# Patient Record
Sex: MALE | Race: WHITE | NOT HISPANIC OR LATINO | Employment: OTHER | URBAN - NONMETROPOLITAN AREA
[De-identification: names, ages, dates, MRNs, and addresses within clinical notes are randomized per-mention and may not be internally consistent; named-entity substitution may affect disease eponyms.]

---

## 2017-01-02 ENCOUNTER — HOSPITAL ENCOUNTER (OUTPATIENT)
Dept: RADIOLOGY | Facility: OTHER | Age: 62
End: 2017-01-02

## 2017-01-02 DIAGNOSIS — M54.16 RADICULOPATHY OF LUMBAR REGION: ICD-10-CM

## 2017-01-02 DIAGNOSIS — M48.061 SPINAL STENOSIS OF LUMBAR REGION: ICD-10-CM

## 2017-01-02 DIAGNOSIS — M79.605 PAIN OF LEFT LEG: ICD-10-CM

## 2017-01-02 DIAGNOSIS — M54.50 LOW BACK PAIN: ICD-10-CM

## 2017-01-30 ENCOUNTER — AMBULATORY - GICH (OUTPATIENT)
Dept: SCHEDULING | Facility: OTHER | Age: 62
End: 2017-01-30

## 2017-02-01 ENCOUNTER — AMBULATORY - GICH (OUTPATIENT)
Dept: RADIOLOGY | Facility: OTHER | Age: 62
End: 2017-02-01

## 2017-02-01 DIAGNOSIS — M54.16 RADICULOPATHY OF LUMBAR REGION: ICD-10-CM

## 2017-02-01 DIAGNOSIS — M54.50 LOW BACK PAIN: ICD-10-CM

## 2017-02-01 DIAGNOSIS — M79.605 PAIN OF LEFT LEG: ICD-10-CM

## 2017-02-01 DIAGNOSIS — M48.061 SPINAL STENOSIS OF LUMBAR REGION: ICD-10-CM

## 2017-02-07 ENCOUNTER — AMBULATORY - GICH (OUTPATIENT)
Dept: RADIOLOGY | Facility: OTHER | Age: 62
End: 2017-02-07

## 2017-02-07 ENCOUNTER — HOSPITAL ENCOUNTER (OUTPATIENT)
Dept: RADIOLOGY | Facility: OTHER | Age: 62
End: 2017-02-07

## 2017-02-07 DIAGNOSIS — M54.50 LOW BACK PAIN: ICD-10-CM

## 2017-02-07 DIAGNOSIS — M48.061 SPINAL STENOSIS OF LUMBAR REGION: ICD-10-CM

## 2017-02-07 DIAGNOSIS — M79.605 PAIN OF LEFT LEG: ICD-10-CM

## 2017-02-07 DIAGNOSIS — M54.16 RADICULOPATHY OF LUMBAR REGION: ICD-10-CM

## 2017-02-16 ENCOUNTER — COMMUNICATION - GICH (OUTPATIENT)
Dept: FAMILY MEDICINE | Facility: OTHER | Age: 62
End: 2017-02-16

## 2017-02-20 ENCOUNTER — HISTORY (OUTPATIENT)
Dept: FAMILY MEDICINE | Facility: OTHER | Age: 62
End: 2017-02-20

## 2017-02-20 ENCOUNTER — OFFICE VISIT - GICH (OUTPATIENT)
Dept: FAMILY MEDICINE | Facility: OTHER | Age: 62
End: 2017-02-20

## 2017-02-20 DIAGNOSIS — R20.0 ANESTHESIA OF SKIN: ICD-10-CM

## 2017-02-20 DIAGNOSIS — R03.0 ELEVATED BLOOD PRESSURE READING WITHOUT DIAGNOSIS OF HYPERTENSION: ICD-10-CM

## 2017-02-20 DIAGNOSIS — M48.061 SPINAL STENOSIS OF LUMBAR REGION: ICD-10-CM
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
## 2017-02-20 DIAGNOSIS — K58.0 IRRITABLE BOWEL SYNDROME WITH DIARRHEA: ICD-10-CM

## 2017-02-20 DIAGNOSIS — M54.16 RADICULOPATHY OF LUMBAR REGION: ICD-10-CM

## 2017-02-20 DIAGNOSIS — M79.2 NEURALGIA AND NEURITIS, UNSPECIFIED (CODE): ICD-10-CM

## 2017-02-20 LAB
ABSOLUTE BASOPHILS - HISTORICAL: 0.1 THOU/CU MM
ABSOLUTE EOSINOPHILS - HISTORICAL: 0.1 THOU/CU MM
ABSOLUTE LYMPHOCYTES - HISTORICAL: 1.8 THOU/CU MM (ref 0.9–2.9)
ABSOLUTE MONOCYTES - HISTORICAL: 0.4 THOU/CU MM
ABSOLUTE NEUTROPHILS - HISTORICAL: 3.9 THOU/CU MM (ref 1.7–7)
BASOPHILS # BLD AUTO: 1.4 %
C-REACTIVE PROTEIN - HISTORICAL: <1 MG/DL
EOSINOPHIL NFR BLD AUTO: 1.7 %
ERYTHROCYTE [DISTWIDTH] IN BLOOD BY AUTOMATED COUNT: 11.6 % (ref 11.5–15.5)
ERYTHROCYTE [SEDIMENTATION RATE] IN BLOOD: 2 MM/HR
HCT VFR BLD AUTO: 44.6 % (ref 37–53)
HEMOGLOBIN: 15.4 G/DL (ref 13.5–17.5)
LYMPHOCYTES NFR BLD AUTO: 28.5 % (ref 20–44)
MCH RBC QN AUTO: 31.2 PG (ref 26–34)
MCHC RBC AUTO-ENTMCNC: 34.4 G/DL (ref 32–36)
MCV RBC AUTO: 91 FL (ref 80–100)
MONOCYTES NFR BLD AUTO: 6.9 %
NEUTROPHILS NFR BLD AUTO: 61.5 % (ref 42–72)
PLATELET # BLD AUTO: 230 THOU/CU MM (ref 140–440)
PMV BLD: 7.4 FL (ref 6.5–11)
RED BLOOD COUNT - HISTORICAL: 4.91 MIL/CU MM (ref 4.3–5.9)
WHITE BLOOD COUNT - HISTORICAL: 6.3 THOU/CU MM (ref 4.5–11)

## 2017-02-22 LAB — LYME SCREEN W/REFLEX WEST BLOT - HISTORICAL: NEGATIVE

## 2017-03-03 ENCOUNTER — AMBULATORY - GICH (OUTPATIENT)
Dept: FAMILY MEDICINE | Facility: OTHER | Age: 62
End: 2017-03-03

## 2017-03-03 ENCOUNTER — HOSPITAL ENCOUNTER (OUTPATIENT)
Dept: RADIOLOGY | Facility: OTHER | Age: 62
End: 2017-03-03
Attending: FAMILY MEDICINE

## 2017-03-03 DIAGNOSIS — R20.0 ANESTHESIA OF SKIN: ICD-10-CM

## 2017-03-03 DIAGNOSIS — M48.061 SPINAL STENOSIS OF LUMBAR REGION: ICD-10-CM

## 2017-03-03 DIAGNOSIS — M54.16 RADICULOPATHY OF LUMBAR REGION: ICD-10-CM

## 2017-04-10 ENCOUNTER — COMMUNICATION - GICH (OUTPATIENT)
Dept: FAMILY MEDICINE | Facility: OTHER | Age: 62
End: 2017-04-10

## 2017-04-10 DIAGNOSIS — M48.061 SPINAL STENOSIS OF LUMBAR REGION: ICD-10-CM

## 2017-04-10 DIAGNOSIS — M54.16 RADICULOPATHY OF LUMBAR REGION: ICD-10-CM

## 2017-04-10 DIAGNOSIS — M54.50 LOW BACK PAIN: ICD-10-CM

## 2017-06-06 ENCOUNTER — OFFICE VISIT - GICH (OUTPATIENT)
Dept: FAMILY MEDICINE | Facility: OTHER | Age: 62
End: 2017-06-06

## 2017-06-06 ENCOUNTER — HISTORY (OUTPATIENT)
Dept: FAMILY MEDICINE | Facility: OTHER | Age: 62
End: 2017-06-06

## 2017-06-06 ENCOUNTER — COMMUNICATION - GICH (OUTPATIENT)
Dept: FAMILY MEDICINE | Facility: OTHER | Age: 62
End: 2017-06-06

## 2017-06-06 DIAGNOSIS — L25.9 CONTACT DERMATITIS: ICD-10-CM

## 2017-12-28 NOTE — TELEPHONE ENCOUNTER
Patient Information     Patient Name MRN Rj Martin [id number] Male 1955      Telephone Encounter by Aspen Rodgers at 2017  9:52 AM     Author:  Aspen Rodgers Service:  (none) Author Type:  (none)     Filed:  2017  9:52 AM Encounter Date:  2017 Status:  Signed     :  Aspen Rodgers            Pt notified of appointment.    Aspen Rodgers ....................  2017   9:52 AM

## 2017-12-28 NOTE — PROGRESS NOTES
Patient Information     Patient Name MRN Rj Martin [id number] Male 1955      Progress Notes by Darryl Carlisle MD at 2017  2:30 PM     Author:  Darryl Carlisle MD Service:  (none) Author Type:  Physician     Filed:  2017  3:46 PM Encounter Date:  2017 Status:  Signed     :  Darryl Carlisle MD (Physician)            SUBJECTIVE:  61 y.o. male who presents for evaluation of a rash on his ankles. The rash is bilateral and follows exactly the pattern of a paracytic said he just wore for half a day. The socks are a pair of cotton socks that he is warned many times, but they were in a drawer and he had not worn them since last fall. He asked his wife if she had use something different to wash them but there was no change in any detergent or any other issues. The rash is been quite itchy and causes some swelling down around the ankles. It's uncomfortable to walk but not really painful. There is no rash elsewhere.    Additional Review of Systems: see HPI:      Past Medical History:     Diagnosis  Date     Carpal tunnel syndrome     Both hands      Diverticulosis of sigmoid colon 2014     Essential and other specified forms of tremor 3/2/2011     HA (headache)      Multiple lipomas 2014     Pain in joint, multiple sites      Patellar fracture 09    Sustained right superior pole patellar fracture which underwent conservative management      RENAL CALCULUS     possible       Umbilical hernia 2014     URTICARIA, CHRONIC 3/2/2011        Current Outpatient Prescriptions       Medication  Sig Dispense Refill     cholestyramine-sucrose 4 G per scoop (QUESTRAN) 4 gram powder Take 1 Packet by mouth 2 times daily. 1 Container 1     hyoscyamine (LEVSIN) 0.125 mg tablet Take 1 tablet by mouth every 4 hours if needed for Other (Specify) (Abdominal cramps). 30 tablet 5     nortriptyline (PAMELOR) 10 mg capsule Take 1-5 capsules by mouth at bedtime. Start with  "one capsule at bedtime and titrate upward as needed. 100 capsule 0     traMADol (ULTRAM) 50 mg tablet Take 1-2 tablets by mouth every 6 hours if needed for Pain (use mainly at night--may take 2 tablets at a time at night if needed). 100 tablet 3     triamcinolone (ARISTOCORT; KENALOG) 0.1 % cream Apply  topically to affected area(s) 3 times daily. 80 g 3     No current facility-administered medications for this visit.      Medications have been reviewed by me and are current to the best of my knowledge and ability.      Allergies as of 06/06/2017 - Gautam as Reviewed 06/06/2017      Allergen  Reaction Noted     Versed [midazolam] Other - Describe In Comment Field 09/08/2014        OBJECTIVE:  /78  Ht 1.791 m (5' 10.51\")  Wt 105.7 kg (233 lb)  BMI 32.95 kg/m2  EXAM:  EXAM:  General Appearance: Pleasant, alert, appropriate appearance for age. No acute distress  Foot Exam: Normal pulses. No gross deformity. Both ankles are mildly swollen. There is an erythematous rash which extends from the toes all the way up to the top of where the socks contacted the skin. There is a perfect line of demarcation where the sock and it. The rash is not raised, there are no lesions, and no pitting.        ASSESSMENT/PLAN:  Contact dermatitis. The etiology is not clear. The rash, however, follows exactly the pattern of where the socks were in contact with the skin. I recommended avoiding those socks, and treat with triamcinolone cream. He does have prednisone at home which he can take if symptoms don't improve. Follow-up as needed for this problem.  Darryl Carlisle MD ....................  6/6/2017   3:46 PM            "

## 2017-12-30 NOTE — NURSING NOTE
Patient Information     Patient Name MRN Rj Martin [id number] Male 1955      Nursing Note by Aspen Rodgers at 2017  2:30 PM     Author:  Aspen Rodgers Service:  (none) Author Type:  (none)     Filed:  2017  2:36 PM Encounter Date:  2017 Status:  Signed     :  Aspen Rodgers            Patient presents to the clinic today for a bilateral foot rash.     Aspen Rodgers ....................  2017   2:27 PM

## 2018-01-02 NOTE — PROGRESS NOTES
Patient Information     Patient Name MRN Sex Rj Tavera [id number] Male 1955      Progress Notes by Helga Huang at 2017  2:26 PM     Author:  Helga Huang Service:  (none) Author Type:  (none)     Filed:  2017  2:26 PM Date of Service:  2017  2:26 PM Status:  Signed     :  Helga Huang            Falls Risk Criteria:    Age 65 and older or under age 4        Sensory deficits    Poor vision    Use of ambulatory aides    Impaired judgment    Unable to walk independently    Meets High Risk criteria for falls:  no

## 2018-01-02 NOTE — PROGRESS NOTES
Patient Information     Patient Name MRN Rj Martin [id number] Male 1955      Progress Notes by Helga Huang at 2017  2:27 PM     Author:  Helga Huang Service:  (none) Author Type:  (none)     Filed:  2017  2:27 PM Date of Service:  2017  2:27 PM Status:  Signed     :  Helga Huang            RECOVERY TIME  Some numbness may be present 2-4 hours after the injection, which could impair your normal driving, reflexes.  You will need someone to drive you home from your exam due to the effects of certain medications.    You may experience numbness and/or relief of your pain for up to 4-6 hours after the injection.  Your usual symptoms may return the night of the procedure and may possible be more severe than usual a day or two following.  Please keep track of your pain over the next several days and report how long the relief lasts to the doctor who referred you for this procedure.    The beneficial effects of the steroids usually require 2 to 3 days to take effect, buy may take as long as 5 to 7 days.  If there is no change in the pain, then investigation can be focused on other possible sources of your pain.  In either case, the information is useful to the doctor who referred you for this procedure.    POSSIBLE SIDE EFFECTS  Facial flushing (redness), occasional low grade fevers of 99.5F or less, hiccups, insomnia, headaches, increased heart rate, abdominal cramping, and/or a bloating feeling are side effects of the steroid medications and will go away 3 to 4 days after the injection.    Diabetic Patients  The steroids you have received may significantly increase your blood sugar levels.  Monitor your blood sugar level closely (4-6 times per day) for a period of 4 days or until your blood sugar level normalizes.  If your blood sugar level elevates significantly or you experience confusion, dizziness, sweating, please notify our primary physician and make him/her aware  that you have received steroids.

## 2018-01-02 NOTE — PROGRESS NOTES
Patient Information     Patient Name MRN Sex Rj Tavera [id number] Male 1955      Progress Notes by Helga Huang at 2017  2:26 PM     Author:  Helga Huang Service:  (none) Author Type:  (none)     Filed:  2017  2:26 PM Date of Service:  2017  2:26 PM Status:  Signed     :  Helga Huang            [place] Protocol    A. Pre-procedure verification complete yes  1-relevant information / documentation available, reviewed and properly matched to the patient; 2-consent accurate and complete, 3-equipment and supplies available    B. Site marking complete Yes  Site marked if not in continuous attendance with patient    C. TIME OUT completed yes  Time Out was conducted just prior to starting procedure to verify the eight required elements: 1-patient identity, 2-consent accurate and complete, 3-position, 4-correct side/site marked (if applicable), 5-procedure, 6-relevant images / results properly labeled and displayed (if applicable), 7-antibiotics / irrigation fluids (if applicable), 8-safety precautions.

## 2018-01-03 NOTE — TELEPHONE ENCOUNTER
Patient Information     Patient Name MRN Sex jR Tavera [id number] Male 1955      Telephone Encounter by Bhavana Deluca at 2017 11:32 AM     Author:  Bhavana Deluca Service:  (none) Author Type:  (none)     Filed:  2017 11:32 AM Encounter Date:  2017 Status:  Signed     :  Bhavana Deluca            Patient notified and will come in at 145 on .  Radha Deluca LPN ...... 2017 11:32 AM

## 2018-01-03 NOTE — NURSING NOTE
Patient Information     Patient Name MRN Rj Martin [id number] Male 1955      Nursing Note by Aspen Rodgers at 2017  1:45 PM     Author:  Aspen Rodgers Service:  (none) Author Type:  (none)     Filed:  2017  2:07 PM Encounter Date:  2017 Status:  Signed     :  Aspen Rodgers            Patient presents to the clinic today for a follow up on his back.  Aspen Rodgers LPN.................. 2017 1:49 PM

## 2018-01-03 NOTE — PROGRESS NOTES
Patient Information     Patient Name MRN Rj Martin [id number] Male 1955      Progress Notes by Darryl Carlisle MD at 2017  1:45 PM     Author:  Darryl Carlisle MD Service:  (none) Author Type:  Physician     Filed:  2017  9:00 AM Encounter Date:  2017 Status:  Signed     :  Darryl Carlisle MD (Physician)            SUBJECTIVE:  61 y.o. male who presents for discussion of several issues. The main one is his back. He has had 2 injections, neither of which has given him much benefit. He has pain in the low back, in the hip area, which radiates down the leg almost all the way to the toe. The pain seems to stop in the ankle or forefoot. The pain moves about, sometimes in the groin, sometimes laterally in the thigh, and in the calf and ankle area. It is quite consistent on the left side. On occasion he gets pain on the right side but not very often. Both legs seem to be numb at times, but there is no bowel or bladder dysfunction.    His MRI done last fall did reveal multiple abnormalities including nerve root compression at L4-5 on the left and also some nerve root compression on the right. His main symptoms, however, are on the left. There is also evidence of spinal stenosis.    Patient is interested in another injection if it may be helpful. He is not anxious for any surgery at this time, however if things don't improve that may be the next option.    He also reports a tick bite that he received last summer. He states he did develop a bull's-eye and a slight rash but then symptoms went away. He's had multiple aches and pains over the years and there didn't seem to be anything new or different, until recently he developed some pain around his left eye that encircled the eye. This came on fairly suddenly around the orbit, quite severe at first, but did not affect her vision or extraocular movements. The pain then gradually went away. He does have a small skin  lesion on the eyebrow which she wonders is connected. No other neurologic symptoms other than those associated with the back.    He's also had evidence of hip arthritis, however not real severe. The left hip is affected, which was also the side affected by his back. He does not have significant pain when walking other than when the back flares up.    He has occasional episodes of diarrhea but nothing real severe. There is no new change in his health status otherwise. He uses occasional tramadol for the pain and uses over-the-counter ibuprofen. He requests a refill of tramadol.    Additional Review of Systems: see HPI:  He denies any cardiopulmonary or other associated symptoms. No significant  problems. No new skin rashes, although he has a number of lumps that have been diagnosed as fibromas, which is a familial trait.    Past Medical History      Diagnosis   Date     Carpal tunnel syndrome       Both hands      Diverticulosis of sigmoid colon  1/28/2014     Essential and other specified forms of tremor  3/2/2011     HA (headache)       Multiple lipomas  1/20/2014     Pain in joint, multiple sites       Patellar fracture  05/06/09     Sustained right superior pole patellar fracture which underwent conservative management      RENAL CALCULUS       possible       Umbilical hernia  1/20/2014     URTICARIA, CHRONIC  3/2/2011        Current Outpatient Prescriptions       Medication  Sig Dispense Refill     cholestyramine-sucrose 4 G per scoop (QUESTRAN) 4 gram powder Take 1 Packet by mouth 2 times daily. 1 Container 1     hyoscyamine (LEVSIN) 0.125 mg tablet Take 1 tablet by mouth every 4 hours if needed for Other (Specify) (Abdominal cramps). 30 tablet 5     methylPREDNISolone (MEDROL) 4 mg tablet Take 2 tablets by mouth 3 times daily with meals for 5 days. 30 tablet 0     traMADol (ULTRAM) 50 mg tablet Take 1-2 tablets by mouth every 6 hours if needed for Pain (use mainly at night--may take 2 tablets at a time at  "night if needed). 100 tablet 3     No current facility-administered medications for this visit.      Medications have been reviewed by me and are current to the best of my knowledge and ability.      Allergies as of 02/20/2017 - Gautam as Reviewed 02/20/2017      Allergen  Reaction Noted     Versed [midazolam] Other - Describe In Comment Field 09/08/2014        OBJECTIVE:  Visit Vitals       /90     Ht 1.791 m (5' 10.51\")     Wt 106.6 kg (235 lb)     BMI 33.23 kg/m2     EXAM:  EXAM:  General Appearance: Pleasant, alert, appropriate appearance for age. No acute distress, sitting comfortably, however keeps his left leg extended because of discomfort.  Examination of the eyes reveal pupils to be equal round and reactive, extraocular movements intact. No visual loss. There is no loss of eye motion, the cranial nerves all appear intact.  Musculoskeletal Exam: Low back is tender and there is tenderness over the sciatic notch. Straight leg raising on the left is equivocal. There is no pain with pressure into the hip joint and he has fairly good internal and external rotation of the left hip.  Skin: No significant skin rashes. The area in the left eyebrow appears to be early developing seborrheic keratosis.  Neurologic Exam: Intact and nonfocal  Psychiatric Exam: Alert and oriented, appropriate affect.  MRI report was reviewed along with review of the 2 previous injections. Also reviewed the consultation at the spine center and reviewed all these findings with the patient.  CBC, sedimentation rate, and C-reactive protein are all normal. Lyme screening is pending.    ASSESSMENT/PLAN:  Persistent back pain with left lumbar radiculopathy, bilateral leg numbness, and history of spinal stenosis. Previous injections have been of minimal benefit. He will be referred to Dr. Nevarez for consultation regarding another injection. He may need to go back to the spine center to consider surgery if no improvement.    History of tick " bite and symptoms consistent with orbital neuritis. Symptoms have now resolved and there were no significant findings. We will check Lyme serology.    IBS-stable.    Pre-hypertension-stable.    Plan: He will be notified of lab results when the Lyme serology returns. Consultation scheduled. We'll follow-up again pending the results of these 2 issues.  Darryl Carlisle MD ....................  2/21/2017   8:59 AM

## 2018-01-03 NOTE — PROGRESS NOTES
Patient Information     Patient Name MRN Rj Martin [id number] Male 1955      Progress Notes by Olivia Henriquez R.T. (ARRT) at 2017  8:50 AM     Author:  Olivia Henriquez R.T. (ARRT) Service:  (none) Author Type:  [employer] - Registered Radiologic Technologist     Filed:  2017  8:50 AM Date of Service:  2017  8:50 AM Status:  Signed     :  Olivia Henriquez R.T. (ARRT) ([facility] - Registered Radiologic Technologist)            RECOVERY TIME  You may experience numbness and/or relief of your pain for up to 4-6 hours after the injection.  Your usual symptoms may return the night of the procedure and may possible be more severe than usual a day or two following.  Please keep track of your pain over the next several days and report how long the relief lasts to the doctor who referred you for this procedure.    The beneficial effects of the steroids usually require 2 to 3 days to take effect, buy may take as long as 5 to 7 days.  If there is no change in the pain, then investigation can be focused on other possible sources of your pain.  In either case, the information is useful to the doctor who referred you for this procedure.    POSSIBLE SIDE EFFECTS  Facial flushing (redness), occasional low grade fevers of 99.5F or less, hiccups, insomnia, headaches, increased heart rate, abdominal cramping, and/or a bloating feeling are side effects of the steroid medications and will go away 3 to 4 days after the injection.    Diabetic Patients  The steroids you have received may significantly increase your blood sugar levels.  Monitor your blood sugar level closely (4-6 times per day) for a period of 4 days or until your blood sugar level normalizes.  If your blood sugar level elevates significantly or you experience confusion, dizziness, sweating, please notify our primary physician and make him/her aware that you have received steroids.

## 2018-01-03 NOTE — PROGRESS NOTES
Patient Information     Patient Name MRN Sex Rj Tavera [id number] Male 1955      Progress Notes by Olivia Henriquez R.T. (ARRT) at 2017  8:50 AM     Author:  Olivia Henri[facility] R.T. ([facility] Service:  (none) Author Type:  [employer] - Registered Radiologic Technologist     Filed:  2017  8:51 AM Date of Service:  2017  8:50 AM Status:  Signed     :  Olivia Henriquez R.T. (ARRT) ([facility] - Registered Radiologic Technologist)            [place] Protocol    A. Pre-procedure verification complete yes  1-relevant information / documentation available, reviewed and properly matched to the patient; 2-consent accurate and complete, 3-equipment and supplies available    B. Site marking complete Yes  Site marked if not in continuous attendance with patient    C. TIME OUT completed yes  Time Out was conducted just prior to starting procedure to verify the eight required elements: 1-patient identity, 2-consent accurate and complete, 3-position, 4-correct side/site marked (if applicable), 5-procedure, 6-relevant images / results properly labeled and displayed (if applicable), 7-antibiotics / irrigation fluids (if applicable), 8-safety precautions.

## 2018-01-03 NOTE — PROGRESS NOTES
Patient Information     Patient Name MRN Rj Martin [id number] Male 1955      Progress Notes by Helga Huang at 3/3/2017  2:54 PM     Author:  Helga Huang Service:  (none) Author Type:  (none)     Filed:  3/3/2017  2:55 PM Date of Service:  3/3/2017  2:54 PM Status:  Signed     :  Helga Huang            RECOVERY TIME  Some numbness may be present 2-4 hours after the injection, which could impair your normal driving, reflexes.  You will need someone to drive you home from your exam due to the effects of certain medications.    You may experience numbness and/or relief of your pain for up to 4-6 hours after the injection.  Your usual symptoms may return the night of the procedure and may possible be more severe than usual a day or two following.  Please keep track of your pain over the next several days and report how long the relief lasts to the doctor who referred you for this procedure.    The beneficial effects of the steroids usually require 2 to 3 days to take effect, buy may take as long as 5 to 7 days.  If there is no change in the pain, then investigation can be focused on other possible sources of your pain.  In either case, the information is useful to the doctor who referred you for this procedure.    POSSIBLE SIDE EFFECTS  Facial flushing (redness), occasional low grade fevers of 99.5F or less, hiccups, insomnia, headaches, increased heart rate, abdominal cramping, and/or a bloating feeling are side effects of the steroid medications and will go away 3 to 4 days after the injection.    Diabetic Patients  The steroids you have received may significantly increase your blood sugar levels.  Monitor your blood sugar level closely (4-6 times per day) for a period of 4 days or until your blood sugar level normalizes.  If your blood sugar level elevates significantly or you experience confusion, dizziness, sweating, please notify our primary physician and make him/her aware  that you have received steroids.

## 2018-01-03 NOTE — PROGRESS NOTES
Patient Information     Patient Name MRN Sex Rj Tavera [id number] Male 1955      Progress Notes by Helga Huang at 3/3/2017  2:54 PM     Author:  Helga Huang Service:  (none) Author Type:  (none)     Filed:  3/3/2017  2:54 PM Date of Service:  3/3/2017  2:54 PM Status:  Signed     :  Helga Huang            [place] Protocol    A. Pre-procedure verification complete yes  1-relevant information / documentation available, reviewed and properly matched to the patient; 2-consent accurate and complete, 3-equipment and supplies available    B. Site marking complete Yes  Site marked if not in continuous attendance with patient    C. TIME OUT completed yes  Time Out was conducted just prior to starting procedure to verify the eight required elements: 1-patient identity, 2-consent accurate and complete, 3-position, 4-correct side/site marked (if applicable), 5-procedure, 6-relevant images / results properly labeled and displayed (if applicable), 7-antibiotics / irrigation fluids (if applicable), 8-safety precautions.

## 2018-01-03 NOTE — TELEPHONE ENCOUNTER
Patient Information     Patient Name MRN Rj Martin [id number] Male 1955      Telephone Encounter by Darryl Carlisle MD at 2017  7:55 AM     Author:  Darryl Carlisle MD Service:  (none) Author Type:  Physician     Filed:  2017  7:57 AM Encounter Date:  2017 Status:  Signed     :  Darryl Carlisle MD (Physician)            Discussed the patient's back issues with him. He is having significant problems and has had injections which have been borderline effective. After discussion I recommended that we see him in clinic and review what has been done thus far and attempt to establish an appropriate plan.    Please schedule him into my schedule on Monday,  and one of my approval spots.  Darryl Carlisle MD ....................  2017   7:56 AM

## 2018-01-03 NOTE — PROGRESS NOTES
Patient Information     Patient Name MRN Sex Rj Tavera [id number] Male 1955      Progress Notes by Helga Huang at 3/3/2017  2:54 PM     Author:  Helga Huang Service:  (none) Author Type:  (none)     Filed:  3/3/2017  2:54 PM Date of Service:  3/3/2017  2:54 PM Status:  Signed     :  Helga Huang            Falls Risk Criteria:    Age 65 and older or under age 4        Sensory deficits    Poor vision    Use of ambulatory aides    Impaired judgment    Unable to walk independently    Meets High Risk criteria for falls:  no

## 2018-01-03 NOTE — PROGRESS NOTES
Patient Information     Patient Name MRN Sex Rj Tavera [id number] Male 1955      Progress Notes by Olivia Henriquez R.T. (ARRT) at 2017  8:51 AM     Author:  Olivia Henriquez R.T. (ARRT) Service:  (none) Author Type:  [employer] - Registered Radiologic Technologist     Filed:  2017  8:51 AM Date of Service:  2017  8:51 AM Status:  Signed     :  Olivia Henriquez R.T. (ARRT) ([facility] - Registered Radiologic Technologist)            Falls Risk Criteria:    Age 65 and older or under age 4        Sensory deficits    Poor vision    Use of ambulatory aides    Impaired judgment    Unable to walk independently    Meets High Risk criteria for falls:  no

## 2018-01-04 NOTE — TELEPHONE ENCOUNTER
Patient Information     Patient Name MRN Sex Rj Tavera [id number] Male 1955      Telephone Encounter by Darryl Carlisle MD at 2017 11:39 AM     Author:  Darryl Carlisle MD Service:  (none) Author Type:  Physician     Filed:  2017 11:40 AM Encounter Date:  4/10/2017 Status:  Signed     :  Darryl Carlisle MD (Physician)            Patient is having increasing pain with radiculopathy. Injections have not been of much benefit. He has been in contact with the spine center at [facility] about possible surgery. He is self-employed, and is quite worried about his inability to work as he has no other source of income. He was told that he would probably not be able to work for several months. He has requested a second opinion. He would like to see the orthopedic group in [place], where his mother had surgery. I advised him to get the disc of his x-rays and MRI to take with him. An appointment will be arranged.  Darryl Carlisle MD ....................  2017   11:40 AM

## 2018-01-19 ENCOUNTER — HISTORY (OUTPATIENT)
Dept: SURGERY | Facility: OTHER | Age: 63
End: 2018-01-19

## 2018-01-19 ENCOUNTER — OFFICE VISIT - GICH (OUTPATIENT)
Dept: SURGERY | Facility: OTHER | Age: 63
End: 2018-01-19

## 2018-01-19 DIAGNOSIS — K42.9 UMBILICAL HERNIA WITHOUT OBSTRUCTION OR GANGRENE: ICD-10-CM
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
## 2018-01-20 LAB — LYME SCREEN W/REFLEX WEST BLOT - HISTORICAL: NEGATIVE

## 2018-01-26 ENCOUNTER — HISTORY (OUTPATIENT)
Dept: SURGERY | Facility: OTHER | Age: 63
End: 2018-01-26

## 2018-01-26 ENCOUNTER — SURGERY (OUTPATIENT)
Dept: SURGERY | Facility: OTHER | Age: 63
End: 2018-01-26

## 2018-01-26 ENCOUNTER — HOSPITAL ENCOUNTER (OUTPATIENT)
Dept: SURGERY | Facility: OTHER | Age: 63
Discharge: HOME | End: 2018-01-26
Attending: SURGERY | Admitting: SURGERY

## 2018-01-26 DIAGNOSIS — K42.9 UMBILICAL HERNIA WITHOUT OBSTRUCTION OR GANGRENE: ICD-10-CM

## 2018-01-27 VITALS
BODY MASS INDEX: 32.62 KG/M2 | WEIGHT: 233 LBS | DIASTOLIC BLOOD PRESSURE: 78 MMHG | SYSTOLIC BLOOD PRESSURE: 130 MMHG | HEIGHT: 71 IN

## 2018-01-27 VITALS
DIASTOLIC BLOOD PRESSURE: 90 MMHG | SYSTOLIC BLOOD PRESSURE: 136 MMHG | WEIGHT: 235 LBS | HEIGHT: 71 IN | BODY MASS INDEX: 32.9 KG/M2

## 2018-02-02 ENCOUNTER — OFFICE VISIT - GICH (OUTPATIENT)
Dept: SURGERY | Facility: OTHER | Age: 63
End: 2018-02-02

## 2018-02-02 ENCOUNTER — HISTORY (OUTPATIENT)
Dept: SURGERY | Facility: OTHER | Age: 63
End: 2018-02-02

## 2018-02-02 DIAGNOSIS — K42.9 UMBILICAL HERNIA WITHOUT OBSTRUCTION OR GANGRENE: ICD-10-CM

## 2018-02-02 DIAGNOSIS — Z09 ENCOUNTER FOR FOLLOW-UP EXAMINATION AFTER COMPLETED TREATMENT FOR CONDITIONS OTHER THAN MALIGNANT NEOPLASM: ICD-10-CM

## 2018-02-09 VITALS
SYSTOLIC BLOOD PRESSURE: 148 MMHG | DIASTOLIC BLOOD PRESSURE: 86 MMHG | WEIGHT: 237 LBS | BODY MASS INDEX: 33.93 KG/M2 | HEIGHT: 70 IN | HEART RATE: 88 BPM

## 2018-02-09 VITALS
WEIGHT: 236.8 LBS | HEART RATE: 80 BPM | DIASTOLIC BLOOD PRESSURE: 92 MMHG | SYSTOLIC BLOOD PRESSURE: 150 MMHG | BODY MASS INDEX: 33.52 KG/M2

## 2018-02-10 ENCOUNTER — HOSPITAL ENCOUNTER (EMERGENCY)
Facility: OTHER | Age: 63
Discharge: HOME | End: 2018-02-10
Attending: INTERNAL MEDICINE | Admitting: INTERNAL MEDICINE
Payer: COMMERCIAL

## 2018-02-10 VITALS
HEART RATE: 88 BPM | DIASTOLIC BLOOD PRESSURE: 67 MMHG | SYSTOLIC BLOOD PRESSURE: 114 MMHG | OXYGEN SATURATION: 98 % | TEMPERATURE: 99.2 F | RESPIRATION RATE: 18 BRPM

## 2018-02-10 DIAGNOSIS — N10 ACUTE PYELONEPHRITIS: Primary | ICD-10-CM

## 2018-02-10 LAB
ALBUMIN SERPL-MCNC: 3.9 G/DL (ref 3.5–5.7)
ALBUMIN UR-MCNC: 100 MG/DL
ALP SERPL-CCNC: 46 U/L (ref 34–104)
ALT SERPL W P-5'-P-CCNC: 12 U/L (ref 7–52)
ANION GAP SERPL CALCULATED.3IONS-SCNC: 9 MMOL/L (ref 3–14)
APPEARANCE UR: CLEAR
AST SERPL W P-5'-P-CCNC: 10 U/L (ref 13–39)
BACTERIA #/AREA URNS HPF: ABNORMAL /HPF
BILIRUB SERPL-MCNC: 1.1 MG/DL (ref 0.3–1)
BILIRUB UR QL STRIP: ABNORMAL
BUN SERPL-MCNC: 15 MG/DL (ref 7–25)
CALCIUM SERPL-MCNC: 9.7 MG/DL (ref 8.6–10.3)
CHLORIDE SERPL-SCNC: 103 MMOL/L (ref 98–107)
CO2 SERPL-SCNC: 24 MMOL/L (ref 21–31)
COLOR UR AUTO: YELLOW
CREAT SERPL-MCNC: 1.07 MG/DL (ref 0.7–1.3)
DIFFERENTIAL METHOD BLD: ABNORMAL
ERYTHROCYTE [DISTWIDTH] IN BLOOD BY AUTOMATED COUNT: 12.9 % (ref 10–15)
GFR SERPL CREATININE-BSD FRML MDRD: 70 ML/MIN/1.7M2
GLUCOSE SERPL-MCNC: 167 MG/DL (ref 70–105)
GLUCOSE UR STRIP-MCNC: NEGATIVE MG/DL
HCT VFR BLD AUTO: 42.7 % (ref 40–53)
HGB BLD-MCNC: 15 G/DL (ref 13.3–17.7)
HGB UR QL STRIP: ABNORMAL
KETONES UR STRIP-MCNC: ABNORMAL MG/DL
LEUKOCYTE ESTERASE UR QL STRIP: ABNORMAL
LYMPHOCYTES # BLD AUTO: 1.2 10E9/L (ref 0.8–5.3)
LYMPHOCYTES NFR BLD AUTO: 6 %
MCH RBC QN AUTO: 31.1 PG (ref 26.5–33)
MCHC RBC AUTO-ENTMCNC: 35.1 G/DL (ref 31.5–36.5)
MCV RBC AUTO: 88 FL (ref 78–100)
MONOCYTES # BLD AUTO: 1.4 10E9/L (ref 0–1.3)
MONOCYTES NFR BLD AUTO: 7 %
NEUTROPHILS # BLD AUTO: 16.9 10E9/L (ref 1.6–8.3)
NEUTROPHILS NFR BLD AUTO: 87 %
NITRATE UR QL: POSITIVE
NON-SQ EPI CELLS #/AREA URNS LPF: ABNORMAL /LPF
PH UR STRIP: 6 PH (ref 5–7)
PLATELET # BLD AUTO: 217 10E9/L (ref 150–450)
POTASSIUM SERPL-SCNC: 3.8 MMOL/L (ref 3.5–5.1)
PROT SERPL-MCNC: 6.3 G/DL (ref 6.4–8.9)
RBC # BLD AUTO: 4.83 10E12/L (ref 4.4–5.9)
RBC #/AREA URNS AUTO: ABNORMAL /HPF
SODIUM SERPL-SCNC: 136 MMOL/L (ref 134–144)
SOURCE: ABNORMAL
SP GR UR STRIP: 1.02 (ref 1–1.03)
UROBILINOGEN UR STRIP-ACNC: 0.2 EU/DL (ref 0.2–1)
WBC # BLD AUTO: 19.5 10E9/L (ref 4–11)
WBC #/AREA URNS AUTO: >100 /HPF

## 2018-02-10 PROCEDURE — 85025 COMPLETE CBC W/AUTO DIFF WBC: CPT | Performed by: INTERNAL MEDICINE

## 2018-02-10 PROCEDURE — 80053 COMPREHEN METABOLIC PANEL: CPT | Performed by: INTERNAL MEDICINE

## 2018-02-10 PROCEDURE — 99285 EMERGENCY DEPT VISIT HI MDM: CPT | Mod: Z6 | Performed by: INTERNAL MEDICINE

## 2018-02-10 PROCEDURE — 25000132 ZZH RX MED GY IP 250 OP 250 PS 637

## 2018-02-10 PROCEDURE — 87186 SC STD MICRODIL/AGAR DIL: CPT | Performed by: INTERNAL MEDICINE

## 2018-02-10 PROCEDURE — 87086 URINE CULTURE/COLONY COUNT: CPT | Performed by: INTERNAL MEDICINE

## 2018-02-10 PROCEDURE — 96375 TX/PRO/DX INJ NEW DRUG ADDON: CPT | Performed by: INTERNAL MEDICINE

## 2018-02-10 PROCEDURE — 96365 THER/PROPH/DIAG IV INF INIT: CPT | Performed by: INTERNAL MEDICINE

## 2018-02-10 PROCEDURE — 99285 EMERGENCY DEPT VISIT HI MDM: CPT | Mod: 25 | Performed by: INTERNAL MEDICINE

## 2018-02-10 PROCEDURE — 96368 THER/DIAG CONCURRENT INF: CPT | Performed by: INTERNAL MEDICINE

## 2018-02-10 PROCEDURE — 81001 URINALYSIS AUTO W/SCOPE: CPT | Performed by: INTERNAL MEDICINE

## 2018-02-10 PROCEDURE — 87088 URINE BACTERIA CULTURE: CPT | Performed by: INTERNAL MEDICINE

## 2018-02-10 PROCEDURE — 25000132 ZZH RX MED GY IP 250 OP 250 PS 637: Performed by: INTERNAL MEDICINE

## 2018-02-10 PROCEDURE — 25000128 H RX IP 250 OP 636: Performed by: INTERNAL MEDICINE

## 2018-02-10 PROCEDURE — 25000128 H RX IP 250 OP 636

## 2018-02-10 PROCEDURE — 36415 COLL VENOUS BLD VENIPUNCTURE: CPT | Performed by: INTERNAL MEDICINE

## 2018-02-10 RX ORDER — ACETAMINOPHEN 500 MG
TABLET ORAL
Status: COMPLETED
Start: 2018-02-10 | End: 2018-02-10

## 2018-02-10 RX ORDER — CEFTRIAXONE SODIUM 1 G/50ML
INJECTION, SOLUTION INTRAVENOUS
Status: DISCONTINUED
Start: 2018-02-10 | End: 2018-02-10 | Stop reason: HOSPADM

## 2018-02-10 RX ORDER — SODIUM CHLORIDE 9 MG/ML
INJECTION, SOLUTION INTRAVENOUS
Status: DISCONTINUED
Start: 2018-02-10 | End: 2018-02-10 | Stop reason: HOSPADM

## 2018-02-10 RX ORDER — SODIUM CHLORIDE 9 MG/ML
INJECTION, SOLUTION INTRAVENOUS
Status: COMPLETED
Start: 2018-02-10 | End: 2018-02-10

## 2018-02-10 RX ORDER — MAGNESIUM HYDROXIDE/ALUMINUM HYDROXICE/SIMETHICONE 120; 1200; 1200 MG/30ML; MG/30ML; MG/30ML
30 SUSPENSION ORAL ONCE
Status: COMPLETED | OUTPATIENT
Start: 2018-02-10 | End: 2018-02-10

## 2018-02-10 RX ORDER — MAGNESIUM HYDROXIDE/ALUMINUM HYDROXICE/SIMETHICONE 120; 1200; 1200 MG/30ML; MG/30ML; MG/30ML
SUSPENSION ORAL
Status: DISCONTINUED
Start: 2018-02-10 | End: 2018-02-10 | Stop reason: HOSPADM

## 2018-02-10 RX ORDER — CEFTRIAXONE SODIUM 1 G/50ML
1 INJECTION, SOLUTION INTRAVENOUS ONCE
Status: COMPLETED | OUTPATIENT
Start: 2018-02-10 | End: 2018-02-10

## 2018-02-10 RX ORDER — KETOROLAC TROMETHAMINE 30 MG/ML
INJECTION, SOLUTION INTRAMUSCULAR; INTRAVENOUS
Status: COMPLETED
Start: 2018-02-10 | End: 2018-02-10

## 2018-02-10 RX ORDER — MAGNESIUM HYDROXIDE/ALUMINUM HYDROXICE/SIMETHICONE 120; 1200; 1200 MG/30ML; MG/30ML; MG/30ML
30 SUSPENSION ORAL ONCE
Status: DISCONTINUED | OUTPATIENT
Start: 2018-02-10 | End: 2018-02-10 | Stop reason: HOSPADM

## 2018-02-10 RX ORDER — ACETAMINOPHEN 500 MG
TABLET ORAL
Status: DISCONTINUED
Start: 2018-02-10 | End: 2018-02-10 | Stop reason: HOSPADM

## 2018-02-10 RX ORDER — KETOROLAC TROMETHAMINE 30 MG/ML
30 INJECTION, SOLUTION INTRAMUSCULAR; INTRAVENOUS ONCE
Status: COMPLETED | OUTPATIENT
Start: 2018-02-10 | End: 2018-02-10

## 2018-02-10 RX ORDER — ACETAMINOPHEN 500 MG
1000 TABLET ORAL ONCE
Status: COMPLETED | OUTPATIENT
Start: 2018-02-10 | End: 2018-02-10

## 2018-02-10 RX ADMIN — SODIUM CHLORIDE 1000 ML: 9 INJECTION, SOLUTION INTRAVENOUS at 18:50

## 2018-02-10 RX ADMIN — KETOROLAC TROMETHAMINE 30 MG: 30 INJECTION, SOLUTION INTRAMUSCULAR at 16:06

## 2018-02-10 RX ADMIN — Medication 1000 MG: at 18:48

## 2018-02-10 RX ADMIN — ACETAMINOPHEN 1000 MG: 500 TABLET, FILM COATED ORAL at 18:48

## 2018-02-10 RX ADMIN — CEFTRIAXONE SODIUM 1 G: 1 INJECTION, SOLUTION INTRAVENOUS at 15:22

## 2018-02-10 RX ADMIN — TAZOBACTAM SODIUM AND PIPERACILLIN SODIUM 3.38 G: 375; 3 INJECTION, SOLUTION INTRAVENOUS at 16:05

## 2018-02-10 RX ADMIN — Medication 1000 ML: at 18:50

## 2018-02-10 RX ADMIN — SODIUM CHLORIDE 1000 ML: 900 INJECTION, SOLUTION INTRAVENOUS at 15:21

## 2018-02-10 RX ADMIN — KETOROLAC TROMETHAMINE 30 MG: 30 INJECTION, SOLUTION INTRAMUSCULAR; INTRAVENOUS at 16:06

## 2018-02-10 RX ADMIN — ALUMINUM HYDROXIDE, MAGNESIUM HYDROXIDE, AND SIMETHICONE 30 ML: 200; 200; 20 SUSPENSION ORAL at 17:32

## 2018-02-10 ASSESSMENT — ENCOUNTER SYMPTOMS
LIGHT-HEADEDNESS: 1
BACK PAIN: 0
ARTHRALGIAS: 0
FATIGUE: 1
ADENOPATHY: 0
AGITATION: 0
FLANK PAIN: 1
HEMATURIA: 0
SHORTNESS OF BREATH: 0
ABDOMINAL PAIN: 0
FEVER: 1
ABDOMINAL DISTENTION: 0
DYSURIA: 1
CHILLS: 1
FREQUENCY: 1
COUGH: 0
CONFUSION: 1

## 2018-02-10 NOTE — ED NOTES
Patient was in the hospital recently for COPD, and now is getting more SOB and weak in the last few days.

## 2018-02-10 NOTE — ED AVS SNAPSHOT
[facility]    [address]    [place] [address]    Phone:  [phone]    Fax:  [phone]                                       Rj Juarez   MRN: [id number]    Department:  [facility]   Date of Visit:  2/10/2018           After Visit Summary Signature Page     I have received my discharge instructions, and my questions have been answered. I have discussed any challenges I see with this plan with the nurse or doctor.    ..........................................................................................................................................  Patient/Patient Representative Signature      ..........................................................................................................................................  Patient Representative Print Name and Relationship to Patient    ..................................................               ................................................  Date                                            Time    ..........................................................................................................................................  Reviewed by Signature/Title    ...................................................              ..............................................  Date                                                            Time

## 2018-02-10 NOTE — ED AVS SNAPSHOT
[facility]    [address]    [place] [address]    Phone:  [phone]    Fax:  [phone]                                       Rj Juarez   MRN: [id number]    Department:  [facility]   Date of Visit:  2/10/2018           Patient Information     Date Of Birth          1955        Your diagnoses for this visit were:     Acute pyelonephritis        You were seen by Dawood Bearden MD.      Follow-up Information     Call to follow up.    Why:  Your primary care provider as needed.  Return to emergency room if symptoms worsen.        Discharge Instructions       Take Augmentin twice daily until gone.    Push oral hydration, prevent dehydration.    Ibuprofen 200 mg tablet - take with food -- 3 or 4 times daily. Up to 3 tablets per dose - maximum 12 per day.    --- Or ---     Naproxen 220 to 440 mg tablet(s) - take 220 to 440 mg with food Twice daily as needed for pain.      ------- in addition to -------    Tylenol 500 mgtablet - 1 or 2 every 4 to 6 hours as needed for pain - maximum 8 per day.     --- Or ---     Tylenol Arthritis 650 mg tablet  -- up to every 4 times daily.    --- Maximum total Tylenol in 24 hours -- is 4,000 mg.       Discharge References/Attachments     PYELONEPHRITIS, MALE (ADULT) (ENGLISH)      Future Appointments        Provider Department Dept Phone Center    2/20/2018 9:20 AM Ly Frances MD [facility] [phone] [facility]      24 Hour Appointment Hotline       To make an appointment at any [facility], call [phone] ([phone]). If you don't have a family doctor or clinic, we will help you find one. [facility] are conveniently located to serve the needs of you and your family.             Review of your medicines      START taking        Dose / Directions Last dose taken    amoxicillin-clavulanate 875-125 MG per tablet   Commonly known as:  AUGMENTIN   Dose:  1 tablet  "  Quantity:  20 tablet        Take 1 tablet by mouth 2 times daily   Refills:  0          Our records show that you are taking the medicines listed below. If these are incorrect, please call your family doctor or clinic.        Dose / Directions Last dose taken    IBUPROFEN PO   Dose:  400 mg        Take 400 mg by mouth   Refills:  0                Prescriptions were sent or printed at these locations (1 Prescription)                   [facility]   [address] [place] [address]    Telephone:     Fax:     Hours:                  [facility] (1 of 1)         amoxicillin-clavulanate (AUGMENTIN) 875-125 MG per tablet                Procedures and tests performed during your visit     *UA reflex to Microscopic    CBC with platelets differential    Comprehensive metabolic panel    Urine Culture Aerobic Bacterial    Urine Microscopic      Orders Needing Specimen Collection     None      Pending Results     Date and Time Order Name Status Description    2/10/2018 1620 Urine Culture Aerobic Bacterial In process             Pending Culture Results     Date and Time Order Name Status Description    2/10/2018 1620 Urine Culture Aerobic Bacterial In process             Thank you for choosing [facility]       Thank you for choosing [facility] for your care. Our goal is always to provide you with excellent care. Hearing back from our patients is one way we can continue to improve our services. Please take a few minutes to complete the written survey that you may receive in the mail after you visit with us. Thank you!        [employer] Information     [employer] lets you send messages to your doctor, view your test results, renew your prescriptions, schedule appointments and more. To sign up, go to [email or web address] . Click on \"Log in\" on the left side of the screen, which will take you to the Welcome page. Then click on \"Sign up Now\" on the right side of the page.     You will be asked to " enter the access code listed below, as well as some personal information. Please follow the directions to create your username and password.     Your access code is: [email or web address]  Expires: 2018  8:06 PM     Your access code will  in 90 days. If you need help or a new code, please call your [facility] clinic or [phone].        Care EveryWhere ID     This is your Care EveryWhere ID. This could be used by other organizations to access your [facility] medical records  [id number]        Equal Access to Services     [facility]: Hadii leo Salinas, waliliya emerson, alhaji kaaljimenez drake, julisa mcmullen . So [facility] [phone].    ATENCIÓN: Si habla español, tiene a garcia disposición servicios gratuitos de asistencia lingüística. Llame al [phone].    We comply with applicable federal civil rights laws and [place] laws. We do not discriminate on the basis of race, color, national origin, age, disability, sex, sexual orientation, or gender identity.            After Visit Summary       This is your record. Keep this with you and show to your community pharmacist(s) and doctor(s) at your next visit.

## 2018-02-10 NOTE — ED NOTES
Patient had hernia surgery 2 weeks ago, and is now having fevers, pain with urination and low back pain.  Feels weak.

## 2018-02-10 NOTE — ED NOTES
Up ambulating around hallways and is feeling strong. States his back is hurting from laying in bed.

## 2018-02-11 ENCOUNTER — TELEPHONE (OUTPATIENT)
Dept: FAMILY MEDICINE | Facility: OTHER | Age: 63
End: 2018-02-11

## 2018-02-11 NOTE — TELEPHONE ENCOUNTER
Reviewed ED visit and lab work and discuss with patient.  Will see him in follow-up later this week.  FRANCISCO BARKER MD

## 2018-02-11 NOTE — ED PROVIDER NOTES
History     Chief Complaint   Patient presents with     Post-op Problem     pain with urination, fevers     The history is provided by the patient and the spouse.     Rj Juarez is a 62 year old male who presents today for evaluation of dysuria and rigors.    Reports starting around 9 AM yesterday he began having dysuria.  Gradually worsened throughout the day.  States he was initially urinating a lot today now, has not urinated much at all.  He's been having fevers and shaking chills.  Bilateral kidney pain/flank pain.  Weakness and fatigue.    Reports he did have a little bit of flank pain left-sided for about a week urination.    Had umbilical hernia surgery recently.    Problem List:    There are no active problems to display for this patient.       Past Medical History:    Past Medical History:   Diagnosis Date     Benign lipomatous neoplasm      Calculus of kidney      Carpal tunnel syndrome      Closed fracture of patella      Diverticulosis of large intestine without perforation or abscess without bleeding      Headache      Other specified forms of tremor      Other urticaria (CODE)      Pain in joint      Umbilical hernia without obstruction or gangrene      Umbilical hernia without obstruction or gangrene        Past Surgical History:    Past Surgical History:   Procedure Laterality Date     COLONOSCOPY      2009,2014,F/U 2019     ESOPHAGOSCOPY, GASTROSCOPY, DUODENOSCOPY (EGD), COMBINED      1/28/14,EGD     OTHER SURGICAL HISTORY      9/10/2014,73716.0,[place] REPAIR ING HERNIA  >5 TRS BETTINA     OTHER SURGICAL HISTORY      1/26/2018,41261.0,[place] REPAIR UMBILICAL NAZARIO  >5 TRS REDUC     RELEASE CARPAL TUNNEL      3,12/2004,Both hands     SIGMOIDOSCOPY FLEXIBLE      No Comments Provided       Family History:    Family History   Problem Relation Age of Onset     Hypertension Mother      Hypertension     DIABETES Mother      Diabetes     Other - See Comments Mother      Spinal stenosis     Other - See  Comments Father      scleroderma which was quite severe     CANCER Father      Cancer, of adenocarcinoma of the lung.  He was a nonsmoker.     DIABETES Brother      Diabetes     Other - See Comments Brother      Pancreatitis     Substance Abuse Brother      Alcohol/Drug,Alcoholic, has been through treatment a number of times.       Social History:  Marital Status:   [2]  Social History   Substance Use Topics     Smoking status: Never Smoker     Smokeless tobacco: Never Used     Alcohol use No      Comment: Alcoholic Drinks/day: not currently consuming any alcohol 2014        Medications:      IBUPROFEN PO   amoxicillin-clavulanate (AUGMENTIN) 875-125 MG per tablet         Review of Systems   Constitutional: Positive for chills, fatigue and fever.   HENT: Negative for congestion.    Respiratory: Negative for cough and shortness of breath.    Cardiovascular: Negative for chest pain.   Gastrointestinal: Negative for abdominal distention and abdominal pain.   Genitourinary: Positive for dysuria, flank pain, frequency and urgency. Negative for hematuria.   Musculoskeletal: Negative for arthralgias and back pain.   Neurological: Positive for light-headedness.   Hematological: Negative for adenopathy.   Psychiatric/Behavioral: Positive for confusion. Negative for agitation.       Physical Exam   BP: 150/79  Pulse: 102  Temp: 98.9  F (37.2  C)  Resp: 18  SpO2: 95 %  Lying Orthostatic BP: 117/69  Lying Orthostatic Pulse: 85 bpm  Sitting Orthostatic BP: 126/81  Sitting Orthostatic Pulse: 84 bpm  Standing Orthostatic BP: 138/73  Standing Orthostatic Pulse: 94 bpm      Physical Exam   Constitutional: He appears well-developed.   HENT:   Head: Normocephalic and atraumatic.   Eyes: No scleral icterus.   Cardiovascular: Regular rhythm and intact distal pulses.    Tachycardic rate.   Pulmonary/Chest: Effort normal and breath sounds normal. No respiratory distress. He has no wheezes.   Abdominal: Soft. There is  tenderness.   Healing umbilical hernia incision.   Musculoskeletal: Normal range of motion. He exhibits tenderness. He exhibits no edema.   CVA tenderness to percussion bilaterally.   Lymphadenopathy:     He has no cervical adenopathy.   Neurological: He is alert. No cranial nerve deficit.   Skin: Skin is warm and dry.   Psychiatric: He has a normal mood and affect.     Results for orders placed or performed during the hospital encounter of 02/10/18   *UA reflex to Microscopic   Result Value Ref Range    Color Urine Yellow     Appearance Urine Clear     Glucose Urine Negative NEG^Negative mg/dL    Bilirubin Urine Small (A) NEG^Negative    Ketones Urine Trace (A) NEG^Negative mg/dL    Specific Gravity Urine 1.025 1.003 - 1.035    Blood Urine Moderate (A) NEG^Negative    pH Urine 6.0 5.0 - 7.0 pH    Protein Albumin Urine 100 (A) NEG^Negative mg/dL    Urobilinogen Urine 0.2 0.2 - 1.0 EU/dL    Nitrite Urine Positive (A) NEG^Negative    Leukocyte Esterase Urine Moderate (A) NEG^Negative    Source Midstream Urine    Urine Microscopic   Result Value Ref Range    WBC Urine >100 (A) OTO2^O - 2 /HPF    RBC Urine 2-5 (A) OTO2^O - 2 /HPF    Squamous Epithelial /LPF Urine Few FEW^Few /LPF    Bacteria Urine Many (A) NEG^Negative /HPF   CBC with platelets differential   Result Value Ref Range    WBC 19.5 (H) 4.0 - 11.0 10e9/L    RBC Count 4.83 4.4 - 5.9 10e12/L    Hemoglobin 15.0 13.3 - 17.7 g/dL    Hematocrit 42.7 40.0 - 53.0 %    MCV 88 78 - 100 fl    MCH 31.1 26.5 - 33.0 pg    MCHC 35.1 31.5 - 36.5 g/dL    RDW 12.9 10.0 - 15.0 %    Platelet Count 217 150 - 450 10e9/L    Diff Method Automated Method     % Neutrophils 87.0 %    % Lymphocytes 6.0 %    % Monocytes 7.0 %    Absolute Neutrophil 16.9 (H) 1.6 - 8.3 10e9/L    Absolute Lymphocytes 1.2 0.8 - 5.3 10e9/L    Absolute Monocytes 1.4 (H) 0.0 - 1.3 10e9/L   Comprehensive metabolic panel   Result Value Ref Range    Sodium 136 134 - 144 mmol/L    Potassium 3.8 3.5 - 5.1 mmol/L     "Chloride 103 98 - 107 mmol/L    Carbon Dioxide 24 21 - 31 mmol/L    Anion Gap 9 3 - 14 mmol/L    Glucose 167 (H) 70 - 105 mg/dL    Urea Nitrogen 15 7 - 25 mg/dL    Creatinine 1.07 0.70 - 1.30 mg/dL    GFR Estimate 70 >60 mL/min/1.7m2    GFR Estimate If Black 84 >60 mL/min/1.7m2    Calcium 9.7 8.6 - 10.3 mg/dL    Bilirubin Total 1.1 (H) 0.3 - 1.0 mg/dL    Albumin 3.9 3.5 - 5.7 g/dL    Protein Total 6.3 (L) 6.4 - 8.9 g/dL    Alkaline Phosphatase 46 34 - 104 U/L    ALT 12 7 - 52 U/L    AST 10 (L) 13 - 39 U/L     Orders Placed This Encounter   Procedures     *UA reflex to Microscopic     Standing Status:   Standing     Number of Occurrences:   1     Urine Microscopic     Standing Status:   Standing     Number of Occurrences:   1     CBC with platelets differential     Last Lab Result: Hemoglobin (g/dL)       Date                     Value                 02/20/2017               15.4             ----------     Standing Status:   Standing     Number of Occurrences:   1     Comprehensive metabolic panel     Standing Status:   Standing     Number of Occurrences:   1     Urine Culture Aerobic Bacterial     For bacterial culture ONLY.  If request is for yeast or yeast surveillance, order \"Yeast Culture [id number]).     Standing Status:   Standing     Number of Occurrences:   1     Medications   alum & mag hydroxide-simethicone (MYLANTA/MAALOX) suspension 30 mL ( Oral Canceled Entry 2/10/18 1702)   alum & mag hydroxide-simethicone (MYLANTA/MAALOX) 200-200-20 MG/5ML suspension (not administered)   0.9% sodium chloride BOLUS (0 mLs Intravenous Stopped 2/10/18 1735)   cefTRIAXone in d5w (ROCEPHIN) intermittent infusion 1 g (0 g Intravenous Stopped 2/10/18 1641)   ketorolac (TORADOL) injection 30 mg (30 mg Intravenous Given 2/10/18 1606)   piperacillin-tazobactam (ZOSYN) infusion 3.375 g (0 g Intravenous Stopped 2/10/18 1644)   alum & mag hydroxide-simethicone (MYLANTA/MAALOX) suspension 30 mL (30 mLs Oral Given 2/10/18 1732) "   acetaminophen (TYLENOL) tablet 1,000 mg (0 mg Oral Hold 2/10/18 1849)   0.9% sodium chloride BOLUS (0 mLs Intravenous Stopped 2/10/18 1945)       ED Course     ED Course   Comment Time   Reports being mildly disoriented or confused.  Mildly lightheaded with ambulation.  Less dysuria noted with urination.  1 L of saline bolus completed.  Check orthostatic blood pressures. 02/10 1749   Reporting headache and chills.  Acetaminophen 1000 mg oral ordered.  Second liter of IV fluids ordered. 02/10 1841         Procedures          Patient felt improved after above treatments.  We discussed possible hospital admission versus outpatient discharge with close follow-up.    He wished to discharge home.    Critical Care time:  none   start oral Augmentin 875 mg twice daily.  Prescription dispensed from [facility].  Push oral hydration.  Advil or Tylenol as needed for pain.  Return to clinic or emergency room if symptoms worsen.            Labs Ordered and Resulted from Time of ED Arrival Up to the Time of Departure from the ED   UA MACROSCOPIC WITH REFLEX TO MICRO - Abnormal; Notable for the following:        Result Value    Bilirubin Urine Small (*)     Ketones Urine Trace (*)     Blood Urine Moderate (*)     Protein Albumin Urine 100 (*)     Nitrite Urine Positive (*)     Leukocyte Esterase Urine Moderate (*)     All other components within normal limits   URINE MICROSCOPIC - Abnormal; Notable for the following:     WBC Urine >100 (*)     RBC Urine 2-5 (*)     Bacteria Urine Many (*)     All other components within normal limits   CBC WITH PLATELETS DIFFERENTIAL - Abnormal; Notable for the following:     WBC 19.5 (*)     Absolute Neutrophil 16.9 (*)     Absolute Monocytes 1.4 (*)     All other components within normal limits   COMPREHENSIVE METABOLIC PANEL - Abnormal; Notable for the following:     Glucose 167 (*)     Bilirubin Total 1.1 (*)     Protein Total 6.3 (*)     AST 10 (*)     All other components  within normal limits   PERIPHERAL IV CATHETER   ORTHOSTATIC BLOOD PRESSURE AND PULSE   URINE CULTURE AEROBIC BACTERIAL       Assessments & Plan (with Medical Decision Making)     I have reviewed the nursing notes.    I have reviewed the findings, diagnosis, plan and need for follow up with the patient.    start oral Augmentin 875 mg twice daily.  Close outpatient follow-up recommended.  Return to ER if worsening.    New Prescriptions    AMOXICILLIN-CLAVULANATE (AUGMENTIN) 875-125 MG PER TABLET    Take 1 tablet by mouth 2 times daily       Final diagnoses:   Acute pyelonephritis       2/10/2018   [facility]     Dawood Bearden MD  02/10/18 2013       Dawood Bearden MD  02/10/18 1999

## 2018-02-13 ENCOUNTER — COMMUNICATION - GICH (OUTPATIENT)
Dept: FAMILY MEDICINE | Facility: OTHER | Age: 63
End: 2018-02-13

## 2018-02-13 ENCOUNTER — TELEPHONE (OUTPATIENT)
Dept: FAMILY MEDICINE | Facility: OTHER | Age: 63
End: 2018-02-13

## 2018-02-13 ENCOUNTER — DOCUMENTATION ONLY (OUTPATIENT)
Dept: FAMILY MEDICINE | Facility: OTHER | Age: 63
End: 2018-02-13

## 2018-02-13 DIAGNOSIS — N10 ACUTE PYELONEPHRITIS: Primary | ICD-10-CM

## 2018-02-13 PROBLEM — M54.16 LUMBAR RADICULOPATHY: Status: ACTIVE | Noted: 2017-02-20

## 2018-02-13 LAB
BACTERIA SPEC CULT: ABNORMAL
SPECIMEN SOURCE: ABNORMAL

## 2018-02-13 RX ORDER — TRIAMCINOLONE ACETONIDE 1 MG/G
1 CREAM TOPICAL 3 TIMES DAILY
COMMUNITY
Start: 2017-06-06

## 2018-02-13 RX ORDER — SULFAMETHOXAZOLE/TRIMETHOPRIM 800-160 MG
2 TABLET ORAL 2 TIMES DAILY
Qty: 40 TABLET | Refills: 0 | Status: SHIPPED | OUTPATIENT
Start: 2018-02-13 | End: 2018-03-29

## 2018-02-13 RX ORDER — CHOLESTYRAMINE 4 G/9G
1 POWDER, FOR SUSPENSION ORAL 2 TIMES DAILY
COMMUNITY
Start: 2014-07-07 | End: 2018-10-31

## 2018-02-13 RX ORDER — HYOSCYAMINE SULFATE 0.125 MG
0.12 TABLET ORAL EVERY 4 HOURS PRN
COMMUNITY
Start: 2014-02-06 | End: 2018-10-31

## 2018-02-13 RX ORDER — TRAMADOL HYDROCHLORIDE 50 MG/1
50-100 TABLET ORAL EVERY 6 HOURS PRN
COMMUNITY
Start: 2017-02-20 | End: 2018-10-31

## 2018-02-13 RX ORDER — NORTRIPTYLINE HCL 10 MG
10-50 CAPSULE ORAL AT BEDTIME
COMMUNITY
Start: 2017-04-11 | End: 2018-10-31

## 2018-02-13 NOTE — OR ANESTHESIA
Patient Information     Patient Name MRN Sex Rj Tavera [id number] Male 1955      OR Anesthesia by Amy Bosch MD at 2018 11:38 AM     Author:  Amy Bosch MD  Service:  (none) Author Type:  PHYS- Anesthesiologist     Filed:  2018 11:41 AM  Date of Service:  2018 11:38 AM Status:  Addendum     :  Amy Bosch MD (PHYS- Anesthesiologist)        Related Notes: Original Note by Amy Bosch MD (PHYS- Anesthesiologist) filed at 2018 11:38 AM                                                           ANESTHESIA ASSESSMENT    Date: 18 Time: 11:38 AM      Patient:  Rj Juarez    Procedure(s) (LRB):  Umbilical Hernia Repair w/Mesh (N/A)    Past Medical History:     Diagnosis  Date     Carpal tunnel syndrome     Both hands      Diverticulosis of sigmoid colon 2014     Essential and other specified forms of tremor 3/2/2011     HA (headache)      Multiple lipomas 2014     Pain in joint, multiple sites      Patellar fracture 09    Sustained right superior pole patellar fracture which underwent conservative management      RENAL CALCULUS     possible       Umbilical hernia 2014     Umbilical hernia without obstruction or gangrene 2018     URTICARIA, CHRONIC 3/2/2011       Past Surgical History:      Procedure  Laterality Date     CARPAL TUNNEL RELEASE Bilateral 3,2004    Both hands       COLONOSCOPY SCREENING  ,    F/U 2019       ESOPHAGOGASTRODUODENOSCOPY  14    EGD       FLEXIBLE SIGMOIDOSCOPY       [place] REPAIR ING HERNIA  >5 TRS BETTINA  9/10/2014              Family History       Problem   Relation Age of Onset     Hypertension  Mother      Diabetes  Mother      Other  Mother      Spinal stenosis       Other  Father      scleroderma which was quite severe        Cancer  Father       of adenocarcinoma of the lung.  He was a nonsmoker.       Diabetes  Brother      Other  Brother      Pancreatitis        Alcohol/Drug  Brother      Alcoholic, has been through treatment a number of times.         Patient Active Problem List     Diagnosis  Code     H/O adenomatous polyp of colon Z86.010     Prehypertension R03.0     Irritable bowel syndrome with diarrhea K58.0     Elevated random blood glucose level R73.09     Benign essential tremor G25.0     Lumbar radiculopathy M54.16     Umbilical hernia without obstruction or gangrene K42.9       Prescriptions Prior to Admission       Medication  Sig Dispense Refill     cholestyramine-sucrose 4 G per scoop (QUESTRAN) 4 gram powder Take 1 Packet by mouth 2 times daily. 1 Container 1     hyoscyamine (LEVSIN) 0.125 mg tablet Take 1 tablet by mouth every 4 hours if needed for Other (Specify) (Abdominal cramps). 30 tablet 5     nortriptyline (PAMELOR) 10 mg capsule Take 1-5 capsules by mouth at bedtime. Start with one capsule at bedtime and titrate upward as needed. 100 capsule 0     traMADol (ULTRAM) 50 mg tablet Take 1-2 tablets by mouth every 6 hours if needed for Pain (use mainly at night--may take 2 tablets at a time at night if needed). 100 tablet 3     triamcinolone (ARISTOCORT; KENALOG) 0.1 % cream Apply  topically to affected area(s) 3 times daily. 80 g 3       Allergies:  Allergies      Allergen   Reactions     Versed [Midazolam]  Other - Describe In Comment Field     Difficult to wake up        Review of Systems:  GERD: No  Chest pain: No (Incomplete RBBB. Hypertension.)  Shortness of breath: No (Slight tickle in his throat but no cough or fever.)  Recent fever: No  Poor exercise tolerance: No  Bleeding tendency: No  Pregnant: No  Anesthesia Complications: Other  (Does alright with propofol, narcotics, NSAID's & tylenol; awoke slowly last time with propofol & ketamine - when he awoke he was confused, agitated and in pain.)      History    Smoking Status      Never Smoker   Smokeless Tobacco      Never Used     Social History     Social History        Marital status:        Spouse name: N/A     Number of children:  N/A     Years of education:  N/A     Social History Main Topics         Smoking status:   Never Smoker     Smokeless tobacco:   Never Used     Alcohol use   No      Comment: not currently consuming any alcohol 9/2014      Drug use:   No     Sexual activity:   Not Asked     Other Topics  Concern     None      Social History Narrative     .  2 children.  Both children grown and living elsewhere.  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       Physical Examination:  BP (!) 186/11 (Cuff Size: Adult Regular)  Pulse 81  Temp 97  F (36.1  C)  Resp 18  SpO2 98% There is no height or weight on file to calculate BMI. There is no height or weight on file to calculate BSA.  Dental Condition: Good (Dentition intact.)     Mallampati Score (Airway): II (Full short beard.)  Cardiovascular: Abnormal  (Hypertensive today; consumed a lot of caffeine this a.m.)  Pulmonary: Normal  Other: N/A    [facility]    No results for input(s): SODIUM, POTASSIUM, CHLORIDE, EN6ESZSC, ANIONGAP, BUN, CREATININE, BUNCREARATIO, CALCIUM, GLUCOSE, GLUCOSEMETER, KETONES, MAGNESIUM, WBC, HGB, HCT, PLT, ABORH, RHTYPE, PREGURINE, BHCGQL, HCGBETAQUANT, INR in the last 72 hours.          Assessment/Plan:  ASA Class: III  Risk of dental injury discussed: Yes  NPO status confirmed: Yes  Anesthetic Plan: MAC (Proceed to GA-LMA prn.)  Risk/Benefit/Alt discussed: Yes  Questions answered: Yes  Emergency Case?: No  Labs/ECG/Radiology Reviewed?: Yes      H&P Reviewed.  Patient Examined.      Provider Electronic Signature:  LAKEISHA DUNCAN MD

## 2018-02-13 NOTE — NURSING NOTE
Patient Information     Patient Name MRN Sex Rj Tavera [id number] Male 1955      Nursing Note by Jessica Bruno at 2018 10:50 AM     Author:  Jessica Bruno Service:  (none) Author Type:  (none)     Filed:  2018 11:03 AM Encounter Date:  2018 Status:  Signed     :  Jessica Bruno            Patient is here today for a post op from a hernia repair. Patient has a follow up with Darryl Carlisle MD next and will discuss his high blood pressure.    Jessica Bruno LPN.......................... 2018  11:00 AM

## 2018-02-13 NOTE — H&P
Patient Information     Patient Name MRN Sex Rj Tavera [id number] Male 1955      H&P (View-Only) by Ly Frances MD at 2018  8:20 AM     Author:  Ly Frances MD Service:  (none) Author Type:  Physician     Filed:  2018  2:01 PM Date of Service:  2018  8:20 AM Status:  Signed     :  Ly Frances MD (Physician)            OFFICE CONSULTATION NOTE  Patient Name: Rj Juarez  Address: [address] [place] [id number]  Age:62 y.o.  Sex: male     Primary Care Physician: Darryl Carlisle MD    I was requested to see this patient in consultation by Darryl Carlisle MD for evaluation of umbilical pain/bulge. A copy of my findings and recommendations will be sent to Darryl Carlisle MD.    HPI:   The patient is 62 y.o. male with umbilical bulge and pain. The patient first noted this quite awhile ago but over the last couple of months it has been a lot more sore . The bulge has been getting bigger and more uncomfortable. No nausea or vomiting. No problems with new diarrhea or constipation. Has chronic diarrhea. No skin redness or rash at the umbilicus. No previous umbilical hernia surgery.  Has a persistant erythematous nodule on his chin from a tick bite. He had a bullseye rash after the bite.    CONSULTATION ASSESSMENT AND PLAN/RECOMMENDATIONS: I discussed with the patient the pathophysiology of umbilical hernias. I explained the risks, benefits and alternatives to repair of umbilical hernia with mesh. We specifically discussed the risks of infection, bleeding, injury to intra-abdominal organs, mesh complications and hernia recurrence. We discussed post operative limitations and expected recovery time. The patient's questions were answered and the patient wishes to proceed with repair. Informed consent paperwork was completed. This will be scheduled at a time that is convenient for the patient. The patient will call with questions or concerns prior  to the procedure.  Will check Lymes titer today.       REVIEW OF SYSTEMS  GENERAL: No fevers or chills. Denies fatigue, recent weight loss.  HEENT: No sinus drainage. No changes with vision or hearing. No difficulty swallowing.   LYMPHATICS:  No swollen nodes in axilla, neck or groin.  CARDIOVASCULAR: Denies chest pain, palpitations and dyspnea on exertion.  PULMONARY: No shortness of breath or cough. No increase in sputum production.  GI: Denies melena, bright red blood in stools. No hematemesis. No constipation or diarrhea.  : No dysuria or hematuria.  SKIN: No recent rashes or ulcers.   HEMATOLOGY:  No history of easy bruising or bleeding.  ENDOCRINE:  No history of diabetes or thyroid problems.  NEUROLOGY:  No history of seizures or headaches. No motor or sensory changes.    PAST MEDICAL HISTORY    Past Medical History:     Diagnosis  Date     Carpal tunnel syndrome     Both hands      Diverticulosis of sigmoid colon 1/28/2014     Essential and other specified forms of tremor 3/2/2011     HA (headache)      Multiple lipomas 1/20/2014     Pain in joint, multiple sites      Patellar fracture 05/06/09    Sustained right superior pole patellar fracture which underwent conservative management      RENAL CALCULUS     possible       Umbilical hernia 1/20/2014     URTICARIA, CHRONIC 3/2/2011      PAST SURGICAL HISTORY    Past Surgical History:      Procedure  Laterality Date     CARPAL TUNNEL RELEASE Bilateral 3,12/2004    Both hands       COLONOSCOPY SCREENING  2009,2014    F/U 2019       ESOPHAGOGASTRODUODENOSCOPY  1/28/14    EGD       FLEXIBLE SIGMOIDOSCOPY       [place] REPAIR ING HERNIA  >5 TRS BETTINA  9/10/2014             CURRENT MEDS    Current Outpatient Prescriptions on File Prior to Visit       Medication  Sig Dispense Refill     cholestyramine-sucrose 4 G per scoop (QUESTRAN) 4 gram powder Take 1 Packet by mouth 2 times daily. 1 Container 1     hyoscyamine (LEVSIN) 0.125 mg tablet Take 1 tablet by mouth every 4  hours if needed for Other (Specify) (Abdominal cramps). 30 tablet 5     nortriptyline (PAMELOR) 10 mg capsule Take 1-5 capsules by mouth at bedtime. Start with one capsule at bedtime and titrate upward as needed. 100 capsule 0     traMADol (ULTRAM) 50 mg tablet Take 1-2 tablets by mouth every 6 hours if needed for Pain (use mainly at night--may take 2 tablets at a time at night if needed). 100 tablet 3     triamcinolone (ARISTOCORT; KENALOG) 0.1 % cream Apply  topically to affected area(s) 3 times daily. 80 g 3     No current facility-administered medications on file prior to visit.      ALLERGIES/SENSITIVITIES  Allergies      Allergen   Reactions     Versed [Midazolam]  Other - Describe In Comment Field     Difficult to wake up      FAMILY HISTORY    Family History       Problem   Relation Age of Onset     Hypertension  Mother      Diabetes  Mother      Other  Mother      Spinal stenosis       Other  Father      scleroderma which was quite severe        Cancer  Father       of adenocarcinoma of the lung.  He was a nonsmoker.       Diabetes  Brother      Other  Brother      Pancreatitis       Alcohol/Drug  Brother      Alcoholic, has been through treatment a number of times.        SOCIAL HISTORY    Social History     Social History        Marital status:       Spouse name: N/A     Number of children:  N/A     Years of education:  N/A     Occupational History      Not on file.     Social History Main Topics         Smoking status:   Never Smoker     Smokeless tobacco:   Never Used     Alcohol use   No      Comment: not currently consuming any alcohol 2014      Drug use:   No     Sexual activity:   Not on file     Other Topics  Concern     Not on file      Social History Narrative     .  2 children.  Both children grown and living elsewhere.  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      PHYSICAL EXAM  /94 (Cuff Site: Right Arm, Position:  "Sitting, Cuff Size: Adult Large)  Pulse 88  Ht 1.79 m (5' 10.47\")  Wt 107.5 kg (237 lb)  BMI 33.55 kg/m2   Body mass index is 33.55 kg/(m^2).    GENERAL: Healthy appearing patient in no acute distress. Pleasant and cooperative with exam and interview.   HEENT: Head-normocephalic. Eyes-no scleral icterus, pupils equal, round, and reactive to light. Nose-no nasal drainage. No lesions. Mouth-oral mucosa pink and moist, no lesions.  NECK: Supple. No thyroid nodules. Trachea midline.  LYMPHATICS:  No cervical, axillary or supraclavicular adenopathy.  CV: Regular rate and rhythm, no murmurs. No peripheral edema.  LUNGS:  No respiratory distress. Clear bilaterally to auscultation.  ABDOMEN: Non distended. Bowel sounds active. Soft, non-tender, no hepatosplenomegaly. Umbilical hernia noted, moderate tenderness, reducible. No peritoneal signs.  SKIN: Pink, warm and dry. No jaundice. No rash.  NEURO:  Cranial nerves II-XII grossly intact. Alert and oriented.  PSYCH: Appropriate mood and affect.    Ly Frances MD             "

## 2018-02-13 NOTE — NURSING NOTE
Patient Information     Patient Name MRN Rj Martin [id number] Male 1955      Nursing Note by Kaci Huddleston at 2018  8:20 AM     Author:  Kaci Huddleston Service:  (none) Author Type:  (none)     Filed:  2018  8:35 AM Encounter Date:  2018 Status:  Signed     :  Kaci Huddleston            Patient presents to the clinic for a hernia consult.  Kaci Huddleston LPN....................2018 8:27 AM

## 2018-02-13 NOTE — OR POSTOP
Patient Information     Patient Name MRN Rj Martin [id number] Male 1955      OR PostOp by Oumar Burnette RN at 2018  2:42 PM     Author:  Oumar Burnette RN Service:  (none) Author Type:  NURS- Registered Nurse     Filed:  2018  2:43 PM Date of Service:  2018  2:42 PM Status:  Signed     :  Oumar Burnette RN (NURS- Registered Nurse)            Discharge Note    Data:  Rj Juarez has been discharged home at 1435 via wheelchair accompanied by Registered Nurse.      Action:  Written discharge/follow-up instructions were provided to patient. Prescriptions were filled and sent with patient.  Belongings sent with patient. Medications from home sent with patient/family: Not Applicable  Equipment none .     Response:  Patient and Spouse verbalized understanding of discharge instructions, reason for discharge, and necessary follow-up appointments.

## 2018-02-13 NOTE — H&P
Patient Information     Patient Name MRN Sex Rj Tavera [id number] Male 1955      H&P by Ly Frances MD at 2018  8:20 AM     Author:  Ly Frances MD Service:  (none) Author Type:  Physician     Filed:  2018  2:01 PM Encounter Date:  2018 Status:  Signed     :  Ly Frances MD (Physician)            OFFICE CONSULTATION NOTE  Patient Name: Rj Juarez  Address: [address] [place] [id number]  Age:62 y.o.  Sex: male     Primary Care Physician: Darryl Carlisle MD    I was requested to see this patient in consultation by Darryl Carlisle MD for evaluation of umbilical pain/bulge. A copy of my findings and recommendations will be sent to Darryl Carlisle MD.    HPI:   The patient is 62 y.o. male with umbilical bulge and pain. The patient first noted this quite awhile ago but over the last couple of months it has been a lot more sore . The bulge has been getting bigger and more uncomfortable. No nausea or vomiting. No problems with new diarrhea or constipation. Has chronic diarrhea. No skin redness or rash at the umbilicus. No previous umbilical hernia surgery.  Has a persistant erythematous nodule on his chin from a tick bite. He had a bullseye rash after the bite.    CONSULTATION ASSESSMENT AND PLAN/RECOMMENDATIONS: I discussed with the patient the pathophysiology of umbilical hernias. I explained the risks, benefits and alternatives to repair of umbilical hernia with mesh. We specifically discussed the risks of infection, bleeding, injury to intra-abdominal organs, mesh complications and hernia recurrence. We discussed post operative limitations and expected recovery time. The patient's questions were answered and the patient wishes to proceed with repair. Informed consent paperwork was completed. This will be scheduled at a time that is convenient for the patient. The patient will call with questions or concerns prior to the  procedure.  Will check Lymes titer today.       REVIEW OF SYSTEMS  GENERAL: No fevers or chills. Denies fatigue, recent weight loss.  HEENT: No sinus drainage. No changes with vision or hearing. No difficulty swallowing.   LYMPHATICS:  No swollen nodes in axilla, neck or groin.  CARDIOVASCULAR: Denies chest pain, palpitations and dyspnea on exertion.  PULMONARY: No shortness of breath or cough. No increase in sputum production.  GI: Denies melena, bright red blood in stools. No hematemesis. No constipation or diarrhea.  : No dysuria or hematuria.  SKIN: No recent rashes or ulcers.   HEMATOLOGY:  No history of easy bruising or bleeding.  ENDOCRINE:  No history of diabetes or thyroid problems.  NEUROLOGY:  No history of seizures or headaches. No motor or sensory changes.    PAST MEDICAL HISTORY    Past Medical History:     Diagnosis  Date     Carpal tunnel syndrome     Both hands      Diverticulosis of sigmoid colon 1/28/2014     Essential and other specified forms of tremor 3/2/2011     HA (headache)      Multiple lipomas 1/20/2014     Pain in joint, multiple sites      Patellar fracture 05/06/09    Sustained right superior pole patellar fracture which underwent conservative management      RENAL CALCULUS     possible       Umbilical hernia 1/20/2014     URTICARIA, CHRONIC 3/2/2011      PAST SURGICAL HISTORY    Past Surgical History:      Procedure  Laterality Date     CARPAL TUNNEL RELEASE Bilateral 3,12/2004    Both hands       COLONOSCOPY SCREENING  2009,2014    F/U 2019       ESOPHAGOGASTRODUODENOSCOPY  1/28/14    EGD       FLEXIBLE SIGMOIDOSCOPY       [place] REPAIR ING HERNIA  >5 TRS BETTINA  9/10/2014             CURRENT MEDS    Current Outpatient Prescriptions on File Prior to Visit       Medication  Sig Dispense Refill     cholestyramine-sucrose 4 G per scoop (QUESTRAN) 4 gram powder Take 1 Packet by mouth 2 times daily. 1 Container 1     hyoscyamine (LEVSIN) 0.125 mg tablet Take 1 tablet by mouth every 4 hours  if needed for Other (Specify) (Abdominal cramps). 30 tablet 5     nortriptyline (PAMELOR) 10 mg capsule Take 1-5 capsules by mouth at bedtime. Start with one capsule at bedtime and titrate upward as needed. 100 capsule 0     traMADol (ULTRAM) 50 mg tablet Take 1-2 tablets by mouth every 6 hours if needed for Pain (use mainly at night--may take 2 tablets at a time at night if needed). 100 tablet 3     triamcinolone (ARISTOCORT; KENALOG) 0.1 % cream Apply  topically to affected area(s) 3 times daily. 80 g 3     No current facility-administered medications on file prior to visit.      ALLERGIES/SENSITIVITIES  Allergies      Allergen   Reactions     Versed [Midazolam]  Other - Describe In Comment Field     Difficult to wake up      FAMILY HISTORY    Family History       Problem   Relation Age of Onset     Hypertension  Mother      Diabetes  Mother      Other  Mother      Spinal stenosis       Other  Father      scleroderma which was quite severe        Cancer  Father       of adenocarcinoma of the lung.  He was a nonsmoker.       Diabetes  Brother      Other  Brother      Pancreatitis       Alcohol/Drug  Brother      Alcoholic, has been through treatment a number of times.        SOCIAL HISTORY    Social History     Social History        Marital status:       Spouse name: N/A     Number of children:  N/A     Years of education:  N/A     Occupational History      Not on file.     Social History Main Topics         Smoking status:   Never Smoker     Smokeless tobacco:   Never Used     Alcohol use   No      Comment: not currently consuming any alcohol 2014      Drug use:   No     Sexual activity:   Not on file     Other Topics  Concern     Not on file      Social History Narrative     .  2 children.  Both children grown and living elsewhere.  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      PHYSICAL EXAM  /94 (Cuff Site: Right Arm, Position:  "Sitting, Cuff Size: Adult Large)  Pulse 88  Ht 1.79 m (5' 10.47\")  Wt 107.5 kg (237 lb)  BMI 33.55 kg/m2   Body mass index is 33.55 kg/(m^2).    GENERAL: Healthy appearing patient in no acute distress. Pleasant and cooperative with exam and interview.   HEENT: Head-normocephalic. Eyes-no scleral icterus, pupils equal, round, and reactive to light. Nose-no nasal drainage. No lesions. Mouth-oral mucosa pink and moist, no lesions.  NECK: Supple. No thyroid nodules. Trachea midline.  LYMPHATICS:  No cervical, axillary or supraclavicular adenopathy.  CV: Regular rate and rhythm, no murmurs. No peripheral edema.  LUNGS:  No respiratory distress. Clear bilaterally to auscultation.  ABDOMEN: Non distended. Bowel sounds active. Soft, non-tender, no hepatosplenomegaly. Umbilical hernia noted, moderate tenderness, reducible. No peritoneal signs.  SKIN: Pink, warm and dry. No jaundice. No rash.  NEURO:  Cranial nerves II-XII grossly intact. Alert and oriented.  PSYCH: Appropriate mood and affect.    Ly Frances MD             "

## 2018-02-13 NOTE — PROGRESS NOTES
Patient Information     Patient Name MRN Sex Rj Tavera [id number] Male 1955      Progress Notes by Ly Frances MD at 2018 10:50 AM     Author:  Ly Frances MD Service:  (none) Author Type:  Physician     Filed:  2018 11:52 AM Encounter Date:  2018 Status:  Signed     :  Ly Frances MD (Physician)            Patient presents for post surgical visit after umbilical hernia repair with mesh on . Patient has done well. No problems with incision.    /92 (Cuff Site: Right Arm, Position: Sitting, Cuff Size: Adult Large)  Pulse 80  Wt 107.4 kg (236 lb 12.8 oz)  BMI 33.52 kg/m2    General: NAD, pleasant and cooperative with exam and interview.  Abdomen: healing incision umbilicus. No sign of infection. No pain with palpation.  Psychiatry: awake, alert and oriented. Appropriate affect.    Assessment/Plan:  Discussed surgery. Patient can return to normal activities. Patient will call with questions or concerns.

## 2018-02-13 NOTE — OR ANESTHESIA
Patient Information     Patient Name MRN Rj Martin [id number] Male 1955      OR Anesthesia by Lakeisha Bosch MD at 2018  1:49 PM     Author:  Lakeisha Bosch MD Service:  (none) Author Type:  PHYS- Anesthesiologist     Filed:  2018  1:50 PM Date of Service:  2018  1:49 PM Status:  Signed     :  Lakeisha Bosch MD (PHYS- Anesthesiologist)            Please note that the pre-operative vital signs that rolled over from the flowsheet are missing a number in the diastolic blood pressure.  The correct reading is 186/111.      LAKEISHA BOSCH MD ....................  2018   1:50 PM

## 2018-02-13 NOTE — OR ANESTHESIA
Patient Information     Patient Name MRN Sex Rj Tavera [id number] Male 1955      OR Anesthesia by Amy Bosch MD at 2018  1:09 PM     Author:  Amy Bosch MD  Service:  (none) Author Type:  PHYS- Anesthesiologist     Filed:  2018  1:43 PM  Date of Service:  2018  1:09 PM Status:  Addendum     :  Amy Bosch MD (PHYS- Anesthesiologist)        Related Notes: Original Note by Amy Bosch MD (PHYS- Anesthesiologist) filed at 2018  1:42 PM            Anesthesia Post Operative Care Note    Name: Rj Juarez  MRN:   [id number]  :    1955       Procedure Done:  See Surgeon Note   Case Cancelled for Anesthetic Reason:  No     Post Op Considerations:  Other       Other Recommendations:  Significant pre-operative & post-operative hypertension was noted.  As discussed with Dr. Carlisle (primary care), he needs follow-up as an out-patient to initiate anti-hypertensive therapy.    Anesthesia Technique    Anesthetic Type:  MAC       MAC Type:  NC     Oral Trauma:  No    Intraoperative Course   Hemodynamics:  Stable      Ventilation Normal:  Yes   Abnormal Ventilation:       Treatment with MDI:  MDI Treatment Lung Sounds:  Normal      PACU Course        Nondepolarizer Used: No        Reversed: N/A    Reintubation:  No   Hemodynamics:  Stable      Hydration: Euvolemic   Temperature:  36.1 - 38.3      Mental Status:  Awake, alert, follows commands   Pain Management:  Adequate   Regional Block:  No   Anesthesia Complications:  None      Vital Signs:  Temp: 97  F (36.1  C)  Pulse: 81  BP: (!) 186/11  Resp: 18  SpO2: 98 %                       Active Lines:  Patient Lines/Drains/Airways Status    Active Line     Name: Placement date: Placement time: Site: Days:    PERIPHERAL VAD Right Forearm 20 18   1150   Forearm   less than 1                Intake & Output:       Labs:  No results for input(s): BA7ISAROMKO, CKR4OJPEWKPF,  PHARTERIAL, QIU3LKYCFFNN, Q6LACVQHLDAM in the last 24 hours.    No results for input(s): MAGNESIUM in the last 24 hours.    No results for input(s): GLUCOSEMETER in the last 720 hours.        LAKEISHA DUNCAN MD ....................  1/26/2018   1:09 PM

## 2018-02-13 NOTE — TELEPHONE ENCOUNTER
Patient was notified of his urine culture.  He has been on Augmentin, and has been improving, but the culture is Klebsiella and not sensitive to ampicillin.  I recommended switching to Bactrim DS as he is allergic to Cipro.  We will plan to follow-up again with a urinalysis, CBC, and an A1c once he has completed his antibiotic.  FRANCISCO BARKER MD

## 2018-02-13 NOTE — PROGRESS NOTES
Patient Information     Patient Name MRN Sex Rj Tavera [id number] Male 1955      Progress Notes by Ly Frances MD at 2018  8:20 AM     Author:  Ly Frances MD Service:  (none) Author Type:  Physician     Filed:  2018  2:01 PM Encounter Date:  2018 Status:  Signed     :  Ly Frances MD (Physician)            OFFICE CONSULTATION NOTE  Patient Name: Rj Juarez  Address: [address] [place] [id number]  Age:62 y.o.  Sex: male     Primary Care Physician: Darryl Carlisle MD    I was requested to see this patient in consultation by Darryl Carlisle MD for evaluation of umbilical pain/bulge. A copy of my findings and recommendations will be sent to Darryl Carlisle MD.    HPI:   The patient is 62 y.o. male with umbilical bulge and pain. The patient first noted this quite awhile ago but over the last couple of months it has been a lot more sore . The bulge has been getting bigger and more uncomfortable. No nausea or vomiting. No problems with new diarrhea or constipation. Has chronic diarrhea. No skin redness or rash at the umbilicus. No previous umbilical hernia surgery.  Has a persistant erythematous nodule on his chin from a tick bite. He had a bullseye rash after the bite.    CONSULTATION ASSESSMENT AND PLAN/RECOMMENDATIONS: I discussed with the patient the pathophysiology of umbilical hernias. I explained the risks, benefits and alternatives to repair of umbilical hernia with mesh. We specifically discussed the risks of infection, bleeding, injury to intra-abdominal organs, mesh complications and hernia recurrence. We discussed post operative limitations and expected recovery time. The patient's questions were answered and the patient wishes to proceed with repair. Informed consent paperwork was completed. This will be scheduled at a time that is convenient for the patient. The patient will call with questions or concerns prior to the  procedure.  Will check Lymes titer today.       REVIEW OF SYSTEMS  GENERAL: No fevers or chills. Denies fatigue, recent weight loss.  HEENT: No sinus drainage. No changes with vision or hearing. No difficulty swallowing.   LYMPHATICS:  No swollen nodes in axilla, neck or groin.  CARDIOVASCULAR: Denies chest pain, palpitations and dyspnea on exertion.  PULMONARY: No shortness of breath or cough. No increase in sputum production.  GI: Denies melena, bright red blood in stools. No hematemesis. No constipation or diarrhea.  : No dysuria or hematuria.  SKIN: No recent rashes or ulcers.   HEMATOLOGY:  No history of easy bruising or bleeding.  ENDOCRINE:  No history of diabetes or thyroid problems.  NEUROLOGY:  No history of seizures or headaches. No motor or sensory changes.    PAST MEDICAL HISTORY    Past Medical History:     Diagnosis  Date     Carpal tunnel syndrome     Both hands      Diverticulosis of sigmoid colon 1/28/2014     Essential and other specified forms of tremor 3/2/2011     HA (headache)      Multiple lipomas 1/20/2014     Pain in joint, multiple sites      Patellar fracture 05/06/09    Sustained right superior pole patellar fracture which underwent conservative management      RENAL CALCULUS     possible       Umbilical hernia 1/20/2014     URTICARIA, CHRONIC 3/2/2011      PAST SURGICAL HISTORY    Past Surgical History:      Procedure  Laterality Date     CARPAL TUNNEL RELEASE Bilateral 3,12/2004    Both hands       COLONOSCOPY SCREENING  2009,2014    F/U 2019       ESOPHAGOGASTRODUODENOSCOPY  1/28/14    EGD       FLEXIBLE SIGMOIDOSCOPY       AR REPAIR ING HERNIA  >5 TRS BETTINA  9/10/2014             CURRENT MEDS    Current Outpatient Prescriptions on File Prior to Visit       Medication  Sig Dispense Refill     cholestyramine-sucrose 4 G per scoop (QUESTRAN) 4 gram powder Take 1 Packet by mouth 2 times daily. 1 Container 1     hyoscyamine (LEVSIN) 0.125 mg tablet Take 1 tablet by mouth every 4 hours  if needed for Other (Specify) (Abdominal cramps). 30 tablet 5     nortriptyline (PAMELOR) 10 mg capsule Take 1-5 capsules by mouth at bedtime. Start with one capsule at bedtime and titrate upward as needed. 100 capsule 0     traMADol (ULTRAM) 50 mg tablet Take 1-2 tablets by mouth every 6 hours if needed for Pain (use mainly at night--may take 2 tablets at a time at night if needed). 100 tablet 3     triamcinolone (ARISTOCORT; KENALOG) 0.1 % cream Apply  topically to affected area(s) 3 times daily. 80 g 3     No current facility-administered medications on file prior to visit.      ALLERGIES/SENSITIVITIES  Allergies      Allergen   Reactions     Versed [Midazolam]  Other - Describe In Comment Field     Difficult to wake up      FAMILY HISTORY    Family History       Problem   Relation Age of Onset     Hypertension  Mother      Diabetes  Mother      Other  Mother      Spinal stenosis       Other  Father      scleroderma which was quite severe        Cancer  Father       of adenocarcinoma of the lung.  He was a nonsmoker.       Diabetes  Brother      Other  Brother      Pancreatitis       Alcohol/Drug  Brother      Alcoholic, has been through treatment a number of times.        SOCIAL HISTORY    Social History     Social History        Marital status:       Spouse name: N/A     Number of children:  N/A     Years of education:  N/A     Occupational History      Not on file.     Social History Main Topics         Smoking status:   Never Smoker     Smokeless tobacco:   Never Used     Alcohol use   No      Comment: not currently consuming any alcohol 2014      Drug use:   No     Sexual activity:   Not on file     Other Topics  Concern     Not on file      Social History Narrative     .  2 children.  Both children grown and living elsewhere.  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      PHYSICAL EXAM  /94 (Cuff Site: Right Arm, Position:  "Sitting, Cuff Size: Adult Large)  Pulse 88  Ht 1.79 m (5' 10.47\")  Wt 107.5 kg (237 lb)  BMI 33.55 kg/m2   Body mass index is 33.55 kg/(m^2).    GENERAL: Healthy appearing patient in no acute distress. Pleasant and cooperative with exam and interview.   HEENT: Head-normocephalic. Eyes-no scleral icterus, pupils equal, round, and reactive to light. Nose-no nasal drainage. No lesions. Mouth-oral mucosa pink and moist, no lesions.  NECK: Supple. No thyroid nodules. Trachea midline.  LYMPHATICS:  No cervical, axillary or supraclavicular adenopathy.  CV: Regular rate and rhythm, no murmurs. No peripheral edema.  LUNGS:  No respiratory distress. Clear bilaterally to auscultation.  ABDOMEN: Non distended. Bowel sounds active. Soft, non-tender, no hepatosplenomegaly. Umbilical hernia noted, moderate tenderness, reducible. No peritoneal signs.  SKIN: Pink, warm and dry. No jaundice. No rash.  NEURO:  Cranial nerves II-XII grossly intact. Alert and oriented.  PSYCH: Appropriate mood and affect.    Ly Frances MD           "

## 2018-02-13 NOTE — INTERVAL H&P NOTE
Patient Information     Patient Name MRN Rj Martin [id number] Male 1955      Interval H&P Note by Ly Frances MD at 2018 11:44 AM     Author:  Ly Frances MD Service:  (none) Author Type:  Physician     Filed:  2018 11:44 AM Date of Service:  2018 11:44 AM Status:  Signed     :  Ly Frances MD (Physician)            History and Physical Update    The history and physical has been reviewed and the patient has been examined.  There are no interim changes to the patient's history or physical condition.  I discussed umbilical hernia repair with the patient. Ly Frances MD        Source Note     Author:  Ly Frances MD Service:  (none) Author Type:  Physician    Filed:  2018  2:01 PM Date of Service:  2018  8:20 AM Status:  Signed    :  Ly Frances MD (Physician)              OFFICE CONSULTATION NOTE  Patient Name: Rj Juarez  Address: [address] [place] [id number]  Age:62 y.o.  Sex: male     Primary Care Physician: Darryl Carlisle MD    I was requested to see this patient in consultation by Darryl Carlisle MD for evaluation of umbilical pain/bulge. A copy of my findings and recommendations will be sent to Darryl Carlisle MD.    HPI:   The patient is 62 y.o. male with umbilical bulge and pain. The patient first noted this quite awhile ago but over the last couple of months it has been a lot more sore . The bulge has been getting bigger and more uncomfortable. No nausea or vomiting. No problems with new diarrhea or constipation. Has chronic diarrhea. No skin redness or rash at the umbilicus. No previous umbilical hernia surgery.  Has a persistant erythematous nodule on his chin from a tick bite. He had a bullseye rash after the bite.    CONSULTATION ASSESSMENT AND PLAN/RECOMMENDATIONS: I discussed with the patient the pathophysiology of umbilical hernias. I explained the risks, benefits and alternatives to  repair of umbilical hernia with mesh. We specifically discussed the risks of infection, bleeding, injury to intra-abdominal organs, mesh complications and hernia recurrence. We discussed post operative limitations and expected recovery time. The patient's questions were answered and the patient wishes to proceed with repair. Informed consent paperwork was completed. This will be scheduled at a time that is convenient for the patient. The patient will call with questions or concerns prior to the procedure.  Will check Lymes titer today.       REVIEW OF SYSTEMS  GENERAL: No fevers or chills. Denies fatigue, recent weight loss.  HEENT: No sinus drainage. No changes with vision or hearing. No difficulty swallowing.   LYMPHATICS:  No swollen nodes in axilla, neck or groin.  CARDIOVASCULAR: Denies chest pain, palpitations and dyspnea on exertion.  PULMONARY: No shortness of breath or cough. No increase in sputum production.  GI: Denies melena, bright red blood in stools. No hematemesis. No constipation or diarrhea.  : No dysuria or hematuria.  SKIN: No recent rashes or ulcers.   HEMATOLOGY:  No history of easy bruising or bleeding.  ENDOCRINE:  No history of diabetes or thyroid problems.  NEUROLOGY:  No history of seizures or headaches. No motor or sensory changes.    PAST MEDICAL HISTORY    Past Medical History:     Diagnosis  Date     Carpal tunnel syndrome     Both hands      Diverticulosis of sigmoid colon 1/28/2014     Essential and other specified forms of tremor 3/2/2011     HA (headache)      Multiple lipomas 1/20/2014     Pain in joint, multiple sites      Patellar fracture 05/06/09    Sustained right superior pole patellar fracture which underwent conservative management      RENAL CALCULUS     possible       Umbilical hernia 1/20/2014     URTICARIA, CHRONIC 3/2/2011      PAST SURGICAL HISTORY    Past Surgical History:      Procedure  Laterality Date     CARPAL TUNNEL RELEASE Bilateral 3,12/2004    Both hands        COLONOSCOPY SCREENING      F/U 2019       ESOPHAGOGASTRODUODENOSCOPY  14    EGD       FLEXIBLE SIGMOIDOSCOPY       ME REPAIR ING HERNIA  >5 TRS BETTINA  9/10/2014             CURRENT MEDS    Current Outpatient Prescriptions on File Prior to Visit       Medication  Sig Dispense Refill     cholestyramine-sucrose 4 G per scoop (QUESTRAN) 4 gram powder Take 1 Packet by mouth 2 times daily. 1 Container 1     hyoscyamine (LEVSIN) 0.125 mg tablet Take 1 tablet by mouth every 4 hours if needed for Other (Specify) (Abdominal cramps). 30 tablet 5     nortriptyline (PAMELOR) 10 mg capsule Take 1-5 capsules by mouth at bedtime. Start with one capsule at bedtime and titrate upward as needed. 100 capsule 0     traMADol (ULTRAM) 50 mg tablet Take 1-2 tablets by mouth every 6 hours if needed for Pain (use mainly at night--may take 2 tablets at a time at night if needed). 100 tablet 3     triamcinolone (ARISTOCORT; KENALOG) 0.1 % cream Apply  topically to affected area(s) 3 times daily. 80 g 3     No current facility-administered medications on file prior to visit.      ALLERGIES/SENSITIVITIES  Allergies      Allergen   Reactions     Versed [Midazolam]  Other - Describe In Comment Field     Difficult to wake up      FAMILY HISTORY    Family History       Problem   Relation Age of Onset     Hypertension  Mother      Diabetes  Mother      Other  Mother      Spinal stenosis       Other  Father      scleroderma which was quite severe        Cancer  Father       of adenocarcinoma of the lung.  He was a nonsmoker.       Diabetes  Brother      Other  Brother      Pancreatitis       Alcohol/Drug  Brother      Alcoholic, has been through treatment a number of times.        SOCIAL HISTORY    Social History     Social History        Marital status:       Spouse name: N/A     Number of children:  N/A     Years of education:  N/A     Occupational History      Not on file.     Social History Main Topics          "Smoking status:   Never Smoker     Smokeless tobacco:   Never Used     Alcohol use   No      Comment: not currently consuming any alcohol 9/2014      Drug use:   No     Sexual activity:   Not on file     Other Topics  Concern     Not on file      Social History Narrative     .  2 children.  Both children grown and living elsewhere.  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      PHYSICAL EXAM  /94 (Cuff Site: Right Arm, Position: Sitting, Cuff Size: Adult Large)  Pulse 88  Ht 1.79 m (5' 10.47\")  Wt 107.5 kg (237 lb)  BMI 33.55 kg/m2   Body mass index is 33.55 kg/(m^2).    GENERAL: Healthy appearing patient in no acute distress. Pleasant and cooperative with exam and interview.   HEENT: Head-normocephalic. Eyes-no scleral icterus, pupils equal, round, and reactive to light. Nose-no nasal drainage. No lesions. Mouth-oral mucosa pink and moist, no lesions.  NECK: Supple. No thyroid nodules. Trachea midline.  LYMPHATICS:  No cervical, axillary or supraclavicular adenopathy.  CV: Regular rate and rhythm, no murmurs. No peripheral edema.  LUNGS:  No respiratory distress. Clear bilaterally to auscultation.  ABDOMEN: Non distended. Bowel sounds active. Soft, non-tender, no hepatosplenomegaly. Umbilical hernia noted, moderate tenderness, reducible. No peritoneal signs.  SKIN: Pink, warm and dry. No jaundice. No rash.  NEURO:  Cranial nerves II-XII grossly intact. Alert and oriented.  PSYCH: Appropriate mood and affect.    Ly Frances MD                    "

## 2018-02-13 NOTE — PROCEDURES
Patient Information     Patient Name MRN Sex Rj Tavera [id number] Male 1955      Procedures by Ly Frances MD at 2018 12:39 PM     Author:  Ly Frances MD Service:  (none) Author Type:  Physician     Filed:  2018 12:41 PM Date of Service:  2018 12:39 PM Status:  Signed     :  Ly Frances MD (Physician)        Pre-procedure Diagnoses:    1. Umbilical hernia without obstruction or gangrene [K42.9]           Post-procedure Diagnoses:    1. Umbilical hernia without obstruction or gangrene [K42.9]           Procedures:    1. TN REPAIR UMBILICAL NAZARIO  >5 TRS REDUC [35757.0]               Preoperative Diagnosis: reducible Umbilical Hernia   Postoperative Diagnosis: reducible Umbilical Hernia   Procedure planned: Repair umbilical hernia with mesh   Procedure performed: Repair reducible umbilical hernia with mesh   Surgeon: Ly Frances MD   Assistant: Martha Aviles CST  Circulator: Kaci Ramos RN  Circulator Set Up: Eduardo Roque RN  Pre Op Nurse: Frances Rossi RN  Scrub: Celeste Smith, Surgical Technician  Anesthesia: Monitored anesthesia care local   Specimen: none  Estimated Blood Loss: minimal   INDICATIONS   Please see the consultation. The patient has been having discomfort associated with a bulge in the umbilicus. The risks, benefits and alternatives to repair of umbilical hernia with mesh were discussed with the patient. We specifically discussed the risks of infection, bleeding, injury to abdominal organs, mesh complication and hernia recurrence. The patient expressed understanding and questions were answered. Informed consent paperwork was completed.     DESCRIPTION OF PROCEDURE   The patient was brought to the operating room and placed in a supine position on the operating table. Appropriate monitors were attached.  The patient received IV antibiotics preoperatively. After general anesthesia was induced, the patient was positioned,  prepped and draped in the standard fashion. Time out was performed confirming the patient's identity and procedure to be performed.  Local anesthetic was infiltrated in the skin and subcutaneous tissue in the area of planned incision just below the umbilicus. Incision was made sharply and carried down to the subcutaneous tissue. Electrocautery was used to maintain excellent hemostasis. Dissection was carried out to the level of the fascia. The hernia sac was dissected from from the overlying skin. The hernia sac was opened and no incarcerated contents were noted. The sac was excised. Circular layered mesh was placed preperitoneally and secured with Vicryl sutures utilizing the straps. Hemostasis was excellent. Further local anesthetic was infiltrated for post operative pain control. The umbilical stump was secured to the fascia using Vicryl suture. Skin edges were approximated using Monocryl suture. Sterile dressing was applied. The patient was then awakened from anesthesia and taken to postanesthesia recovery in stable condition. All needle, sponge and instrument counts were reported as correct at the conclusion of the case. The patient tolerated the procedure with no immediately apparent complications.       Ly Frances MD     CC: Darryl Carlisle MD

## 2018-02-14 ENCOUNTER — TELEPHONE (OUTPATIENT)
Dept: FAMILY MEDICINE | Facility: OTHER | Age: 63
End: 2018-02-14

## 2018-02-14 DIAGNOSIS — R39.9 LOWER URINARY TRACT SYMPTOMS (LUTS): ICD-10-CM

## 2018-02-14 DIAGNOSIS — R73.9 HYPERGLYCEMIA: ICD-10-CM

## 2018-02-14 DIAGNOSIS — N10 ACUTE PYELONEPHRITIS: Primary | ICD-10-CM

## 2018-02-15 ENCOUNTER — OFFICE VISIT (OUTPATIENT)
Dept: UROLOGY | Facility: OTHER | Age: 63
End: 2018-02-15
Attending: UROLOGY
Payer: COMMERCIAL

## 2018-02-15 VITALS — DIASTOLIC BLOOD PRESSURE: 80 MMHG | SYSTOLIC BLOOD PRESSURE: 118 MMHG | BODY MASS INDEX: 32.99 KG/M2 | WEIGHT: 233 LBS

## 2018-02-15 DIAGNOSIS — R97.20 ELEVATED PROSTATE SPECIFIC ANTIGEN (PSA): Primary | ICD-10-CM

## 2018-02-15 DIAGNOSIS — N10 ACUTE PYELONEPHRITIS: ICD-10-CM

## 2018-02-15 DIAGNOSIS — R39.9 LOWER URINARY TRACT SYMPTOMS (LUTS): ICD-10-CM

## 2018-02-15 DIAGNOSIS — R73.9 HYPERGLYCEMIA: ICD-10-CM

## 2018-02-15 DIAGNOSIS — N39.43 POST-VOID DRIBBLING: ICD-10-CM

## 2018-02-15 DIAGNOSIS — R30.0 DYSURIA: Primary | ICD-10-CM

## 2018-02-15 LAB
ALBUMIN UR-MCNC: NEGATIVE MG/DL
ANION GAP SERPL CALCULATED.3IONS-SCNC: 9 MMOL/L (ref 3–14)
APPEARANCE UR: CLEAR
BILIRUB UR QL STRIP: NEGATIVE
BUN SERPL-MCNC: 9 MG/DL (ref 7–25)
CALCIUM SERPL-MCNC: 9.2 MG/DL (ref 8.6–10.3)
CHLORIDE SERPL-SCNC: 103 MMOL/L (ref 98–107)
CO2 SERPL-SCNC: 26 MMOL/L (ref 21–31)
COLOR UR AUTO: YELLOW
CREAT SERPL-MCNC: 1.21 MG/DL (ref 0.7–1.3)
ERYTHROCYTE [DISTWIDTH] IN BLOOD BY AUTOMATED COUNT: 12.8 % (ref 10–15)
GFR SERPL CREATININE-BSD FRML MDRD: 60 ML/MIN/1.7M2
GLUCOSE SERPL-MCNC: 172 MG/DL (ref 70–105)
GLUCOSE UR STRIP-MCNC: NEGATIVE MG/DL
HBA1C MFR BLD: 6.1 % (ref 4–6)
HCT VFR BLD AUTO: 46.1 % (ref 40–53)
HGB BLD-MCNC: 15.8 G/DL (ref 13.3–17.7)
HGB UR QL STRIP: NEGATIVE
KETONES UR STRIP-MCNC: NEGATIVE MG/DL
LEUKOCYTE ESTERASE UR QL STRIP: NEGATIVE
MCH RBC QN AUTO: 30.4 PG (ref 26.5–33)
MCHC RBC AUTO-ENTMCNC: 34.3 G/DL (ref 31.5–36.5)
MCV RBC AUTO: 89 FL (ref 78–100)
NITRATE UR QL: NEGATIVE
PH UR STRIP: 6.5 PH (ref 5–7)
PLATELET # BLD AUTO: 281 10E9/L (ref 150–450)
POTASSIUM SERPL-SCNC: 3.8 MMOL/L (ref 3.5–5.1)
RBC # BLD AUTO: 5.19 10E12/L (ref 4.4–5.9)
SODIUM SERPL-SCNC: 138 MMOL/L (ref 134–144)
SOURCE: NORMAL
SP GR UR STRIP: 1.01 (ref 1–1.03)
UROBILINOGEN UR STRIP-ACNC: 0.2 EU/DL (ref 0.2–1)
WBC # BLD AUTO: 6.9 10E9/L (ref 4–11)

## 2018-02-15 PROCEDURE — 80048 BASIC METABOLIC PNL TOTAL CA: CPT | Performed by: FAMILY MEDICINE

## 2018-02-15 PROCEDURE — 83036 HEMOGLOBIN GLYCOSYLATED A1C: CPT | Performed by: FAMILY MEDICINE

## 2018-02-15 PROCEDURE — 51798 US URINE CAPACITY MEASURE: CPT | Performed by: UROLOGY

## 2018-02-15 PROCEDURE — 36415 COLL VENOUS BLD VENIPUNCTURE: CPT | Performed by: FAMILY MEDICINE

## 2018-02-15 PROCEDURE — 99204 OFFICE O/P NEW MOD 45 MIN: CPT | Mod: 25 | Performed by: UROLOGY

## 2018-02-15 PROCEDURE — 85027 COMPLETE CBC AUTOMATED: CPT | Performed by: FAMILY MEDICINE

## 2018-02-15 PROCEDURE — 81003 URINALYSIS AUTO W/O SCOPE: CPT | Performed by: FAMILY MEDICINE

## 2018-02-15 RX ORDER — TAMSULOSIN HYDROCHLORIDE 0.4 MG/1
0.4 CAPSULE ORAL EVERY EVENING
Qty: 90 CAPSULE | Refills: 3 | Status: SHIPPED | OUTPATIENT
Start: 2018-02-15 | End: 2018-10-31

## 2018-02-15 RX ORDER — TAMSULOSIN HYDROCHLORIDE 0.4 MG/1
0.4 CAPSULE ORAL EVERY EVENING
Qty: 90 CAPSULE | Refills: 3 | Status: SHIPPED | OUTPATIENT
Start: 2018-02-15 | End: 2018-02-15

## 2018-02-15 ASSESSMENT — PAIN SCALES - GENERAL: PAINLEVEL: MILD PAIN (2)

## 2018-02-15 NOTE — PROGRESS NOTES
I was asked to see this patient by Dr Carlisle and provide my opinion about the following:  Incontinence    Type of Visit  Consult    Chief Complaint  Incontinence    HPI  Mr. Juarez is a 62 year old male with history of recent episode of pyelonephritis who presents with multiple urinary complaints.  He was seen in the ED and treated with antibiotics 5 days ago.  His symptoms are improving significantly since starting antibiotics.  He is on Bactrim now.    He has no history of prostate surgery.  He complains of small volume leakage, not associated with urge.  His dysuria has improved.  He denies hematuria.  He is not on Flomax.    His father had a bad experience with Flomax in the past.      Past Medical History  He  has a past medical history of Benign lipomatous neoplasm; Calculus of kidney; Carpal tunnel syndrome; Closed fracture of patella; Diverticulosis of large intestine without perforation or abscess without bleeding; Headache; Other specified forms of tremor; Other urticaria (CODE); Pain in joint; Umbilical hernia without obstruction or gangrene; and Umbilical hernia without obstruction or gangrene.  Patient Active Problem List   Diagnosis     Benign essential tremor     Elevated random blood glucose level     H/O adenomatous polyp of colon     Irritable bowel syndrome with diarrhea     Lumbar radiculopathy     Prehypertension       Past Surgical History  He  has a past surgical history that includes Colonoscopy; Release carpal tunnel; Esophagoscopy, gastroscopy, duodenoscopy (EGD), combined; Sigmoidoscopy flexible; other surgical history; and other surgical history.    Medications  He has a current medication list which includes the following prescription(s): cholestyramine, hyoscyamine, nortriptyline, tramadol, triamcinolone, sulfamethoxazole-trimethoprim, ibuprofen, and amoxicillin-clavulanate.    Allergies  Allergies   Allergen Reactions     Ciprofloxacin Unknown     Midazolam      Other  reaction(s): Other - Describe In Comment Field  Difficult to wake up       Social History  He  reports that he has never smoked. He has never used smokeless tobacco. He reports that he does not drink alcohol.  No drug abuse.    Family History  Family History   Problem Relation Age of Onset     Hypertension Mother      Hypertension     DIABETES Mother      Diabetes     Other - See Comments Mother      Spinal stenosis     Other - See Comments Father      scleroderma which was quite severe     CANCER Father      Cancer, of adenocarcinoma of the lung.  He was a nonsmoker.     DIABETES Brother      Diabetes     Other - See Comments Brother      Pancreatitis     Substance Abuse Brother      Alcohol/Drug,Alcoholic, has been through treatment a number of times.       Review of Systems  I personally reviewed the ROS with the patient.    Nursing Notes:   Vani Erwin LPN  2/15/2018  9:43 AM  Signed  Here for urinary complaints.  Post-Void Residual  A post-void residual was measured by ultrasonic bladder scanner.  110 mL  Review of Systems:    Weight loss:    No     Recent fever/chills:  Yes   Night sweats:   Yes  Current skin rash:  Yes   Recent hair loss:  No  Heat intolerance:  No   Cold intolerance:  No  Chest pain:   No   Palpitations:   No  Shortness of breath:  Yes   Wheezing:   No  Constipation:    No   Diarrhea:   Yes   Nausea:   No   Vomiting:   No   Kidney/side pain:  Yes   Back pain:   Yes  Frequent headaches:  Yes   Dizziness:     Yes  Leg swelling:   No   Calf pain:    No    Parents, brothers or sisters with history of kidney cancer:   No  Parents, brothers or sisters with history of bladder cancer: No  Father or brother with history of prostate cancer:  No  Vani Erwin LPN on 2/15/2018 at 9:37 AM      Physical Exam  Vitals:    02/15/18 0938   BP: 118/80   BP Location: Left arm   Patient Position: Sitting   Cuff Size: Adult Large   Weight: 105.7 kg (233 lb)     Constitutional: No acute  distress.  Alert and cooperative   Head: NCAT  Eyes: Conjunctivae normal  Cardiovascular: Regular rate.  Pulmonary/Chest: Respirations are even and non-labored bilaterally, no audible wheezing  Abdominal: Soft. No distension, tenderness, masses or guarding.   Neurological: A + O x 3.  Cranial Nerves II-XII grossly intact.  Extremities: FREEMAN x 4, Warm. No clubbing.  No cyanosis.    Skin: Pink, warm and dry.  No visible rashes noted.  Psychiatric:  Normal mood and affect  Back:  No left CVA tenderness.  No right CVA tenderness.  Genitourinary:  Nonpalpable bladder    Labs  Results for orders placed or performed during the hospital encounter of 02/10/18   *UA reflex to Microscopic   Result Value Ref Range    Color Urine Yellow     Appearance Urine Clear     Glucose Urine Negative NEG^Negative mg/dL    Bilirubin Urine Small (A) NEG^Negative    Ketones Urine Trace (A) NEG^Negative mg/dL    Specific Gravity Urine 1.025 1.003 - 1.035    Blood Urine Moderate (A) NEG^Negative    pH Urine 6.0 5.0 - 7.0 pH    Protein Albumin Urine 100 (A) NEG^Negative mg/dL    Urobilinogen Urine 0.2 0.2 - 1.0 EU/dL    Nitrite Urine Positive (A) NEG^Negative    Leukocyte Esterase Urine Moderate (A) NEG^Negative    Source Midstream Urine    Urine Microscopic   Result Value Ref Range    WBC Urine >100 (A) OTO2^O - 2 /HPF    RBC Urine 2-5 (A) OTO2^O - 2 /HPF    Squamous Epithelial /LPF Urine Few FEW^Few /LPF    Bacteria Urine Many (A) NEG^Negative /HPF   CBC with platelets differential   Result Value Ref Range    WBC 19.5 (H) 4.0 - 11.0 10e9/L    RBC Count 4.83 4.4 - 5.9 10e12/L    Hemoglobin 15.0 13.3 - 17.7 g/dL    Hematocrit 42.7 40.0 - 53.0 %    MCV 88 78 - 100 fl    MCH 31.1 26.5 - 33.0 pg    MCHC 35.1 31.5 - 36.5 g/dL    RDW 12.9 10.0 - 15.0 %    Platelet Count 217 150 - 450 10e9/L    Diff Method Automated Method     % Neutrophils 87.0 %    % Lymphocytes 6.0 %    % Monocytes 7.0 %    Absolute Neutrophil 16.9 (H) 1.6 - 8.3 10e9/L    Absolute  Lymphocytes 1.2 0.8 - 5.3 10e9/L    Absolute Monocytes 1.4 (H) 0.0 - 1.3 10e9/L   Comprehensive metabolic panel   Result Value Ref Range    Sodium 136 134 - 144 mmol/L    Potassium 3.8 3.5 - 5.1 mmol/L    Chloride 103 98 - 107 mmol/L    Carbon Dioxide 24 21 - 31 mmol/L    Anion Gap 9 3 - 14 mmol/L    Glucose 167 (H) 70 - 105 mg/dL    Urea Nitrogen 15 7 - 25 mg/dL    Creatinine 1.07 0.70 - 1.30 mg/dL    GFR Estimate 70 >60 mL/min/1.7m2    GFR Estimate If Black 84 >60 mL/min/1.7m2    Calcium 9.7 8.6 - 10.3 mg/dL    Bilirubin Total 1.1 (H) 0.3 - 1.0 mg/dL    Albumin 3.9 3.5 - 5.7 g/dL    Protein Total 6.3 (L) 6.4 - 8.9 g/dL    Alkaline Phosphatase 46 34 - 104 U/L    ALT 12 7 - 52 U/L    AST 10 (L) 13 - 39 U/L   Urine Culture Aerobic Bacterial   Result Value Ref Range    Specimen Description Midstream Urine     Culture Micro >100,000 colonies/mL  Klebsiella pneumoniae   (A)        Susceptibility    Klebsiella pneumoniae - TRACIE     AMPICILLIN >16 Resistant ug/mL     CEFTAZIDIME <=1 Sensitive ug/mL     CEFTRIAXONE <=8 Sensitive ug/mL     CEFUROXIME 8 Sensitive ug/mL     CIPROFLOXACIN <=1 Sensitive ug/mL     GENTAMICIN <=4 Sensitive ug/mL     LEVOFLOXACIN <=2 Sensitive ug/mL     NITROFURANTOIN 64 Intermediate ug/mL     TETRACYCLINE <=4 Sensitive ug/mL     Trimethoprim/Sulfa <=2/38 Sensitive ug/mL     CEFOTAXIME <=2 Sensitive ug/mL     Piperacillin/Tazo <=16 Sensitive ug/mL     CEFEPIME <=8 Sensitive ug/mL     IMIPENEM <=1 Sensitive ug/mL     AZTREONAM <=8 Sensitive ug/mL    Klebsiella pneumoniae - TRACIE     CEFTAZIDIME  Sensitive ug/mL     NITROFURANTOIN  Intermediate ug/mL     Post-Void Residual  A post-void residual was measured by ultrasonic bladder scanner.  110 mL today  65 mL (previously recorded)    Assessment  Mr. Juarez is a 62 year old male who presents with recent episode of pyelonephritis and post void dribbling.  We discussed side effects of Flomax including, but not limited to, retrograde ejaculation,  congestion and lightheadedness.    Plan  Continue Bactrim  Start Flomax 0.4mg every evening  Follow up in 6 weeks with UA and PVR

## 2018-02-15 NOTE — MR AVS SNAPSHOT
"              After Visit Summary   2/15/2018    Rj Juarez    MRN: [id number]           Patient Information     Date Of Birth          1955        Visit Information        Provider Department      2/15/2018 9:30 AM Nestor Toledo MD [facility]        Today's Diagnoses     Dysuria    -  1    Post-void dribbling           Follow-ups after your visit        Your next 10 appointments already scheduled     Feb 20, 2018  9:20 AM CST   Return Visit with Ly Frances MD   [facility] ([facility])    [address]  [place] [address]   [phone]            Mar 29, 2018  9:00 AM CDT   Return Visit with Nestor Toledo MD   [facility]    [address]  [place] [address]   [phone]              Who to contact     If you have questions or need follow up information about today's clinic visit or your schedule please contact [facility] directly at [phone].  Normal or non-critical lab and imaging results will be communicated to you by [employer], letter or phone within 4 business days after the clinic has received the results. If you do not hear from us within 7 days, please contact the clinic through [email or web address] or phone. If you have a critical or abnormal lab result, we will notify you by phone as soon as possible.  Submit refill requests through [employer]ai or call your pharmacy and they will forward the refill request to us. Please allow 3 business days for your refill to be completed.          Additional Information About Your Visit        Guomai Information     Guomai lets you send messages to your doctor, view your test results, renew your prescriptions, schedule appointments and more. To sign up, go to [email or web address] . Click on \"Log in\" on the left side of the screen, which will take you to the Welcome page. Then click on \"Sign " "up Now\" on the right side of the page.     You will be asked to enter the access code listed below, as well as some personal information. Please follow the directions to create your username and password.     Your access code is: [email or web address]  Expires: 2018  8:06 PM     Your access code will  in 90 days. If you need help or a new code, please call your [facility] clinic or [phone].        Care EveryWhere ID     This is your Care EveryWhere ID. This could be used by other organizations to access your [facility] medical records  [id number]        Your Vitals Were     BMI (Body Mass Index)                   32.99 kg/m2            Blood Pressure from Last 3 Encounters:   02/15/18 118/80   02/10/18 114/67   18 (!) 150/92    Weight from Last 3 Encounters:   02/15/18 105.7 kg (233 lb)   18 107.4 kg (236 lb 12.8 oz)   18 107.5 kg (237 lb)              We Performed the Following     Bladder scan          Today's Medication Changes          These changes are accurate as of 2/15/18  1:46 PM.  If you have any questions, ask your nurse or doctor.               Start taking these medicines.        Dose/Directions    tamsulosin 0.4 MG capsule   Commonly known as:  FLOMAX   Used for:  Post-void dribbling   Started by:  Nestor Toledo MD        Dose:  0.4 mg   Take 1 capsule (0.4 mg) by mouth every evening   Quantity:  90 capsule   Refills:  3            Where to get your medicines      These medications were sent to [facility], - [place], [place] - [address]  [address], [place] [address]     Phone:  [phone]     tamsulosin 0.4 MG capsule                Primary Care Provider Office Phone # Fax #    Darryl Carlisle [phone]       [address] [facility] [address]        Equal Access to Services     WILLIAM ALDANA AH: Hadii leo sutherland Somarianne, waaxda luqadaha, qaybta julisa ritter " mathieu pimentelaalobo ah. So [facility] [phone].    ATENCIÓN: Si anderson marie, tiene a garcia disposición servicios gratuitos de asistencia lingüística. Samantha al [phone].    We comply with applicable federal civil rights laws and [place] laws. We do not discriminate on the basis of race, color, national origin, age, disability, sex, sexual orientation, or gender identity.            Thank you!     Thank you for choosing [facility]  for your care. Our goal is always to provide you with excellent care. Hearing back from our patients is one way we can continue to improve our services. Please take a few minutes to complete the written survey that you may receive in the mail after your visit with us. Thank you!             Your Updated Medication List - Protect others around you: Learn how to safely use, store and throw away your medicines at www.disposemymeds.org.          This list is accurate as of 2/15/18  1:46 PM.  Always use your most recent med list.                   Brand Name Dispense Instructions for use Diagnosis    amoxicillin-clavulanate 875-125 MG per tablet    AUGMENTIN    20 tablet    Take 1 tablet by mouth 2 times daily    Acute pyelonephritis       cholestyramine 4 GM/DOSE powder    QUESTRAN     Take 1 packet by mouth 2 times daily        hyoscyamine 0.125 MG tablet    ANASPAZ/LEVSIN     Take 0.125 mg by mouth every 4 hours as needed        IBUPROFEN PO      Take 400 mg by mouth    Acute pyelonephritis       nortriptyline 10 MG capsule    PAMELOR     Take 10-50 mg by mouth At Bedtime Start with one capsule at bedtime and titrate upward as needed.        sulfamethoxazole-trimethoprim 800-160 MG per tablet    BACTRIM DS/SEPTRA DS    40 tablet    Take 2 tablets by mouth 2 times daily    Acute pyelonephritis       tamsulosin 0.4 MG capsule    FLOMAX    90 capsule    Take 1 capsule (0.4 mg) by mouth every evening    Post-void dribbling       traMADol 50 MG tablet    ULTRAM     Take   mg by mouth every 6 hours as needed Use mainly at night- may take 2 tablets at a time if needed.        triamcinolone 0.1 % cream    KENALOG     Apply 1 Film topically 3 times daily

## 2018-02-19 ENCOUNTER — HEALTH MAINTENANCE LETTER (OUTPATIENT)
Age: 63
End: 2018-02-19

## 2018-02-19 ENCOUNTER — DOCUMENTATION ONLY (OUTPATIENT)
Dept: FAMILY MEDICINE | Facility: OTHER | Age: 63
End: 2018-02-19

## 2018-03-29 ENCOUNTER — HOSPITAL ENCOUNTER (OUTPATIENT)
Dept: CT IMAGING | Facility: OTHER | Age: 63
Discharge: HOME | End: 2018-03-29
Attending: UROLOGY | Admitting: UROLOGY
Payer: COMMERCIAL

## 2018-03-29 ENCOUNTER — OFFICE VISIT (OUTPATIENT)
Dept: UROLOGY | Facility: OTHER | Age: 63
End: 2018-03-29
Attending: UROLOGY
Payer: COMMERCIAL

## 2018-03-29 VITALS
WEIGHT: 231 LBS | SYSTOLIC BLOOD PRESSURE: 130 MMHG | RESPIRATION RATE: 16 BRPM | DIASTOLIC BLOOD PRESSURE: 70 MMHG | BODY MASS INDEX: 32.7 KG/M2

## 2018-03-29 DIAGNOSIS — R10.9 LEFT FLANK PAIN: ICD-10-CM

## 2018-03-29 DIAGNOSIS — Z87.442 HISTORY OF RENAL CALCULI: ICD-10-CM

## 2018-03-29 DIAGNOSIS — R30.0 DYSURIA: Primary | ICD-10-CM

## 2018-03-29 LAB
ALBUMIN UR-MCNC: NEGATIVE MG/DL
APPEARANCE UR: CLEAR
BACTERIA #/AREA URNS HPF: ABNORMAL /HPF
BILIRUB UR QL STRIP: NEGATIVE
COLOR UR AUTO: YELLOW
GLUCOSE UR STRIP-MCNC: 100 MG/DL
HGB UR QL STRIP: NEGATIVE
KETONES UR STRIP-MCNC: NEGATIVE MG/DL
LEUKOCYTE ESTERASE UR QL STRIP: NEGATIVE
NITRATE UR QL: NEGATIVE
PH UR STRIP: 6 PH (ref 5–7)
RBC #/AREA URNS AUTO: ABNORMAL /HPF
RBC CASTS #/AREA URNS LPF: ABNORMAL /LPF
SOURCE: ABNORMAL
SP GR UR STRIP: 1.02 (ref 1–1.03)
UROBILINOGEN UR STRIP-ACNC: 0.2 EU/DL (ref 0.2–1)
WBC #/AREA URNS AUTO: ABNORMAL /HPF
WBC CASTS #/AREA URNS LPF: ABNORMAL /LPF

## 2018-03-29 PROCEDURE — 74176 CT ABD & PELVIS W/O CONTRAST: CPT

## 2018-03-29 PROCEDURE — 51798 US URINE CAPACITY MEASURE: CPT | Performed by: UROLOGY

## 2018-03-29 PROCEDURE — 99214 OFFICE O/P EST MOD 30 MIN: CPT | Mod: 25 | Performed by: UROLOGY

## 2018-03-29 PROCEDURE — 81001 URINALYSIS AUTO W/SCOPE: CPT | Performed by: UROLOGY

## 2018-03-29 ASSESSMENT — PAIN SCALES - GENERAL: PAINLEVEL: NO PAIN (1)

## 2018-03-29 NOTE — Clinical Note
He seems to be doing quite a bit better.  He was concerned about left flank pain and has a history of kidney stones.  His CT stone study, urinalysis and PVR were all completely normal and revealed no stones so I do not have a cause for his discomfort.

## 2018-03-29 NOTE — NURSING NOTE
Here for 6 week follow up on U/A and PVR.  Review of Systems:    Weight loss:    No     Recent fever/chills:  No   Night sweats:   No  Current skin rash:  No   Recent hair loss:  No  Heat intolerance:  No   Cold intolerance:  No  Chest pain:   No   Palpitations:   No  Shortness of breath:  No   Wheezing:   No  Constipation:    No   Diarrhea:   Yes   Nausea:   No   Vomiting:   No   Kidney/side pain:  Yes   Back pain:   Yes  Frequent headaches:  No   Dizziness:     No  Leg swelling:   No   Calf pain:    Yes    Post-Void Residual  A post-void residual was measured by ultrasonic bladder scanner.  34 mL  Vani Erwin LPN on 3/29/2018 at 9:19 AM

## 2018-03-29 NOTE — PROGRESS NOTES
Type of Visit  EST    Chief Complaint  Incontinence  Left flank pain    HPI  Mr. Juarez is a 62 year old male with history of previous episode of pyelonephritis who follows up with urinary complaints and left flank pain.  The left flank pain has been intermittent and over the course of a few weeks.  He does have a history of kidney stones remotely.  He denies gross hematuria, fevers and dysuria.  His urinary symptoms in general have overall improved.  He underwent urinalysis, PVR and CT stone study today.    He completed the course of Bactrim for pyelonephritis.  He has no history of prostate surgery.  His dysuria has improved.  He denies hematuria.      Family History  Family History   Problem Relation Age of Onset     Hypertension Mother      Hypertension     DIABETES Mother      Diabetes     Other - See Comments Mother      Spinal stenosis     Other - See Comments Father      scleroderma which was quite severe     CANCER Father      Cancer, of adenocarcinoma of the lung.  He was a nonsmoker.     DIABETES Brother      Diabetes     Other - See Comments Brother      Pancreatitis     Substance Abuse Brother      Alcohol/Drug,Alcoholic, has been through treatment a number of times.       Review of Systems  I personally reviewed the ROS with the patient.    Nursing Notes:   Vani Erwin LPN  3/29/2018  9:25 AM  Signed  Here for 6 week follow up on U/A and PVR.  Review of Systems:    Weight loss:    No     Recent fever/chills:  No   Night sweats:   No  Current skin rash:  No   Recent hair loss:  No  Heat intolerance:  No   Cold intolerance:  No  Chest pain:   No   Palpitations:   No  Shortness of breath:  No   Wheezing:   No  Constipation:    No   Diarrhea:   Yes   Nausea:   No   Vomiting:   No   Kidney/side pain:  Yes   Back pain:   Yes  Frequent headaches:  No   Dizziness:     No  Leg swelling:   No   Calf pain:    Yes    Post-Void Residual  A post-void residual was measured by ultrasonic bladder  scanner.  34 mL  Vani Erwin LPN on 3/29/2018 at 9:19 AM      Physical Exam  Vitals:    03/29/18 0919   BP: 130/70   BP Location: Left arm   Patient Position: Sitting   Cuff Size: Adult Large   Resp: 16   Weight: 104.8 kg (231 lb)     Constitutional: No acute distress.  Alert and cooperative   Head: NCAT  Eyes: Conjunctivae normal  Cardiovascular: Regular rate.  Pulmonary/Chest: Respirations are even and non-labored bilaterally, no audible wheezing  Abdominal: Soft. No distension, tenderness, masses or guarding.   Neurological: A + O x 3.  Cranial Nerves II-XII grossly intact.  Extremities: FREEMAN x 4, Warm. No clubbing.  No cyanosis.    Skin: Pink, warm and dry.  No visible rashes noted.  Psychiatric:  Normal mood and affect  Back:  No left CVA tenderness.  No right CVA tenderness.  Genitourinary:  Nonpalpable bladder    Labs  Results for orders placed or performed in visit on 02/15/18   **CBC with platelets FUTURE anytime   Result Value Ref Range    WBC 6.9 4.0 - 11.0 10e9/L    RBC Count 5.19 4.4 - 5.9 10e12/L    Hemoglobin 15.8 13.3 - 17.7 g/dL    Hematocrit 46.1 40.0 - 53.0 %    MCV 89 78 - 100 fl    MCH 30.4 26.5 - 33.0 pg    MCHC 34.3 31.5 - 36.5 g/dL    RDW 12.8 10.0 - 15.0 %    Platelet Count 281 150 - 450 10e9/L   **Basic metabolic panel FUTURE anytime   Result Value Ref Range    Sodium 138 134 - 144 mmol/L    Potassium 3.8 3.5 - 5.1 mmol/L    Chloride 103 98 - 107 mmol/L    Carbon Dioxide 26 21 - 31 mmol/L    Anion Gap 9 3 - 14 mmol/L    Glucose 172 (H) 70 - 105 mg/dL    Urea Nitrogen 9 7 - 25 mg/dL    Creatinine 1.21 0.70 - 1.30 mg/dL    GFR Estimate 60 (L) >60 mL/min/1.7m2    GFR Estimate If Black 73 >60 mL/min/1.7m2    Calcium 9.2 8.6 - 10.3 mg/dL   **UA reflex to Microscopic FUTURE anytime   Result Value Ref Range    Color Urine Yellow     Appearance Urine Clear     Glucose Urine Negative NEG^Negative mg/dL    Bilirubin Urine Negative NEG^Negative    Ketones Urine Negative NEG^Negative mg/dL     Specific Gravity Urine 1.010 1.003 - 1.035    Blood Urine Negative NEG^Negative    pH Urine 6.5 5.0 - 7.0 pH    Protein Albumin Urine Negative NEG^Negative mg/dL    Urobilinogen Urine 0.2 0.2 - 1.0 EU/dL    Nitrite Urine Negative NEG^Negative    Leukocyte Esterase Urine Negative NEG^Negative    Source Midstream Urine    **A1C FUTURE anytime   Result Value Ref Range    Hemoglobin A1C 6.1 (H) 4.0 - 6.0 %     Imaging  CT Stone  3/29/2018  Impression: No evidence of abdominal wall hernia at this time.  No renal or ureteral calculi are present. There is no hydronephrosis.    Post-Void Residual  A post-void residual was measured by ultrasonic bladder scanner.  34 mL today  110 mL (previously recorded)  65 mL (previously recorded)    Assessment  Mr. Juarez is a 62 year old male who follows up with prior episode of pyelonephritis, post void dribbling and unexplained left flank pain.  He does have a history of kidney stones.  Urinalysis and PVR were normal.  Imaging today reveals no kidney stones leading to left flank pain     Plan  Continue Flomax 0.4mg every evening  Follow up in 1 year or as needed

## 2018-03-29 NOTE — MR AVS SNAPSHOT
"              After Visit Summary   3/29/2018    Rj Juarez    MRN: [id number]           Patient Information     Date Of Birth          1955        Visit Information        Provider Department      3/29/2018 9:00 AM Nestor Toledo MD [facility]        Today's Diagnoses     Dysuria    -  1    History of renal calculi        Left flank pain           Follow-ups after your visit        Future tests that were ordered for you today     Open Future Orders        Priority Expected Expires Ordered    CT Abdomen Pelvis w/o Contrast Routine  3/29/2019 3/29/2018            Who to contact     If you have questions or need follow up information about today's clinic visit or your schedule please contact [facility] AND [facility] directly at [phone].  Normal or non-critical lab and imaging results will be communicated to you by [employer], letter or phone within 4 business days after the clinic has received the results. If you do not hear from us within 7 days, please contact the clinic through [employer] or phone. If you have a critical or abnormal lab result, we will notify you by phone as soon as possible.  Submit refill requests through [employer] or call your pharmacy and they will forward the refill request to us. Please allow 3 business days for your refill to be completed.          Additional Information About Your Visit        [employer] Information     [employer] lets you send messages to your doctor, view your test results, renew your prescriptions, schedule appointments and more. To sign up, go to [email or web address] . Click on \"Log in\" on the left side of the screen, which will take you to the Welcome page. Then click on \"Sign up Now\" on the right side of the page.     You will be asked to enter the access code listed below, as well as some personal information. Please follow the directions to create your username and password.     Your access code is: [email or web address]  Expires: " 2018  9:06 PM     Your access code will  in 90 days. If you need help or a new code, please call your [place] clinic or [phone].        Care EveryWhere ID     This is your Care EveryWhere ID. This could be used by other organizations to access your [place] medical records  [id number]        Your Vitals Were     Respirations BMI (Body Mass Index)                16 32.7 kg/m2           Blood Pressure from Last 3 Encounters:   18 130/70   02/15/18 118/80   02/10/18 114/67    Weight from Last 3 Encounters:   18 104.8 kg (231 lb)   02/15/18 105.7 kg (233 lb)   18 107.4 kg (236 lb 12.8 oz)              We Performed the Following     POST-VOID RESIDUAL BLADDER SCAN     UA reflex to Microscopic     Urine Microscopic          Today's Medication Changes          These changes are accurate as of 3/29/18 12:37 PM.  If you have any questions, ask your nurse or doctor.               Stop taking these medicines if you haven't already. Please contact your care team if you have questions.     sulfamethoxazole-trimethoprim 800-160 MG per tablet   Commonly known as:  BACTRIM DS/SEPTRA DS   Stopped by:  Nestor Toledo MD                    Primary Care Provider Office Phone # Fax #    Darryl L MD [place] [phone] [address] [facility] [address]        Equal Access to Services     [facility] AH: Hadii leo tejeda hadasho Somarianne, waaxda luqadaha, qaybta kaalmada noelle, julias mcmullen . So [facility] [phone].    ATENCIÓN: Si habla español, tiene a garcia disposición servicios gratuitos de asistencia lingüística. Llame al [phone].    We comply with applicable federal civil rights laws and [place] laws. We do not discriminate on the basis of race, color, national origin, age, disability, sex, sexual orientation, or gender identity.            Thank you!     Thank you for choosing [facility] AND [facility]  for your care. Our  goal is always to provide you with excellent care. Hearing back from our patients is one way we can continue to improve our services. Please take a few minutes to complete the written survey that you may receive in the mail after your visit with us. Thank you!             Your Updated Medication List - Protect others around you: Learn how to safely use, store and throw away your medicines at www.disposemymeds.org.          This list is accurate as of 3/29/18 12:37 PM.  Always use your most recent med list.                   Brand Name Dispense Instructions for use Diagnosis    amoxicillin-clavulanate 875-125 MG per tablet    AUGMENTIN    20 tablet    Take 1 tablet by mouth 2 times daily    Acute pyelonephritis       cholestyramine 4 GM/DOSE powder    QUESTRAN     Take 1 packet by mouth 2 times daily        hyoscyamine 0.125 MG tablet    ANASPAZ/LEVSIN     Take 0.125 mg by mouth every 4 hours as needed        IBUPROFEN PO      Take 400 mg by mouth    Acute pyelonephritis       nortriptyline 10 MG capsule    PAMELOR     Take 10-50 mg by mouth At Bedtime Start with one capsule at bedtime and titrate upward as needed.        tamsulosin 0.4 MG capsule    FLOMAX    90 capsule    Take 1 capsule (0.4 mg) by mouth every evening    Post-void dribbling       traMADol 50 MG tablet    ULTRAM     Take  mg by mouth every 6 hours as needed Use mainly at night- may take 2 tablets at a time if needed.        triamcinolone 0.1 % cream    KENALOG     Apply 1 Film topically 3 times daily

## 2018-08-01 ENCOUNTER — TELEPHONE (OUTPATIENT)
Dept: FAMILY MEDICINE | Facility: OTHER | Age: 63
End: 2018-08-01

## 2018-08-01 DIAGNOSIS — N30.00 ACUTE CYSTITIS WITHOUT HEMATURIA: Primary | ICD-10-CM

## 2018-08-01 RX ORDER — SULFAMETHOXAZOLE/TRIMETHOPRIM 800-160 MG
1 TABLET ORAL 2 TIMES DAILY
Qty: 20 TABLET | Refills: 0 | Status: SHIPPED | OUTPATIENT
Start: 2018-08-01 | End: 2018-10-31

## 2018-08-01 NOTE — TELEPHONE ENCOUNTER
Patient called stating that he had urinary tract symptoms over the weekend when he was working in [place].  He had dysuria and frequency and some mild flank pain.  He did have Bactrim DS which she took for 4 or 5 days.  Symptoms have now resolved, but he still feeling a bit weak and diaphoretic.  I recommended that he stay on the Bactrim for another 10 days, and be seen within the next week or 2 for reevaluation.    FRANCISCO BARKER MD on 8/1/2018 at 10:24 AM

## 2018-10-31 ENCOUNTER — OFFICE VISIT (OUTPATIENT)
Dept: FAMILY MEDICINE | Facility: OTHER | Age: 63
End: 2018-10-31
Attending: FAMILY MEDICINE
Payer: COMMERCIAL

## 2018-10-31 VITALS
RESPIRATION RATE: 18 BRPM | WEIGHT: 228.6 LBS | DIASTOLIC BLOOD PRESSURE: 86 MMHG | HEART RATE: 74 BPM | TEMPERATURE: 96.7 F | BODY MASS INDEX: 32.36 KG/M2 | SYSTOLIC BLOOD PRESSURE: 132 MMHG

## 2018-10-31 DIAGNOSIS — R39.14 BENIGN PROSTATIC HYPERPLASIA WITH INCOMPLETE BLADDER EMPTYING: ICD-10-CM

## 2018-10-31 DIAGNOSIS — S61.412A LACERATION OF LEFT HAND WITHOUT FOREIGN BODY, INITIAL ENCOUNTER: ICD-10-CM

## 2018-10-31 DIAGNOSIS — Z23 NEED FOR PROPHYLACTIC VACCINATION AND INOCULATION AGAINST INFLUENZA: ICD-10-CM

## 2018-10-31 DIAGNOSIS — M54.16 LUMBAR RADICULOPATHY: ICD-10-CM

## 2018-10-31 DIAGNOSIS — I10 ESSENTIAL HYPERTENSION: Primary | ICD-10-CM

## 2018-10-31 DIAGNOSIS — T14.8XXA SUPERFICIAL FOREIGN BODY (SLIVER): ICD-10-CM

## 2018-10-31 DIAGNOSIS — R73.9 ELEVATED RANDOM BLOOD GLUCOSE LEVEL: ICD-10-CM

## 2018-10-31 DIAGNOSIS — N40.1 BENIGN PROSTATIC HYPERPLASIA WITH INCOMPLETE BLADDER EMPTYING: ICD-10-CM

## 2018-10-31 DIAGNOSIS — Z23 NEED FOR DIPHTHERIA-TETANUS-PERTUSSIS (TDAP) VACCINE: ICD-10-CM

## 2018-10-31 DIAGNOSIS — N39.43 POST-VOID DRIBBLING: ICD-10-CM

## 2018-10-31 LAB
ALBUMIN SERPL-MCNC: 4.4 G/DL (ref 3.5–5.7)
ALBUMIN UR-MCNC: NEGATIVE MG/DL
ALP SERPL-CCNC: 54 U/L (ref 34–104)
ALT SERPL W P-5'-P-CCNC: 13 U/L (ref 7–52)
ANION GAP SERPL CALCULATED.3IONS-SCNC: 2 MMOL/L (ref 3–14)
APPEARANCE UR: CLEAR
AST SERPL W P-5'-P-CCNC: 10 U/L (ref 13–39)
BASOPHILS # BLD AUTO: 0.1 10E9/L (ref 0–0.2)
BASOPHILS NFR BLD AUTO: 1 %
BILIRUB SERPL-MCNC: 0.8 MG/DL (ref 0.3–1)
BILIRUB UR QL STRIP: NEGATIVE
BUN SERPL-MCNC: 18 MG/DL (ref 7–25)
CALCIUM SERPL-MCNC: 10 MG/DL (ref 8.6–10.3)
CHLORIDE SERPL-SCNC: 103 MMOL/L (ref 98–107)
CHOLEST SERPL-MCNC: 172 MG/DL
CO2 SERPL-SCNC: 32 MMOL/L (ref 21–31)
COLOR UR AUTO: YELLOW
CREAT SERPL-MCNC: 0.95 MG/DL (ref 0.7–1.3)
DIFFERENTIAL METHOD BLD: NORMAL
EOSINOPHIL # BLD AUTO: 0.1 10E9/L (ref 0–0.7)
EOSINOPHIL NFR BLD AUTO: 1.5 %
ERYTHROCYTE [DISTWIDTH] IN BLOOD BY AUTOMATED COUNT: 12.9 % (ref 10–15)
GFR SERPL CREATININE-BSD FRML MDRD: 80 ML/MIN/1.7M2
GLUCOSE SERPL-MCNC: 112 MG/DL (ref 70–105)
GLUCOSE UR STRIP-MCNC: NEGATIVE MG/DL
HBA1C MFR BLD: 5.8 % (ref 4–6)
HCT VFR BLD AUTO: 47.4 % (ref 40–53)
HDLC SERPL-MCNC: 42 MG/DL (ref 23–92)
HGB BLD-MCNC: 15.7 G/DL (ref 13.3–17.7)
HGB UR QL STRIP: NEGATIVE
IMM GRANULOCYTES # BLD: 0 10E9/L (ref 0–0.4)
IMM GRANULOCYTES NFR BLD: 0.3 %
KETONES UR STRIP-MCNC: NEGATIVE MG/DL
LDLC SERPL CALC-MCNC: 112 MG/DL
LEUKOCYTE ESTERASE UR QL STRIP: NEGATIVE
LYMPHOCYTES # BLD AUTO: 1.7 10E9/L (ref 0.8–5.3)
LYMPHOCYTES NFR BLD AUTO: 29 %
MCH RBC QN AUTO: 30.3 PG (ref 26.5–33)
MCHC RBC AUTO-ENTMCNC: 33.1 G/DL (ref 31.5–36.5)
MCV RBC AUTO: 92 FL (ref 78–100)
MONOCYTES # BLD AUTO: 0.7 10E9/L (ref 0–1.3)
MONOCYTES NFR BLD AUTO: 11.1 %
NEUTROPHILS # BLD AUTO: 3.4 10E9/L (ref 1.6–8.3)
NEUTROPHILS NFR BLD AUTO: 57.1 %
NITRATE UR QL: NEGATIVE
NONHDLC SERPL-MCNC: 130 MG/DL
PH UR STRIP: 5.5 PH (ref 5–9)
PLATELET # BLD AUTO: 245 10E9/L (ref 150–450)
POTASSIUM SERPL-SCNC: 4.5 MMOL/L (ref 3.5–5.1)
PROT SERPL-MCNC: 6.7 G/DL (ref 6.4–8.9)
PSA SERPL-MCNC: 3.91 NG/ML
RBC # BLD AUTO: 5.18 10E12/L (ref 4.4–5.9)
SODIUM SERPL-SCNC: 137 MMOL/L (ref 134–144)
SOURCE: NORMAL
SP GR UR STRIP: 1.02 (ref 1–1.03)
TRIGL SERPL-MCNC: 91 MG/DL
UROBILINOGEN UR STRIP-ACNC: 0.2 EU/DL (ref 0.2–1)
WBC # BLD AUTO: 5.9 10E9/L (ref 4–11)

## 2018-10-31 PROCEDURE — 99214 OFFICE O/P EST MOD 30 MIN: CPT | Performed by: FAMILY MEDICINE

## 2018-10-31 PROCEDURE — 90686 IIV4 VACC NO PRSV 0.5 ML IM: CPT | Performed by: FAMILY MEDICINE

## 2018-10-31 PROCEDURE — 84153 ASSAY OF PSA TOTAL: CPT | Performed by: FAMILY MEDICINE

## 2018-10-31 PROCEDURE — 80061 LIPID PANEL: CPT | Performed by: FAMILY MEDICINE

## 2018-10-31 PROCEDURE — 80053 COMPREHEN METABOLIC PANEL: CPT | Performed by: FAMILY MEDICINE

## 2018-10-31 PROCEDURE — 83036 HEMOGLOBIN GLYCOSYLATED A1C: CPT | Performed by: FAMILY MEDICINE

## 2018-10-31 PROCEDURE — G0463 HOSPITAL OUTPT CLINIC VISIT: HCPCS

## 2018-10-31 PROCEDURE — 85025 COMPLETE CBC W/AUTO DIFF WBC: CPT | Performed by: FAMILY MEDICINE

## 2018-10-31 PROCEDURE — 81003 URINALYSIS AUTO W/O SCOPE: CPT | Performed by: FAMILY MEDICINE

## 2018-10-31 PROCEDURE — G0008 ADMIN INFLUENZA VIRUS VAC: HCPCS

## 2018-10-31 PROCEDURE — 90471 IMMUNIZATION ADMIN: CPT | Performed by: FAMILY MEDICINE

## 2018-10-31 PROCEDURE — 90472 IMMUNIZATION ADMIN EACH ADD: CPT

## 2018-10-31 PROCEDURE — 36415 COLL VENOUS BLD VENIPUNCTURE: CPT | Performed by: FAMILY MEDICINE

## 2018-10-31 PROCEDURE — 90715 TDAP VACCINE 7 YRS/> IM: CPT | Performed by: FAMILY MEDICINE

## 2018-10-31 PROCEDURE — G0463 HOSPITAL OUTPT CLINIC VISIT: HCPCS | Mod: 25

## 2018-10-31 RX ORDER — HYDROCHLOROTHIAZIDE 25 MG/1
25 TABLET ORAL DAILY
Qty: 90 TABLET | Refills: 3 | Status: SHIPPED | OUTPATIENT
Start: 2018-10-31 | End: 2019-08-22

## 2018-10-31 RX ORDER — TRAMADOL HYDROCHLORIDE 50 MG/1
50-100 TABLET ORAL EVERY 6 HOURS PRN
Qty: 100 TABLET | Refills: 5 | Status: SHIPPED | OUTPATIENT
Start: 2018-10-31 | End: 2019-11-08

## 2018-10-31 RX ORDER — TAMSULOSIN HYDROCHLORIDE 0.4 MG/1
0.4 CAPSULE ORAL EVERY EVENING
Qty: 90 CAPSULE | Refills: 3 | Status: SHIPPED | OUTPATIENT
Start: 2018-10-31 | End: 2019-08-22

## 2018-10-31 ASSESSMENT — PAIN SCALES - GENERAL: PAINLEVEL: SEVERE PAIN (7)

## 2018-10-31 ASSESSMENT — PATIENT HEALTH QUESTIONNAIRE - PHQ9: SUM OF ALL RESPONSES TO PHQ QUESTIONS 1-9: 0

## 2018-10-31 NOTE — LETTER
November 1, 2018      Rj CRENSHAW Larry  [address]  [facility] [address]        Dear Hector,    Your lab work is enclosed.  You will note that your A1c is normal and your glucose was 112 on your profile, which is excellent.  The blood profile is essentially normal.  The minimally high carbon dioxide and low anion gap is of no significance at all.  The same is true of the low AST.    Your complete blood count, cholesterol profile, and urinalysis are all normal.  You will note that your PSA is slightly elevated at 3.9.    The PSA elevation is just minimal.  There are a couple of options regarding this finding.  It is most likely related just to an enlarged prostate.  One option is to just observe and repeat the PSA again in about 4 months to see if it rises significantly.  The other option would be to see Dr. Toledo again, for consultation and a possible prostate biopsy.    We can discuss this further at your convenience.    Resulted Orders   Prostate Specific Antigen GH   Result Value Ref Range    Prostate Specific Antigen 3.909 (H) <3.100 ng/mL   Hemoglobin A1c   Result Value Ref Range    Hemoglobin A1C 5.8 4.0 - 6.0 %   Comprehensive metabolic panel   Result Value Ref Range    Sodium 137 134 - 144 mmol/L    Potassium 4.5 3.5 - 5.1 mmol/L    Chloride 103 98 - 107 mmol/L    Carbon Dioxide 32 (H) 21 - 31 mmol/L    Anion Gap 2 (L) 3 - 14 mmol/L    Glucose 112 (H) 70 - 105 mg/dL    Urea Nitrogen 18 7 - 25 mg/dL    Creatinine 0.95 0.70 - 1.30 mg/dL    GFR Estimate 80 >60 mL/min/1.7m2    GFR Estimate If Black >90 >60 mL/min/1.7m2    Calcium 10.0 8.6 - 10.3 mg/dL    Bilirubin Total 0.8 0.3 - 1.0 mg/dL    Albumin 4.4 3.5 - 5.7 g/dL    Protein Total 6.7 6.4 - 8.9 g/dL    Alkaline Phosphatase 54 34 - 104 U/L    ALT 13 7 - 52 U/L    AST 10 (L) 13 - 39 U/L   CBC with platelets differential   Result Value Ref Range    WBC 5.9 4.0 - 11.0 10e9/L    RBC Count 5.18 4.4 - 5.9 10e12/L    Hemoglobin 15.7 13.3 - 17.7 g/dL     Hematocrit 47.4 40.0 - 53.0 %    MCV 92 78 - 100 fl    MCH 30.3 26.5 - 33.0 pg    MCHC 33.1 31.5 - 36.5 g/dL    RDW 12.9 10.0 - 15.0 %    Platelet Count 245 150 - 450 10e9/L    Diff Method Automated Method     % Neutrophils 57.1 %    % Lymphocytes 29.0 %    % Monocytes 11.1 %    % Eosinophils 1.5 %    % Basophils 1.0 %    % Immature Granulocytes 0.3 %    Absolute Neutrophil 3.4 1.6 - 8.3 10e9/L    Absolute Lymphocytes 1.7 0.8 - 5.3 10e9/L    Absolute Monocytes 0.7 0.0 - 1.3 10e9/L    Absolute Eosinophils 0.1 0.0 - 0.7 10e9/L    Absolute Basophils 0.1 0.0 - 0.2 10e9/L    Abs Immature Granulocytes 0.0 0 - 0.4 10e9/L   Lipid Profile   Result Value Ref Range    Cholesterol 172 <200 mg/dL    Triglycerides 91 <150 mg/dL    HDL Cholesterol 42 23 - 92 mg/dL    LDL Cholesterol Calculated 112 (H) <100 mg/dL      Comment:      Above desirable:  100-129 mg/dl  Borderline High:  130-159 mg/dL  High:             160-189 mg/dL  Very high:       >189 mg/dl      Non HDL Cholesterol 130 (H) <130 mg/dL      Comment:      Above Desirable:  130-159 mg/dl  Borderline high:  160-189 mg/dl  High:             190-219 mg/dl  Very high:       >219 mg/dl     *UA reflex to Microscopic   Result Value Ref Range    Color Urine Yellow     Appearance Urine Clear     Glucose Urine Negative NEG^Negative mg/dL    Bilirubin Urine Negative NEG^Negative    Ketones Urine Negative NEG^Negative mg/dL    Specific Gravity Urine 1.025 1.000 - 1.030    Blood Urine Negative NEG^Negative    pH Urine 5.5 5.0 - 9.0 pH    Protein Albumin Urine Negative NEG^Negative mg/dL    Urobilinogen Urine 0.2 0.2 - 1.0 EU/dL    Nitrite Urine Negative NEG^Negative    Leukocyte Esterase Urine Negative NEG^Negative    Source Midstream Urine        If you have any questions or concerns, please call the clinic at the number listed above.       Sincerely,        FRANCISCO BARKER MD

## 2018-10-31 NOTE — PROGRESS NOTES

## 2018-10-31 NOTE — MR AVS SNAPSHOT
After Visit Summary   10/31/2018    Rj Juarez    MRN: [id number]           Patient Information     Date Of Birth          1955        Visit Information        Provider Department      10/31/2018 11:15 AM Darryl Carlisle MD [facility]        Today's Diagnoses     Essential hypertension    -  1    Need for prophylactic vaccination and inoculation against influenza        Elevated random blood glucose level        Laceration of left hand without foreign body, initial encounter        Need for diphtheria-tetanus-pertussis (Tdap) vaccine        Post-void dribbling        Lumbar radiculopathy        Benign prostatic hyperplasia with incomplete bladder emptying        Superficial foreign body (sliver)           Follow-ups after your visit        Who to contact     If you have questions or need follow up information about today's clinic visit or your schedule please contact [facility] AND [facility] directly at [phone].  Normal or non-critical lab and imaging results will be communicated to you by MyChart, letter or phone within 4 business days after the clinic has received the results. If you do not hear from us within 7 days, please contact the clinic through MyChart or phone. If you have a critical or abnormal lab result, we will notify you by phone as soon as possible.  Submit refill requests through [employer] or call your pharmacy and they will forward the refill request to us. Please allow 3 business days for your refill to be completed.          Additional Information About Your Visit        Care EveryWhere ID     This is your Care EveryWhere ID. This could be used by other organizations to access your [place] medical records  [id number]        Your Vitals Were     Pulse Temperature Respirations BMI (Body Mass Index)          74 96.7  F (35.9  C) (Tympanic) 18 32.36 kg/m2         Blood Pressure from Last 3 Encounters:   10/31/18 132/86   03/29/18  130/70   02/15/18 118/80    Weight from Last 3 Encounters:   10/31/18 228 lb 9.6 oz (103.7 kg)   03/29/18 231 lb (104.8 kg)   02/15/18 233 lb (105.7 kg)              We Performed the Following     *UA reflex to Microscopic     CBC with platelets differential     Comprehensive metabolic panel     HC FLU VAC PRESRV FREE QUAD SPLIT VIR 3+YRS IM     Hemoglobin A1c     Lipid Profile     Prostate Specific Antigen GH     TDAP VACCINE (BOOSTRIX)     Vaccine Administration, Initial [19763]          Today's Medication Changes          These changes are accurate as of 10/31/18  1:10 PM.  If you have any questions, ask your nurse or doctor.               Start taking these medicines.        Dose/Directions    hydrochlorothiazide 25 MG tablet   Commonly known as:  HYDRODIURIL   Used for:  Essential hypertension   Started by:  Darryl Carlisle MD        Dose:  25 mg   Take 1 tablet (25 mg) by mouth daily   Quantity:  90 tablet   Refills:  3         These medicines have changed or have updated prescriptions.        Dose/Directions    traMADol 50 MG tablet   Commonly known as:  ULTRAM   This may have changed:  reasons to take this   Used for:  Lumbar radiculopathy   Changed by:  Darryl Carlisle MD        Dose:   mg   Take 1-2 tablets ( mg) by mouth every 6 hours as needed for moderate pain Use mainly at night- may take 2 tablets at a time if needed.   Quantity:  100 tablet   Refills:  5            Where to get your medicines      These medications were sent to [facility], - [place], [place] - [address]  [address], [place] [address]     Phone:  [phone]     hydrochlorothiazide 25 MG tablet    tamsulosin 0.4 MG capsule         Some of these will need a paper prescription and others can be bought over the counter.  Ask your nurse if you have questions.     Bring a paper prescription for each of these medications     traMADol 50 MG tablet                Information about OPIOIDS     PRESCRIPTION OPIOIDS: WHAT YOU NEED TO KNOW   We gave you an opioid (narcotic) pain medicine. It is important to manage your pain, but opioids are not always the best choice. You should first try all the other options your care team gave you. Take this medicine for as short a time (and as few doses) as possible.    Some activities can increase your pain, such as bandage changes or therapy sessions. It may help to take your pain medicine 30 to 60 minutes before these activities. Reduce your stress by getting enough sleep, working on hobbies you enjoy and practicing relaxation or meditation. Talk to your care team about ways to manage your pain beyond prescription opioids.    These medicines have risks:    DO NOT drive when on new or higher doses of pain medicine. These medicines can affect your alertness and reaction times, and you could be arrested for driving under the influence (DUI). If you need to use opioids long-term, talk to your care team about driving.    DO NOT operate heavy machinery    DO NOT do any other dangerous activities while taking these medicines.    DO NOT drink any alcohol while taking these medicines.     If the opioid prescribed includes acetaminophen, DO NOT take with any other medicines that contain acetaminophen. Read all labels carefully. Look for the word  acetaminophen  or  Tylenol.  Ask your pharmacist if you have questions or are unsure.    You can get addicted to pain medicines, especially if you have a history of addiction (chemical, alcohol or substance dependence). Talk to your care team about ways to reduce this risk.    All opioids tend to cause constipation. Drink plenty of water and eat foods that have a lot of fiber, such as fruits, vegetables, prune juice, apple juice and high-fiber cereal. Take a laxative (Miralax, milk of magnesia, Colace, Senna) if you don t move your bowels at least every other day. Other side effects include upset  stomach, sleepiness, dizziness, throwing up, tolerance (needing more of the medicine to have the same effect), physical dependence and slowed breathing.    Store your pills in a secure place, locked if possible. We will not replace any lost or stolen medicine. If you don t finish your medicine, please throw away (dispose) as directed by your pharmacist. The [place] Pollution Control [employer] has more information about safe disposal: [email or web address]://[email or web address].[email or web address]/living-[email or web address]/managing-unwanted-medications         Primary Care Provider Office Phone # Fax #    Darryl Carlisle -[phone] [address] [facility] [address]        Equal Access to Services     WILLIAM ALDANA : Gretchen Salinas, waliliya emerson, alhaji drake, julisa marroquin. So [facility] [phone].    ATENCIÓN: Si habla español, tiene a garcia disposición servicios gratuitos de asistencia lingüística. Llame al [phone].    We comply with applicable federal civil rights laws and [place] laws. We do not discriminate on the basis of race, color, national origin, age, disability, sex, sexual orientation, or gender identity.            Thank you!     Thank you for choosing [facility]  for your care. Our goal is always to provide you with excellent care. Hearing back from our patients is one way we can continue to improve our services. Please take a few minutes to complete the written survey that you may receive in the mail after your visit with us. Thank you!             Your Updated Medication List - Protect others around you: Learn how to safely use, store and throw away your medicines at www.disposemymeds.org.          This list is accurate as of 10/31/18  1:10 PM.  Always use your most recent med list.                   Brand Name Dispense Instructions for use Diagnosis    hydrochlorothiazide 25 MG tablet    HYDRODIURIL    90 tablet    Take 1 tablet  (25 mg) by mouth daily    Essential hypertension       IBUPROFEN PO      Take 400 mg by mouth    Acute pyelonephritis       tamsulosin 0.4 MG capsule    FLOMAX    90 capsule    Take 1 capsule (0.4 mg) by mouth every evening    Post-void dribbling       traMADol 50 MG tablet    ULTRAM    100 tablet    Take 1-2 tablets ( mg) by mouth every 6 hours as needed for moderate pain Use mainly at night- may take 2 tablets at a time if needed.    Lumbar radiculopathy       triamcinolone 0.1 % cream    KENALOG     Apply 1 Film topically 3 times daily

## 2018-10-31 NOTE — PROGRESS NOTES
SUBJECTIVE:  62 year old male who presents for evaluation of a number of problems.  He is due for lab work, in terms of follow-up of his prostate symptoms and blood sugar.  He checks his blood pressure at home and generally gets in the 150-160 range.  We checked his pressure here several times with his machine compared to ours and generally came up with about 140 over the mid 90s.  His machine correlated fairly well with ours.    He has had no cardiopulmonary symptoms.  His main problem is persistent back pain for which she takes occasional tramadol, and also urinary frequency and dribbling.  He has seen Dr. Toledo in the past.  He has not tried Flomax but is ready to try it now.    He has had 2 issues regarding lacerations on his liver.  He got a sliver in the left hand at the base of the index finger.  He did know it was present until he developed a small abscess and then he was able to pull out the sliver himself, about 1/4 inch piece of wood.  This area has started to heal up nicely.  He has a small laceration in the same area that is healing nicely.  His last tetanus shot was almost 7 years ago.    His main concern is the blood sugar, the prostate issues in the blood pressure.  He has been running borderline on blood pressure for quite some time.  He has had no neurologic symptoms.  His gastrointestinal symptoms are fairly well controlled and he is due for colonoscopy next year.    Additional Review of Systems: See HPI: No new symptoms otherwise    Past Medical History:   Diagnosis Date     Benign lipomatous neoplasm     1/20/2014     Calculus of kidney     possible     Carpal tunnel syndrome     Both hands     Closed fracture of patella     05/06/09,Sustained right superior pole patellar fracture which underwent conservative management     Diverticulosis of large intestine without perforation or abscess without bleeding     1/28/2014     Headache     No Comments Provided     Other specified forms of tremor      3/2/2011     Other urticaria (CODE)     3/2/2011     Pain in joint     No Comments Provided     Umbilical hernia without obstruction or gangrene     1/20/2014     Umbilical hernia without obstruction or gangrene     1/26/2018        Current Outpatient Prescriptions   Medication Sig Dispense Refill     hydrochlorothiazide (HYDRODIURIL) 25 MG tablet Take 1 tablet (25 mg) by mouth daily 90 tablet 3     IBUPROFEN PO Take 400 mg by mouth       tamsulosin (FLOMAX) 0.4 MG capsule Take 1 capsule (0.4 mg) by mouth every evening 90 capsule 3     traMADol (ULTRAM) 50 MG tablet Take 1-2 tablets ( mg) by mouth every 6 hours as needed for moderate pain Use mainly at night- may take 2 tablets at a time if needed. 100 tablet 5     triamcinolone (KENALOG) 0.1 % cream Apply 1 Film topically 3 times daily         Allergies as of 10/31/2018 - Gautam as Reviewed 10/31/2018   Allergen Reaction Noted     Ciprofloxacin Unknown 02/10/2018     Midazolam  09/08/2014        OBJECTIVE:  /86 (BP Location: Right arm, Patient Position: Sitting, Cuff Size: Adult Large)  Pulse 74  Temp 96.7  F (35.9  C) (Tympanic)  Resp 18  Wt 228 lb 9.6 oz (103.7 kg)  BMI 32.36 kg/m2  EXAM: {EXAM -   General: He is a pleasant cooperative 62-year-old man, answers questions appropriately and is in no acute distress  HEENT/neck: ENT is unremarkable.  Neck is supple with good carotid pulses  Chest/cardiac: Lungs are clear.  Cardiac exam is normal.  Blood pressure was checked a number of times both in the left arm and right arm, and ran roughly 144/90.  His machine was fairly close in this regard.  Abdomen/: Soft and nontender with no organomegaly or masses.  Skin: Multiple skin lesions which are typical hemangiomas and seborrheic keratoses.  He also has subcutaneous nodules which she has had for many years which appear unchanged.  Extremities: There is a healing laceration which appears to be well approximated, 1 cm, and left index finger and just  above that is the area where he pulled out the sliver.  This is healing as well.  Neuro/psych: Neuro is intact and nonfocal    Labs/imaging: Labs are pending    ASSESSMENT/PLAN:  Healing laceration and sliver-recommended updating tetanus today which was done.    Preventive healthcare-flu vaccine recommended and given.  Colonoscopy is due next year.    History of elevated blood sugar-A1c pending    Lumbar radiculopathy-currently stable-refilled tramadol    History of lower urinary tract symptoms with incomplete bladder emptying and postvoid dribbling.  He is going to restart Flomax.  Check labs today and he will be notified of the results.    Hypertension-remains borderline.  We had a long discussion regarding pros and cons of treatment and elected to start hydrochlorothiazide 25 mg daily.  We will plan to follow-up blood pressure again in 2 months and he will continue checking at home with a goal to get down close to 120/80.    He will be notified of lab results and we will follow-up again in 2 months or pending the results.  FRANCISCO BARKER MD on 10/31/2018 at 1:09 PM

## 2018-10-31 NOTE — NURSING NOTE
"Previous A1C is at goal of <8  Lab Results   Component Value Date    A1C 6.1 02/15/2018     Urine microalbumin:creatine:  Foot exam 6/6/17  Eye exam May 2018    Tobacco User no  Patient is not on a daily aspirin  Patient is not on a Statin.  Blood pressure today of:     BP Readings from Last 1 Encounters:   03/29/18 130/70      is at the goal of <139/89 for diabetics.    Andrea Gillis LPN on 10/31/2018 at 11:27 AM    Chief Complaint   Patient presents with     Hypertension     Blood pressure check     Diabetes     Diabetic Check     Imm/Inj     Flu Shot       Initial /86 (BP Location: Right arm, Patient Position: Sitting, Cuff Size: Adult Large)  Pulse 74  Temp 96.7  F (35.9  C) (Tympanic)  Resp 18  Wt 228 lb 9.6 oz (103.7 kg)  BMI 32.36 kg/m2 Estimated body mass index is 32.36 kg/(m^2) as calculated from the following:    Height as of 1/19/18: 5' 10.47\" (1.79 m).    Weight as of this encounter: 228 lb 9.6 oz (103.7 kg).  Medication Reconciliation: complete    Andrea Gillis LPN    "

## 2018-11-29 ENCOUNTER — OFFICE VISIT (OUTPATIENT)
Dept: UROLOGY | Facility: OTHER | Age: 63
End: 2018-11-29
Attending: UROLOGY
Payer: COMMERCIAL

## 2018-11-29 VITALS
BODY MASS INDEX: 32.73 KG/M2 | RESPIRATION RATE: 16 BRPM | HEART RATE: 80 BPM | SYSTOLIC BLOOD PRESSURE: 138 MMHG | WEIGHT: 233.8 LBS | DIASTOLIC BLOOD PRESSURE: 82 MMHG | HEIGHT: 71 IN

## 2018-11-29 DIAGNOSIS — R97.20 ELEVATED PROSTATE SPECIFIC ANTIGEN (PSA): Primary | ICD-10-CM

## 2018-11-29 PROCEDURE — 99213 OFFICE O/P EST LOW 20 MIN: CPT | Performed by: UROLOGY

## 2018-11-29 PROCEDURE — G0463 HOSPITAL OUTPT CLINIC VISIT: HCPCS | Performed by: UROLOGY

## 2018-11-29 ASSESSMENT — PAIN SCALES - GENERAL: PAINLEVEL: SEVERE PAIN (7)

## 2018-11-29 NOTE — NURSING NOTE
Review of Systems:    Weight loss:    No     Recent fever/chills:  Yes   Night sweats:   Yes  Current skin rash:  No   Recent hair loss:  No  Heat intolerance:  No   Cold intolerance:  No  Chest pain:   No   Palpitations:   No  Shortness of breath:  No   Wheezing:   No  Constipation:    No   Diarrhea:   Yes   Nausea:   Yes   Vomiting:   No   Kidney/side pain:  Yes   Back pain:   Yes  Frequent headaches:  No   Dizziness:     Yes  Leg swelling:   No   Calf pain:    Yes

## 2018-11-29 NOTE — MR AVS SNAPSHOT
After Visit Summary   11/29/2018    Rj Juarez    MRN: [id number]           Patient Information     Date Of Birth          1955        Visit Information        Provider Department      11/29/2018 8:15 AM Nestor Toledo MD [facility]        Today's Diagnoses     Elevated prostate specific antigen (PSA)    -  1       Follow-ups after your visit        Your next 10 appointments already scheduled     Mar 01, 2019  8:00 AM CST   LAB with GH LAB   [facility] ([facility])    [address] [facility] [id number] [phone]           Please do not eat 10-12 hours before your appointment if you are coming in fasting for labs on lipids, cholesterol, or glucose (sugar). This does not apply to pregnant women. Water, hot tea and black coffee (with nothing added) are okay. Do not drink other fluids, diet soda or chew gum.            Mar 01, 2019 10:15 AM CST   Return Visit with Nestor Toledo MD   [facility] ([facility])    [address]  [place] [id number] [phone]              Who to contact     If you have questions or need follow up information about today's clinic visit or your schedule please contact [facility] directly at [phone].  Normal or non-critical lab and imaging results will be communicated to you by MyChart, letter or phone within 4 business days after the clinic has received the results. If you do not hear from us within 7 days, please contact the clinic through MyChart or phone. If you have a critical or abnormal lab result, we will notify you by phone as soon as possible.  Submit refill requests through [employer] or call your pharmacy and they will forward the refill request to us. Please allow 3 business days for your refill to be completed.          Additional Information About Your Visit        Care EveryWhere ID   "   This is your Care EveryWhere ID. This could be used by other organizations to access your [place] medical records  [id number]        Your Vitals Were     Pulse Respirations Height BMI (Body Mass Index)          80 16 1.791 m (5' 10.5\") 33.07 kg/m2         Blood Pressure from Last 3 Encounters:   11/29/18 138/82   10/31/18 132/86   03/29/18 130/70    Weight from Last 3 Encounters:   11/29/18 106.1 kg (233 lb 12.8 oz)   10/31/18 103.7 kg (228 lb 9.6 oz)   03/29/18 104.8 kg (231 lb)              Today, you had the following     No orders found for display       Primary Care Provider Office Phone # Fax #    Darryl Carlisle [phone] [address] [facility] [address]        Equal Access to Services     [facility]: Hadii aad ku hadasho Somarianne, waaxda luqadaha, qaybta kaalmada adeegyada, julisa benson hayspike mcmullen . So [facility] [phone].    ATENCIÓN: Si habla español, tiene a garcia disposición servicios gratuitos de asistencia lingüística. Samantha al [phone].    We comply with applicable federal civil rights laws and [place] laws. We do not discriminate on the basis of race, color, national origin, age, disability, sex, sexual orientation, or gender identity.            Thank you!     Thank you for choosing [facility] AND [facility]  for your care. Our goal is always to provide you with excellent care. Hearing back from our patients is one way we can continue to improve our services. Please take a few minutes to complete the written survey that you may receive in the mail after your visit with us. Thank you!             Your Updated Medication List - Protect others around you: Learn how to safely use, store and throw away your medicines at www.disposemymeds.org.          This list is accurate as of 11/29/18 10:28 AM.  Always use your most recent med list.                   Brand Name Dispense Instructions for use Diagnosis    hydrochlorothiazide 25 " MG tablet    HYDRODIURIL    90 tablet    Take 1 tablet (25 mg) by mouth daily    Essential hypertension       IBUPROFEN PO      Take 400 mg by mouth    Acute pyelonephritis       tamsulosin 0.4 MG capsule    FLOMAX    90 capsule    Take 1 capsule (0.4 mg) by mouth every evening    Post-void dribbling       traMADol 50 MG tablet    ULTRAM    100 tablet    Take 1-2 tablets ( mg) by mouth every 6 hours as needed for moderate pain Use mainly at night- may take 2 tablets at a time if needed.    Lumbar radiculopathy       triamcinolone 0.1 % external cream    KENALOG     Apply 1 Film topically 3 times daily

## 2018-11-29 NOTE — PROGRESS NOTES
Type of Visit  EST    Chief Complaint  Elevated PSA    HPI  Mr. Juarez is a 62 year old male with history of previous episode of pyelonephritis who follows up with urinary complaints and left flank pain.  No family history of prostate cancer.  He has never undergone prostate biopsy in the past.  He recently underwent a PSA.  He denies dysuria or urinary urgency at the time of the lab collection.  He did develop an episode of acute pyelonephritis in the early spring earlier this year.  This was treated and his symptoms have returned to baseline.  He does have obstructive urinary symptoms including frequency and urgency.  He was previously on Flomax but he discontinued as he prefers to not be on medication.      Family History  Family History   Problem Relation Age of Onset     Hypertension Mother      Hypertension     Diabetes Mother      Diabetes     Other - See Comments Mother      Spinal stenosis     Other - See Comments Father      scleroderma which was quite severe     Cancer Father      Cancer, of adenocarcinoma of the lung.  He was a nonsmoker.     Diabetes Brother      Diabetes     Other - See Comments Brother      Pancreatitis     Substance Abuse Brother      Alcohol/Drug,Alcoholic, has been through treatment a number of times.       Review of Systems  I personally reviewed the ROS with the patient.    Nursing Notes:   Prema Aviles RN  2018  8:25 AM  Signed  Review of Systems:    Weight loss:    No     Recent fever/chills:  Yes   Night sweats:   Yes  Current skin rash:  No   Recent hair loss:  No  Heat intolerance:  No   Cold intolerance:  No  Chest pain:   No   Palpitations:   No  Shortness of breath:  No   Wheezing:   No  Constipation:    No   Diarrhea:   Yes   Nausea:   Yes   Vomiting:   No   Kidney/side pain:  Yes   Back pain:   Yes  Frequent headaches:  No   Dizziness:     Yes  Leg swelling:   No   Calf pain:    Yes      Physical Exam  Vitals:    18 0820   BP: 138/82   BP  "Location: Left arm   Patient Position: Sitting   Cuff Size: Adult Large   Pulse: 80   Resp: 16   Weight: 106.1 kg (233 lb 12.8 oz)   Height: 1.791 m (5' 10.5\")     Constitutional: No acute distress.  Alert and cooperative   Head: NCAT  Eyes: Conjunctivae normal  Cardiovascular: Regular rate.  Pulmonary/Chest: Respirations are even and non-labored bilaterally, no audible wheezing  Abdominal: Soft. No distension, tenderness, masses or guarding.   Neurological: A + O x 3.  Cranial Nerves II-XII grossly intact.  Extremities: FREEMAN x 4, Warm. No clubbing.  No cyanosis.    Skin: Pink, warm and dry.  No visible rashes noted.  Psychiatric:  Normal mood and affect  Back:  No left CVA tenderness.  No right CVA tenderness.  Genitourinary:  Nonpalpable bladder    Labs  Results for orders placed or performed in visit on 10/31/18   Prostate Specific Antigen GH   Result Value Ref Range    Prostate Specific Antigen 3.909 (H) <3.100 ng/mL   Hemoglobin A1c   Result Value Ref Range    Hemoglobin A1C 5.8 4.0 - 6.0 %   Comprehensive metabolic panel   Result Value Ref Range    Sodium 137 134 - 144 mmol/L    Potassium 4.5 3.5 - 5.1 mmol/L    Chloride 103 98 - 107 mmol/L    Carbon Dioxide 32 (H) 21 - 31 mmol/L    Anion Gap 2 (L) 3 - 14 mmol/L    Glucose 112 (H) 70 - 105 mg/dL    Urea Nitrogen 18 7 - 25 mg/dL    Creatinine 0.95 0.70 - 1.30 mg/dL    GFR Estimate 80 >60 mL/min/1.7m2    GFR Estimate If Black >90 >60 mL/min/1.7m2    Calcium 10.0 8.6 - 10.3 mg/dL    Bilirubin Total 0.8 0.3 - 1.0 mg/dL    Albumin 4.4 3.5 - 5.7 g/dL    Protein Total 6.7 6.4 - 8.9 g/dL    Alkaline Phosphatase 54 34 - 104 U/L    ALT 13 7 - 52 U/L    AST 10 (L) 13 - 39 U/L   CBC with platelets differential   Result Value Ref Range    WBC 5.9 4.0 - 11.0 10e9/L    RBC Count 5.18 4.4 - 5.9 10e12/L    Hemoglobin 15.7 13.3 - 17.7 g/dL    Hematocrit 47.4 40.0 - 53.0 %    MCV 92 78 - 100 fl    MCH 30.3 26.5 - 33.0 pg    MCHC 33.1 31.5 - 36.5 g/dL    RDW 12.9 10.0 - 15.0 %    " Platelet Count 245 150 - 450 10e9/L    Diff Method Automated Method     % Neutrophils 57.1 %    % Lymphocytes 29.0 %    % Monocytes 11.1 %    % Eosinophils 1.5 %    % Basophils 1.0 %    % Immature Granulocytes 0.3 %    Absolute Neutrophil 3.4 1.6 - 8.3 10e9/L    Absolute Lymphocytes 1.7 0.8 - 5.3 10e9/L    Absolute Monocytes 0.7 0.0 - 1.3 10e9/L    Absolute Eosinophils 0.1 0.0 - 0.7 10e9/L    Absolute Basophils 0.1 0.0 - 0.2 10e9/L    Abs Immature Granulocytes 0.0 0 - 0.4 10e9/L   Lipid Profile   Result Value Ref Range    Cholesterol 172 <200 mg/dL    Triglycerides 91 <150 mg/dL    HDL Cholesterol 42 23 - 92 mg/dL    LDL Cholesterol Calculated 112 (H) <100 mg/dL    Non HDL Cholesterol 130 (H) <130 mg/dL   *UA reflex to Microscopic   Result Value Ref Range    Color Urine Yellow     Appearance Urine Clear     Glucose Urine Negative NEG^Negative mg/dL    Bilirubin Urine Negative NEG^Negative    Ketones Urine Negative NEG^Negative mg/dL    Specific Gravity Urine 1.025 1.000 - 1.030    Blood Urine Negative NEG^Negative    pH Urine 5.5 5.0 - 9.0 pH    Protein Albumin Urine Negative NEG^Negative mg/dL    Urobilinogen Urine 0.2 0.2 - 1.0 EU/dL    Nitrite Urine Negative NEG^Negative    Leukocyte Esterase Urine Negative NEG^Negative    Source Midstream Urine      Imaging  CT Stone  3/29/2018  Impression: No evidence of abdominal wall hernia at this time.  No renal or ureteral calculi are present. There is no hydronephrosis.    Post-Void Residual  A post-void residual was measured by ultrasonic bladder scanner.  34 mL (previously recorded)  110 mL (previously recorded)  65 mL (previously recorded)    Assessment  Mr. Juarez is a 62 year old male who follows up with mildly elevated PSA.  We discussed various options including prostate biopsy, repeat PSA, [facility] urine testing, prostate MRI.  We discussed the risks and benefits of each option.    Plan  Follow up PSA in 3 months

## 2018-12-27 ENCOUNTER — OFFICE VISIT (OUTPATIENT)
Dept: FAMILY MEDICINE | Facility: OTHER | Age: 63
End: 2018-12-27
Attending: NURSE PRACTITIONER
Payer: MEDICARE

## 2018-12-27 ENCOUNTER — TELEPHONE (OUTPATIENT)
Dept: FAMILY MEDICINE | Facility: OTHER | Age: 63
End: 2018-12-27

## 2018-12-27 VITALS
DIASTOLIC BLOOD PRESSURE: 82 MMHG | SYSTOLIC BLOOD PRESSURE: 160 MMHG | HEIGHT: 71 IN | TEMPERATURE: 95.1 F | OXYGEN SATURATION: 97 % | WEIGHT: 237 LBS | BODY MASS INDEX: 33.18 KG/M2 | HEART RATE: 90 BPM

## 2018-12-27 DIAGNOSIS — Z01.89 PATIENT REQUEST FOR DIAGNOSTIC TESTING: ICD-10-CM

## 2018-12-27 DIAGNOSIS — J02.9 VIRAL PHARYNGITIS: Primary | ICD-10-CM

## 2018-12-27 DIAGNOSIS — J02.9 SORE THROAT: ICD-10-CM

## 2018-12-27 LAB
DEPRECATED S PYO AG THROAT QL EIA: NORMAL
SPECIMEN SOURCE: NORMAL

## 2018-12-27 PROCEDURE — 99213 OFFICE O/P EST LOW 20 MIN: CPT | Performed by: NURSE PRACTITIONER

## 2018-12-27 PROCEDURE — 87880 STREP A ASSAY W/OPTIC: CPT | Performed by: NURSE PRACTITIONER

## 2018-12-27 PROCEDURE — 25000125 ZZHC RX 250: Performed by: NURSE PRACTITIONER

## 2018-12-27 PROCEDURE — G0463 HOSPITAL OUTPT CLINIC VISIT: HCPCS

## 2018-12-27 RX ORDER — DEXAMETHASONE SODIUM PHOSPHATE 4 MG/ML
10 VIAL (ML) INJECTION ONCE
Status: COMPLETED | OUTPATIENT
Start: 2018-12-27 | End: 2018-12-27

## 2018-12-27 RX ADMIN — DEXAMETHASONE SODIUM PHOSPHATE 10 MG: 4 INJECTION, SOLUTION INTRA-ARTICULAR; INTRALESIONAL; INTRAMUSCULAR; INTRAVENOUS; SOFT TISSUE at 13:42

## 2018-12-27 ASSESSMENT — MIFFLIN-ST. JEOR: SCORE: 1885.02

## 2018-12-27 ASSESSMENT — PAIN SCALES - GENERAL: PAINLEVEL: MILD PAIN (3)

## 2018-12-27 NOTE — NURSING NOTE
Chief Complaint   Patient presents with     Throat Problem       Medication Reconciliation: complete   Sore Throat  Onset:  3 days  Fever:  no  Exposure:  no  Pain scale:  3  Headache:  yes  Rash:  no  Carmela Velasquez LPN .............12/27/2018  12:45 PM

## 2018-12-27 NOTE — PATIENT INSTRUCTIONS
Negative rapid strep test    Decadron given in clinic for pain and swelling.  NO Ibuprofen for 24 hours.      Symptoms likely due to virus. No antibiotic is needed at this time.       Most coughs are caused by a viral infection.   Usually coughs can last 2 to 3 weeks.     Most sore throats are caused by viruses and are part of a cold. About 10% of sore throats are caused by strep bacteria.    Encouraged fluids and rest.    May use symptomatic care with tylenol or ibuprofen.     Using a humidifier works well to break up the congestion.     Alternate warm and cool liquids    Oatmeal or honey coats the throat and some patients find it soothes the pain.     Salt water gargles as needed     Return to clinic with change/worsening of symptoms or concerns.

## 2018-12-27 NOTE — PROGRESS NOTES
HPI:    Rj Juarez is a 62 year old male  who presents to clinic today for strep test.    Sore throat for the past 3 days.  States severe throat pain with difficulty swallowing.   Requesting strep testing.  Right ear ache/pain with swallowing.  Right side of neck with painful lymph nodes.  Denies any facial numbness, tingling or weakness.  No fevers.  Some sweats.  No runny/stuffy nose.  No cough.  No chest congestion.  No shortness of breath.  Mild eye irritation/scratchy feeling.  Wears contact lenses.  Dull headaches.  Appetite - normal/feels hungry but eating less due to pain.  Energy decreased, low.    Taking Ibuprofen 800 mg every 6 hours otherwise pain becomes unbearable.  States he takes Ibuprofen on a regular basis for chronic back pain.        Past Medical History:   Diagnosis Date     Benign lipomatous neoplasm     1/20/2014     Calculus of kidney     possible     Carpal tunnel syndrome     Both hands     Closed fracture of patella     05/06/09,Sustained right superior pole patellar fracture which underwent conservative management     Diverticulosis of large intestine without perforation or abscess without bleeding     1/28/2014     Headache     No Comments Provided     Other specified forms of tremor     3/2/2011     Other urticaria (CODE)     3/2/2011     Pain in joint     No Comments Provided     Umbilical hernia without obstruction or gangrene     1/20/2014     Umbilical hernia without obstruction or gangrene     1/26/2018     Past Surgical History:   Procedure Laterality Date     COLONOSCOPY      2009,2014,F/U 2019     ESOPHAGOSCOPY, GASTROSCOPY, DUODENOSCOPY (EGD), COMBINED      1/28/14,EGD     OTHER SURGICAL HISTORY      9/10/2014,71569.0,[place] REPAIR ING HERNIA  >5 TRS BETTINA     OTHER SURGICAL HISTORY      1/26/2018,84951.0,[place] REPAIR UMBILICAL NAZARIO  >5 TRS REDUC     RELEASE CARPAL TUNNEL      3,12/2004,Both hands     SIGMOIDOSCOPY FLEXIBLE      No Comments Provided     Social History  "    Tobacco Use     Smoking status: Never Smoker     Smokeless tobacco: Never Used   Substance Use Topics     Alcohol use: No     Comment: Alcoholic Drinks/day: not currently consuming any alcohol 9/2014     Current Outpatient Medications   Medication Sig Dispense Refill     IBUPROFEN PO Take 400 mg by mouth       hydrochlorothiazide (HYDRODIURIL) 25 MG tablet Take 1 tablet (25 mg) by mouth daily (Patient not taking: Reported on 12/27/2018) 90 tablet 3     tamsulosin (FLOMAX) 0.4 MG capsule Take 1 capsule (0.4 mg) by mouth every evening (Patient not taking: Reported on 12/27/2018) 90 capsule 3     traMADol (ULTRAM) 50 MG tablet Take 1-2 tablets ( mg) by mouth every 6 hours as needed for moderate pain Use mainly at night- may take 2 tablets at a time if needed. 100 tablet 5     triamcinolone (KENALOG) 0.1 % cream Apply 1 Film topically 3 times daily       Allergies   Allergen Reactions     Ciprofloxacin Unknown     Midazolam      Other reaction(s): Other - Describe In Comment Field  Difficult to wake up         Past medical history, past surgical history, current medications and allergies reviewed and accurate to the best of my knowledge.        ROS:  Refer to HPI    /82 (BP Location: Left arm, Patient Position: Sitting, Cuff Size: Adult Large)   Pulse 90   Temp 95.1  F (35.1  C) (Tympanic)   Ht 1.8 m (5' 10.87\")   Wt 107.5 kg (237 lb)   SpO2 97%   BMI 33.18 kg/m      EXAM:  General Appearance: Well appearing adult male, non toxic appearance, appropriate appearance for age. No acute distress  Head: normocephalic, atraumatic  Ears: Left TM grey, translucent with bony landmarks appreciated, no erythema, no effusion, no bulging, no purulence.  Right TM grey, translucent with bony landmarks appreciated, no erythema, no effusion, no bulging, no purulence.  Left auditory canal clear.  Right auditory canal clear.  Normal external ears, non tender.  Eyes: conjunctivae normal without erythema or irritation, " no drainage or crusting, no eyelid swelling, pupils equal   Orophayrnx: moist mucous membranes, posterior pharynx with erythema, tonsils without hypertrophy/ minimally visible tonsils, mild erythema, no exudates or petechiae, uvula midline, no trismus, no post nasal drip seen, voice clear, grimacing with swallowing.    Neck: minimal bilateral tonsillar lymph nodes, right side with tenderness to palpation.  Respiratory: normal chest wall and respirations.  Normal effort.  Clear to auscultation bilaterally, no wheezing, crackles or rhonchi.  No increased work of breathing.  No cough appreciated, oxygen saturation 97%  Cardiac: RRR with no murmurs  Musculoskeletal:  Normal gait.  Equal movement of bilateral upper extremities.  Equal movement of bilateral lower extremities.    Psychological: normal affect, alert and pleasant      Labs:  Results for orders placed or performed in visit on 12/27/18   Rapid strep screen   Result Value Ref Range    Specimen Description Throat     Rapid Strep A Screen       Negative presumptive for Group A Beta Streptococcus           ASSESSMENT/PLAN:  1. Sore throat  2. Patient request for diagnostic testing    - Rapid strep screen    3. Viral pharyngitis    Negative rapid strep test.  Likely viral illness.  No clinical indications for antibiotics at this time.    - dexamethasone (DECADRON) oral solution (inj used orally) 10 mg; Take 2.5 mLs (10 mg) by mouth once    Discussed no Ibuprofen for the next 24 hours due to taking Decadron.  May use OTC Tylenol (max 3000 mg) per 24 hours PRN.    Encouraged fluids  Symptomatic treatment - salt water gargles, honey, elevation, humidifier, tea, lozenges, etc     Discussed warning signs/symptoms indicative of need to f/u    Follow up if symptoms persist or worsen or concerns

## 2018-12-28 NOTE — TELEPHONE ENCOUNTER
I was able to reach the patient last night.  He was seen in rapid clinic, and feeling better.  FRANCISCO BARKER MD on 12/28/2018 at 11:02 AM

## 2019-01-08 ENCOUNTER — TELEPHONE (OUTPATIENT)
Dept: SURGERY | Facility: OTHER | Age: 64
End: 2019-01-08

## 2019-01-08 DIAGNOSIS — Z12.11 SPECIAL SCREENING FOR MALIGNANT NEOPLASMS, COLON: Primary | ICD-10-CM

## 2019-01-08 RX ORDER — BISACODYL 5 MG/1
TABLET, DELAYED RELEASE ORAL
Qty: 2 TABLET | Refills: 0 | Status: SHIPPED | OUTPATIENT
Start: 2019-01-08 | End: 2019-03-12

## 2019-01-08 RX ORDER — POLYETHYLENE GLYCOL 3350, SODIUM CHLORIDE, SODIUM BICARBONATE, POTASSIUM CHLORIDE 420; 11.2; 5.72; 1.48 G/4L; G/4L; G/4L; G/4L
4000 POWDER, FOR SOLUTION ORAL ONCE
Qty: 4000 ML | Refills: 0 | Status: SHIPPED | OUTPATIENT
Start: 2019-01-08 | End: 2019-02-25

## 2019-01-08 NOTE — TELEPHONE ENCOUNTER
Screening Questions for the Scheduling of Screening Colonoscopies   (If Colonoscopy is diagnostic, Provider should review the chart before scheduling.)  Are you younger than 50 or older than 80?  NO   Do you take aspirin or fish oil?   NO  (if yes, tell patient to stop 1 week prior to Colonoscopy)  Do you take warfarin (Coumadin), clopidogrel (Plavix), apixaban (Eliquis), dabigatram (Pradaxa), rivaroxaban (Xarelto) or any blood thinner? NO  Do you use oxygen at home?  NO   Do you have kidney disease? NO   Are you on dialysis? NO   Have you had a stroke or heart attack in the last year? NO   Have you had a stent in your heart or any blood vessel in the last year? NO   Have you had a transplant of any organ? NO  Have you had a colonoscopy or upper endoscopy (EGD) before? YES          When?  2014 -[facility]   Date of scheduled Colonoscopy. 03/01/2019  Provider AMIE Trujillo  [facility]

## 2019-01-22 DIAGNOSIS — R97.20 ELEVATED PROSTATE SPECIFIC ANTIGEN (PSA): Primary | ICD-10-CM

## 2019-01-31 ENCOUNTER — TRANSFERRED RECORDS (OUTPATIENT)
Dept: HEALTH INFORMATION MANAGEMENT | Facility: OTHER | Age: 64
End: 2019-01-31

## 2019-02-25 ENCOUNTER — HOSPITAL ENCOUNTER (OUTPATIENT)
Dept: GENERAL RADIOLOGY | Facility: OTHER | Age: 64
Discharge: HOME | End: 2019-02-25
Attending: INTERNAL MEDICINE | Admitting: INTERNAL MEDICINE
Payer: MEDICARE

## 2019-02-25 ENCOUNTER — OFFICE VISIT (OUTPATIENT)
Dept: INTERNAL MEDICINE | Facility: OTHER | Age: 64
End: 2019-02-25
Attending: INTERNAL MEDICINE
Payer: MEDICARE

## 2019-02-25 VITALS
WEIGHT: 235 LBS | RESPIRATION RATE: 18 BRPM | HEIGHT: 71 IN | HEART RATE: 80 BPM | SYSTOLIC BLOOD PRESSURE: 148 MMHG | TEMPERATURE: 96.1 F | DIASTOLIC BLOOD PRESSURE: 90 MMHG | BODY MASS INDEX: 32.9 KG/M2

## 2019-02-25 DIAGNOSIS — M25.562 ACUTE PAIN OF LEFT KNEE: Primary | ICD-10-CM

## 2019-02-25 DIAGNOSIS — M25.562 ACUTE PAIN OF LEFT KNEE: ICD-10-CM

## 2019-02-25 DIAGNOSIS — M25.562 PAIN IN LATERAL PORTION OF LEFT KNEE: ICD-10-CM

## 2019-02-25 PROCEDURE — G0463 HOSPITAL OUTPT CLINIC VISIT: HCPCS | Mod: 25

## 2019-02-25 PROCEDURE — G0463 HOSPITAL OUTPT CLINIC VISIT: HCPCS

## 2019-02-25 PROCEDURE — 73562 X-RAY EXAM OF KNEE 3: CPT | Mod: LT

## 2019-02-25 PROCEDURE — 99213 OFFICE O/P EST LOW 20 MIN: CPT | Performed by: INTERNAL MEDICINE

## 2019-02-25 ASSESSMENT — PATIENT HEALTH QUESTIONNAIRE - PHQ9
5. POOR APPETITE OR OVEREATING: NOT AT ALL
SUM OF ALL RESPONSES TO PHQ QUESTIONS 1-9: 0

## 2019-02-25 ASSESSMENT — ANXIETY QUESTIONNAIRES
1. FEELING NERVOUS, ANXIOUS, OR ON EDGE: NOT AT ALL
GAD7 TOTAL SCORE: 0
7. FEELING AFRAID AS IF SOMETHING AWFUL MIGHT HAPPEN: NOT AT ALL
5. BEING SO RESTLESS THAT IT IS HARD TO SIT STILL: NOT AT ALL
3. WORRYING TOO MUCH ABOUT DIFFERENT THINGS: NOT AT ALL
2. NOT BEING ABLE TO STOP OR CONTROL WORRYING: NOT AT ALL
IF YOU CHECKED OFF ANY PROBLEMS ON THIS QUESTIONNAIRE, HOW DIFFICULT HAVE THESE PROBLEMS MADE IT FOR YOU TO DO YOUR WORK, TAKE CARE OF THINGS AT HOME, OR GET ALONG WITH OTHER PEOPLE: NOT DIFFICULT AT ALL
6. BECOMING EASILY ANNOYED OR IRRITABLE: NOT AT ALL

## 2019-02-25 ASSESSMENT — ENCOUNTER SYMPTOMS
CHEST TIGHTNESS: 0
BACK PAIN: 0
ADENOPATHY: 0
WOUND: 0
ABDOMINAL PAIN: 0
CONFUSION: 0
FEVER: 0
SHORTNESS OF BREATH: 0
CHILLS: 0
BRUISES/BLEEDS EASILY: 0
HEMATURIA: 0

## 2019-02-25 ASSESSMENT — PAIN SCALES - GENERAL: PAINLEVEL: MODERATE PAIN (4)

## 2019-02-25 ASSESSMENT — MIFFLIN-ST. JEOR: SCORE: 1869.11

## 2019-02-25 NOTE — PATIENT INSTRUCTIONS
Ice 20 minutes on, 20 minutes off -- as needed.    Rest as needed.   Elevate injured extremity to help with swelling if needed.    Ibuprofen 200 mg tablet - take with food -- 3 or 4 times daily. Up to 4tablets per dose - maximum 16 per day.  Or   Naproxen 500 mg tablet - take 1 tablet with food twice daily as needed for pain.     Caution with NSAIDS (above) due to risk for increased blood pressure, stomachpain/nausea/ulcers and kidney damage; use minimal amount necessary    -- in addition to --     Tylenol 500 mg tablet - 1 or 2 every 4 to 6 hours as needed for pain - maximum 8 per day.      Use knee brace as needed.     Call if needed -- can order MRI And send Orthopedic referral - [facility] - they will call with date/time of appointment.      Return as needed for follow-up with Dr. Bearden.    Clinic : [phone]  Appointment line: [phone]

## 2019-02-25 NOTE — PROGRESS NOTES
"Nursing Notes:   Christianne Starr LPN  2/25/2019  9:56 AM  Addendum  Patient presents to the clinic for left knee pain with decrease in range of motion since last night when patient fell on the ice.  Patient currently ambulating with crutches and is able to bear weight.    Chief Complaint   Patient presents with     Musculoskeletal Problem       Initial /90 (BP Location: Right arm, Patient Position: Sitting, Cuff Size: Adult Regular)   Pulse 80   Temp 96.1  F (35.6  C) (Tympanic)   Resp 18   Ht 1.797 m (5' 10.75\")   Wt 106.6 kg (235 lb)   BMI 33.01 kg/m    Estimated body mass index is 33.01 kg/m  as calculated from the following:    Height as of this encounter: 1.797 m (5' 10.75\").    Weight as of this encounter: 106.6 kg (235 lb).  Medication Reconciliation: complete    Christianne Starr LPN    Nursing note reviewed with patient.  Accuracy and completeness verified.   Mr. Juarez is a 63 year old male who:  Patient presents with:  Musculoskeletal Problem      ICD-10-CM    1. Acute pain of left knee M25.562 XR Knee Left 3 Views     order for DME   2. Pain in lateral portion of left knee M25.562 order for DME     HPI  Patient presents with his wife for evaluation of left knee pain.  States about 7 PM last night [place]  States that his left knee buckled and he went down.  Has pain localized over the outside, lateral joint line of the left knee.  Did not really hit his knee on the ice at all.    States that with his straight leg, standing is fine but if he bends his knee and bears weight or twists or rotates he has pain laterally.  Has not tried a knee brace.  Use some ibuprofen.  Did find some help.  Pain does seem to radiate slightly down the lateral outside of the leg from the knee and down.  Mild swelling reported.    He has a knee immobilizer for his right knee which she had years ago after he blew out his knee.    He is worried about " possible fracture versus other issue and would like knee x-ray today.    X-ray came back negative per my interpretation.  Small joint effusion.  No fractures.  Advised conservative measures.  Knee brace, ibuprofen Tylenol ice elevation.  Activity as tolerated.  Proceed with MRI in the next couple of weeks if symptoms do not improve.  Consider orthopedic referral/consultation.    Functional Capacity: normally > 4 METS.   Review of Systems   Constitutional: Negative for chills and fever.   HENT: Negative for congestion.    Eyes: Negative for visual disturbance.   Respiratory: Negative for chest tightness and shortness of breath.    Cardiovascular: Negative for chest pain.   Gastrointestinal: Negative for abdominal pain.   Genitourinary: Negative for hematuria.   Musculoskeletal: Positive for gait problem (+ left knee pain, laterally - knee buckled last night). Negative for back pain.   Skin: Negative for rash and wound.   Neurological: Negative for syncope.   Hematological: Negative for adenopathy. Does not bruise/bleed easily.   Psychiatric/Behavioral: Negative for confusion.      SHAKEEL:   SHAKEEL-7 SCORE 2/25/2019   Total Score 0     PHQ9:  PHQ-9 SCORE 4/25/2016 10/31/2018 2/25/2019   PHQ-9 Total Score 20 0 0       I have personally reviewed the past medical history, past surgical history, medications, allergies, family and social history as listed below.     Allergies   Allergen Reactions     Ciprofloxacin Other (See Comments)     Tendon issue , and IBS     Midazolam      Other reaction(s): Other - Describe In Comment Field  Difficult to wake up       Current Outpatient Medications   Medication Sig Dispense Refill     order for DME Equipment being ordered: Short hinged runners knee brace 1 each 0     bisacodyl (DULCOLAX) 5 MG EC tablet Take as directed by colonoscopy prep. 2 tablet 0     hydrochlorothiazide (HYDRODIURIL) 25 MG tablet Take 1 tablet (25 mg) by mouth daily (Patient not taking: Reported on 12/27/2018) 90  tablet 3     IBUPROFEN PO Take 400 mg by mouth       tamsulosin (FLOMAX) 0.4 MG capsule Take 1 capsule (0.4 mg) by mouth every evening (Patient not taking: Reported on 12/27/2018) 90 capsule 3     traMADol (ULTRAM) 50 MG tablet Take 1-2 tablets ( mg) by mouth every 6 hours as needed for moderate pain Use mainly at night- may take 2 tablets at a time if needed. 100 tablet 5     triamcinolone (KENALOG) 0.1 % cream Apply 1 Film topically 3 times daily          Patient Active Problem List    Diagnosis Date Noted     Elevated prostate specific antigen (PSA) 11/29/2018     Priority: Medium     Lumbar radiculopathy 02/20/2017     Priority: Medium     Benign essential tremor 03/18/2016     Priority: Medium     Elevated random blood glucose level 03/18/2016     Priority: Medium     Irritable bowel syndrome with diarrhea 03/18/2016     Priority: Medium     H/O adenomatous polyp of colon 12/17/2009     Priority: Medium     Past Medical History:   Diagnosis Date     Benign lipomatous neoplasm     1/20/2014     Calculus of kidney     possible     Carpal tunnel syndrome     Both hands     Closed fracture of patella     05/06/09,Sustained right superior pole patellar fracture which underwent conservative management     Diverticulosis of large intestine without perforation or abscess without bleeding     1/28/2014     Headache     No Comments Provided     Other specified forms of tremor     3/2/2011     Other urticaria (CODE)     3/2/2011     Pain in joint     No Comments Provided     Umbilical hernia without obstruction or gangrene     1/20/2014     Umbilical hernia without obstruction or gangrene     1/26/2018     Past Surgical History:   Procedure Laterality Date     COLONOSCOPY  01/28/2014 2009,2014,F/U 2019     ESOPHAGOSCOPY, GASTROSCOPY, DUODENOSCOPY (EGD), COMBINED      1/28/14,EGD     OTHER SURGICAL HISTORY      9/10/2014,11772.0,[place] REPAIR ING HERNIA  >5 TRS BETTINA     OTHER SURGICAL HISTORY       2018,41292.0,[place] REPAIR UMBILICAL NAZARIO  >5 TRS REDUC     RELEASE CARPAL TUNNEL      3,2004,Both hands     SIGMOIDOSCOPY FLEXIBLE      No Comments Provided     Social History     Socioeconomic History     Marital status:      Spouse name: None     Number of children: None     Years of education: None     Highest education level: None   Occupational History     None   Social Needs     Financial resource strain: None     Food insecurity:     Worry: None     Inability: None     Transportation needs:     Medical: None     Non-medical: None   Tobacco Use     Smoking status: Never Smoker     Smokeless tobacco: Never Used   Substance and Sexual Activity     Alcohol use: No     Drug use: No     Sexual activity: No   Lifestyle     Physical activity:     Days per week: None     Minutes per session: None     Stress: None   Relationships     Social connections:     Talks on phone: None     Gets together: None     Attends Sabianist service: None     Active member of club or organization: None     Attends meetings of clubs or organizations: None     Relationship status: None     Intimate partner violence:     Fear of current or ex partner: None     Emotionally abused: None     Physically abused: None     Forced sexual activity: None   Other Topics Concern     Parent/sibling w/ CABG, MI or angioplasty before 65F 55M? Not Asked   Social History Narrative    .  2 children.  Both children grown and living elsewhere.  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     Family History   Problem Relation Age of Onset     Hypertension Mother         Hypertension     Diabetes Mother         Diabetes     Other - See Comments Mother         Spinal stenosis     Other - See Comments Father         scleroderma which was quite severe     Cancer Father         Cancer, of adenocarcinoma of the lung.  He was a nonsmoker.     Diabetes Brother         Diabetes     Other - See  "Comments Brother         Pancreatitis     Substance Abuse Brother         Alcohol/Drug,Alcoholic, has been through treatment a number of times.       EXAM:   Vitals:    02/25/19 0952   BP: 148/90   BP Location: Right arm   Patient Position: Sitting   Cuff Size: Adult Regular   Pulse: 80   Resp: 18   Temp: 96.1  F (35.6  C)   TempSrc: Tympanic   Weight: 106.6 kg (235 lb)   Height: 1.797 m (5' 10.75\")       Current Pain Score: Moderate Pain (4)     BP Readings from Last 3 Encounters:   02/25/19 148/90   12/27/18 160/82   11/29/18 138/82      Wt Readings from Last 3 Encounters:   02/25/19 106.6 kg (235 lb)   12/27/18 107.5 kg (237 lb)   11/29/18 106.1 kg (233 lb 12.8 oz)      Estimated body mass index is 33.01 kg/m  as calculated from the following:    Height as of this encounter: 1.797 m (5' 10.75\").    Weight as of this encounter: 106.6 kg (235 lb).     Physical Exam   Constitutional: He appears well-developed and well-nourished. No distress.   HENT:   Head: Normocephalic and atraumatic.   Eyes: Conjunctivae are normal. No scleral icterus.   Neck: Neck supple.   Cardiovascular: Normal rate and regular rhythm.   Pulmonary/Chest: Effort normal.   Abdominal: Soft. There is no tenderness.   Musculoskeletal: He exhibits tenderness. He exhibits no deformity.   + lateral left knee tenderness to palpation laterally. Mild effusion.    Lymphadenopathy:     He has no cervical adenopathy.   Neurological: He is alert.   Skin: Skin is warm and dry. No rash noted. He is not diaphoretic.   Psychiatric: He has a normal mood and affect.      Procedures   INVESTIGATIONS:  Results for orders placed or performed during the hospital encounter of 02/25/19   XR Knee Left 3 Views    Narrative    PROCEDURE:  XR KNEE LT 3 VW    HISTORY: Acute pain of left knee    COMPARISON:  None.    TECHNIQUE:  3 views of the left knee were obtained.    FINDINGS:  No fracture or dislocation is identified. There are mild  tricompartmental degenerative " changes. There is a small joint  effusion.        Impression    IMPRESSION: No acute fracture.  Mild degenerative disease.    RITCHIE CARMEN MD       ASSESSMENT AND PLAN:  Problem List Items Addressed This Visit     None      Visit Diagnoses     Acute pain of left knee    -  Primary    Relevant Medications    order for DME    Other Relevant Orders    XR Knee Left 3 Views (Completed)    Pain in lateral portion of left knee        Relevant Medications    order for DME        reviewed diet, exercise and weight control, recommended sodium restriction  -- Expected clinical course discussed    -- Medications and their side effects discussed    The 10-year ASCVD risk score (Mitch CASSIE Jr., et al., 2013) is: 28.7%    Values used to calculate the score:      Age: 63 years      Sex: Male      Is Non- : No      Diabetic: Yes      Tobacco smoker: No      Systolic Blood Pressure: 148 mmHg      Is BP treated: No      HDL Cholesterol: 42 mg/dL      Total Cholesterol: 172 mg/dL    Patient Instructions   Ice 20 minutes on, 20 minutes off -- as needed.    Rest as needed.   Elevate injured extremity to help with swelling if needed.    Ibuprofen 200 mg tablet - take with food -- 3 or 4 times daily. Up to 4tablets per dose - maximum 16 per day.  Or   Naproxen 500 mg tablet - take 1 tablet with food twice daily as needed for pain.     Caution with NSAIDS (above) due to risk for increased blood pressure, stomachpain/nausea/ulcers and kidney damage; use minimal amount necessary    -- in addition to --     Tylenol 500 mg tablet - 1 or 2 every 4 to 6 hours as needed for pain - maximum 8 per day.      Use knee brace as needed.     Call if needed -- can order MRI And send Orthopedic referral - [facility] - they will call with date/time of appointment.      Return as needed for follow-up with Dr. Bearden.    Clinic : [phone]  Appointment line:  [phone]      Dawood Bearden MD  Internal Medicine  [facility]     Portions of this note were dictated using speech recognition software. The note has been proofread but errors in the text may have been overlooked. Please contact me if there are any concerns regarding the accuracy of the dictation.

## 2019-02-25 NOTE — NURSING NOTE
"Patient presents to the clinic for left knee pain with decrease in range of motion since last night when patient fell on the ice.  Patient currently ambulating with crutches and is able to bear weight.    Chief Complaint   Patient presents with     Musculoskeletal Problem       Initial /90 (BP Location: Right arm, Patient Position: Sitting, Cuff Size: Adult Regular)   Pulse 80   Temp 96.1  F (35.6  C) (Tympanic)   Resp 18   Ht 1.797 m (5' 10.75\")   Wt 106.6 kg (235 lb)   BMI 33.01 kg/m   Estimated body mass index is 33.01 kg/m  as calculated from the following:    Height as of this encounter: 1.797 m (5' 10.75\").    Weight as of this encounter: 106.6 kg (235 lb).  Medication Reconciliation: complete    Christianne Starr LPN    "

## 2019-02-26 ASSESSMENT — ANXIETY QUESTIONNAIRES: GAD7 TOTAL SCORE: 0

## 2019-02-28 ENCOUNTER — ANESTHESIA EVENT (OUTPATIENT)
Dept: SURGERY | Facility: OTHER | Age: 64
End: 2019-02-28
Payer: MEDICARE

## 2019-03-01 ENCOUNTER — ANESTHESIA (OUTPATIENT)
Dept: SURGERY | Facility: OTHER | Age: 64
End: 2019-03-01
Payer: MEDICARE

## 2019-03-01 ENCOUNTER — HOSPITAL ENCOUNTER (OUTPATIENT)
Facility: OTHER | Age: 64
Discharge: HOME | End: 2019-03-01
Attending: SURGERY | Admitting: SURGERY
Payer: MEDICARE

## 2019-03-01 VITALS
BODY MASS INDEX: 31.64 KG/M2 | RESPIRATION RATE: 16 BRPM | SYSTOLIC BLOOD PRESSURE: 138 MMHG | TEMPERATURE: 95.7 F | WEIGHT: 225.25 LBS | OXYGEN SATURATION: 94 % | DIASTOLIC BLOOD PRESSURE: 94 MMHG | HEART RATE: 61 BPM

## 2019-03-01 DIAGNOSIS — K57.30 DIVERTICULOSIS OF COLON WITHOUT DIVERTICULITIS: ICD-10-CM

## 2019-03-01 DIAGNOSIS — K63.5 POLYP OF SIGMOID COLON, UNSPECIFIED TYPE: ICD-10-CM

## 2019-03-01 DIAGNOSIS — K63.5 POLYP OF ASCENDING COLON, UNSPECIFIED TYPE: Primary | ICD-10-CM

## 2019-03-01 PROCEDURE — 25800030 ZZH RX IP 258 OP 636: Performed by: SURGERY

## 2019-03-01 PROCEDURE — 45384 COLONOSCOPY W/LESION REMOVAL: CPT | Performed by: NURSE ANESTHETIST, CERTIFIED REGISTERED

## 2019-03-01 PROCEDURE — 25000125 ZZHC RX 250: Performed by: NURSE ANESTHETIST, CERTIFIED REGISTERED

## 2019-03-01 PROCEDURE — 88305 TISSUE EXAM BY PATHOLOGIST: CPT

## 2019-03-01 PROCEDURE — 25000128 H RX IP 250 OP 636: Performed by: NURSE ANESTHETIST, CERTIFIED REGISTERED

## 2019-03-01 PROCEDURE — 45384 COLONOSCOPY W/LESION REMOVAL: CPT | Mod: PT | Performed by: SURGERY

## 2019-03-01 PROCEDURE — 45384 COLONOSCOPY W/LESION REMOVAL: CPT | Performed by: SURGERY

## 2019-03-01 PROCEDURE — 25000125 ZZHC RX 250: Performed by: SURGERY

## 2019-03-01 PROCEDURE — 40000010 ZZH STATISTIC ANES STAT CODE-CRNA PER MINUTE: Performed by: SURGERY

## 2019-03-01 RX ORDER — PROPOFOL 10 MG/ML
INJECTION, EMULSION INTRAVENOUS PRN
Status: DISCONTINUED | OUTPATIENT
Start: 2019-03-01 | End: 2019-03-01

## 2019-03-01 RX ORDER — ONDANSETRON 4 MG/1
4 TABLET, ORALLY DISINTEGRATING ORAL EVERY 6 HOURS PRN
Status: DISCONTINUED | OUTPATIENT
Start: 2019-03-01 | End: 2019-03-01 | Stop reason: HOSPADM

## 2019-03-01 RX ORDER — ONDANSETRON 2 MG/ML
4 INJECTION INTRAMUSCULAR; INTRAVENOUS EVERY 6 HOURS PRN
Status: DISCONTINUED | OUTPATIENT
Start: 2019-03-01 | End: 2019-03-01 | Stop reason: HOSPADM

## 2019-03-01 RX ORDER — PROPOFOL 10 MG/ML
INJECTION, EMULSION INTRAVENOUS CONTINUOUS PRN
Status: DISCONTINUED | OUTPATIENT
Start: 2019-03-01 | End: 2019-03-01

## 2019-03-01 RX ORDER — NALOXONE HYDROCHLORIDE 0.4 MG/ML
.1-.4 INJECTION, SOLUTION INTRAMUSCULAR; INTRAVENOUS; SUBCUTANEOUS
Status: DISCONTINUED | OUTPATIENT
Start: 2019-03-01 | End: 2019-03-01 | Stop reason: HOSPADM

## 2019-03-01 RX ORDER — FLUMAZENIL 0.1 MG/ML
0.2 INJECTION, SOLUTION INTRAVENOUS
Status: DISCONTINUED | OUTPATIENT
Start: 2019-03-01 | End: 2019-03-01 | Stop reason: HOSPADM

## 2019-03-01 RX ORDER — SODIUM CHLORIDE, SODIUM LACTATE, POTASSIUM CHLORIDE, CALCIUM CHLORIDE 600; 310; 30; 20 MG/100ML; MG/100ML; MG/100ML; MG/100ML
INJECTION, SOLUTION INTRAVENOUS CONTINUOUS
Status: DISCONTINUED | OUTPATIENT
Start: 2019-03-01 | End: 2019-03-01 | Stop reason: HOSPADM

## 2019-03-01 RX ORDER — LIDOCAINE 40 MG/G
CREAM TOPICAL
Status: DISCONTINUED | OUTPATIENT
Start: 2019-03-01 | End: 2019-03-01 | Stop reason: HOSPADM

## 2019-03-01 RX ORDER — LIDOCAINE HYDROCHLORIDE 20 MG/ML
INJECTION, SOLUTION INFILTRATION; PERINEURAL PRN
Status: DISCONTINUED | OUTPATIENT
Start: 2019-03-01 | End: 2019-03-01

## 2019-03-01 RX ORDER — ONDANSETRON 2 MG/ML
4 INJECTION INTRAMUSCULAR; INTRAVENOUS
Status: DISCONTINUED | OUTPATIENT
Start: 2019-03-01 | End: 2019-03-01 | Stop reason: HOSPADM

## 2019-03-01 RX ADMIN — PROPOFOL 135 MCG/KG/MIN: 10 INJECTION, EMULSION INTRAVENOUS at 08:12

## 2019-03-01 RX ADMIN — PROPOFOL 70 MG: 10 INJECTION, EMULSION INTRAVENOUS at 08:12

## 2019-03-01 RX ADMIN — SODIUM CHLORIDE, SODIUM LACTATE, POTASSIUM CHLORIDE, AND CALCIUM CHLORIDE: 600; 310; 30; 20 INJECTION, SOLUTION INTRAVENOUS at 07:40

## 2019-03-01 RX ADMIN — LIDOCAINE HYDROCHLORIDE 80 MG: 20 INJECTION, SOLUTION INFILTRATION; PERINEURAL at 08:12

## 2019-03-01 RX ADMIN — LIDOCAINE HYDROCHLORIDE 0.1 ML: 10 INJECTION, SOLUTION EPIDURAL; INFILTRATION; INTRACAUDAL; PERINEURAL at 07:40

## 2019-03-01 NOTE — DISCHARGE INSTRUCTIONS
Procedure you had done: colonoscopy with removal of polyps  Your health care provider is:  Doug Porras  Your surgeon is Dr. Ly Frances.   Please call your health care provider or surgeon at [phone] if:    - you feel you are getting worse or having an increase in problems    - fever greater than 101 degrees  - increasing shortness of breath or chest pain  - any signs of infection (increasing redness, swelling, tenderness, warmth, change in appearance, or  increased drainage)  - blood in your urine or stool  - coughing or vomiting blood  - nausea (upset stomach) and vomiting and/or diarrhea that will not stop  - severe pain that is not relieved by medicine, rest or ice  You have had medications for sedation. Please be aware that this can cause drowsiness and impaired judgment for up to 24 hours after your procedure. Do not drive, operate power tools or drink alcohol for 24 hours.  If samples were taken-you will get a phone call and a letter with your results in the next 7-10 days. If you don't get results, please call and let us know!     [facility]  Adult Discharge Orders & Instructions    ________________________________________________________________          For 12 hours after surgery  1. Get plenty of rest.  A responsible adult must stay with you for at least 24 hours after you leave the hospital.   2. You may feel lightheaded.  IF so, sit for a few minutes before standing.  Have someone help you get up.   3. You may have a slight fever. Call the doctor if your fever is over 101 F (38.3 C) (taken under the tongue) or lasts longer than 24 hours.  4. You may have a dry mouth, a sore throat, muscle aches or trouble sleeping.  These should go away after 24 hours.  5. Do not make important or legal decisions.      To contact a doctor, call   [phone]_______________________

## 2019-03-01 NOTE — H&P
PRE-PROCEDURE NOTE    CHIEF COMPLAINT / REASON FORPROCEDURE:  Need for screening colonoscopy.    PERTINENT HISTORY   Patient with no complaints. Previous colonoscopy 2014-polyps. No diarrhea, constipation, abdominal pain or rectal bleeding. No family history of colon polyps or colon cancer.  Past Medical History:   Diagnosis Date     Benign lipomatous neoplasm     1/20/2014     Calculus of kidney     possible     Carpal tunnel syndrome     Both hands     Closed fracture of patella     05/06/09,Sustained right superior pole patellar fracture which underwent conservative management     Diverticulosis of large intestine without perforation or abscess without bleeding     1/28/2014     Headache     No Comments Provided     Other specified forms of tremor     3/2/2011     Other urticaria (CODE)     3/2/2011     Pain in joint     No Comments Provided     Umbilical hernia without obstruction or gangrene     1/20/2014     Umbilical hernia without obstruction or gangrene     1/26/2018     Past Surgical History:   Procedure Laterality Date     COLONOSCOPY  01/28/2014 2009,2014,F/U 2019     ESOPHAGOSCOPY, GASTROSCOPY, DUODENOSCOPY (EGD), COMBINED      1/28/14,EGD     OTHER SURGICAL HISTORY      9/10/2014,85746.0,[place] REPAIR ING HERNIA  >5 TRS BETTINA     OTHER SURGICAL HISTORY      1/26/2018,48737.0,[place] REPAIR UMBILICAL NAZARIO  >5 TRS REDUC     RELEASE CARPAL TUNNEL      3,12/2004,Both hands     SIGMOIDOSCOPY FLEXIBLE      No Comments Provided     Other:  None  Bleeding tendencies:  No    Relevant Family History:  none    Relevant Social History:  none    A relevant review of systems was performed and was Negative.    ALLERGIES/SENSITIVITIES:   Allergies   Allergen Reactions     Ciprofloxacin Other (See Comments)     Tendon issue , and IBS     Midazolam      Other reaction(s): Other - Describe In Comment Field  Difficult to wake up        CURRENTMEDICATIONS:      No current facility-administered medications on file prior to  encounter.   Current Outpatient Medications on File Prior to Encounter:  bisacodyl (DULCOLAX) 5 MG EC tablet Take as directed by colonoscopy prep.   hydrochlorothiazide (HYDRODIURIL) 25 MG tablet Take 1 tablet (25 mg) by mouth daily (Patient not taking: Reported on 12/27/2018)   IBUPROFEN PO Take 400 mg by mouth   tamsulosin (FLOMAX) 0.4 MG capsule Take 1 capsule (0.4 mg) by mouth every evening (Patient not taking: Reported on 12/27/2018)   traMADol (ULTRAM) 50 MG tablet Take 1-2 tablets ( mg) by mouth every 6 hours as needed for moderate pain Use mainly at night- may take 2 tablets at a time if needed.   triamcinolone (KENALOG) 0.1 % cream Apply 1 Film topically 3 times daily     No current facility-administered medications for this encounter.        PRE-SEDATION ASSESSMENT:    BP (!) 187/118 (Cuff Size: Adult Regular)   Temp 97.9  F (36.6  C) (Tympanic)   Resp 18   Wt 102.2 kg (225 lb 4 oz)   SpO2 95%   BMI 31.64 kg/m    Lung Exam:  Normal  Heart Exam:  Normal    Comment(s):      IMPRESSION:  Need for screening colonoscopy.    PLAN:  I discussed screening colonoscopy with the patient. Anesthesia coverage requested due to FARNAZ.

## 2019-03-01 NOTE — ANESTHESIA PREPROCEDURE EVALUATION
Anesthesia Pre-Procedure Evaluation    Patient: Rj Juarez   MRN: [id number] : 1955          Preoperative Diagnosis: history of polyps    Procedure(s):  COLONOSCOPY    Past Medical History:   Diagnosis Date     Benign lipomatous neoplasm     2014     Calculus of kidney     possible     Carpal tunnel syndrome     Both hands     Closed fracture of patella     09,Sustained right superior pole patellar fracture which underwent conservative management     Diverticulosis of large intestine without perforation or abscess without bleeding     2014     Headache     No Comments Provided     Other specified forms of tremor     3/2/2011     Other urticaria (CODE)     3/2/2011     Pain in joint     No Comments Provided     Umbilical hernia without obstruction or gangrene     2014     Umbilical hernia without obstruction or gangrene     2018     Past Surgical History:   Procedure Laterality Date     COLONOSCOPY  2014,,F/U      ESOPHAGOSCOPY, GASTROSCOPY, DUODENOSCOPY (EGD), COMBINED      14,EGD     OTHER SURGICAL HISTORY      9/10/2014,67842.0,[place] REPAIR ING HERNIA  >5 TRS BETTINA     OTHER SURGICAL HISTORY      2018,17318.0,[place] REPAIR UMBILICAL NAZARIO  >5 TRS REDUC     RELEASE CARPAL TUNNEL      3,2004,Both hands     SIGMOIDOSCOPY FLEXIBLE      No Comments Provided       Anesthesia Evaluation     . Pt has had prior anesthetic. Type: MAC and General    No history of anesthetic complications          ROS/MED HX    ENT/Pulmonary:  - neg pulmonary ROS   (+)sleep apnea, doesn't use CPAP , . .    Neurologic:  - neg neurologic ROS     Cardiovascular:  - neg cardiovascular ROS       METS/Exercise Tolerance:  >4 METS   Hematologic:  - neg hematologic  ROS       Musculoskeletal:  - neg musculoskeletal ROS       GI/Hepatic: Comment: GERD symptoms once every couple weeks.    (+) GERD Symptomatic,       Renal/Genitourinary:  - ROS Renal section negative       Endo:  -  "neg endo ROS       Psychiatric:  - neg psychiatric ROS       Infectious Disease:  - neg infectious disease ROS       Malignancy:      - no malignancy   Other:    - neg other ROS                      Physical Exam  Normal systems: cardiovascular, pulmonary and dental    Airway   Mallampati: I  TM distance: >3 FB  Neck ROM: full    Dental     Cardiovascular   Rhythm and rate: regular and normal      Pulmonary    breath sounds clear to auscultation            Lab Results   Component Value Date    WBC 5.9 10/31/2018    HGB 15.7 10/31/2018    HCT 47.4 10/31/2018     10/31/2018    SED 2 02/20/2017     10/31/2018    POTASSIUM 4.5 10/31/2018    CHLORIDE 103 10/31/2018    CO2 32 (H) 10/31/2018    BUN 18 10/31/2018    CR 0.95 10/31/2018     (H) 10/31/2018    COMFORT 10.0 10/31/2018    ALBUMIN 4.4 10/31/2018    PROTTOTAL 6.7 10/31/2018    ALT 13 10/31/2018    AST 10 (L) 10/31/2018    ALKPHOS 54 10/31/2018    BILITOTAL 0.8 10/31/2018       Preop Vitals  BP Readings from Last 3 Encounters:   03/01/19 (!) 187/118   02/25/19 148/90   12/27/18 160/82    Pulse Readings from Last 3 Encounters:   02/25/19 80   12/27/18 90   11/29/18 80      Resp Readings from Last 3 Encounters:   03/01/19 18   02/25/19 18   11/29/18 16    SpO2 Readings from Last 3 Encounters:   03/01/19 95%   12/27/18 97%   02/10/18 98%      Temp Readings from Last 1 Encounters:   03/01/19 97.9  F (36.6  C) (Tympanic)    Ht Readings from Last 1 Encounters:   02/25/19 1.797 m (5' 10.75\")      Wt Readings from Last 1 Encounters:   03/01/19 102.2 kg (225 lb 4 oz)    Estimated body mass index is 31.64 kg/m  as calculated from the following:    Height as of 2/25/19: 1.797 m (5' 10.75\").    Weight as of this encounter: 102.2 kg (225 lb 4 oz).       Anesthesia Plan      History & Physical Review      ASA Status:  2 .    NPO Status:  > 6 hours    Plan for MAC   PONV prophylaxis:  Ondansetron (or other 5HT-3)       Postoperative Care      Consents  Anesthetic " plan, risks, benefits and alternatives discussed with:  Patient..                 TAE CADE CRNA

## 2019-03-01 NOTE — OP NOTE
PROCEDURE NOTE    SURGEON: Ly Cox MD.    PRE-OP DIAGNOSIS:  Screening Colonoscopy, history of colon polyps      POST-OP DIAGNOSIS: colon polyps, diverticula     Location: Right colon Size: 0.4 cm  Removed:  Y       Sigmoid   0.2    Y  PROCEDURE:  Colonoscopy with polypectomies hot forceps    ESTIMATEDBLOOD LOSS: none    COMPLICATIONS:  None    SPECIMEN:  Right and sigmoid colon polyps    ANESTHESIA:  See anesthesia note, anesthesia requested due to: FARNAZ    INDICATION FOR THE PROCEDURE: The patient is a 63 year old male. The patient has no complaints. I explained to the patient the risks, benefits and alternatives to screening colonoscopy for evaluating the colon for colon polyps and colon cancer. We specifically discussed the risks of bleeding, infection, perforation, potential inability to reach the cecum and the risks of sedation. The patient's questions were answered and the patient wished to proceed. Informed consent paperwork was completed.    PROCEDURE: The patient was taken to the endoscopy suite. Appropriate monitors were attached. The patient was placed in the left lateral decubitus position.Timeout was performed confirming the patient's identity and procedure to be performed. After appropriate sedation was confirmed, digital rectal exam was performed. There was normal tone and no gross abnormality was noted other than a large prostate. The lubricated colonoscope was introduced into the anus the colon was insufflated with air. The prep quality was adequate. Under direct visualization the scope was advanced to the cecum. The ileocecal valve was intubated and the terminal ileum inspected. No gross abnormality was noted. The scope was withdrawn back into the cecum. The mucosa of colon was inspected while withdrawing the scope. A flat polyp was noted in the right colon and removed with hot forceps. A tiny flat polyp was noted in the sigmoid colon and removed with hot forceps. Diverticula were noted in  the sigmoid colon. The scope was retroflexed in the rectum and the anorectal junction was inspected. No abnormalities were noted. The scope was returned to a neutral position and the colon was decompressed. The scope was removed. The patient tolerated the procedure with no immediately apparent complication. The patient was taken to recovery in stable condition.  FOLLOW UP:  RECOMMEND high fiber diet, follow up: will call with pathology results.

## 2019-03-01 NOTE — ANESTHESIA CARE TRANSFER NOTE
Patient: Rj Juarez    Procedure(s):  COMBINED COLONOSCOPY, REMOVE TUMOR/POLYP/LESION BY FULGURATION/HOT BIOPSY    Diagnosis: history of polyps  Diagnosis Additional Information: No value filed.    Anesthesia Type:   MAC     Note:  Airway :Room Air  Patient transferred to:Phase II  Handoff Report: Identifed the Patient, Identified the Reponsible Provider, Reviewed the pertinent medical history, Discussed the surgical course, Reviewed Intra-OP anesthesia mangement and issues during anesthesia, Set expectations for post-procedure period and Allowed opportunity for questions and acknowledgement of understanding      Vitals: (Last set prior to Anesthesia Care Transfer)    CRNA VITALS  3/1/2019 0811 - 3/1/2019 0843      3/1/2019             Resp Rate (set):  10                Electronically Signed By: TAE CADE CRNA  March 1, 2019  8:43 AM

## 2019-03-01 NOTE — ANESTHESIA POSTPROCEDURE EVALUATION
Patient: Rj Juarez    Procedure(s):  COMBINED COLONOSCOPY, REMOVE TUMOR/POLYP/LESION BY FULGURATION/HOT BIOPSY    Diagnosis:history of polyps  Diagnosis Additional Information: No value filed.    Anesthesia Type:  MAC    Note:  Anesthesia Post Evaluation    Patient location during evaluation: Phase 2  Patient participation: Able to fully participate in evaluation  Level of consciousness: awake and alert  Pain management: adequate  Airway patency: patent  Cardiovascular status: acceptable  Respiratory status: acceptable  Hydration status: acceptable  PONV: none     Anesthetic complications: None          Last vitals:  Vitals:    03/01/19 0845 03/01/19 0900 03/01/19 0915   BP: 130/85 (!) 133/99 (!) 137/96   Pulse: 74 65 61   Resp: 16 16    Temp:      SpO2: 91% 96% 94%         Electronically Signed By: TAE CADE CRNA  March 1, 2019  9:28 AM

## 2019-03-04 ENCOUNTER — OFFICE VISIT (OUTPATIENT)
Dept: UROLOGY | Facility: OTHER | Age: 64
End: 2019-03-04
Attending: UROLOGY
Payer: MEDICARE

## 2019-03-04 VITALS
WEIGHT: 236.2 LBS | SYSTOLIC BLOOD PRESSURE: 142 MMHG | HEART RATE: 68 BPM | BODY MASS INDEX: 33.18 KG/M2 | RESPIRATION RATE: 16 BRPM | DIASTOLIC BLOOD PRESSURE: 90 MMHG

## 2019-03-04 DIAGNOSIS — R97.20 ELEVATED PROSTATE SPECIFIC ANTIGEN (PSA): ICD-10-CM

## 2019-03-04 DIAGNOSIS — R97.20 ELEVATED PSA: Primary | ICD-10-CM

## 2019-03-04 LAB — PSA SERPL-MCNC: 2.92 NG/ML

## 2019-03-04 PROCEDURE — 36415 COLL VENOUS BLD VENIPUNCTURE: CPT | Performed by: UROLOGY

## 2019-03-04 PROCEDURE — 99213 OFFICE O/P EST LOW 20 MIN: CPT | Performed by: UROLOGY

## 2019-03-04 PROCEDURE — 84153 ASSAY OF PSA TOTAL: CPT | Performed by: UROLOGY

## 2019-03-04 PROCEDURE — G0463 HOSPITAL OUTPT CLINIC VISIT: HCPCS

## 2019-03-04 ASSESSMENT — PAIN SCALES - GENERAL: PAINLEVEL: MILD PAIN (3)

## 2019-03-04 NOTE — PROGRESS NOTES
Type of Visit  EST    Chief Complaint  Elevated PSA    HPI  Mr. Juarez is a 63 year old male with slightly elevated PSA who follows up with repeat PSA.  The patient did develop pyelonephritis about 1 year ago which has resolved.  He denies a family history of prostate cancer and has never undergone a biopsy.  His value was slightly elevated at the last visit 3 months ago so we elected to proceed with a serial PSA 3 months later.  He reports no changes with his health history in the meantime.    He has multiple mildly bothersome urinary symptoms such as urgency, urge incontinence and occasional stress incontinence.  He has been prescribed Flomax in the past but is not interested in taking the medication.  He denies dysuria and hematuria today.      Family History  Family History   Problem Relation Age of Onset     Hypertension Mother         Hypertension     Diabetes Mother         Diabetes     Other - See Comments Mother         Spinal stenosis     Other - See Comments Father         scleroderma which was quite severe     Cancer Father         Cancer, of adenocarcinoma of the lung.  He was a nonsmoker.     Diabetes Brother         Diabetes     Other - See Comments Brother         Pancreatitis     Substance Abuse Brother         Alcohol/Drug,Alcoholic, has been through treatment a number of times.       Review of Systems  I personally reviewed the ROS with the patient.    Nursing Notes:   Mariola Guillaume LPN  3/4/2019 11:40 AM  Signed  Pt presents to clinic for follow up on elevated PSA    Review of Systems:    Weight loss:    No     Recent fever/chills:  No   Night sweats:   Yes  Current skin rash:  No   Recent hair loss:  No  Heat intolerance:  No   Cold intolerance:  No  Chest pain:   No   Palpitations:   No  Shortness of breath:  No   Wheezing:   No  Constipation:    No   Diarrhea:   No   Nausea:   No   Vomiting:   No   Kidney/side pain:  Yes   Back pain:   Yes  Frequent headaches:  No   Dizziness:      No  Leg swelling:   No   Calf pain:    No          Physical Exam  Vitals:    03/04/19 1135   BP: 142/90   BP Location: Right arm   Patient Position: Sitting   Cuff Size: Adult Regular   Pulse: 68   Resp: 16   Weight: 107.1 kg (236 lb 3.2 oz)     Constitutional: No acute distress.  Alert and cooperative   Head: NCAT  Eyes: Conjunctivae normal  Cardiovascular: Regular rate.  Pulmonary/Chest: Respirations are even and non-labored bilaterally, no audible wheezing  Abdominal: Soft. No distension, tenderness, masses or guarding.   Neurological: A + O x 3.  Cranial Nerves II-XII grossly intact.  Extremities: FREEMAN x 4, Warm. No clubbing.  No cyanosis.    Skin: Pink, warm and dry.  No visible rashes noted.  Psychiatric:  Normal mood and affect  Back:  No left CVA tenderness.  No right CVA tenderness.  Genitourinary:  Nonpalpable bladder    Labs  Results for JEFE JACOB (MRN [id number]) as of 3/4/2019 11:36   10/31/2018 12:23 3/4/2019 09:37   Prostate Specific Antigen 3.909 (H) 2.918     Imaging  CT Stone  3/29/2018  Impression: No evidence of abdominal wall hernia at this time.  No renal or ureteral calculi are present. There is no hydronephrosis.    Post-Void Residual  A post-void residual was measured by ultrasonic bladder scanner.  34 mL (previously recorded)  110 mL (previously recorded)  65 mL (previously recorded)    Assessment  Mr. Jacob is a 63 year old male who follows up with mildly elevated PSA which has normalized.    Plan  Follow up PSA in 2 years

## 2019-03-07 ENCOUNTER — TELEPHONE (OUTPATIENT)
Dept: SURGERY | Facility: OTHER | Age: 64
End: 2019-03-07

## 2019-03-08 NOTE — TELEPHONE ENCOUNTER
"Patient notified of pathology results.    See \"result note\"    Kaci Huddleston LPN  3/8/2019  8:10 AM    "

## 2019-03-12 ENCOUNTER — OFFICE VISIT (OUTPATIENT)
Dept: SURGERY | Facility: OTHER | Age: 64
End: 2019-03-12
Attending: SURGERY
Payer: MEDICARE

## 2019-03-12 VITALS
BODY MASS INDEX: 32.9 KG/M2 | WEIGHT: 235 LBS | HEART RATE: 80 BPM | RESPIRATION RATE: 16 BRPM | HEIGHT: 71 IN | TEMPERATURE: 97.2 F | SYSTOLIC BLOOD PRESSURE: 138 MMHG | DIASTOLIC BLOOD PRESSURE: 86 MMHG

## 2019-03-12 DIAGNOSIS — L57.0 ACTINIC KERATOSIS OF LEFT TEMPLE: Primary | ICD-10-CM

## 2019-03-12 PROCEDURE — 17110 DESTRUCTION B9 LES UP TO 14: CPT | Performed by: SURGERY

## 2019-03-12 PROCEDURE — 17000 DESTRUCT PREMALG LESION: CPT | Performed by: SURGERY

## 2019-03-12 PROCEDURE — G0463 HOSPITAL OUTPT CLINIC VISIT: HCPCS

## 2019-03-12 PROCEDURE — 99213 OFFICE O/P EST LOW 20 MIN: CPT | Mod: 25 | Performed by: SURGERY

## 2019-03-12 ASSESSMENT — MIFFLIN-ST. JEOR: SCORE: 1869.11

## 2019-03-12 ASSESSMENT — PAIN SCALES - GENERAL: PAINLEVEL: MODERATE PAIN (4)

## 2019-03-12 NOTE — NURSING NOTE
"Chief Complaint   Patient presents with     RECHECK     left eye lesion       Initial /86 (BP Location: Left arm, Patient Position: Sitting, Cuff Size: Adult Large)   Pulse 80   Temp 97.2  F (36.2  C) (Tympanic)   Resp 16   Ht 1.797 m (5' 10.75\")   Wt 106.6 kg (235 lb)   BMI 33.01 kg/m   Estimated body mass index is 33.01 kg/m  as calculated from the following:    Height as of this encounter: 1.797 m (5' 10.75\").    Weight as of this encounter: 106.6 kg (235 lb).  Medication Reconciliation: complete    Christianne Nesbitt LPN    "

## 2019-03-12 NOTE — PROGRESS NOTES
Primary Care Physician: Doug Porras MD      HPI:   Patient is here for follow up. The patient is 63 year old male with previous shave biopsy on face and left chest wall-done by dermatology in [place] January 31.  The facial lesion was an actinic keratosis, the chest wall lesion was a hemangioma. The facial shave site has healed and is itchy and light pink. No ulceration or scaling. The chest wall shave site never really healed up. It is itching and bleeding at times. He had a rash from the bandaid immediately after the procedure. He has been using some Vitamin E but no antibiotic ointment or cream.  He had a colonoscopy done 3/4/19. Had a sessile serrated adenoma. Since then he has had some loose stools. No blood, no pain.     ASSESSMENT AND PLAN/RECOMMENDATIONS:   Actinic keratosis-face-monitor for recurrence-follow up in 3 months.   Chest wall lesion-  We discussed cryo therapy of the lesion. We specifically discussed the risks of infection, discoloration and the possible need for further treatments. The patient expressed understanding and the patient wishes to proceed.    Procedure:  The area of the skin lesion on the left chest wall was treated with liquid nitrogen for 2 freeze thaw cycles. The patient tolerated the procedure with no immediately apparent complications. We revieweddischarge instructions. The patient will call for any concerns. The patient will follow up in 3 months for a recheck of the area. The patient denies questions at this time.  Diarrhea-trial of probiotic, follow up colonoscopy in 1 year due to serrated adenoma. Option for prep would be Moviprep as he had a hard time tolerating the Nulytely.  Patient will call with concerns.    Past Medical History:   Diagnosis Date     Benign lipomatous neoplasm     1/20/2014     Calculus of kidney     possible     Carpal tunnel syndrome     Both hands     Closed fracture of patella     05/06/09,Sustained right superior pole patellar fracture  which underwent conservative management     Diverticulosis of large intestine without perforation or abscess without bleeding     2014     Headache     No Comments Provided     Other specified forms of tremor     3/2/2011     Other urticaria (CODE)     3/2/2011     Pain in joint     No Comments Provided     Umbilical hernia without obstruction or gangrene     2014     Umbilical hernia without obstruction or gangrene     2018      Past Surgical History:   Procedure Laterality Date     COLONOSCOPY  2014,,F/U      COLONOSCOPY  2019    Serrated adenoma, follow up 1 year     ESOPHAGOSCOPY, GASTROSCOPY, DUODENOSCOPY (EGD), COMBINED      14,EGD     OTHER SURGICAL HISTORY      9/10/2014,77918.0,MA REPAIR ING HERNIA  >5 TRS BETTINA     OTHER SURGICAL HISTORY      2018,62123.0,MA REPAIR UMBILICAL NAZARIO  >5 TRS REDUC     RELEASE CARPAL TUNNEL      3,2004,Both hands     SIGMOIDOSCOPY FLEXIBLE      No Comments Provided     Family History   Problem Relation Age of Onset     Hypertension Mother         Hypertension     Diabetes Mother         Diabetes     Other - See Comments Mother         Spinal stenosis     Other - See Comments Father         scleroderma which was quite severe     Cancer Father         Cancer, of adenocarcinoma of the lung.  He was a nonsmoker.     Diabetes Brother         Diabetes     Other - See Comments Brother         Pancreatitis     Substance Abuse Brother         Alcohol/Drug,Alcoholic, has been through treatment a number of times.     Social History     Socioeconomic History     Marital status:      Spouse name: None     Number of children: None     Years of education: None     Highest education level: None   Occupational History     None   Social Needs     Financial resource strain: None     Food insecurity:     Worry: None     Inability: None     Transportation needs:     Medical: None     Non-medical: None   Tobacco Use     Smoking  status: Never Smoker     Smokeless tobacco: Never Used   Substance and Sexual Activity     Alcohol use: No     Drug use: No     Sexual activity: No   Lifestyle     Physical activity:     Days per week: None     Minutes per session: None     Stress: None   Relationships     Social connections:     Talks on phone: None     Gets together: None     Attends Christian service: None     Active member of club or organization: None     Attends meetings of clubs or organizations: None     Relationship status: None     Intimate partner violence:     Fear of current or ex partner: None     Emotionally abused: None     Physically abused: None     Forced sexual activity: None   Other Topics Concern     Parent/sibling w/ CABG, MI or angioplasty before 65F 55M? Not Asked   Social History Narrative    .  2 children.  Both children grown and living elsewhere.  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     Current Outpatient Medications   Medication     hydrochlorothiazide (HYDRODIURIL) 25 MG tablet     IBUPROFEN PO     tamsulosin (FLOMAX) 0.4 MG capsule     traMADol (ULTRAM) 50 MG tablet     triamcinolone (KENALOG) 0.1 % cream     No current facility-administered medications for this visit.      Allergies   Allergen Reactions     Ciprofloxacin Other (See Comments)     Tendon issue , and IBS     Midazolam      Other reaction(s): Other - Describe In Comment Field  Difficult to wake up     REVIEW OF SYSTEMS  GENERAL: No fevers or chills. Denies fatigue, recent weight loss.  HEENT: No sinus drainage. No changes with vision or hearing. No difficulty swallowing.   LYMPHATICS:  No swollen nodes in axilla, neck or groin.  CARDIOVASCULAR: Denies chest pain, palpitations and dyspnea on exertion.  PULMONARY: No shortness of breath or cough. No increase in sputum production.  GI: Denies melena, bright red blood in stools. No hematemesis. A bit off since prep for colonoscopy.  : No  "dysuria or hematuria.  SKIN: No recent rashes.   HEMATOLOGY:  No history of easy bruising or bleeding.    PHYSICAL EXAM  Vitals: Pulse 80   Temp 97.2  F (36.2  C) (Tympanic)   Resp 16   Ht 1.797 m (5' 10.75\")   Wt 106.6 kg (235 lb)   BMI 33.01 kg/m    GENERAL: Healthy appearing patient in no acute distress. Pleasant and cooperative with exam and interview.   HEENT:Head-normocephalic. Eyes-no scleral icterus, pupils equal, round, and reactive to light. Nose-no nasal drainage. No lesions. Mouth-oral mucosa pink and moist, no lesions.  SKIN: Pink, warm and dry. No jaundice. No rash. Healing shave biopsy site above left eyebrow. Hyperpigmentation but no ulceration or scaling. Left chest wall with scaly area and mild erythema consistent with chronic irritation after biopsy of hemangioma.   NEURO:  Alert and oriented.  PSYCH: Appropriate mood and affect.    IMAGING/LAB  I personally reviewed patient's pathology report from skin lesion excision 1/31/19. Reviewed photos from colonoscopy-serrated adenoma right colon.    "

## 2019-03-12 NOTE — PATIENT INSTRUCTIONS
See you in 3 months for a skin check-call if you are worried before then about any scaling or ulceration. Call if the spot on your chest wall doesn't heal up ok.   1 year for the colonoscopy. Can try Moviprep for prep if you want.   Florajen 3 as a probiotic for a couple of weeks.

## 2019-04-09 ENCOUNTER — HOSPITAL ENCOUNTER (OUTPATIENT)
Dept: GENERAL RADIOLOGY | Facility: OTHER | Age: 64
Discharge: HOME | End: 2019-04-09
Attending: INTERNAL MEDICINE | Admitting: INTERNAL MEDICINE
Payer: MEDICARE

## 2019-04-09 ENCOUNTER — HOSPITAL ENCOUNTER (OUTPATIENT)
Dept: ULTRASOUND IMAGING | Facility: OTHER | Age: 64
End: 2019-04-09
Attending: INTERNAL MEDICINE
Payer: MEDICARE

## 2019-04-09 ENCOUNTER — OFFICE VISIT (OUTPATIENT)
Dept: INTERNAL MEDICINE | Facility: OTHER | Age: 64
End: 2019-04-09
Attending: INTERNAL MEDICINE
Payer: MEDICARE

## 2019-04-09 VITALS
BODY MASS INDEX: 32.8 KG/M2 | HEART RATE: 64 BPM | DIASTOLIC BLOOD PRESSURE: 100 MMHG | RESPIRATION RATE: 16 BRPM | WEIGHT: 233.5 LBS | SYSTOLIC BLOOD PRESSURE: 160 MMHG | TEMPERATURE: 96.8 F

## 2019-04-09 DIAGNOSIS — M25.461 EFFUSION OF RIGHT KNEE: ICD-10-CM

## 2019-04-09 DIAGNOSIS — M17.11 PRIMARY OSTEOARTHRITIS OF RIGHT KNEE: ICD-10-CM

## 2019-04-09 DIAGNOSIS — M25.561 POSTERIOR KNEE PAIN, RIGHT: ICD-10-CM

## 2019-04-09 DIAGNOSIS — M25.561 POSTERIOR KNEE PAIN, RIGHT: Primary | ICD-10-CM

## 2019-04-09 PROCEDURE — 99214 OFFICE O/P EST MOD 30 MIN: CPT | Performed by: INTERNAL MEDICINE

## 2019-04-09 PROCEDURE — G0463 HOSPITAL OUTPT CLINIC VISIT: HCPCS | Mod: 25

## 2019-04-09 PROCEDURE — 73560 X-RAY EXAM OF KNEE 1 OR 2: CPT | Mod: LT

## 2019-04-09 PROCEDURE — G0463 HOSPITAL OUTPT CLINIC VISIT: HCPCS

## 2019-04-09 PROCEDURE — 76882 US LMTD JT/FCL EVL NVASC XTR: CPT | Mod: RT

## 2019-04-09 ASSESSMENT — ANXIETY QUESTIONNAIRES
6. BECOMING EASILY ANNOYED OR IRRITABLE: NOT AT ALL
7. FEELING AFRAID AS IF SOMETHING AWFUL MIGHT HAPPEN: NOT AT ALL
5. BEING SO RESTLESS THAT IT IS HARD TO SIT STILL: NOT AT ALL
GAD7 TOTAL SCORE: 0
1. FEELING NERVOUS, ANXIOUS, OR ON EDGE: NOT AT ALL
3. WORRYING TOO MUCH ABOUT DIFFERENT THINGS: NOT AT ALL
IF YOU CHECKED OFF ANY PROBLEMS ON THIS QUESTIONNAIRE, HOW DIFFICULT HAVE THESE PROBLEMS MADE IT FOR YOU TO DO YOUR WORK, TAKE CARE OF THINGS AT HOME, OR GET ALONG WITH OTHER PEOPLE: NOT DIFFICULT AT ALL
2. NOT BEING ABLE TO STOP OR CONTROL WORRYING: NOT AT ALL

## 2019-04-09 ASSESSMENT — ENCOUNTER SYMPTOMS
FEVER: 0
ABDOMINAL PAIN: 0
CHILLS: 0
CHEST TIGHTNESS: 0
ARTHRALGIAS: 1
CONFUSION: 0
HEMATURIA: 0
SHORTNESS OF BREATH: 0
BRUISES/BLEEDS EASILY: 0
ADENOPATHY: 0
WOUND: 0
BACK PAIN: 0

## 2019-04-09 ASSESSMENT — PATIENT HEALTH QUESTIONNAIRE - PHQ9
5. POOR APPETITE OR OVEREATING: NOT AT ALL
SUM OF ALL RESPONSES TO PHQ QUESTIONS 1-9: 0

## 2019-04-09 ASSESSMENT — PAIN SCALES - GENERAL: PAINLEVEL: MILD PAIN (3)

## 2019-04-09 NOTE — NURSING NOTE
"Patient presents to the clinic for right knee pain this past week, patient denies trauma at this time.    Chief Complaint   Patient presents with     Musculoskeletal Problem       Initial BP (!) 160/100 (BP Location: Right arm, Patient Position: Sitting, Cuff Size: Adult Regular)   Pulse 64   Temp 96.8  F (36  C) (Tympanic)   Resp 16   Wt 105.9 kg (233 lb 8 oz)   BMI 32.80 kg/m   Estimated body mass index is 32.8 kg/m  as calculated from the following:    Height as of 3/12/19: 1.797 m (5' 10.75\").    Weight as of this encounter: 105.9 kg (233 lb 8 oz).  Medication Reconciliation: complete    Christianne Starr LPN          "

## 2019-04-09 NOTE — PATIENT INSTRUCTIONS
Suspect baker cyst of right knee. See print-outs.     Ultrasound ordered  - they will call with date/time of appointment.      Xray of right knee today.     Knee steroid injection ordered  - they will call with date/time of appointment.      Return as needed for follow-up with Dr. Bearden.    Clinic : [phone]  Appointment line: [phone]

## 2019-04-09 NOTE — PROGRESS NOTES
"Nursing Notes:   Christianne Starr LPN  4/9/2019  3:08 PM  Addendum  Patient presents to the clinic for right knee pain this past week, patient denies trauma at this time.    Chief Complaint   Patient presents with     Musculoskeletal Problem       Initial BP (!) 160/100 (BP Location: Right arm, Patient Position: Sitting, Cuff Size: Adult Regular)   Pulse 64   Temp 96.8  F (36  C) (Tympanic)   Resp 16   Wt 105.9 kg (233 lb 8 oz)   BMI 32.80 kg/m    Estimated body mass index is 32.8 kg/m  as calculated from the following:    Height as of 3/12/19: 1.797 m (5' 10.75\").    Weight as of this encounter: 105.9 kg (233 lb 8 oz).  Medication Reconciliation: complete    Christianne Starr LPN          Nursing note reviewed with patient.  Accuracy and completeness verified.   Mr. Juarez is a 63 year old male who:  Patient presents with:  Musculoskeletal Problem      ICD-10-CM    1. Posterior knee pain, right M25.561 US Extremity Non Vascular Right     XR Joint Injection Major Right     XR Knee Standing 2v  Bilateral & 2v Right   2. Effusion of right knee M25.461 XR Joint Injection Major Right     XR Knee Standing 2v  Bilateral & 2v Right   3. Primary osteoarthritis of right knee  M17.11 XR Joint Injection Major Right     XR Knee Standing 2v  Bilateral & 2v Right     HPI  Patient presents for evaluation of posterior right knee pain.  Started about 2 weeks ago - pain in his right knee, posteriorly, laterally started hurting.  States that is been gradually worsening.  If he sits too long with his knee bent when he gets up his knee is quite stiff and painful.  Reports that he has actually had to use his left foot when driving in the car to push on the brake, as well as needing to try not bending his knee for too long because his knee will hurt and he gets stiffness when trying to walk.  If he is able to get up and walk around for a while, the stiffness will improve.    Reports that he has a knee brace at home that he tried " using but that did not seem to help at all.  Has used some ibuprofen.  This only provided minimal relief.    History of left knee partially torn meniscus in the past.    He has mild effusion of both knees.    Does not recall any outright injuries.    Based on examination, we discussed the probability that this is a Baker's cyst.  Treatment options reviewed and discussed.  Home treatments printed and reviewed.  He would like ultrasound to verify Baker's cyst.  X-rays to evaluate for extent of arthritis.  Knee steroid injection ordered.    In the meantime, ice packs, Tylenol, ibuprofen as needed.    Patient did have distant history of right superior pole patellar fracture in the past that healed without surgery, back in 2009.    Primary care provider is Dr. Porras.  He uses tramadol intermittently for pain.    Functional Capacity: normally > 4 METS.   Review of Systems   Constitutional: Negative for chills and fever.   HENT: Negative for congestion.    Eyes: Negative for visual disturbance.   Respiratory: Negative for chest tightness and shortness of breath.    Cardiovascular: Negative for chest pain.   Gastrointestinal: Negative for abdominal pain.   Genitourinary: Negative for hematuria.   Musculoskeletal: Positive for arthralgias (+ posterior right knee pain. mild chronic left knee pain) and gait problem (+ left knee pain, laterally - knee is better, was giving out at times. ). Negative for back pain.   Skin: Negative for rash and wound.   Neurological: Negative for syncope.   Hematological: Negative for adenopathy. Does not bruise/bleed easily.   Psychiatric/Behavioral: Negative for confusion.        SHAKEEL:   SHAKEEL-7 SCORE 2/25/2019 4/9/2019   Total Score 0 0     PHQ9:  PHQ-9 SCORE 10/31/2018 2/25/2019 4/9/2019   PHQ-9 Total Score 0 0 0       I have personally reviewed the past medical history, past surgical history, medications, allergies, family and social history as listed below.     Allergies   Allergen Reactions      Ciprofloxacin Other (See Comments)     Tendon issue , and IBS     Midazolam      Other reaction(s): Other - Describe In Comment Field  Difficult to wake up       Current Outpatient Medications   Medication Sig Dispense Refill     hydrochlorothiazide (HYDRODIURIL) 25 MG tablet Take 1 tablet (25 mg) by mouth daily 90 tablet 3     IBUPROFEN PO Take 400 mg by mouth       tamsulosin (FLOMAX) 0.4 MG capsule Take 1 capsule (0.4 mg) by mouth every evening 90 capsule 3     traMADol (ULTRAM) 50 MG tablet Take 1-2 tablets ( mg) by mouth every 6 hours as needed for moderate pain Use mainly at night- may take 2 tablets at a time if needed. 100 tablet 5     triamcinolone (KENALOG) 0.1 % cream Apply 1 Film topically 3 times daily          Patient Active Problem List    Diagnosis Date Noted     Primary osteoarthritis of right knee  04/09/2019     Priority: Medium     Effusion of right knee 04/09/2019     Priority: Medium     Posterior knee pain, right 04/09/2019     Priority: Medium     Elevated prostate specific antigen (PSA) 11/29/2018     Priority: Medium     Lumbar radiculopathy 02/20/2017     Priority: Medium     Benign essential tremor 03/18/2016     Priority: Medium     Elevated random blood glucose level 03/18/2016     Priority: Medium     Irritable bowel syndrome with diarrhea 03/18/2016     Priority: Medium     H/O adenomatous polyp of colon 12/17/2009     Priority: Medium     Past Medical History:   Diagnosis Date     Benign lipomatous neoplasm     1/20/2014     Calculus of kidney     possible     Carpal tunnel syndrome     Both hands     Closed fracture of patella     05/06/09,Sustained right superior pole patellar fracture which underwent conservative management     Diverticulosis of large intestine without perforation or abscess without bleeding     1/28/2014     Headache     No Comments Provided     Other specified forms of tremor     3/2/2011     Other urticaria (CODE)     3/2/2011     Pain in joint     No  Comments Provided     Umbilical hernia without obstruction or gangrene     1/20/2014     Umbilical hernia without obstruction or gangrene     1/26/2018     Past Surgical History:   Procedure Laterality Date     COLONOSCOPY  01/28/2014 2009,2014,F/U 2019     COLONOSCOPY  03/04/2019    Serrated adenoma, follow up 1 year     ESOPHAGOSCOPY, GASTROSCOPY, DUODENOSCOPY (EGD), COMBINED      1/28/14,EGD     OTHER SURGICAL HISTORY      9/10/2014,32035.0,MS REPAIR ING HERNIA  >5 TRS BETTINA     OTHER SURGICAL HISTORY      1/26/2018,68668.0,MS REPAIR UMBILICAL NAZARIO  >5 TRS REDUC     RELEASE CARPAL TUNNEL      3,12/2004,Both hands     SIGMOIDOSCOPY FLEXIBLE      No Comments Provided     Social History     Socioeconomic History     Marital status:      Spouse name: None     Number of children: None     Years of education: None     Highest education level: None   Occupational History     None   Social Needs     Financial resource strain: None     Food insecurity:     Worry: None     Inability: None     Transportation needs:     Medical: None     Non-medical: None   Tobacco Use     Smoking status: Never Smoker     Smokeless tobacco: Never Used   Substance and Sexual Activity     Alcohol use: No     Drug use: No     Sexual activity: Never   Lifestyle     Physical activity:     Days per week: None     Minutes per session: None     Stress: None   Relationships     Social connections:     Talks on phone: None     Gets together: None     Attends Hindu service: None     Active member of club or organization: None     Attends meetings of clubs or organizations: None     Relationship status: None     Intimate partner violence:     Fear of current or ex partner: None     Emotionally abused: None     Physically abused: None     Forced sexual activity: None   Other Topics Concern     Parent/sibling w/ CABG, MI or angioplasty before 65F 55M? Not Asked   Social History Narrative    .  2 children.  Both children grown and  "living elsewhere.  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     Family History   Problem Relation Age of Onset     Hypertension Mother         Hypertension     Diabetes Mother         Diabetes     Other - See Comments Mother         Spinal stenosis     Other - See Comments Father         scleroderma which was quite severe     Cancer Father         Cancer, of adenocarcinoma of the lung.  He was a nonsmoker.     Diabetes Brother         Diabetes     Other - See Comments Brother         Pancreatitis     Substance Abuse Brother         Alcohol/Drug,Alcoholic, has been through treatment a number of times.       EXAM:   Vitals:    19 1505   BP: (!) 160/100   BP Location: Right arm   Patient Position: Sitting   Cuff Size: Adult Regular   Pulse: 64   Resp: 16   Temp: 96.8  F (36  C)   TempSrc: Tympanic   Weight: 105.9 kg (233 lb 8 oz)       Current Pain Score: Mild Pain (3)     BP Readings from Last 3 Encounters:   19 (!) 160/100   19 138/86   19 142/90      Wt Readings from Last 3 Encounters:   19 105.9 kg (233 lb 8 oz)   19 106.6 kg (235 lb)   19 107.1 kg (236 lb 3.2 oz)      Estimated body mass index is 32.8 kg/m  as calculated from the following:    Height as of 3/12/19: 1.797 m (5' 10.75\").    Weight as of this encounter: 105.9 kg (233 lb 8 oz).     Physical Exam   Constitutional: He appears well-developed and well-nourished. No distress.   HENT:   Head: Normocephalic and atraumatic.   Eyes: Conjunctivae are normal. No scleral icterus.   Neck: Neck supple.   Cardiovascular: Normal rate and regular rhythm.   Pulmonary/Chest: Effort normal.   Musculoskeletal: He exhibits tenderness (tender lump behind right knee, laterally). He exhibits no deformity.   + mild left knee effusion, moderate right knee effusion   Lymphadenopathy:     He has no cervical adenopathy.   Neurological: He is alert.   Skin: Skin is warm and dry. No " rash noted. He is not diaphoretic.   Psychiatric: He has a normal mood and affect.        Procedures   INVESTIGATIONS:  Results for orders placed or performed in visit on 03/04/19   Prostate Specific Antigen GH   Result Value Ref Range    Prostate Specific Antigen 2.918 <3.100 ng/mL     4/9/2019 - US EXTREMITY NON VASCULAR RIGHT     HISTORY: ? baker cyst behind right knee; Posterior knee pain, right .     COMPARISON: Radiographs earlier today.     TECHNIQUE: Ultrasound of the right knee.     FINDINGS:     A small Baker cyst is suggested in the popliteal fossa, measuring up  to 2.5 cm. An ossification along the lateral right knee appeared  symmetric on the prior radiographs, potentially reflecting a prior  lateral collateral ligament injury. Consider MR for further  assessment.     JAQUI HERNANDEZ MD    4/9/2019 - XR KNEE STANDING 2 V  BILATERAL AND 2 V RIGHT     HISTORY: Posterior knee pain, right; Effusion of right knee; Primary  osteoarthritis of right knee .     TECHNIQUE: 4 views of the knees.     COMPARISON: 2/25/2019 left knee.     FINDINGS:     No acute fracture or dislocation is identified. There is some  prepatellar soft tissue swelling. No definite suprapatellar effusion  is seen.       JAQUI HERNANDEZ MD    ASSESSMENT AND PLAN:  Problem List Items Addressed This Visit        Nervous and Auditory    Posterior knee pain, right - Primary    Relevant Orders    US Extremity Non Vascular Right (Completed)    XR Joint Injection Major Right    XR Knee Standing 2v  Bilateral & 2v Right (Completed)       Musculoskeletal and Integumentary    Primary osteoarthritis of right knee     Relevant Orders    XR Joint Injection Major Right    XR Knee Standing 2v  Bilateral & 2v Right (Completed)    Effusion of right knee    Relevant Orders    XR Joint Injection Major Right    XR Knee Standing 2v  Bilateral & 2v Right (Completed)        recommended sodium restriction  -- Expected clinical course discussed    --  Medications and their side effects discussed    The 10-year ASCVD risk score (Rivieramike MATTHEWS Jr., et al., 2013) is: 32.1%    Values used to calculate the score:      Age: 63 years      Sex: Male      Is Non- : No      Diabetic: Yes      Tobacco smoker: No      Systolic Blood Pressure: 160 mmHg      Is BP treated: No      HDL Cholesterol: 42 mg/dL      Total Cholesterol: 172 mg/dL    Patient Instructions   Suspect baker cyst of right knee. See print-outs.     Ultrasound ordered  - they will call with date/time of appointment.      Xray of right knee today.     Knee steroid injection ordered  - they will call with date/time of appointment.      Return as needed for follow-up with Dr. Bearden.    Clinic : [phone]  Appointment line: [phone]      Dawood Bearden MD  Internal Medicine  [facility]     Portions of this note were dictated using speech recognition software. The note has been proofread but errors in the text may have been overlooked. Please contact me if there are any concerns regarding the accuracy of the dictation.

## 2019-04-10 ENCOUNTER — HOSPITAL ENCOUNTER (OUTPATIENT)
Dept: GENERAL RADIOLOGY | Facility: OTHER | Age: 64
Discharge: HOME | End: 2019-04-10
Attending: INTERNAL MEDICINE | Admitting: INTERNAL MEDICINE
Payer: MEDICARE

## 2019-04-10 DIAGNOSIS — M25.561 POSTERIOR KNEE PAIN, RIGHT: ICD-10-CM

## 2019-04-10 DIAGNOSIS — M17.11 PRIMARY OSTEOARTHRITIS OF RIGHT KNEE: ICD-10-CM

## 2019-04-10 DIAGNOSIS — M25.461 EFFUSION OF RIGHT KNEE: ICD-10-CM

## 2019-04-10 PROCEDURE — 25000128 H RX IP 250 OP 636: Performed by: RADIOLOGY

## 2019-04-10 PROCEDURE — 25000125 ZZHC RX 250: Performed by: RADIOLOGY

## 2019-04-10 PROCEDURE — 20610 DRAIN/INJ JOINT/BURSA W/O US: CPT | Mod: RT

## 2019-04-10 PROCEDURE — 25500064 ZZH RX 255 OP 636: Performed by: RADIOLOGY

## 2019-04-10 RX ORDER — BUPIVACAINE HYDROCHLORIDE 5 MG/ML
3 INJECTION, SOLUTION PERINEURAL ONCE
Status: COMPLETED | OUTPATIENT
Start: 2019-04-10 | End: 2019-04-10

## 2019-04-10 RX ORDER — TRIAMCINOLONE ACETONIDE 40 MG/ML
40 INJECTION, SUSPENSION INTRA-ARTICULAR; INTRAMUSCULAR ONCE
Status: COMPLETED | OUTPATIENT
Start: 2019-04-10 | End: 2019-04-10

## 2019-04-10 RX ADMIN — Medication 2 ML: at 17:02

## 2019-04-10 RX ADMIN — BUPIVACAINE HYDROCHLORIDE 15 MG: 5 INJECTION, SOLUTION PERINEURAL at 17:01

## 2019-04-10 RX ADMIN — LIDOCAINE HYDROCHLORIDE 2 ML: 10 INJECTION, SOLUTION INFILTRATION; PERINEURAL at 17:02

## 2019-04-10 RX ADMIN — TRIAMCINOLONE ACETONIDE 40 MG: 40 INJECTION, SUSPENSION INTRA-ARTICULAR; INTRAMUSCULAR at 17:02

## 2019-04-10 ASSESSMENT — ANXIETY QUESTIONNAIRES: GAD7 TOTAL SCORE: 0

## 2019-06-11 ENCOUNTER — HOSPITAL ENCOUNTER (OUTPATIENT)
Dept: GENERAL RADIOLOGY | Facility: OTHER | Age: 64
Discharge: HOME | End: 2019-06-11
Attending: INTERNAL MEDICINE | Admitting: INTERNAL MEDICINE
Payer: MEDICARE

## 2019-06-11 DIAGNOSIS — M25.461 EFFUSION OF RIGHT KNEE: ICD-10-CM

## 2019-06-11 DIAGNOSIS — M17.11 PRIMARY OSTEOARTHRITIS OF RIGHT KNEE: ICD-10-CM

## 2019-06-11 DIAGNOSIS — M25.561 POSTERIOR KNEE PAIN, RIGHT: ICD-10-CM

## 2019-06-11 PROCEDURE — 25000125 ZZHC RX 250: Performed by: RADIOLOGY

## 2019-06-11 PROCEDURE — 25000128 H RX IP 250 OP 636: Performed by: RADIOLOGY

## 2019-06-11 PROCEDURE — 20610 DRAIN/INJ JOINT/BURSA W/O US: CPT | Mod: RT

## 2019-06-11 PROCEDURE — 25500064 ZZH RX 255 OP 636: Performed by: RADIOLOGY

## 2019-06-11 RX ORDER — TRIAMCINOLONE ACETONIDE 40 MG/ML
40 INJECTION, SUSPENSION INTRA-ARTICULAR; INTRAMUSCULAR ONCE
Status: COMPLETED | OUTPATIENT
Start: 2019-06-11 | End: 2019-06-11

## 2019-06-11 RX ORDER — LIDOCAINE HYDROCHLORIDE 10 MG/ML
2 INJECTION, SOLUTION INFILTRATION; PERINEURAL ONCE
Status: COMPLETED | OUTPATIENT
Start: 2019-06-11 | End: 2019-06-11

## 2019-06-11 RX ORDER — BUPIVACAINE HYDROCHLORIDE 5 MG/ML
2 INJECTION, SOLUTION EPIDURAL; INTRACAUDAL ONCE
Status: COMPLETED | OUTPATIENT
Start: 2019-06-11 | End: 2019-06-11

## 2019-06-11 RX ADMIN — IOHEXOL 2 ML: 240 INJECTION, SOLUTION INTRATHECAL; INTRAVASCULAR; INTRAVENOUS; ORAL at 11:07

## 2019-06-11 RX ADMIN — BUPIVACAINE HYDROCHLORIDE 3 ML: 5 INJECTION, SOLUTION EPIDURAL; INTRACAUDAL; PERINEURAL at 11:07

## 2019-06-11 RX ADMIN — LIDOCAINE HYDROCHLORIDE 2 ML: 10 INJECTION, SOLUTION INFILTRATION; PERINEURAL at 11:07

## 2019-06-11 RX ADMIN — TRIAMCINOLONE ACETONIDE 40 MG: 40 INJECTION, SUSPENSION INTRA-ARTICULAR; INTRAMUSCULAR at 11:07

## 2019-08-05 ENCOUNTER — TELEPHONE (OUTPATIENT)
Dept: INTERNAL MEDICINE | Facility: OTHER | Age: 64
End: 2019-08-05

## 2019-08-05 DIAGNOSIS — M25.461 EFFUSION OF RIGHT KNEE: Primary | ICD-10-CM

## 2019-08-06 ENCOUNTER — TELEPHONE (OUTPATIENT)
Dept: PEDIATRICS | Facility: OTHER | Age: 64
End: 2019-08-06

## 2019-08-06 DIAGNOSIS — G89.29 CHRONIC PAIN OF RIGHT KNEE: Primary | ICD-10-CM

## 2019-08-06 DIAGNOSIS — M25.561 CHRONIC PAIN OF RIGHT KNEE: Primary | ICD-10-CM

## 2019-08-06 NOTE — TELEPHONE ENCOUNTER
Hector Baez wanted me to ask you to order an MRI for him. He will see Dr Ochoa 9/24/19 @ [facility] or can go to [facility] for a sooner appointment ,but they require an MRI first. Thank you.

## 2019-08-06 NOTE — TELEPHONE ENCOUNTER
Patient notified that MRI has been ordered and they will give him a call.  Pilar Tapia LPN  8/6/2019  2:06 PM

## 2019-08-07 ENCOUNTER — HOSPITAL ENCOUNTER (OUTPATIENT)
Dept: MRI IMAGING | Facility: OTHER | Age: 64
Discharge: HOME | End: 2019-08-07
Attending: INTERNAL MEDICINE | Admitting: INTERNAL MEDICINE
Payer: MEDICARE

## 2019-08-07 DIAGNOSIS — G89.29 CHRONIC PAIN OF RIGHT KNEE: ICD-10-CM

## 2019-08-07 DIAGNOSIS — M25.561 CHRONIC PAIN OF RIGHT KNEE: ICD-10-CM

## 2019-08-07 PROCEDURE — 73721 MRI JNT OF LWR EXTRE W/O DYE: CPT | Mod: RT

## 2019-08-22 ENCOUNTER — OFFICE VISIT (OUTPATIENT)
Dept: PEDIATRICS | Facility: OTHER | Age: 64
End: 2019-08-22
Attending: INTERNAL MEDICINE
Payer: MEDICARE

## 2019-08-22 VITALS
DIASTOLIC BLOOD PRESSURE: 84 MMHG | HEART RATE: 80 BPM | RESPIRATION RATE: 16 BRPM | TEMPERATURE: 97.7 F | SYSTOLIC BLOOD PRESSURE: 138 MMHG | BODY MASS INDEX: 31.74 KG/M2 | WEIGHT: 226 LBS

## 2019-08-22 DIAGNOSIS — Z11.59 NEED FOR HEPATITIS C SCREENING TEST: ICD-10-CM

## 2019-08-22 DIAGNOSIS — E78.2 MIXED HYPERLIPIDEMIA: ICD-10-CM

## 2019-08-22 DIAGNOSIS — Z79.1 NSAID LONG-TERM USE: ICD-10-CM

## 2019-08-22 DIAGNOSIS — G47.33 OSA ON CPAP: ICD-10-CM

## 2019-08-22 DIAGNOSIS — R73.03 PRE-DIABETES: ICD-10-CM

## 2019-08-22 DIAGNOSIS — Z76.89 ENCOUNTER TO ESTABLISH CARE: Primary | ICD-10-CM

## 2019-08-22 DIAGNOSIS — E66.01 MORBID OBESITY DUE TO EXCESS CALORIES (H): ICD-10-CM

## 2019-08-22 DIAGNOSIS — M54.16 LUMBAR RADICULOPATHY: Chronic | ICD-10-CM

## 2019-08-22 DIAGNOSIS — I10 ESSENTIAL HYPERTENSION: ICD-10-CM

## 2019-08-22 DIAGNOSIS — K58.0 IRRITABLE BOWEL SYNDROME WITH DIARRHEA: ICD-10-CM

## 2019-08-22 LAB
ANION GAP SERPL CALCULATED.3IONS-SCNC: 4 MMOL/L (ref 3–14)
BUN SERPL-MCNC: 16 MG/DL (ref 7–25)
CALCIUM SERPL-MCNC: 9.2 MG/DL (ref 8.6–10.3)
CHLORIDE SERPL-SCNC: 104 MMOL/L (ref 98–107)
CHOLEST SERPL-MCNC: 167 MG/DL
CO2 SERPL-SCNC: 28 MMOL/L (ref 21–31)
CREAT SERPL-MCNC: 0.9 MG/DL (ref 0.7–1.3)
GFR SERPL CREATININE-BSD FRML MDRD: 85 ML/MIN/{1.73_M2}
GLUCOSE SERPL-MCNC: 134 MG/DL (ref 70–105)
HBA1C MFR BLD: 6.4 % (ref 4–6)
HDLC SERPL-MCNC: 38 MG/DL (ref 23–92)
LDLC SERPL CALC-MCNC: 114 MG/DL
NONHDLC SERPL-MCNC: 129 MG/DL
POTASSIUM SERPL-SCNC: 4.1 MMOL/L (ref 3.5–5.1)
SODIUM SERPL-SCNC: 136 MMOL/L (ref 134–144)
TRIGL SERPL-MCNC: 76 MG/DL

## 2019-08-22 PROCEDURE — 90732 PPSV23 VACC 2 YRS+ SUBQ/IM: CPT

## 2019-08-22 PROCEDURE — 80048 BASIC METABOLIC PNL TOTAL CA: CPT | Mod: ZL | Performed by: INTERNAL MEDICINE

## 2019-08-22 PROCEDURE — 83036 HEMOGLOBIN GLYCOSYLATED A1C: CPT | Mod: ZL | Performed by: INTERNAL MEDICINE

## 2019-08-22 PROCEDURE — G0463 HOSPITAL OUTPT CLINIC VISIT: HCPCS | Mod: 25

## 2019-08-22 PROCEDURE — 36415 COLL VENOUS BLD VENIPUNCTURE: CPT | Mod: ZL | Performed by: INTERNAL MEDICINE

## 2019-08-22 PROCEDURE — G0472 HEP C SCREEN HIGH RISK/OTHER: HCPCS | Performed by: INTERNAL MEDICINE

## 2019-08-22 PROCEDURE — G0009 ADMIN PNEUMOCOCCAL VACCINE: HCPCS

## 2019-08-22 PROCEDURE — 80061 LIPID PANEL: CPT | Mod: ZL | Performed by: INTERNAL MEDICINE

## 2019-08-22 PROCEDURE — 86803 HEPATITIS C AB TEST: CPT | Mod: ZL | Performed by: INTERNAL MEDICINE

## 2019-08-22 PROCEDURE — 99214 OFFICE O/P EST MOD 30 MIN: CPT | Performed by: INTERNAL MEDICINE

## 2019-08-22 RX ORDER — HYDROCHLOROTHIAZIDE 25 MG/1
TABLET ORAL
Qty: 90 TABLET | Refills: 3 | Status: SHIPPED | OUTPATIENT
Start: 2019-08-22 | End: 2019-11-07

## 2019-08-22 ASSESSMENT — PAIN SCALES - GENERAL: PAINLEVEL: MILD PAIN (3)

## 2019-08-22 NOTE — LETTER
August 22, 2019      Rj Juarez  [address] [address]        Dear ,     We are writing to inform you of your test results.    A1c is 0.1 under the cut-off for diabetes.  Work on lifestyle changes and [facility] next time!    Signed, Doug Porras MD  Internal Medicine & Pediatrics     -- Pre-diabetes:    fasting glucose: 100 - 125    hemoglobin A1c: 5.7 - 6.4   -- Diabetes:    fasting glucose greater than 125    random glucose greater than 200    hemoglobin A1c: > or = 6.5          Resulted Orders   Hemoglobin A1c   Result Value Ref Range    Hemoglobin A1C 6.4 (H) 4.0 - 6.0 %   Lipid Profile   Result Value Ref Range    Cholesterol 167 <200 mg/dL    Triglycerides 76 <150 mg/dL    HDL Cholesterol 38 23 - 92 mg/dL    LDL Cholesterol Calculated 114 (H) <100 mg/dL      Comment:      Above desirable:  100-129 mg/dl  Borderline High:  130-159 mg/dL  High:             160-189 mg/dL  Very high:       >189 mg/dl      Non HDL Cholesterol 129 <130 mg/dL   Basic metabolic panel   Result Value Ref Range    Sodium 136 134 - 144 mmol/L    Potassium 4.1 3.5 - 5.1 mmol/L    Chloride 104 98 - 107 mmol/L    Carbon Dioxide 28 21 - 31 mmol/L    Anion Gap 4 3 - 14 mmol/L    Glucose 134 (H) 70 - 105 mg/dL    Urea Nitrogen 16 7 - 25 mg/dL    Creatinine 0.90 0.70 - 1.30 mg/dL    GFR Estimate 85 >60 mL/min/[1.73_m2]    GFR Estimate If Black >90 >60 mL/min/[1.73_m2]    Calcium 9.2 8.6 - 10.3 mg/dL       If you have any questions or concerns, please call the clinic at the number listed above.       Sincerely,        Doug Porras MD

## 2019-08-22 NOTE — PROGRESS NOTES
"Subjective  Rj Juarez is a 63 year old male who presents for establish primary care.  Previous physician was Dr. Carlisle.  Recently he had an encounter with his ASV and cracked some ribs.  He is also been having some peeling skin on his hands.  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  He has chronic back pain but is been trying to hold off on surgery.  He said some problems with meniscal tears in the knees and sees orthopedics.  He is been taking tramadol.  He has a history of obstructive sleep apnea but did not tolerate CPAP in the past.  He like to try it again.  Home blood pressures 125-130/85.  He has chronic IBS with diarrhea and is tried several different things and nothing is really seem to help.  He wants to know if he really needs to take a blood pressure medication.  He was taking hydrochlorothiazide.  He is worried this may cause excessive bleeding.  No chest pains with exertion.    Problem List/PMH: reviewed in EMR, and made relevant updates today.  Medications: reviewed in EMR, and made relevant updates today.  Allergies: reviewed in EMR, and made relevant updates today.    Social Hx:  Social History     Tobacco Use     Smoking status: Never Smoker     Smokeless tobacco: Never Used   Substance Use Topics     Alcohol use: No     Drug use: No     Social History     Social History Narrative    .      2 children.  Both children grown and living elsewhere.      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     I reviewed social history and made relevant updates today.    Family Hx:   Family History   Problem Relation Age of Onset     Hypertension Mother         Hypertension     Diabetes Mother         Diabetes     Other - See Comments Mother         Spinal stenosis     Other - See Comments Father         scleroderma which was quite severe     Cancer Father         Cancer, of adenocarcinoma of the lung.  He was a " nonsmoker.     Diabetes Brother         Diabetes     Other - See Comments Brother         Pancreatitis     Substance Abuse Brother         Alcohol/Drug,Alcoholic, has been through treatment a number of times.       Objective  Vitals: reviewed in EMR.  /84 (BP Location: Right arm, Patient Position: Sitting, Cuff Size: Adult Large)   Pulse 80   Temp 97.7  F (36.5  C) (Tympanic)   Resp 16   Wt 102.5 kg (226 lb)   BMI 31.74 kg/m      Gen: Pleasant male, NAD.  HEENT: MMM, no OP erythema.   Neck: Supple, no JVD, no bruits.  CV: RRR no m/r/g.   Pulm: CTAB no w/r/r  Neuro: Grossly intact  Msk: No lower extremity edema.  Skin: No concerning lesions.  Psychiatric: Normal affect and insight. Does not appear anxious or depressed.    Labs:  Lab Results   Component Value Date    WBC 5.9 10/31/2018    HGB 15.7 10/31/2018    HCT 47.4 10/31/2018     10/31/2018    CHOL 167 08/22/2019    TRIG 76 08/22/2019    HDL 38 08/22/2019    ALT 13 10/31/2018    AST 10 (L) 10/31/2018     08/22/2019    BUN 16 08/22/2019    CO2 28 08/22/2019         Assessment    ICD-10-CM    1. Encounter to establish care Z76.89    2. FARNAZ on CPAP G47.33 Sleep DME    Z99.89    3. Irritable bowel syndrome with diarrhea K58.0 NUTRITION REFERRAL   4. Lumbar radiculopathy M54.16    5. Essential hypertension I10 hydrochlorothiazide (HYDRODIURIL) 25 MG tablet     Basic metabolic panel     Basic metabolic panel     Basic metabolic panel   6. Morbid obesity due to excess calories (H) E66.01    7. Pre-diabetes R73.03 Hemoglobin A1c     Hemoglobin A1c   8. NSAID long-term use Z79.1    9. Mixed hyperlipidemia E78.2 Lipid Profile     Lipid Profile   10. Need for hepatitis C screening test Z11.59 Hepatitis C antibody     Hepatitis C antibody     Orders Placed This Encounter   Procedures     Sleep DME     Pneumococcal vaccine 23 valent PPSV23  (Pneumovax) [22819]     Basic metabolic panel     Lipid Profile     Hemoglobin A1c     Hepatitis C antibody      Basic metabolic panel     NUTRITION REFERRAL       Plan   -- Expected clinical course discussed   -- Medications and their side effects discussed  Patient Instructions    -- Dietician consult for IBS   -- Restart CPAP for sleep apnea     -- Restart hydrochlorothiazide, 1/2 pill for a week then 1 pill   -- Lab only in 2 week   -- Nurse only for BP recheck in 2 weeks   -- Consider starting a statin, would recommend atorvastatin 10 mg daily     -- Pneumonia 23 today   -- Shingles vaccine at pharmacy or nurse-only appointment      The 10-year ASCVD risk score (Mitch MATTHEWS Jr., et al., 2013) is: 16.8%    Values used to calculate the score:      Age: 63 years      Sex: Male      Is Non- : No      Diabetic: No      Tobacco smoker: No      Systolic Blood Pressure: 152 mmHg      Is BP treated: No      HDL Cholesterol: 42 mg/dL      Total Cholesterol: 172 mg/dL    Your BMI is Body mass index is 31.74 kg/m .  (BMI ranges: Normal 18.5 - 25, Overweight 25 - 30, Obesity greater than 30,     Morbid Obesity greater than 40 or greater than 35 with associated conditions.)    Facts about losing weight:   -- Overweight and Obesity increase your risk for developing diabetes, high blood pressure and stroke, and shorten your life.   -- 90% of weight loss comes from dietary changes, only 10% from exercise    What should I do?   -- Work on 5-10% weight loss   -- Focus on a few healthy dietary changes   -- Eat more fresh fruits and vegetables, and fewer carbohydrates   -- Cut out all calorie-containing beverages (milk, juice, alcohol, etc)   -- Exercise every day   -- Weigh yourself once a week   -- Consider the DASH Diet (http://http://bit.ly/DASHDiet - redirects to the [facility])   -- Consider Weight Watchers (http://www.weightwatchers.com)   -- Consider My Fitness Pal (iOS, Android, http://[email or web address] [employer] [email or web address])        Find Local Classes   * Preventing falls   * Preventing and managing diabetes   * Managing chronic conditions  and pain    http://[email or web address]  [phone]    Examples of classes:   -- Diabetes prevention program (Pre-Diabetes or at risk for diabetes)   -- Living well with Diabetes   -- Living well with Chronic Conditions   -- Living well with Chronic Pain   -- Stay Active and Independent for Life (SAIL)   -- Maikel Ji Néstor: Moving for better balance        Return in about 1 year (around 8/22/2020) for medication management.    Signed, Doug Porras MD  Internal Medicine & Pediatrics

## 2019-08-22 NOTE — NURSING NOTE
Patient presents today to establish care.  No LMP for male patient.  Medication Reconciliation: complete     Jenna Douglas LPN  8/22/2019 8:16 AM    Provider aware of elevated blood pressure.  Jenna Douglas LPN, LPN 8/22/2019 8:25 AM

## 2019-08-22 NOTE — LETTER
August 26, 2019      Rj Juarez  [address] [address]        Dear ,    We are writing to inform you of your test results.    Hepatitis C test is negative.    Signed, Doug Porras MD  Internal Medicine & Pediatrics      Resulted Orders   Hepatitis C antibody   Result Value Ref Range    Hepatitis C Antibody Nonreactive NR^Nonreactive      Comment:      Assay performance characteristics have not been established for newborns,   infants, and children     Hemoglobin A1c   Result Value Ref Range    Hemoglobin A1C 6.4 (H) 4.0 - 6.0 %   Lipid Profile   Result Value Ref Range    Cholesterol 167 <200 mg/dL    Triglycerides 76 <150 mg/dL    HDL Cholesterol 38 23 - 92 mg/dL    LDL Cholesterol Calculated 114 (H) <100 mg/dL      Comment:      Above desirable:  100-129 mg/dl  Borderline High:  130-159 mg/dL  High:             160-189 mg/dL  Very high:       >189 mg/dl      Non HDL Cholesterol 129 <130 mg/dL   Basic metabolic panel   Result Value Ref Range    Sodium 136 134 - 144 mmol/L    Potassium 4.1 3.5 - 5.1 mmol/L    Chloride 104 98 - 107 mmol/L    Carbon Dioxide 28 21 - 31 mmol/L    Anion Gap 4 3 - 14 mmol/L    Glucose 134 (H) 70 - 105 mg/dL    Urea Nitrogen 16 7 - 25 mg/dL    Creatinine 0.90 0.70 - 1.30 mg/dL    GFR Estimate 85 >60 mL/min/[1.73_m2]    GFR Estimate If Black >90 >60 mL/min/[1.73_m2]    Calcium 9.2 8.6 - 10.3 mg/dL       If you have any questions or concerns, please call the clinic at the number listed above.       Sincerely,        Doug Porras MD

## 2019-08-22 NOTE — PATIENT INSTRUCTIONS
-- Dietician consult for IBS   -- Restart CPAP for sleep apnea     -- Restart hydrochlorothiazide, 1/2 pill for a week then 1 pill   -- Lab only in 2 week   -- Nurse only for BP recheck in 2 weeks   -- Consider starting a statin, would recommend atorvastatin 10 mg daily     -- Pneumonia 23 today   -- Shingles vaccine at pharmacy or nurse-only appointment      The 10-year ASCVD risk score (Mitch MATTHEWS JrOlive, et al., 2013) is: 16.8%    Values used to calculate the score:      Age: 63 years      Sex: Male      Is Non- : No      Diabetic: No      Tobacco smoker: No      Systolic Blood Pressure: 152 mmHg      Is BP treated: No      HDL Cholesterol: 42 mg/dL      Total Cholesterol: 172 mg/dL    Your BMI is Body mass index is 31.74 kg/m .  (BMI ranges: Normal 18.5 - 25, Overweight 25 - 30, Obesity greater than 30,     Morbid Obesity greater than 40 or greater than 35 with associated conditions.)    Facts about losing weight:   -- Overweight and Obesity increase your risk for developing diabetes, high blood pressure and stroke, and shorten your life.   -- 90% of weight loss comes from dietary changes, only 10% from exercise    What should I do?   -- Work on 5-10% weight loss   -- Focus on a few healthy dietary changes   -- Eat more fresh fruits and vegetables, and fewer carbohydrates   -- Cut out all calorie-containing beverages (milk, juice, alcohol, etc)   -- Exercise every day   -- Weigh yourself once a week   -- Consider the DASH Diet (http://http://bit.ly/DASHDiet - redirects to the NIH)   -- Consider Weight Watchers (http://www.weightwatchers.com)   -- Consider My Fitness Pal (iOS, Android, http://www.[employer].Nonpareil)        Find Local Classes   * Preventing falls   * Preventing and managing diabetes   * Managing chronic conditions and pain    http://yourjuniper.org/  [phone]    Examples of classes:   -- Diabetes prevention program (Pre-Diabetes or at risk for diabetes)   -- Living well with  Diabetes   -- Living well with Chronic Conditions   -- Living well with Chronic Pain   -- Stay Active and Independent for Life (SAIL)   -- Maikel Palm: Moving for better balance

## 2019-08-23 LAB — HCV AB SERPL QL IA: NONREACTIVE

## 2019-09-23 ENCOUNTER — OFFICE VISIT (OUTPATIENT)
Dept: FAMILY MEDICINE | Facility: OTHER | Age: 64
End: 2019-09-23
Attending: DIETITIAN, REGISTERED
Payer: MEDICARE

## 2019-09-23 VITALS — HEIGHT: 71 IN | WEIGHT: 225 LBS | BODY MASS INDEX: 31.5 KG/M2

## 2019-09-23 DIAGNOSIS — K58.0 IRRITABLE BOWEL SYNDROME WITH DIARRHEA: Chronic | ICD-10-CM

## 2019-09-23 PROCEDURE — 97802 MEDICAL NUTRITION INDIV IN: CPT | Performed by: DIETITIAN, REGISTERED

## 2019-09-23 ASSESSMENT — MIFFLIN-ST. JEOR: SCORE: 1819.78

## 2019-09-23 NOTE — PROGRESS NOTES
"[facility]  Medical Nutrition Therapy    Visit Type: Initial Assessment    Rj Juarez referred by Dr. Porras for MNT related to IBS-D, prediabetes, Obesity, HTN, GERD    Patient accompanied by his wife.  Hector reports that he is here for a variety of reasons, but would like to discuss his IBS-D as his first priority.  He also knows he is at risk for DM and notes concerns over his weight and increasing BMI.  He currently has  Cracked ribs and knee pain which cause him to be less active.     He prefers to not take Rx for his Dx at this time. He thinks he knows some foods that make his IBS worse, including most veggies and many fruits. His wife also asks about sleep and these disease states as his sleep apnea is   Untreated. (poor fitting C-Pap).     Hector is resistant to groups [facility]. He is in the pre-contemplation stage of change but is very interested in learning which foods might improve his IBS. Reports to have a high amount of stress.    Nutrition Assessment:  Anthropometrics  Height: .  179.1 cm (5' 10.5\") BMI:    31.83  Weight:  102.1 kg (225 lb) BSA:  2.25  IBW (kg):  Female: 70.2 Male: 75.2          Nutrition History   Eats all organic, produce from their garden, and wild game and fish for proteins. Rarely eats in restaurants.      B-oats or granola with full fat dairy  L-protein  D-protein veggies  Beverages: drinks 40 oz + of tea and lemonade daily.  Also drinks Gomez or other high sugar beverages all day long.    Physical Activity   is active with gardening and work. No formal exercise.      Nutrition Prescription  Energy:   1650      Protein:      80 grams      Fluid:80 oz        Fat:60 grams          Carbohydrate:   15 grams per meal         Fiber:   25 grams                              Food Record      does not record food             Nutrition Diagnosis:   IBS-D, prediabetes, HTN    Nutrition Intervention:   He elects to try low FODMAP phase 1 and get MRT food " sensitivity testing to identify foods that cause inflammation and worsening IBS-D symptoms.  He will also consider lifestyle change such as moderate physical activity and stress management.   Increase exercise to 150 minutes per week    Nutrition Goals:   (1) keep records of intake and symptoms  (2) symptom survey  (3) FODMAP diet review and start phase 1  (4) lose 7% weight in 3 months    Nutrition Follow Up / Monitorin weeks for nutrition and LEAP diet for IBS-D    Time spent: 60 minutes  Patient to follow-up with RD in 2 weeks.  Patient has RD contact information to call/email if needed.    KRISTIN K. KLINEFELTER, RD on 2019 at 3:20 PM                        ROS      Physical Exam

## 2019-09-24 ENCOUNTER — OFFICE VISIT (OUTPATIENT)
Dept: ORTHOPEDICS | Facility: OTHER | Age: 64
End: 2019-09-24
Attending: ORTHOPAEDIC SURGERY
Payer: MEDICARE

## 2019-09-24 DIAGNOSIS — Z00.00 ROUTINE GENERAL MEDICAL EXAMINATION AT A HEALTH CARE FACILITY: Primary | ICD-10-CM

## 2019-09-24 DIAGNOSIS — Z01.89 LABORATORY EXAMINATION: Primary | ICD-10-CM

## 2019-09-24 PROCEDURE — 36415 COLL VENOUS BLD VENIPUNCTURE: CPT | Mod: ZL

## 2019-09-24 PROCEDURE — G0463 HOSPITAL OUTPT CLINIC VISIT: HCPCS

## 2019-09-24 PROCEDURE — 99000 SPECIMEN HANDLING OFFICE-LAB: CPT | Performed by: FAMILY MEDICINE

## 2019-09-24 NOTE — PROGRESS NOTES
Patient presents to [facility] clinic today for lab only for LEAP testing.  [employer] Confirmation number [id number].  Micaela Thompson CMA([facility])..................9/24/2019   1:37 PM

## 2019-09-30 NOTE — PROGRESS NOTES
VITALS:   Height:   70  Weight:   220  Pulse rate:  72  Blood Pressure:  140 / 88      CHIEF COMPLAINT: Right Knee Pain     PROBLEMS:   Patient has no noted problems.    PATIENT REPORTED MEDICATIONS:  IBUPROFEN TABLET (IBUPROFEN TABS)     Medications were reviewed with patient this visit.    PATIENT REPORTED ALLERGIES:   Patient has no noted allergies.    RISK FACTORS:  Tobacco use:   never smoker  Alcohol Use:   Yes    HISTORY OF PRESENT ILLNESS:    REASON FOR EVALUATION:  Right knee pain.     HISTORY OF PRESENT ILLNESS:  Hector comes in he has had on and off knee pain here for several months.  It comes and goes here, sporadic in nature.  It is not always necessarily twisting and turning.  He has undergone a couple injections.  The injections of cortisone do seem to be helping out with regard to that.  He is here to look at his options at this time.  Did have MRI scan done, showed meniscal tearing in the medial side, radial in nature, with underlying arthrosis of a mild approaching moderate nature medial compartment, as well as patellofemoral joint.  He does feel pretty good at this time.  Does have a hunting trip coming up to [place] here in just five days.  Feels ready for that.  He is not requesting any injection, just wants to know what to do long term.  Is using higher dose antiinflammatories.  Also has a back issue that kind of supersedes and causes issues with regard to his knee superimposed on this process, as well.      PAST MEDICAL HISTORY:  The patient's health history form dated 09/24/19 was reviewed and signed.  Past medical history, medications, allergies, surgical history, social history, family history, and review of systems noted and scanned into EMR.  ALLERGIES:  No known medication allergies.      PAST MEDICAL HISTORY:    Arthritis  Skin Cancer    PAST ORTHOPEDIC SURGICAL HISTORY:    Bilateral Carpal Tunnel Release     PAST SURGICAL HISTORY:    Hernia Surgery     FAMILY HISTORY:    Father -  "Scleroderma  Mother - Diabetes   Brother - Diabetes  Sister - Breast Cancer     SOCIAL HISTORY:     Alcohol Use - Yes  Tobacco Use - Never  Secondhand Smoke Exposure - No  History of HIV - No  History of Hepatitis - No    REVIEW OF SYSTEMS:  Joint or Muscle pain: Yes  Stiffness:  Yes  Swelling:  Yes  Difficulty in walking: Yes  Weakness of muscles: Yes  Rash or Itching: Yes  Numbness/Tingling: Yes    PHYSICAL EXAMINATION:    Findings - height 5' 10\", weight 220 pounds, pulse 72, blood pressure 140/88.  The patient is awake, alert and oriented, in no acute distress.  Overall general mood, affect and appearance are normal.  Does ambulate with some gait antalgia.  On evaluation of the right knee shows tenderness across the medial joint line.  Pain with palpation there.  Meniscal palpation is mildly symptomatic.  Painful with Carlie testing.  He is stable to varus and valgus.  Range of motion 0 to about 115-120.  Kneecap tracking is acceptable.  Trace effusion is seen within the joint at this time.      X-RAY:  X-rays are reviewed, does show mild approaching moderate arthrosis.     MRI:  MRI is reviewed, as well.  Shows meniscal tearing with mild approaching moderate arthrosis.      ASSESSMENT:    IMPRESSION:  Right knee symptomatic meniscal tearing with underlying arthrosis.     PLAN:   At this time we have discussed the options.  I recommended he does consider potentially an arthroscopic intervention.  He is going to continue to see how he does.  He does not always have mechanically based pain.  If that does continue to ramp up in regard to his severity, I would advise he does consider arthroscopic partial meniscectomy.  At this point I do not feel he is ready to for joint reconstruction.  If it is simply just pain with weightbearing, we will continue to do the occasional injections space out by three months or thereabouts.      Dictated by:  Wade Ochoa MD  Copy to:  Doug Porras MD     D:  " 09/24/19  T:  09/30/19    Typed and/or reviewed and corrected by signing  below, and sent to the Physician for final review and signature.      This report was created using voice recording software and computer-generated templates. Although every effort has been made to review for and eliminate errors, some errors may still occur.         Electronically signed by Cesia King on 09/30/2019 at 8:47 AM    Electronically signed by Wade Ochoa MD on 09/30/2019 at 8:57 AM  ________________________________________________________________________

## 2019-10-09 ENCOUNTER — OFFICE VISIT (OUTPATIENT)
Dept: FAMILY MEDICINE | Facility: OTHER | Age: 64
End: 2019-10-09
Attending: DIETITIAN, REGISTERED
Payer: MEDICARE

## 2019-10-09 DIAGNOSIS — E66.01 MORBID OBESITY DUE TO EXCESS CALORIES (H): ICD-10-CM

## 2019-10-09 DIAGNOSIS — K58.0 IRRITABLE BOWEL SYNDROME WITH DIARRHEA: Chronic | ICD-10-CM

## 2019-10-09 DIAGNOSIS — R73.03 PRE-DIABETES: Chronic | ICD-10-CM

## 2019-10-09 PROCEDURE — 97803 MED NUTRITION INDIV SUBSEQ: CPT | Mod: GZ | Performed by: DIETITIAN, REGISTERED

## 2019-10-10 NOTE — PROGRESS NOTES
"[facility]  Medical Nutrition Therapy     Visit Type:follow-up, MNT        Rj Juarez referred by Dr. Porras for MNT related to IBS-D, prediabetes, Obesity, HTN, GERD     Hector is here to follow-up on his food sensitivity testing and DM prevention.  He reports that his wife is interested in his weight loss and preventing DM. He \"knows what he should do\" and is contemplating our nutrition   Plan and goals from our initial visit 3 weeks ago.      Review of MRT testing show food sensitivities to cow's milk, wheat, nightshade veggies and egg whites.         Nutrition Assessment:  Anthropometrics  Height: .  179.1 cm (5' 10.5\")      BMI:    31.83  Weight:  102.1 kg (225 lb)         BSA:  2.25  IBW (kg):  Female: 70.2 Male: 75.2                    Nutrition History   Eats all organic, produce from their garden, and wild game and fish for proteins. Rarely eats in restaurants.       B-oats or granola with full fat dairy  L-protein  D-protein veggies  Beverages: drinks 40 oz + of tea and lemonade daily.  Also drinks Gomez or other high sugar beverages all day long.  Drinks whole milk, cream, eats cheese and many dairy products. He gave them up for 2 weeks a few years ago  Without change in his symptoms.      Physical Activity   is active with gardening and work. No formal exercise.        Nutrition Prescription  Energy:   1650                 Protein:      80 grams                 Fluid:80 oz        Fat:60 grams                    Carbohydrate:   15 grams per meal                   Fiber:   25 grams           Nutrition Diagnosis:   IBS-D, prediabetes, HTN     Nutrition Intervention:  Low CHO diet (15 grams per meal) for 1800 calories.  Modify diet to remove foods found to cause worsening IBS-symptoms.  He is not sure that he can remove dairy or wheat. He acknowledges that change is a challenge. He   Will continue to consider where he can make diet change after his hunting trips.     Nutrition Goals:   " (1) keep records of intake and symptoms  (2) symptom survey  (3)weight loss 7% in 6 months     Nutrition Follow Up / Monitorin months     Time spent: 60 minutes  Patient to follow-up with RD in 2 months  Patient has RD contact information to call/email if needed.  KRISTIN K. KLINEFELTER, RD on 10/10/2019 at 5:12 PM          ROS      Physical Exam

## 2019-11-07 ENCOUNTER — HOSPITAL ENCOUNTER (EMERGENCY)
Facility: OTHER | Age: 64
Discharge: ANOTHER HOSPITAL | End: 2019-11-07
Attending: PHYSICIAN ASSISTANT | Admitting: PHYSICIAN ASSISTANT
Payer: MEDICARE

## 2019-11-07 ENCOUNTER — NURSE TRIAGE (OUTPATIENT)
Dept: PEDIATRICS | Facility: OTHER | Age: 64
End: 2019-11-07

## 2019-11-07 ENCOUNTER — TRANSFERRED RECORDS (OUTPATIENT)
Dept: HEALTH INFORMATION MANAGEMENT | Facility: OTHER | Age: 64
End: 2019-11-07

## 2019-11-07 ENCOUNTER — APPOINTMENT (OUTPATIENT)
Dept: GENERAL RADIOLOGY | Facility: OTHER | Age: 64
End: 2019-11-07
Attending: PHYSICIAN ASSISTANT
Payer: MEDICARE

## 2019-11-07 VITALS
HEIGHT: 71 IN | OXYGEN SATURATION: 94 % | SYSTOLIC BLOOD PRESSURE: 143 MMHG | HEART RATE: 73 BPM | DIASTOLIC BLOOD PRESSURE: 78 MMHG | RESPIRATION RATE: 52 BRPM | WEIGHT: 220 LBS | TEMPERATURE: 97 F | BODY MASS INDEX: 30.8 KG/M2

## 2019-11-07 DIAGNOSIS — Z91.148 NON COMPLIANCE W MEDICATION REGIMEN: ICD-10-CM

## 2019-11-07 DIAGNOSIS — I16.0 HYPERTENSIVE URGENCY: ICD-10-CM

## 2019-11-07 DIAGNOSIS — R07.9 CHEST PAIN, UNSPECIFIED TYPE: ICD-10-CM

## 2019-11-07 LAB
ALBUMIN SERPL-MCNC: 4.2 G/DL (ref 3.5–5.7)
ALP SERPL-CCNC: 72 U/L (ref 34–104)
ALT SERPL W P-5'-P-CCNC: 12 U/L (ref 7–52)
ANION GAP SERPL CALCULATED.3IONS-SCNC: 7 MMOL/L (ref 3–14)
AST SERPL W P-5'-P-CCNC: 11 U/L (ref 13–39)
BASOPHILS # BLD AUTO: 0.1 10E9/L (ref 0–0.2)
BASOPHILS NFR BLD AUTO: 1 %
BILIRUB SERPL-MCNC: 0.8 MG/DL (ref 0.3–1)
BUN SERPL-MCNC: 13 MG/DL (ref 7–25)
CALCIUM SERPL-MCNC: 9.5 MG/DL (ref 8.6–10.3)
CHLORIDE SERPL-SCNC: 102 MMOL/L (ref 98–107)
CO2 SERPL-SCNC: 27 MMOL/L (ref 21–31)
CREAT SERPL-MCNC: 0.94 MG/DL (ref 0.7–1.3)
DIFFERENTIAL METHOD BLD: NORMAL
EOSINOPHIL # BLD AUTO: 0.1 10E9/L (ref 0–0.7)
EOSINOPHIL NFR BLD AUTO: 1.5 %
ERYTHROCYTE [DISTWIDTH] IN BLOOD BY AUTOMATED COUNT: 12.7 % (ref 10–15)
GFR SERPL CREATININE-BSD FRML MDRD: 81 ML/MIN/{1.73_M2}
GLUCOSE SERPL-MCNC: 209 MG/DL (ref 70–105)
HCT VFR BLD AUTO: 46.7 % (ref 40–53)
HGB BLD-MCNC: 15.5 G/DL (ref 13.3–17.7)
IMM GRANULOCYTES # BLD: 0 10E9/L (ref 0–0.4)
IMM GRANULOCYTES NFR BLD: 0.2 %
LYMPHOCYTES # BLD AUTO: 1.4 10E9/L (ref 0.8–5.3)
LYMPHOCYTES NFR BLD AUTO: 23 %
MCH RBC QN AUTO: 30 PG (ref 26.5–33)
MCHC RBC AUTO-ENTMCNC: 33.2 G/DL (ref 31.5–36.5)
MCV RBC AUTO: 91 FL (ref 78–100)
MONOCYTES # BLD AUTO: 0.5 10E9/L (ref 0–1.3)
MONOCYTES NFR BLD AUTO: 7.9 %
NEUTROPHILS # BLD AUTO: 4 10E9/L (ref 1.6–8.3)
NEUTROPHILS NFR BLD AUTO: 66.4 %
NT-PROBNP SERPL-MCNC: 31 PG/ML (ref 0–100)
PLATELET # BLD AUTO: 255 10E9/L (ref 150–450)
POTASSIUM SERPL-SCNC: 3.9 MMOL/L (ref 3.5–5.1)
PROT SERPL-MCNC: 7.3 G/DL (ref 6.4–8.9)
RBC # BLD AUTO: 5.16 10E12/L (ref 4.4–5.9)
SODIUM SERPL-SCNC: 136 MMOL/L (ref 134–144)
TROPONIN I SERPL-MCNC: 4.8 PG/ML
WBC # BLD AUTO: 6.1 10E9/L (ref 4–11)

## 2019-11-07 PROCEDURE — 93005 ELECTROCARDIOGRAM TRACING: CPT | Performed by: PHYSICIAN ASSISTANT

## 2019-11-07 PROCEDURE — 85025 COMPLETE CBC W/AUTO DIFF WBC: CPT | Performed by: PHYSICIAN ASSISTANT

## 2019-11-07 PROCEDURE — 93010 ELECTROCARDIOGRAM REPORT: CPT | Performed by: INTERNAL MEDICINE

## 2019-11-07 PROCEDURE — 80053 COMPREHEN METABOLIC PANEL: CPT | Performed by: PHYSICIAN ASSISTANT

## 2019-11-07 PROCEDURE — 99291 CRITICAL CARE FIRST HOUR: CPT | Mod: 25 | Performed by: PHYSICIAN ASSISTANT

## 2019-11-07 PROCEDURE — 84484 ASSAY OF TROPONIN QUANT: CPT | Performed by: PHYSICIAN ASSISTANT

## 2019-11-07 PROCEDURE — 25000132 ZZH RX MED GY IP 250 OP 250 PS 637: Performed by: PHYSICIAN ASSISTANT

## 2019-11-07 PROCEDURE — 25000125 ZZHC RX 250: Performed by: PHYSICIAN ASSISTANT

## 2019-11-07 PROCEDURE — 99291 CRITICAL CARE FIRST HOUR: CPT | Mod: Z6 | Performed by: PHYSICIAN ASSISTANT

## 2019-11-07 PROCEDURE — 99285 EMERGENCY DEPT VISIT HI MDM: CPT | Mod: 25 | Performed by: PHYSICIAN ASSISTANT

## 2019-11-07 PROCEDURE — 71046 X-RAY EXAM CHEST 2 VIEWS: CPT

## 2019-11-07 PROCEDURE — 83880 ASSAY OF NATRIURETIC PEPTIDE: CPT | Performed by: PHYSICIAN ASSISTANT

## 2019-11-07 PROCEDURE — 36415 COLL VENOUS BLD VENIPUNCTURE: CPT | Performed by: PHYSICIAN ASSISTANT

## 2019-11-07 PROCEDURE — 93005 ELECTROCARDIOGRAM TRACING: CPT | Mod: 76 | Performed by: PHYSICIAN ASSISTANT

## 2019-11-07 RX ORDER — LIDOCAINE HYDROCHLORIDE 20 MG/ML
15 SOLUTION OROPHARYNGEAL ONCE
Status: COMPLETED | OUTPATIENT
Start: 2019-11-07 | End: 2019-11-07

## 2019-11-07 RX ORDER — ALUMINA, MAGNESIA, AND SIMETHICONE 2400; 2400; 240 MG/30ML; MG/30ML; MG/30ML
15 SUSPENSION ORAL ONCE
Status: COMPLETED | OUTPATIENT
Start: 2019-11-07 | End: 2019-11-07

## 2019-11-07 RX ORDER — ASPIRIN 325 MG
325 TABLET ORAL ONCE
Status: COMPLETED | OUTPATIENT
Start: 2019-11-07 | End: 2019-11-07

## 2019-11-07 RX ORDER — NITROGLYCERIN 0.4 MG/1
0.4 TABLET SUBLINGUAL EVERY 5 MIN PRN
Status: DISCONTINUED | OUTPATIENT
Start: 2019-11-07 | End: 2019-11-07 | Stop reason: HOSPADM

## 2019-11-07 RX ADMIN — ASPIRIN 325 MG ORAL TABLET 325 MG: 325 PILL ORAL at 13:13

## 2019-11-07 RX ADMIN — NITROGLYCERIN 0.4 MG: 0.4 TABLET SUBLINGUAL at 13:15

## 2019-11-07 RX ADMIN — ALUMINUM HYDROXIDE, MAGNESIUM HYDROXIDE, AND DIMETHICONE 15 ML: 400; 400; 40 SUSPENSION ORAL at 13:44

## 2019-11-07 RX ADMIN — LIDOCAINE HYDROCHLORIDE 15 ML: 20 SOLUTION ORAL; TOPICAL at 13:44

## 2019-11-07 ASSESSMENT — MIFFLIN-ST. JEOR: SCORE: 1797.1

## 2019-11-07 NOTE — TELEPHONE ENCOUNTER
states Pt calling in with c/o of chest pain.  ED was advised and Pt chose to speak with nurse.    Pt states he has been experiencing intermittent chest pain the past week or 2. Relayed that antiacids are not effective in reducing pain. 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      Currently experiencing chest pain worse on exertion.  SOB on exertion.  Describes chest pain as tightness. Pt states he also feels more fatigued with intermittent weakness.      Advised Pt to present to ED ASAP with  or dial 911 if symptoms worsen. The patient indicates understanding of these issues and agrees with the plan.    Reason for Disposition    Difficulty breathing    Protocols used: CHEST PAIN-A-[place]  Ashlyn Brady RN  ....................  11/7/2019   12:52 PM

## 2019-11-07 NOTE — ED NOTES
Pt on phone with insurance company regarding ambulance transportation coverage.    Radha Acosta RN on 11/7/2019 at 2:38 PM

## 2019-11-07 NOTE — ED PROVIDER NOTES
History     Chief Complaint   Patient presents with     Shortness of Breath     HPI  Rj Juarez is a 63 year old male who presents to the emergency department this morning for evaluation patient was noted to have stuttering chest pain for 1 to 4 weeks with exertional activity today around 11 AM he was doing some yard work pushing a wheelbarrow when he developed chest pressure substernal. When he arrives here in the emerge department he rates it 2 out of 10.  This pain is nonradiating is localized substernally he has not had any diaphoresis with it.  He says he supposed to be taking hypertensive medication but is not taking his blood pressure is elevated.  He does not have any abdominal pain diarrhea constipation no loss bowel bladder function rashes he does not have any trauma.  He says that he does have intermittent chronic pain but this pain is different than his usual pain. The patient has been trying to take antacids without relief.     Allergies:  Allergies   Allergen Reactions     Ciprofloxacin Other (See Comments)     Tendon issue , and IBS     Midazolam      Other reaction(s): Other - Describe In Comment Field  Difficult to wake up       Problem List:    Patient Active Problem List    Diagnosis Date Noted     FARNAZ on CPAP 08/22/2019     Priority: Medium     Essential hypertension 08/22/2019     Priority: Medium     Morbid obesity due to excess calories (H) 08/22/2019     Priority: Medium     Pre-diabetes 08/22/2019     Priority: Medium     NSAID long-term use 08/22/2019     Priority: Medium     Primary osteoarthritis of right knee  04/09/2019     Priority: Medium     Effusion of right knee 04/09/2019     Priority: Medium     Posterior knee pain, right 04/09/2019     Priority: Medium     Elevated prostate specific antigen (PSA) 11/29/2018     Priority: Medium     Lumbar radiculopathy 02/20/2017     Priority: Medium     Benign essential tremor 03/18/2016     Priority: Medium     Irritable bowel  syndrome with diarrhea 2016     Priority: Medium     H/O adenomatous polyp of colon 2009     Priority: Medium        Past Medical History:    Past Medical History:   Diagnosis Date     Benign lipomatous neoplasm      Calculus of kidney      Carpal tunnel syndrome      Closed fracture of patella      Diverticulosis of large intestine without perforation or abscess without bleeding      Headache      Other specified forms of tremor      Other urticaria (CODE)      Pain in joint      Umbilical hernia without obstruction or gangrene        Past Surgical History:    Past Surgical History:   Procedure Laterality Date     COLONOSCOPY  2014,,F/U      COLONOSCOPY  2019    Serrated adenoma, follow up 1 year     ESOPHAGOSCOPY, GASTROSCOPY, DUODENOSCOPY (EGD), COMBINED      14,EGD     OTHER SURGICAL HISTORY      9/10/2014,56661.0,HI REPAIR ING HERNIA  >5 TRS BETTINA     OTHER SURGICAL HISTORY      2018,71802.0,HI REPAIR UMBILICAL NAZARIO  >5 TRS REDUC     RELEASE CARPAL TUNNEL      3,2004,Both hands     SIGMOIDOSCOPY FLEXIBLE      No Comments Provided       Family History:    Family History   Problem Relation Age of Onset     Hypertension Mother         Hypertension     Diabetes Mother         Diabetes     Other - See Comments Mother         Spinal stenosis     Other - See Comments Father         scleroderma which was quite severe     Cancer Father         Cancer, of adenocarcinoma of the lung.  He was a nonsmoker.     Diabetes Brother         Diabetes     Other - See Comments Brother         Pancreatitis     Substance Abuse Brother         Alcohol/Drug,Alcoholic, has been through treatment a number of times.       Social History:  Marital Status:   [2]  Social History     Tobacco Use     Smoking status: Never Smoker     Smokeless tobacco: Never Used   Substance Use Topics     Alcohol use: No     Drug use: No        Medications:    IBUPROFEN PO  traMADol (ULTRAM) 50 MG  "tablet  triamcinolone (KENALOG) 0.1 % cream          Review of Systems    Pertinent positives and negatives are as above in the HPI. 10 point review of systems is otherwise negative.    Physical Exam   BP: (!) 189/114  Pulse: 94  Heart Rate: 92  Temp: 97  F (36.1  C)  Resp: 18  Height: 179.1 cm (5' 10.5\")  Weight: 99.8 kg (220 lb)  SpO2: 98 %      Physical Exam   Exam:  Constitutional: healthy, alert and no distress  Head: Normocephalic. No masses, lesions, tenderness or abnormalities  Neck: Neck supple. No adenopathy. Thyroid symmetric, normal size,, Carotids without bruits.  ENT: ENT exam normal, no neck nodes or sinus tenderness  Cardiovascular: negative, PMI normal. No lifts, heaves, or thrills. RRR. No murmurs, clicks gallops or rub  Respiratory: negative, Percussion normal. Good diaphragmatic excursion. Lungs clear  Gastrointestinal: Abdomen soft, non-tender. BS normal. No masses, organomegaly  : Deferred  Musculoskeletal: extremities normal- no gross deformities noted, gait normal and normal muscle tone, no tenderness along the anterior chest wall.  Skin: no suspicious lesions or rashes  Neurologic: Gait normal. Reflexes normal and symmetric. Sensation grossly WNL.  Psychiatric: mentation appears normal and affect normal/bright  Hematologic/Lymphatic/Immunologic: Normal cervical lymph nodes      ED Course        Procedures               EKG Interpretation:      Interpreted by Hernandez Serna PA-C  Time reviewed: 12:53  Symptoms at time of EKG: chest pain    Rhythm: normal sinus, age indeterminate inferior Q waves, incomplete right bundle branch block  Rate: 98  ST Segments/ T Waves: T wave changes 0.5mm III, no evidence of any other elevation noted nor reciprocal changes noted  Q Waves: III  Comparison to prior: Unchanged from 2016  Clinical Impression: Normal sinus rhythm, inferior infarct age-indeterminate Q waves in lead III, T wave changes as noted         EKG Interpretation:      Interpreted by " Hernandez Serna PA-C  Time reviewed:13:45  Symptoms at time of EKG: chest pain  Rhythm: Incomplete right bundle branch block  Rate: 71  ST Segments/ T Waves: T wave elevation in 3 improved.  Q Waves: III  Comparison to prior: Improvement of T wave elevation of 0.5 mm or less in lead III.     Clinical Impression: Normal sinus rhythm, incomplete right bundle branch block, inferior infarct age indeterminate.        Results for orders placed or performed during the hospital encounter of 11/07/19 (from the past 24 hour(s))   CBC with platelets differential   Result Value Ref Range    WBC 6.1 4.0 - 11.0 10e9/L    RBC Count 5.16 4.4 - 5.9 10e12/L    Hemoglobin 15.5 13.3 - 17.7 g/dL    Hematocrit 46.7 40.0 - 53.0 %    MCV 91 78 - 100 fl    MCH 30.0 26.5 - 33.0 pg    MCHC 33.2 31.5 - 36.5 g/dL    RDW 12.7 10.0 - 15.0 %    Platelet Count 255 150 - 450 10e9/L    Diff Method Automated Method     % Neutrophils 66.4 %    % Lymphocytes 23.0 %    % Monocytes 7.9 %    % Eosinophils 1.5 %    % Basophils 1.0 %    % Immature Granulocytes 0.2 %    Absolute Neutrophil 4.0 1.6 - 8.3 10e9/L    Absolute Lymphocytes 1.4 0.8 - 5.3 10e9/L    Absolute Monocytes 0.5 0.0 - 1.3 10e9/L    Absolute Eosinophils 0.1 0.0 - 0.7 10e9/L    Absolute Basophils 0.1 0.0 - 0.2 10e9/L    Abs Immature Granulocytes 0.0 0 - 0.4 10e9/L   Comprehensive metabolic panel   Result Value Ref Range    Sodium 136 134 - 144 mmol/L    Potassium 3.9 3.5 - 5.1 mmol/L    Chloride 102 98 - 107 mmol/L    Carbon Dioxide 27 21 - 31 mmol/L    Anion Gap 7 3 - 14 mmol/L    Glucose 209 (H) 70 - 105 mg/dL    Urea Nitrogen 13 7 - 25 mg/dL    Creatinine 0.94 0.70 - 1.30 mg/dL    GFR Estimate 81 >60 mL/min/[1.73_m2]    GFR Estimate If Black >90 >60 mL/min/[1.73_m2]    Calcium 9.5 8.6 - 10.3 mg/dL    Bilirubin Total 0.8 0.3 - 1.0 mg/dL    Albumin 4.2 3.5 - 5.7 g/dL    Protein Total 7.3 6.4 - 8.9 g/dL    Alkaline Phosphatase 72 34 - 104 U/L    ALT 12 7 - 52 U/L    AST 11 (L) 13 - 39 U/L    NT pro BNP   Result Value Ref Range    N-Terminal Pro BNP Inpatient 31 0 - 100 pg/mL   Troponin GH (now)   Result Value Ref Range    Troponin 4.8 <34.0 pg/mL   XR Chest 2 Views    Narrative    PROCEDURE:  XR CHEST 2 VW    HISTORY: pain, .    COMPARISON:  3/18/2016    FINDINGS:  The cardiomediastinal contours are normal.  The trachea is midline.  No focal consolidation, effusion or pneumothorax.    No suspicious osseous lesion or subdiaphragmatic free air.      Impression    IMPRESSION:      No acute cardiopulmonary process.      JAQUI HERNANDEZ MD       Medications   nitroGLYcerin (NITROSTAT) sublingual tablet 0.4 mg (0.4 mg Sublingual Given 11/7/19 1315)   aspirin (ASA) tablet 325 mg (325 mg Oral Given 11/7/19 1313)   alum & mag hydroxide-simethicone (MYLANTA ES/MAALOX  ES) suspension 15 mL (15 mLs Oral Given 11/7/19 1344)     And   lidocaine (XYLOCAINE) 2 % solution 15 mL (15 mLs Mouth/Throat Given 11/7/19 1344)       Assessments & Plan (with Medical Decision Making)     I have reviewed the nursing notes.    I have reviewed the findings, diagnosis, plan and need for follow up with the patient.  Differential diagnosis at this point include: acute coronary syndromes, acute aortic dissection, pulmonary embolism, pneumothorax, pneumonia, non-emergent sources of chest wall pain, pericarditis and myocarditis, as well as other etiologies.  The patient presents respond to see him for evaluation he 63 years old comes in with stuttering chest pressure intermittently for 1 to 4 weeks with exertional activity.  Patient will barrel today developed significant chest pressure when he arrived here it was 2 out of 10.  He says that it is worst in his chest and then his chronic back pain.  He did not have any radiation of symptoms he did not have any diaphoresis or other associated symptoms.  Patient did receive 4 baby aspirin, nitroglycerin, he may have had some relief in his symptoms shortly after the nitroglycerin.  He  also received a GI cocktail which seemed to to help as well.   Serial examinations of her as abdomen again he did not have any midepigastric right upper quadrant tenderness and he has not had any here in the emergency department.  Anterior chest wall discomfort is not reproducible.  I did speak with Dr. Mcnair at [facility] in [place].  Independently reviewed the radiographs EKG coordinate care for the patient reviewed patient's medical record.  The patient was informed of the above findings and the concerns of unstable angina.  Given the fact that the patient has had stuttering chest pain over the last month or so further work-up from a cardiovascular standpoint would be needed.  The patient will be transferred via [facility] to [facility].  I explained my diagnostic considerations and recommendations and the patient voiced an understanding and was in agreement with the treatment plan. All questions were answered. We discussed potential side effects of any prescribed or recommended therapies, as well as expectations for response to treatments.  Aggregate Critical Care Time is 35 minutes.  This was the time seeing the patient at the bedside while the patient was critical.  My time did not include any pertinent procedures or activities that did not contribute to the patient's care while the patient was critical.           HEART Score  Background  Calculates the overall risk of adverse event in patient's presenting with chest pain.  Based on 5 criteria (each assigned 0-2 points) including suspiciousness of history, EKG, age, risk factors and troponin.    Data  63 year old male  has Benign essential tremor; H/O adenomatous polyp of colon; Irritable bowel syndrome with diarrhea; Lumbar radiculopathy; Elevated prostate specific antigen (PSA); Primary osteoarthritis of right knee ; Effusion of right knee; Posterior knee pain, right; FARNAZ on CPAP; Essential hypertension; Morbid obesity due to excess calories (H);  Pre-diabetes; and NSAID long-term use on their problem list.   reports that he has never smoked. He has never used smokeless tobacco.  family history includes Cancer in his father; Diabetes in his brother and mother; Hypertension in his mother; Other - See Comments in his brother, father, and mother; Substance Abuse in his brother.  No results found for: TROPI  Criteria   0-2 points for each of 5 items (maximum of 10 points):  Score 2- History highly suspicious for coronary syndrome  Score 1- EKG with Non-specific repolarization disturbance  Score 2- Age 65 years or older  Score 2- Three or more risk factors for or history of atherosclerotic disease  Score 0- Within normal limits for troponin levels  Interpretation  Risk of adverse outcome  Heart Score: 7  Total Score 7-10- Adverse Outcome Risk 72.7% - Supports early aggressive management, typically including cardiac catheterization          New Prescriptions    No medications on file       Final diagnoses:   Chest pain, unspecified type   Hypertensive urgency   Non compliance w medication regimen       11/7/2019   [facility]     Hernandez Serna PA-C  11/07/19 3713

## 2019-11-08 ENCOUNTER — TRANSFERRED RECORDS (OUTPATIENT)
Dept: HEALTH INFORMATION MANAGEMENT | Facility: OTHER | Age: 64
End: 2019-11-08

## 2019-11-08 DIAGNOSIS — M54.16 LUMBAR RADICULOPATHY: ICD-10-CM

## 2019-11-08 LAB
CHOLEST SERPL-MCNC: 146 MG/DL
CREAT SERPL-MCNC: 0.79 MG/DL (ref 0.8–1.5)
EJECTION FRACTION: 60 %
GLUCOSE SERPL-MCNC: 108 MG/DL (ref 60–99)
HDLC SERPL-MCNC: 33 MG/DL
LDLC SERPL CALC-MCNC: 84 MG/DL
POTASSIUM SERPL-SCNC: 3.8 MEQ/L (ref 3.5–5.1)
TRIGL SERPL-MCNC: 146 MG/DL

## 2019-11-11 ENCOUNTER — TELEPHONE (OUTPATIENT)
Dept: PEDIATRICS | Facility: OTHER | Age: 64
End: 2019-11-11

## 2019-11-11 DIAGNOSIS — R07.9 CHEST PAIN, UNSPECIFIED TYPE: Primary | ICD-10-CM

## 2019-11-11 NOTE — TELEPHONE ENCOUNTER
-- Lexiscan cardiolyte   -- Follow-up in clinic as planned    Signed, Doug Porras MD, FAAP, FACP  Internal Medicine & Pediatrics

## 2019-11-11 NOTE — TELEPHONE ENCOUNTER
I have no discharge summary.  If they wanted stat, why didn't they order it.  Needs to be seen.    Signed, Doug Porras MD, FAAP, FACP  Internal Medicine & Pediatrics

## 2019-11-11 NOTE — TELEPHONE ENCOUNTER
Per imaging.    Patient was just discharged from [facility] and wants a stat stress test.  Patient currently scheduled with [facility] 11-19-19 but would like test this week and would also like to see [facility].

## 2019-11-11 NOTE — TELEPHONE ENCOUNTER
Spoke to pt and he was told to come here and have a stress before the end of the week.  Has Hosp F/U with Emelina PHILLIPS scheduled on the 19th.  Call placed to [facility] at [facility] to get discharge summary, labs, and imaging reports.  Informed pt that we would call him back.  Tasha Sotelo LPN ....................  11/11/2019   11:48 AM

## 2019-11-11 NOTE — TELEPHONE ENCOUNTER
Patient notified and imaging will contact pt to get his stress test scheduled.  Tasha Sotelo LPN ....................  11/11/2019   3:41 PM

## 2019-11-12 RX ORDER — TRAMADOL HYDROCHLORIDE 50 MG/1
TABLET ORAL
Qty: 100 TABLET | Refills: 5 | Status: SHIPPED | OUTPATIENT
Start: 2019-11-12 | End: 2019-11-19

## 2019-11-12 NOTE — TELEPHONE ENCOUNTER
Routing refill request to provider for review/approval because:  Drug not on the [facility] refill protocol     Tramadol last filled on 10-31-18 for #100 X 5 refills. LOV 8-22-19 to establish care. Sheyla Pace RN on 11/12/2019 at 3:34 PM

## 2019-11-14 ENCOUNTER — HOSPITAL ENCOUNTER (OUTPATIENT)
Dept: NUCLEAR MEDICINE | Facility: OTHER | Age: 64
Discharge: HOME | End: 2019-11-14
Attending: INTERNAL MEDICINE | Admitting: INTERNAL MEDICINE
Payer: MEDICARE

## 2019-11-14 ENCOUNTER — HOSPITAL ENCOUNTER (OUTPATIENT)
Dept: NUCLEAR MEDICINE | Facility: OTHER | Age: 64
End: 2019-11-14
Attending: INTERNAL MEDICINE
Payer: MEDICARE

## 2019-11-14 DIAGNOSIS — R07.9 CHEST PAIN, UNSPECIFIED TYPE: ICD-10-CM

## 2019-11-14 LAB
CV BLOOD PRESSURE: 63 %
CV STRESS MAX HR HE: 87
NUC STRESS EJECTION FRACTION: 58 %
RATE PRESSURE PRODUCT: NORMAL
STRESS ECHO BASELINE DIASTOLIC HE: 86
STRESS ECHO BASELINE HR: 80
STRESS ECHO BASELINE SYSTOLIC BP: 116
STRESS ECHO CALCULATED PERCENT HR: 56 %
STRESS ECHO LAST STRESS DIASTOLIC BP: 84
STRESS ECHO LAST STRESS SYSTOLIC BP: 128
STRESS ECHO POST ESTIMATED WORKLOAD: 1 METS
STRESS ECHO TARGET HR: 155

## 2019-11-14 PROCEDURE — 93017 CV STRESS TEST TRACING ONLY: CPT

## 2019-11-14 PROCEDURE — 34300033 ZZH RX 343: Performed by: INTERNAL MEDICINE

## 2019-11-14 PROCEDURE — A9500 TC99M SESTAMIBI: HCPCS | Performed by: INTERNAL MEDICINE

## 2019-11-14 PROCEDURE — 93018 CV STRESS TEST I&R ONLY: CPT | Performed by: INTERNAL MEDICINE

## 2019-11-14 PROCEDURE — 93016 CV STRESS TEST SUPVJ ONLY: CPT | Performed by: INTERNAL MEDICINE

## 2019-11-14 PROCEDURE — 25000128 H RX IP 250 OP 636: Performed by: INTERNAL MEDICINE

## 2019-11-14 PROCEDURE — 78452 HT MUSCLE IMAGE SPECT MULT: CPT

## 2019-11-14 RX ORDER — HYDROCHLOROTHIAZIDE 25 MG/1
TABLET ORAL
Refills: 3 | COMMUNITY
Start: 2019-08-22 | End: 2020-05-19

## 2019-11-14 RX ORDER — AMINOPHYLLINE 25 MG/ML
50 INJECTION, SOLUTION INTRAVENOUS 2 TIMES DAILY PRN
Status: DISCONTINUED | OUTPATIENT
Start: 2019-11-14 | End: 2019-11-15 | Stop reason: HOSPADM

## 2019-11-14 RX ORDER — REGADENOSON 0.08 MG/ML
0.4 INJECTION, SOLUTION INTRAVENOUS ONCE
Status: COMPLETED | OUTPATIENT
Start: 2019-11-14 | End: 2019-11-14

## 2019-11-14 RX ORDER — METOPROLOL TARTRATE 25 MG/1
TABLET, FILM COATED ORAL
Refills: 0 | COMMUNITY
Start: 2019-11-08 | End: 2019-12-03 | Stop reason: ALTCHOICE

## 2019-11-14 RX ADMIN — KIT FOR THE PREPARATION OF TECHNETIUM TC99M SESTAMIBI 8.14 MILLICURIE: 1 INJECTION, POWDER, LYOPHILIZED, FOR SOLUTION PARENTERAL at 08:20

## 2019-11-14 RX ADMIN — KIT FOR THE PREPARATION OF TECHNETIUM TC99M SESTAMIBI 31.4 MILLICURIE: 1 INJECTION, POWDER, LYOPHILIZED, FOR SOLUTION PARENTERAL at 10:20

## 2019-11-14 RX ADMIN — REGADENOSON 0.4 MG: 0.08 INJECTION, SOLUTION INTRAVENOUS at 10:17

## 2019-11-14 NOTE — PROGRESS NOTES
0805: The patient arrived for a Lexiscan Cardiolite stress test.  The procedure, risks, and benefits were discussed with the patient and spouse,and the consent was signed.  A saline lock was started,and the Cardiolite was injected by x-ray.  The patient was taken to the waiting area, to await resting images at 0845.  0950: The patient returned from x-ray and was prepped for the stress test.    arrived, and the patient was administered the Lexiscan per procedure.  The patient had a headache with the lexiscan which lessened with coffee after the test.  He was given a snack and taken to x-ray in stable condition for stress images.  The saline lock will be removed by x-ray for proper disposal.  The patient was instructed that the ordering MD will call with results in one to two days.  Please see the chart for the complete test results.

## 2019-11-19 ENCOUNTER — OFFICE VISIT (OUTPATIENT)
Dept: INTERNAL MEDICINE | Facility: OTHER | Age: 64
End: 2019-11-19
Attending: NURSE PRACTITIONER
Payer: COMMERCIAL

## 2019-11-19 VITALS
BODY MASS INDEX: 32.14 KG/M2 | DIASTOLIC BLOOD PRESSURE: 78 MMHG | RESPIRATION RATE: 16 BRPM | TEMPERATURE: 97.9 F | WEIGHT: 227.2 LBS | HEART RATE: 84 BPM | SYSTOLIC BLOOD PRESSURE: 148 MMHG

## 2019-11-19 DIAGNOSIS — L50.9 HIVES: ICD-10-CM

## 2019-11-19 DIAGNOSIS — F40.240 CLAUSTROPHOBIA: ICD-10-CM

## 2019-11-19 DIAGNOSIS — K21.00 GASTROESOPHAGEAL REFLUX DISEASE WITH ESOPHAGITIS: ICD-10-CM

## 2019-11-19 DIAGNOSIS — R07.9 EXERTIONAL CHEST PAIN: Primary | ICD-10-CM

## 2019-11-19 DIAGNOSIS — M54.16 LUMBAR RADICULOPATHY: Chronic | ICD-10-CM

## 2019-11-19 LAB
ANION GAP SERPL CALCULATED.3IONS-SCNC: 5 MMOL/L (ref 3–14)
BUN SERPL-MCNC: 16 MG/DL (ref 7–25)
CALCIUM SERPL-MCNC: 9.6 MG/DL (ref 8.6–10.3)
CHLORIDE SERPL-SCNC: 100 MMOL/L (ref 98–107)
CO2 SERPL-SCNC: 32 MMOL/L (ref 21–31)
CREAT SERPL-MCNC: 1.03 MG/DL (ref 0.7–1.3)
ERYTHROCYTE [DISTWIDTH] IN BLOOD BY AUTOMATED COUNT: 12.4 % (ref 10–15)
GFR SERPL CREATININE-BSD FRML MDRD: 72 ML/MIN/{1.73_M2}
GLUCOSE SERPL-MCNC: 157 MG/DL (ref 70–105)
HCT VFR BLD AUTO: 46.7 % (ref 40–53)
HGB BLD-MCNC: 15.4 G/DL (ref 13.3–17.7)
MCH RBC QN AUTO: 29.9 PG (ref 26.5–33)
MCHC RBC AUTO-ENTMCNC: 33 G/DL (ref 31.5–36.5)
MCV RBC AUTO: 91 FL (ref 78–100)
PLATELET # BLD AUTO: 268 10E9/L (ref 150–450)
POTASSIUM SERPL-SCNC: 4.2 MMOL/L (ref 3.5–5.1)
RBC # BLD AUTO: 5.15 10E12/L (ref 4.4–5.9)
SODIUM SERPL-SCNC: 137 MMOL/L (ref 134–144)
WBC # BLD AUTO: 6.4 10E9/L (ref 4–11)

## 2019-11-19 PROCEDURE — 99215 OFFICE O/P EST HI 40 MIN: CPT | Performed by: NURSE PRACTITIONER

## 2019-11-19 PROCEDURE — G0463 HOSPITAL OUTPT CLINIC VISIT: HCPCS

## 2019-11-19 PROCEDURE — 80048 BASIC METABOLIC PNL TOTAL CA: CPT | Mod: ZL | Performed by: NURSE PRACTITIONER

## 2019-11-19 PROCEDURE — 85027 COMPLETE CBC AUTOMATED: CPT | Mod: ZL | Performed by: NURSE PRACTITIONER

## 2019-11-19 PROCEDURE — 36415 COLL VENOUS BLD VENIPUNCTURE: CPT | Mod: ZL | Performed by: NURSE PRACTITIONER

## 2019-11-19 RX ORDER — ALPRAZOLAM 0.25 MG
TABLET ORAL
Qty: 2 TABLET | Refills: 0 | Status: SHIPPED | OUTPATIENT
Start: 2019-11-19 | End: 2019-11-25

## 2019-11-19 RX ORDER — TRAMADOL HYDROCHLORIDE 50 MG/1
TABLET ORAL
Qty: 100 TABLET | Refills: 5 | Status: SHIPPED | OUTPATIENT
Start: 2019-11-19 | End: 2019-12-03

## 2019-11-19 ASSESSMENT — ENCOUNTER SYMPTOMS
WEAKNESS: 0
DIZZINESS: 0
BACK PAIN: 1
SHORTNESS OF BREATH: 0
VOMITING: 0
ANAL BLEEDING: 0
CHEST TIGHTNESS: 0
HEARTBURN: 0
ABDOMINAL PAIN: 0
ROS SKIN COMMENTS: INTERMITTENT HIVES
HEMATOCHEZIA: 0
NAUSEA: 0
LIGHT-HEADEDNESS: 0
COUGH: 0
NERVOUS/ANXIOUS: 1

## 2019-11-19 ASSESSMENT — PAIN SCALES - GENERAL: PAINLEVEL: MILD PAIN (3)

## 2019-11-19 NOTE — PROGRESS NOTES
Subjective:  He is here today for hospital follow-up.  He was hospitalized at [facility] from November 7 through November 8 for cardiac evaluation.  He was seen in the [facility] emergency department for intermittent substernal chest pain that had been occurring for 1-4 weeks.  His initial troponin was normal.  He had an EKG which showed normal sinus rhythm at 90 bpm and age-indeterminate inferior Q waves and incomplete right bundle branch block.  There was concern for acute coronary syndrome.  He was transferred to [facility].  He was admitted under observation and placed on telemetry.  Cardiac enzymes were normal.  Chest x-ray unremarkable.  Echocardiogram showing borderline LVH with normal global systolic function and an ejection fraction of 55%.  No significant valvular disease.  Prior to admission he has stopped taking some of his blood pressure medications and he was initially seen in the emergency department his blood pressure was quite high.  While at [facility] he was restarted back on aspirin 81 mg daily, hydrochlorothiazide 25 mg daily.  Lopressor 6.25 mg twice daily was added.  He was also started on omeprazole 20 mg daily as there was concern that he had underlying GERD and concern for peptic ulcer disease related to high NSAID use.  It was recommended after hospital discharge that he had EGD for evaluation and that he follow-up with his primary physician to have a cardiac stress test ordered.  He had cardiac stress test last week after order was placed by primary physician.  He is here today to discuss results.  Laboratory studies during hospitalization were unremarkable.  A1c 6%.  Hemoglobin 14.3 g/dL.  Patient reports that he is still having exertional chest pain.  No chest pain at rest.  The epigastric burning resolved with starting the omeprazole.  He denies hematemesis, heartburn, black stools and bloody stools.  He tells me he is not taking aspirin 81 mg daily.  He is taking metoprolol and  hydrochlorothiazide.  He needs to have his potassium level rechecked.  He also needs something to help with his back pain since discontinuing NSAIDs.  He has taken tramadol in the past.  It does make him tired.  He does not want any stronger narcotic pain medications.  Also reports that he has had intermittent hives usually in the morning but usually resolve within a half an hour.  He did not have hives when he was on hypertensive drugs in the past.  He has felt anxious.      Patient Active Problem List   Diagnosis     Benign essential tremor     H/O adenomatous polyp of colon     Irritable bowel syndrome with diarrhea     Lumbar radiculopathy     Elevated prostate specific antigen (PSA)     Primary osteoarthritis of right knee      Effusion of right knee     Posterior knee pain, right     FARNAZ on CPAP     Essential hypertension     Morbid obesity due to excess calories (H)     Pre-diabetes     NSAID long-term use     Exertional chest pain     Gastroesophageal reflux disease with esophagitis     Past Medical History:   Diagnosis Date     Benign lipomatous neoplasm     1/20/2014     Calculus of kidney     possible     Carpal tunnel syndrome     Both hands     Closed fracture of patella     05/06/09,Sustained right superior pole patellar fracture which underwent conservative management     Diverticulosis of large intestine without perforation or abscess without bleeding     1/28/2014     Headache     No Comments Provided     Other specified forms of tremor     3/2/2011     Other urticaria (CODE)     3/2/2011     Pain in joint     No Comments Provided     Umbilical hernia without obstruction or gangrene     1/26/2018     Past Surgical History:   Procedure Laterality Date     COLONOSCOPY  01/28/2014 2009,2014,F/U 2019     COLONOSCOPY  03/04/2019    Serrated adenoma, follow up 1 year     ESOPHAGOSCOPY, GASTROSCOPY, DUODENOSCOPY (EGD), COMBINED      1/28/14,EGD     OTHER SURGICAL HISTORY      9/10/2014,77404.0,UT REPAIR  ING HERNIA  >5 TRS BETTINA     OTHER SURGICAL HISTORY      1/26/2018,38780.0,OH REPAIR UMBILICAL NAZARIO  >5 TRS REDUC     RELEASE CARPAL TUNNEL      3,12/2004,Both hands     SIGMOIDOSCOPY FLEXIBLE      No Comments Provided     Social History     Socioeconomic History     Marital status:      Spouse name: Not on file     Number of children: Not on file     Years of education: Not on file     Highest education level: Not on file   Occupational History     Not on file   Social Needs     Financial resource strain: Not on file     Food insecurity:     Worry: Not on file     Inability: Not on file     Transportation needs:     Medical: Not on file     Non-medical: Not on file   Tobacco Use     Smoking status: Never Smoker     Smokeless tobacco: Never Used   Substance and Sexual Activity     Alcohol use: No     Drug use: No     Sexual activity: Never   Lifestyle     Physical activity:     Days per week: Not on file     Minutes per session: Not on file     Stress: Not on file   Relationships     Social connections:     Talks on phone: Not on file     Gets together: Not on file     Attends Advent service: Not on file     Active member of club or organization: Not on file     Attends meetings of clubs or organizations: Not on file     Relationship status: Not on file     Intimate partner violence:     Fear of current or ex partner: Not on file     Emotionally abused: Not on file     Physically abused: Not on file     Forced sexual activity: Not on file   Other Topics Concern     Parent/sibling w/ CABG, MI or angioplasty before 65F 55M? Not Asked   Social History Narrative    .      2 children.  Both children grown and living elsewhere.      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     Family History   Problem Relation Age of Onset     Hypertension Mother         Hypertension     Diabetes Mother         Diabetes     Other - See Comments Mother         Spinal  stenosis     Other - See Comments Father         scleroderma which was quite severe     Cancer Father         Cancer, of adenocarcinoma of the lung.  He was a nonsmoker.     Diabetes Brother         Diabetes     Other - See Comments Brother         Pancreatitis     Substance Abuse Brother         Alcohol/Drug,Alcoholic, has been through treatment a number of times.     Current Outpatient Medications   Medication Sig Dispense Refill     ALPRAZolam (XANAX) 0.25 MG tablet 1 tablet 30-60 minutes prior to procedure 2 tablet 0     hydrochlorothiazide (HYDRODIURIL) 25 MG tablet take 12.5 mg (1/2 tablet) by mouth daily for 7 days then 25 mg (1 tablet) daily  3     metoprolol tartrate (LOPRESSOR) 25 MG tablet TAKE 1/4 TABLET (6.25 MG) BY MOUTH 2 TIMES DAILY 30 DAYS.  0     naloxone (NARCAN) 4 MG/0.1ML nasal spray Spray 1 spray (4 mg) into one nostril alternating nostrils once as needed for opioid reversal every 2-3 minutes until assistance arrives 0.2 mL 0     omeprazole (PRILOSEC) 20 MG DR capsule TAKE 1 CAPSULE (20 MG) BY MOUTH DAILY@0700 FOR 30 DAYS. 90 capsule 3     traMADol (ULTRAM) 50 MG tablet TAKE 1 TO 2 TABLETS BY MOUTH EVERY 6 HOURS AS NEEDED FOR moderate PAIN  tablet 5     triamcinolone (KENALOG) 0.1 % cream Apply 1 Film topically 3 times daily       Ciprofloxacin and Midazolam      Review of Systems:  Review of Systems   Respiratory: Negative for cough, chest tightness and shortness of breath.    Cardiovascular: Positive for chest pain.        Occasionally radiates to the left arm.   Gastrointestinal: Negative for abdominal pain, anal bleeding, heartburn, hematochezia, nausea and vomiting.   Musculoskeletal: Positive for back pain.   Skin:        Intermittent hives   Neurological: Negative for dizziness, weakness and light-headedness.   Psychiatric/Behavioral: The patient is nervous/anxious.        Objective:   BP (!) 148/78 (BP Location: Right arm, Patient Position: Sitting, Cuff Size: Adult Large)    Pulse 84   Temp 97.9  F (36.6  C) (Tympanic)   Resp 16   Wt 103.1 kg (227 lb 3.2 oz)   BMI 32.14 kg/m    Physical Exam  Pleasant gentleman accompanied by his wife who does appear anxious.  He has many questions and concerns today.  Skin color pink.  Sclera nonicteric.  Lung fields clear to auscultation.  Cardiovascular regular rate and rhythm with no murmur S3 auscultated.  Abdomen is soft and without masses, tenderness and organomegaly.  No epigastric tenderness.  Extremities without edema.  No hives present at this time.  Conclusion           The nuclear stress test is negative for inducible myocardial ischemia or infarction.     The left ventricular ejection fraction at rest is 63%.  The left ventricular ejection fraction at stress is 58%.     There is no prior study for comparison     Hospital discharge summary labs and diagnostic studies are available reviewed and discussed with patient and his wife at this visit.    Assessment:    ICD-10-CM    1. Exertional chest pain R07.9 CTA Angiogram coronary artery     CBC with platelets     Basic metabolic panel     naloxone (NARCAN) 4 MG/0.1ML nasal spray     Basic metabolic panel     CBC with platelets   2. Gastroesophageal reflux disease with esophagitis K21.0 omeprazole (PRILOSEC) 20 MG DR capsule     CBC with platelets     CBC with platelets   3. Lumbar radiculopathy M54.16 traMADol (ULTRAM) 50 MG tablet   4. Claustrophobia F40.240 ALPRAZolam (XANAX) 0.25 MG tablet   5. Hives L50.9        Plan:   Patient has a nuclear stress test that was negative for inducible myocardial ischemia or infarction however the ejection fraction at rest was 63% and reduce down to 58%.  He continues to have exertional chest pain.  With the drop in the ejection fraction and continued discomfort and high risk for coronary disease we will proceed with a CT angiogram of coronary arteries to evaluate further.  Risk and benefits of testing reviewed and discussed with patient.  I think he  should restart aspirin 81 mg and continue current blood pressure medications.  We will check a potassium level today.  He is going to keep a close watch on his blood pressure.  Significantly improved since restarting his medications.  He does not feel that he can have CT scan without something for anxiety.  He is requesting alprazolam.  May take alprazolam 0.5 mg 30-60 minutes prior to procedure.  His wife will drive him to the appointment.  He also is requesting to have medication to treat his back pain.  Tramadol is refilled.  He can follow with primary doctor to discuss further treatment plan as he was wondering if may be gabapentin would be appropriate.  If he has exertional chest pain that is not relieved with rest or more intense than usual, syncope, etc. he needs to call [phone].  Would asked that he not exert himself in the meantime.  He will have follow-up appointment 1 to 2 days after CT angiogram with primary physician to discuss results.  He was also asking to try a long-acting beta-blocker since he is having some cold intolerance symptoms however I think that he is going to do better by staying on currently prescribed medication and discuss this with his primary physician at follow-up once we have further cardiac evaluation.  He will continue on the omeprazole.  His epigastric discomfort resolved.  He stopped using NSAIDs.  He is having no symptoms to suggest GI blood loss.  He gets in his best interest at this time to be on aspirin 81 mg daily with his current cardiac symptoms.  If he notices any evidence of GI bleed or increased epigastric discomfort he will stop aspirin and be seen in the clinic.  We will follow-up on hemoglobin today.  Hemoglobin was normal while he was hospitalized.  He had no evidence of hives at this appointment.  He is quite anxious and this may be due to anxiety.  His wife agrees.  Continue to monitor.  Does not sound like the hives are related to his medication.  He has been on  these medications before in the past without difficulties and the hives are intermittent.    We will hold off on ordering EGD for evaluation at this time as we are still doing cardiac work-up.  He can discuss this further at follow-up visit with primary physician.  Side effects of all medications reviewed and discussed with patient he expresses understanding.  Medications were reconciled at this visit.    LAURENT Villalpando   11/19/2019  7:45 AM

## 2019-11-19 NOTE — NURSING NOTE
Chief Complaint   Patient presents with     Follow Up       Medication Reconciliation: complete    Barb Navarro LPN

## 2019-11-20 ENCOUNTER — TELEPHONE (OUTPATIENT)
Dept: CARDIOLOGY | Facility: OTHER | Age: 64
End: 2019-11-20

## 2019-11-20 NOTE — TELEPHONE ENCOUNTER
Patient verified .  Reminder call for CCTA test with instructions given.  Patient verbalized understanding. He will bring his Xanax to take before the CT and he will have a .

## 2019-11-21 ENCOUNTER — HOSPITAL ENCOUNTER (OUTPATIENT)
Dept: CT IMAGING | Facility: OTHER | Age: 64
Discharge: HOME | End: 2019-11-21
Attending: NURSE PRACTITIONER | Admitting: NURSE PRACTITIONER
Payer: MEDICARE

## 2019-11-21 VITALS
WEIGHT: 227 LBS | DIASTOLIC BLOOD PRESSURE: 78 MMHG | BODY MASS INDEX: 32.5 KG/M2 | HEIGHT: 70 IN | SYSTOLIC BLOOD PRESSURE: 117 MMHG | HEART RATE: 56 BPM | OXYGEN SATURATION: 95 %

## 2019-11-21 DIAGNOSIS — R07.9 EXERTIONAL CHEST PAIN: ICD-10-CM

## 2019-11-21 PROCEDURE — 25500064 ZZH RX 255 OP 636: Performed by: NURSE PRACTITIONER

## 2019-11-21 PROCEDURE — 25000132 ZZH RX MED GY IP 250 OP 250 PS 637: Performed by: RADIOLOGY

## 2019-11-21 PROCEDURE — 25000125 ZZHC RX 250: Performed by: RADIOLOGY

## 2019-11-21 PROCEDURE — 75574 CT ANGIO HRT W/3D IMAGE: CPT

## 2019-11-21 RX ORDER — NITROGLYCERIN 0.4 MG/1
0.4 TABLET SUBLINGUAL
Status: COMPLETED | OUTPATIENT
Start: 2019-11-21 | End: 2019-11-21

## 2019-11-21 RX ORDER — METOPROLOL TARTRATE 50 MG
50-100 TABLET ORAL
Status: COMPLETED | OUTPATIENT
Start: 2019-11-21 | End: 2019-11-21

## 2019-11-21 RX ORDER — METOPROLOL TARTRATE 1 MG/ML
10 INJECTION, SOLUTION INTRAVENOUS
Status: DISCONTINUED | OUTPATIENT
Start: 2019-11-21 | End: 2019-11-22 | Stop reason: HOSPADM

## 2019-11-21 RX ADMIN — IOHEXOL 109 ML: 350 INJECTION, SOLUTION INTRAVENOUS at 09:32

## 2019-11-21 RX ADMIN — METOPROLOL TARTRATE 100 MG: 100 TABLET ORAL at 08:09

## 2019-11-21 RX ADMIN — METOPROLOL TARTRATE 10 MG: 1 INJECTION, SOLUTION INTRAVENOUS at 09:25

## 2019-11-21 RX ADMIN — NITROGLYCERIN 0.4 MG: 0.4 TABLET SUBLINGUAL at 09:31

## 2019-11-21 ASSESSMENT — MIFFLIN-ST. JEOR: SCORE: 1820.92

## 2019-11-21 NOTE — PROGRESS NOTES
0800 Patient arrived for a CCTA. Accompanied with wife. They were connected to a cardiac monitor and vital signs were obtained. The patient's heart rate was 75 . Medications and allergies were reviewed. . The procedure was explained, and the patient was instructed to rest and relax, as much as possible. A saline lock was established. The patient's heart rate 58. The patient was offered the restroom, and then they walked to CT. The cardiac monitor was placed there, and the test was completed per protocol.The patient was given one bottle of water, and they were told to  Drink at least 3 other additional glasses of water today, per post contrast instructions.  The patient was instructed that the ordering MD will call with results in one to two days with test results. The patient left ambulatory in stable condition. Patient has an appointment with Dr. Porras on 11/25/2019 for review of test results

## 2019-11-23 ENCOUNTER — DOCUMENTATION ONLY (OUTPATIENT)
Dept: FAMILY MEDICINE | Facility: OTHER | Age: 64
End: 2019-11-23

## 2019-11-23 NOTE — PROGRESS NOTES
Patient gave me verbal permission to review his chart, and to give him results of his previous echocardiogram and CT angiogram, as I was his previous physician prior to senior living from the clinic, at the end of 2018, and am currently a hospitalist here at [facility].  FRANCISCO BARKER MD on 11/23/2019 at 10:48 AM

## 2019-11-25 ENCOUNTER — OFFICE VISIT (OUTPATIENT)
Dept: PEDIATRICS | Facility: OTHER | Age: 64
End: 2019-11-25
Attending: INTERNAL MEDICINE
Payer: COMMERCIAL

## 2019-11-25 VITALS
DIASTOLIC BLOOD PRESSURE: 70 MMHG | HEIGHT: 71 IN | BODY MASS INDEX: 31.5 KG/M2 | TEMPERATURE: 98.2 F | SYSTOLIC BLOOD PRESSURE: 124 MMHG | HEART RATE: 86 BPM | WEIGHT: 225 LBS | RESPIRATION RATE: 14 BRPM | OXYGEN SATURATION: 96 %

## 2019-11-25 DIAGNOSIS — I73.00 RAYNAUD'S PHENOMENON WITHOUT GANGRENE: ICD-10-CM

## 2019-11-25 DIAGNOSIS — I25.10 ASCVD (ARTERIOSCLEROTIC CARDIOVASCULAR DISEASE): ICD-10-CM

## 2019-11-25 DIAGNOSIS — I25.10 STENOSIS OF RIGHT CORONARY ARTERY: ICD-10-CM

## 2019-11-25 DIAGNOSIS — I10 ESSENTIAL HYPERTENSION: ICD-10-CM

## 2019-11-25 DIAGNOSIS — R93.1 HIGH CORONARY ARTERY CALCIUM SCORE: ICD-10-CM

## 2019-11-25 DIAGNOSIS — R07.9 CHEST PAIN, EXERTIONAL: Primary | ICD-10-CM

## 2019-11-25 DIAGNOSIS — I25.10 CAD, MULTIPLE VESSEL: ICD-10-CM

## 2019-11-25 PROBLEM — E66.01 MORBID OBESITY DUE TO EXCESS CALORIES (H): Status: RESOLVED | Noted: 2019-08-22 | Resolved: 2019-11-25

## 2019-11-25 PROCEDURE — 99214 OFFICE O/P EST MOD 30 MIN: CPT | Performed by: INTERNAL MEDICINE

## 2019-11-25 PROCEDURE — G0463 HOSPITAL OUTPT CLINIC VISIT: HCPCS

## 2019-11-25 RX ORDER — ATORVASTATIN CALCIUM 10 MG/1
10 TABLET, FILM COATED ORAL DAILY
Qty: 90 TABLET | Refills: 3 | Status: SHIPPED | OUTPATIENT
Start: 2019-11-25 | End: 2019-12-03 | Stop reason: ALTCHOICE

## 2019-11-25 ASSESSMENT — PAIN SCALES - GENERAL: PAINLEVEL: NO PAIN (1)

## 2019-11-25 ASSESSMENT — MIFFLIN-ST. JEOR: SCORE: 1819.78

## 2019-11-25 NOTE — PATIENT INSTRUCTIONS
-- Cardiology consult   -- Start atorvastatin 10 mg daily   -- Rest, don't do anything that triggers any symptoms   -- Continue aspirin   -- Continue hydrochlorothiazide and metoprolol      The 10-year ASCVD risk score (Mitch MATTHEWS Jr., et al., 2013) is: 14.5%    Values used to calculate the score:      Age: 63 years      Sex: Male      Is Non- : No      Diabetic: No      Tobacco smoker: No      Systolic Blood Pressure: 124 mmHg      Is BP treated: Yes      HDL Cholesterol: 38 mg/dL      Total Cholesterol: 167 mg/dL    [place] counselors/therapists   Telephone Hours Kids? Address   [facility]  (Many counselors) [phone] M-Th 8-5  F 8-12 Yes [address]   [email or web address]   [facility]  (Many counselors) [phone]  Yes [address]   [email or web address]   [facility]  (Many counselors) [phone]  Yes [address]  [email or web address]   [facility]  Jeff Sanchez [phone]  Yes [facility]  [address]  [email or web address]   [facility]  Hector Perdomo [phone]  Yes [address]   [address]  [email or web address]   Ban Buchanan [phone]   [address]  [email or web address]   [facility]  Matty Kamara [phone]   [address]  [email or web address]   Dania Abdi [phone]   [address]   Ly Arteaga [phone]   [address] [place] [address]   Cristine Misael [phone]  Yes [address]   Newton Sanchez [phone]   [address] [place]    Avelino Guillaume [phone]   [address] [place] [address]   Alona Schneider [phone]   [address]   [email or web address]   Jody Coyle [phone]   [address]  Gloria)  [email or web address]   [facility]  Christoph Brunson [phone]   [address] [place] [address]  Michele Rinne Cindy Thomas [phone]      [facility]  Alok Kirk CNP [phone] M-F 8-5 Yes [address] [place]  [email or web address]   [facility]: Kevin [phone]  Yes IWONA Brown  [address]  [email or web address]   [facility]: [place] [phone]  Yes IWONA Cannon  [address]  [email or web address]

## 2019-11-25 NOTE — PROGRESS NOTES
"Subjective  Rj Juarez is a 63 year old male who presents for follow-up CT angiogram.  He is been having some chest pain with exertion for quite a while.  Dr. Carlisle is a friend of his and has been encouraging him to come in to be seen.  They have been hunting together.  He recently called in on November 7, 2019 with issue of chest pain and nurse triage told him to come to the emergency department.  He was seen in the emergency department and transferred to [facility] in [place].  There he underwent a chest pain rule out with serial troponins and telemetry.  They offered stress test as an outpatient and he declined requesting to have it performed locally.  He underwent stress testing at [facility] and followed up with Celeste Frausto for his hospital discharge follow-up.  She is totally noticed that his ejection fraction had reduced on his stress test although there was no wall motion abnormal.  Based on his classic story for exertional based chest heaviness in order for CT angiogram was placed.  He also wants to know how much of a component his stress load is.  \"I do not do well sitting around.\"  He feels a lot of stress and anxiety building up with all the work orders he has pending between now and Christmas.    Problem List/PMH: reviewed in EMR, and made relevant updates today.  Medications: reviewed in EMR, and made relevant updates today.  Allergies: reviewed in EMR, and made relevant updates today.    Social Hx:  Social History     Tobacco Use     Smoking status: Never Smoker     Smokeless tobacco: Never Used   Substance Use Topics     Alcohol use: No     Drug use: No     Social History     Social History Narrative    .      2 children.  Both children grown and living elsewhere.      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     I reviewed social history and made relevant updates today.    Family Hx:   Family " "History   Problem Relation Age of Onset     Hypertension Mother         Hypertension     Diabetes Mother         Diabetes     Other - See Comments Mother         Spinal stenosis     Other - See Comments Father         scleroderma which was quite severe     Cancer Father         Cancer, of adenocarcinoma of the lung.  He was a nonsmoker.     Diabetes Brother         Diabetes     Other - See Comments Brother         Pancreatitis     Substance Abuse Brother         Alcohol/Drug,Alcoholic, has been through treatment a number of times.       Objective  Vitals: reviewed in EMR.  /70 (BP Location: Right arm, Patient Position: Sitting, Cuff Size: Adult Large)   Pulse 86   Temp 98.2  F (36.8  C) (Tympanic)   Resp 14   Ht 1.791 m (5' 10.5\")   Wt 102.1 kg (225 lb)   SpO2 96%   BMI 31.83 kg/m      Gen: Pleasant male, NAD.  HEENT: MMM, no OP erythema.   Neck: Supple, no JVD, no bruits.  CV: RRR no m/r/g.   Pulm: CTAB no w/r/r  Neuro: Grossly intact  Msk: No lower extremity edema.  Skin: No concerning lesions.  Psychiatric: Normal affect and insight. Does not appear anxious or depressed.    Recent Results (from the past 744 hour(s))   XR Chest 2 Views    Narrative    PROCEDURE:  XR CHEST 2 VW    HISTORY: pain, .    COMPARISON:  3/18/2016    FINDINGS:  The cardiomediastinal contours are normal.  The trachea is midline.  No focal consolidation, effusion or pneumothorax.    No suspicious osseous lesion or subdiaphragmatic free air.      Impression    IMPRESSION:      No acute cardiopulmonary process.      JAQUI HERNANDEZ MD   NM Lexiscan stress test   Result Value    Target     Baseline Systolic     Baseline Diastolic BP 86    Last Stress Systolic     Last Stress Diastolic BP 84    Baseline HR 80    Max HR 87    Calculated Percent HR 56    Estimated workload 1.0    Rate Pressure Product 11,136.0    Nuc Rest EF 63    Left Ventricular EF 58    Narrative       The nuclear stress test is negative " for inducible myocardial ischemia   or infarction.     The left ventricular ejection fraction at rest is 63%.  The left   ventricular ejection fraction at stress is 58%.     There is no prior study for comparison.     CTA Angiogram coronary artery    Narrative    PROCEDURE:  CTA ANGIOGRAM CORONARY ARTERY, 2019 11:20 AM    HISTORY:  exertional chest pain, abnormal Lexiscan (drop in EF with  stress); Exertional chest pain.    TECHNIQUE: A ECG synchronized prospectively triggered noncontrast CT  of the heart was performed. Subsequently, a prospective triggered  helical cardiac acquisition was obtained after the administration of  the contrast bolus for the purposes of coronary assessment. Coronary  calcification and stenosis was analyzed using Siemens ScreenburnoVia  software.     COMPARISON:  2019    FINDINGS:    Examination quality: Excellent.     CORONARY CALCIUM SCORE:    Left Main:                            0.0  Left anterior descendin.8  Left Circumflex:                  281.9  Right coronary artery:        495.4    TOTAL:                               1118.2    The estimated probability of a non-zero calcium score for a   Male of age 65 years is 77%. The observed calcium score of 1118 is at  93 percentile for subjects of the same age, gender, and race/ethnicity  who are free of clinical cardiovascular disease and treated diabetes.    Dominance of coronary artery system: Co-dominant    Coronary Vessels  Left main coronary artery: Short, patent.    Left anterior descending artery: Scattered mild narrowing. Focal  moderate narrowing in the LAD between the first and second diagonal  branches measuring 79% by area and 54% by diameter    Left circumflex artery: Multifocal mild narrowing throughout the  proximal circumflex. Patent distally.    Right coronary artery: Multifocal mild stenosis is seen throughout the  right coronary. Focal moderate to severe stenosis is associated with  bulky  atherosclerotic plaque in the proximal right coronary artery,  measuring up to 89% by area and 67% by diameter. Focal severe stenosis  without a measurable lumen is seen in the distal right coronary 10.5  cm in the origin.    Cardiac Morphology  The heart is within normal limits in volume. The ascending aorta is  ectatic at 3.7 cm.    Cardiac Function  Ejection fraction: 54%  End-diastolic volume: 175 mL  End-systolic volume: 81 mL  Regional wall motion abnormality: None.  Cardiac devices: None.    Nonvascular findings  Dependent atelectasis. No suspicious osseous lesion.      Impression    IMPRESSION:      Total calcium score of 1118. The observed calcium score of is at the  93 percentile for subjects of the same age, gender, and race/ethnicity  who are free of clinical cardiovascular disease and treated diabetes.  Please note that bulky coronary calcification somewhat limits the  accuracy of postcontrast CT assessment of the coronary arteries.    Near occlusion of the distal right coronary artery, 10.5 from the  origin. Moderate to severe proximal right coronary artery stenosis.  Moderate focal narrowing 54% by diameter in the LAD between the first  and second branch points. Multifocal mild stenoses elsewhere.    JAQUI HERNANDEZ MD         Assessment    ICD-10-CM    1. Chest pain, exertional R07.9 CARDIOLOGY EVAL ADULT REFERRAL     atorvastatin (LIPITOR) 10 MG tablet   2. High coronary artery calcium score R93.1 CARDIOLOGY EVAL ADULT REFERRAL     atorvastatin (LIPITOR) 10 MG tablet   3. Essential hypertension I10    4. Raynaud's phenomenon without gangrene I73.00    5. ASCVD (arteriosclerotic cardiovascular disease) I25.10 CARDIOLOGY EVAL ADULT REFERRAL     atorvastatin (LIPITOR) 10 MG tablet     Orders Placed This Encounter   Procedures     CARDIOLOGY EVAL ADULT REFERRAL       I agree that his history is consistent with angina and chest pains with exertion.  If he rests his symptoms resolved within 20 minutes  or so.  He does also have some anxiety which complicates his symptoms.  We reviewed his coronary CT angiogram report together today.  I believe the next step should be a conventional angiogram and local cardiology consultation is requested.  I will contact our cardiology team to see if subsequent testing could be expedited.  I recommend that he not exert himself including avoid any symptoms that would trigger his chest discomfort for the foreseeable future.    Plan   -- Expected clinical course discussed   -- Medications and their side effects discussed  Patient Instructions      -- Cardiology consult   -- Start atorvastatin 10 mg daily   -- Rest, don't do anything that triggers any symptoms   -- Continue aspirin   -- Continue hydrochlorothiazide and metoprolol      The 10-year ASCVD risk score (Mitch MATTHEWS Jr., et al., 2013) is: 14.5%    Values used to calculate the score:      Age: 63 years      Sex: Male      Is Non- : No      Diabetic: No      Tobacco smoker: No      Systolic Blood Pressure: 124 mmHg      Is BP treated: Yes      HDL Cholesterol: 38 mg/dL      Total Cholesterol: 167 mg/dL    [place] counselors/therapists   Telephone Hours Kids? Address   [facility]  (Many counselors) [phone] M-Th 8-5  F 8-12 Yes [address]   [email or web address]   [facility]  (Many counselors) [phone]  Yes [address] [facility] [address] [place]   [email or web address]   [facility]  (Many counselors) [phone]  Yes [address]  [email or web address]   [facility]  Jeff Sanchez [phone]  Yes [facility]  [address]  [email or web address]   [facility]  Hector Perdomo [phone]  Yes [address]   [address]  [email or web address]   Ban Buchanan [phone]   [address] [email or web address]   [facility]  Matty Trivedi  Kay Arevalo  Alla Kamara [phone]   [address] [facility] [address]  [email or web address]   Dania Abdi [phone]   [address]   Ly Arteaga [phone]   [address] [place] [address]   Cristine Douglas [phone]  Yes [address]   Newton Sanchez [phone]   [address] [place]    Aveilno Guillaume [phone]   [address] [facility] [address]   Alona Schneider [phone]   [address] [place] [address]   [email or web address]   Jody Coyle [phone]   [address] [place] [address] [address]  ([facility])  [email or web address]   [facility]  Christoph Brunson [phone]   [address] [facility]  Michele Rinne Cindy Thomas [phone]      [facility]  Alok Kirk, CNP [phone] M-F 8-5 Yes [address] [place], [place]  [email or web address]   [facility]: Kevni [phone]  Yes IWONA Brown  [address]  [email or web address]   [facility]: [place] [phone]  Yes IWONA Cannon  [address]  [email or web address]           Return if symptoms worsen or fail to improve.    Signed, Doug Porras MD, FAAP, FACP  Internal Medicine & Pediatrics

## 2019-11-25 NOTE — NURSING NOTE
Patient presents to clinic for F/U on angiogram.  Tasha Sotelo LPN ....................  11/25/2019   1:17 PM    No LMP for male patient.  Medication Reconciliation: complete    Tasha Sotelo LPN  11/25/2019 1:17 PM

## 2019-11-27 ENCOUNTER — TELEPHONE (OUTPATIENT)
Dept: CARDIOLOGY | Facility: OTHER | Age: 64
End: 2019-11-27

## 2019-11-27 ENCOUNTER — HOSPITAL ENCOUNTER (OUTPATIENT)
Facility: CLINIC | Age: 64
End: 2019-11-27
Attending: INTERNAL MEDICINE | Admitting: INTERNAL MEDICINE
Payer: MEDICARE

## 2019-11-27 DIAGNOSIS — I25.10 CAD, MULTIPLE VESSEL: ICD-10-CM

## 2019-11-27 DIAGNOSIS — R07.9 CHEST PAIN, EXERTIONAL: ICD-10-CM

## 2019-11-27 DIAGNOSIS — R93.1 HIGH CORONARY ARTERY CALCIUM SCORE: ICD-10-CM

## 2019-11-27 DIAGNOSIS — I25.10 STENOSIS OF RIGHT CORONARY ARTERY: ICD-10-CM

## 2019-11-27 NOTE — TELEPHONE ENCOUNTER
Chaparrita Almonte APRN CNP Green, Glenda M, RN             Can we call Antonietta to schedule coronary angiography for this patient and let patient know date and time. I placed order for his cor angio yesterday.   Thanks,   Chaparrita NAIR. TAE Almonte CNP      Called Antonietta at [facility] scheduled patient for an Angiogram this will be on 12/13/2019 at 1030.  Called patient he coming to see Dr. Mendez on 12/03/2019 will give education and informational packet that day.  Alondra Hernandez RN on 11/27/2019 at 9:20 AM

## 2019-12-02 NOTE — PROGRESS NOTES
[facility] HEART CARE   CARDIOLOGY CONSULT     Rj Juarez   1955  [id number]    Doug Porras     Chief Complaint   Patient presents with     Consult     Chest Pain, exertional; High coronary artery calcium score; ASCVD          HPI:   Mr. Juarez is a 63-year-old gentleman who is being seen by cardiology for concerns for unstable angina.  He had normal stress test on 11/14/2019.  He went on to have a CTA coronary angiogram on 11/21/2019 with concerns for blockages of the LAD and RCA.  He has concerns for CAD, hypertension, FARNAZ on CPAP, prediabetes with an A1c of 6.4% on 8/22/2019, exertional chest pain, and chronic low back pain.  He had significant questions today.    He was seen in the clinic on 8/22/2019, essentially to establish care.  He was previously seen by Dr. Carlisle.  He was noted to have rib fractures to his left side.  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  He had denied chest pain at that time.    He reports that in late September he started waking up in the night with increasing heartburn.  He has not had a history of acid reflux but does take 3000 to 4000 mg of ibuprofen on a regular basis for back pain.  He is friends of Dr. Carlisle and had been mentioning his symptoms.  Dr. Carlisle had recommended he be seen.    He had gone goose hunting on October 3, 2019.  He was able to obtain these keys and would quickly retrieve them.  In so doing, he describes tightness to his chest with shortness of breath.  At that time, his discomfort did not radiate.    He is also been cutting firewood a regular basis which is caused him to be short of breath with chest discomfort.    He went hunting for deer.  With walking through the woods, he describes a tightness to his chest with shortness of breath.  With the symptoms he started to have nausea but no vomiting, shortness of breath, diaphoresis, fatigue, and a substernal burning brought on with activity and  relieved with stress.    He was seen in the clinic again 11/19/2019 after being seen in the ER on 11/7/2019.  He has been having stuttering chest pain for 1 to 4 weeks with exertional activity at around 11 AM doing some yard work pushing a wheelbarrow.  He described a substernal chest pressure.  With that, he had been having nausea, shortness of breath, diaphoresis, and lightheadedness.  He has been increasingly fatigued.  With these activities, he would have a burning sensation in his chest relieved with rest.  His symptoms have been 2 out of 10.  He had not been taking medications for blood pressure as he had been told to do so multiple times previously.  Is been resistant to take medications.  He does not like to be on medications.  He was given 4 baby aspirin and a sublingual nitro which really improved his symptoms.  He was also given a GI cocktail which seemed to improve his symptoms.  His EKG and troponin were essentially normal.  He did/does have a component of anterior chest wall discomfort which is reproducible but not entirely reproducible.  He was decided he be transferred to [facility].    He was transferred to [facility] from 11/7/2019 to 11/8/2019.  Chest x-ray was normal. Cardiac enzymes were normal.  They were concerned that he has acid reflux.  He was told to follow-up with his PCP in 7 to 10 days and have a stress test.  He was suggested he have an EGD.  It was recommended he have an outpatient stress test at [facility] but was declined.  It was suggested he have a stress test locally in [place].  He was started on Prilosec and metoprolol 6.25 mg twice a day.  He was told to discontinue taking NSAID's.    He went on to have a stress test on 11/14/2019 which showed no reversible ischemia or infarction.  His EF went from 63% and dropped to 58%.  This is obviously concerning.    He was seen in the clinic once again on 11/19/2019 and his hospitalization was reviewed.  He was set up for a CTA of  his coronaries.    He had a CTA of his coronary on 2019.  His total calcium score was 1118.2.  He had concerns for severe disease involving the LAD and RCA.  LAD was approximately 54 percent to 79% blocked.  His RCA was 67% to 89% blocked.  Left main patent and the circumflex had mild narrowing throughout the proximal vessel.    With his ongoing symptoms and his abnormal CTA of the coronaries, he was referred to cardiology.  He was seen on 12/3/2019.  He is hoping to have the procedure done at [facility]. Has been tentatively set up for a cardiac catheterization on 19.  He has an appointment at [facility] on 2019.      IMAGING RESULTS:   CTA angiogram of coronaries on 2019:  CORONARY CALCIUM SCORE:     Left Main:                            0.0  Left anterior descendin.8  Left Circumflex:                  281.9  Right coronary artery:        495.4     TOTAL:                               1118.2     The estimated probability of a non-zero calcium score for a   Male of age 65 years is 77%. The observed calcium score of 1118 is at  93 percentile for subjects of the same age, gender, and race/ethnicity  who are free of clinical cardiovascular disease and treated diabetes.     Dominance of coronary artery system: Co-dominant     Coronary Vessels  Left main coronary artery: Short, patent.     Left anterior descending artery: Scattered mild narrowing. Focal  moderate narrowing in the LAD between the first and second diagonal  branches measuring 79% by area and 54% by diameter     Left circumflex artery: Multifocal mild narrowing throughout the  proximal circumflex. Patent distally.     Right coronary artery: Multifocal mild stenosis is seen throughout the  right coronary. Focal moderate to severe stenosis is associated with  bulky atherosclerotic plaque in the proximal right coronary artery,  measuring up to 89% by area and 67% by diameter. Focal severe stenosis  without a measurable  lumen is seen in the distal right coronary 10.5  cm in the origin.    Stress test on 11/14/2019:     The nuclear stress test is negative for inducible myocardial ischemia or infarction.     The left ventricular ejection fraction at rest is 63%.  The left ventricular ejection fraction at stress is 58%.     There is no prior study for comparison.    ALLERGIES:   Allergies   Allergen Reactions     Ciprofloxacin Other (See Comments)     Tendon issue , and IBS     Midazolam      Other reaction(s): Other - Describe In Comment Field  Difficult to wake up        PAST MEDICAL HISTORY:   Past Medical History:   Diagnosis Date     Benign lipomatous neoplasm     1/20/2014     Calculus of kidney     possible     Carpal tunnel syndrome     Both hands     Closed fracture of patella     05/06/09,Sustained right superior pole patellar fracture which underwent conservative management     Diverticulosis of large intestine without perforation or abscess without bleeding     1/28/2014     Headache     No Comments Provided     Other specified forms of tremor     3/2/2011     Other urticaria (CODE)     3/2/2011     Pain in joint     No Comments Provided     Umbilical hernia without obstruction or gangrene     1/26/2018        PAST SURGICAL HISTORY:   Past Surgical History:   Procedure Laterality Date     COLONOSCOPY  01/28/2014 2009,2014,F/U 2019     COLONOSCOPY  03/04/2019    Serrated adenoma, follow up 1 year     ESOPHAGOSCOPY, GASTROSCOPY, DUODENOSCOPY (EGD), COMBINED      1/28/14,EGD     OTHER SURGICAL HISTORY      9/10/2014,17925.0,AZ REPAIR ING HERNIA  >5 TRS BETTINA     OTHER SURGICAL HISTORY      1/26/2018,70601.0,AZ REPAIR UMBILICAL NAZARIO  >5 TRS REDUC     RELEASE CARPAL TUNNEL      3,12/2004,Both hands     SIGMOIDOSCOPY FLEXIBLE      No Comments Provided        FAMILY HISTORY:   Family History   Problem Relation Age of Onset     Hypertension Mother         Hypertension     Diabetes Mother         Diabetes     Other - See  Comments Mother         Spinal stenosis     Other - See Comments Father         scleroderma which was quite severe     Cancer Father         Cancer, of adenocarcinoma of the lung.  He was a nonsmoker.     Diabetes Brother         Diabetes     Other - See Comments Brother         Pancreatitis     Substance Abuse Brother         Alcohol/Drug,Alcoholic, has been through treatment a number of times.        SOCIAL HISTORY:   Social History     Socioeconomic History     Marital status:      Spouse name: Not on file     Number of children: Not on file     Years of education: Not on file     Highest education level: Not on file   Occupational History     Not on file   Social Needs     Financial resource strain: Not on file     Food insecurity:     Worry: Not on file     Inability: Not on file     Transportation needs:     Medical: Not on file     Non-medical: Not on file   Tobacco Use     Smoking status: Never Smoker     Smokeless tobacco: Never Used   Substance and Sexual Activity     Alcohol use: No     Drug use: No     Sexual activity: Not Currently   Lifestyle     Physical activity:     Days per week: Not on file     Minutes per session: Not on file     Stress: Not on file   Relationships     Social connections:     Talks on phone: Not on file     Gets together: Not on file     Attends [employer] service: Not on file     Active member of club or organization: Not on file     Attends meetings of clubs or organizations: Not on file     Relationship status: Not on file     Intimate partner violence:     Fear of current or ex partner: Not on file     Emotionally abused: Not on file     Physically abused: Not on file     Forced sexual activity: Not on file   Other Topics Concern     Parent/sibling w/ CABG, MI or angioplasty before 65F 55M? Not Asked   Social History Narrative    .      2 children.  Both children grown and living elsewhere.      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         CURRENT MEDICATIONS:   Prior to Admission medications    Medication Sig Start Date End Date Taking? Authorizing Provider   aspirin (ASA) 81 MG tablet Take 1 tablet (81 mg) by mouth daily 11/19/19   Emelina Frausto NP   atorvastatin (LIPITOR) 10 MG tablet Take 1 tablet (10 mg) by mouth daily 11/25/19   Doug Porras MD   hydrochlorothiazide (HYDRODIURIL) 25 MG tablet take 12.5 mg (1/2 tablet) by mouth daily for 7 days then 25 mg (1 tablet) daily 8/22/19   Reported, Patient   metoprolol tartrate (LOPRESSOR) 25 MG tablet TAKE 1/4 TABLET (6.25 MG) BY MOUTH 2 TIMES DAILY 30 DAYS. 11/8/19   Reported, Patient   naloxone (NARCAN) 4 MG/0.1ML nasal spray Spray 1 spray (4 mg) into one nostril alternating nostrils once as needed for opioid reversal every 2-3 minutes until assistance arrives 11/19/19   Emelina Frausto NP   omeprazole (PRILOSEC) 20 MG DR capsule TAKE 1 CAPSULE (20 MG) BY MOUTH DAILY@0700 FOR 30 DAYS. 11/19/19   Emelina Frausto NP   traMADol (ULTRAM) 50 MG tablet TAKE 1 TO 2 TABLETS BY MOUTH EVERY 6 HOURS AS NEEDED FOR moderate PAIN USE 11/19/19   Emelina Frausto NP   triamcinolone (KENALOG) 0.1 % cream Apply 1 Film topically 3 times daily 6/6/17   Reported, Patient          ROS:   CONSTITUTIONAL: No weight loss, fever, chills, weakness or fatigue.   HEENT: Eyes: No visual changes. Ears, Nose, Throat: No hearing loss, congestion or difficulty swallowing.   CARDIOVASCULAR: No chest pain, chest pressure or chest discomfort. No palpitations or lower extremity edema.   RESPIRATORY: No shortness of breath, dyspnea upon exertion, cough or sputum production.   GASTROINTESTINAL: No abdominal pain. No anorexia, nausea, vomiting or diarrhea.   NEUROLOGICAL: No headache, lightheadedness, dizziness, syncope, ataxia or weakness.   HEMATOLOGIC: No anemia, bleeding or bruising.   PSYCHIATRIC: No history of depression or anxiety.   ENDOCRINOLOGIC: No  reports of sweating, cold or heat intolerance. No polyuria or polydipsia.   SKIN: No abnormal rashes or itching.       PHYSICAL EXAM:   GENERAL: The patient is a well-developed, well-nourished, in no apparent distress. Alert and oriented x3.   HEENT: Head is normocephalic and atraumatic. Eyes are symmetrical with normal visual tracking.  HEART: Regular rate and rhythm, S1S2 present without murmur, rub or gallop.   LUNGS: Respirations regular and unlabored. Clear to auscultation.   GI: Abdomen is soft and non distended.   EXTREMITIES: No peripheral edema present.   MUSCULOSKELETAL: No joint swelling.   NEUROLOGIC: Alert and oriented X3.    SKIN: No jaundice. No rashes or visible skin lesions present.         LAB RESULTS:   Office Visit on 11/19/2019   Component Date Value Ref Range Status     Sodium 11/19/2019 137  134 - 144 mmol/L Final     Potassium 11/19/2019 4.2  3.5 - 5.1 mmol/L Final     Chloride 11/19/2019 100  98 - 107 mmol/L Final     Carbon Dioxide 11/19/2019 32* 21 - 31 mmol/L Final     Anion Gap 11/19/2019 5  3 - 14 mmol/L Final     Glucose 11/19/2019 157* 70 - 105 mg/dL Final     Urea Nitrogen 11/19/2019 16  7 - 25 mg/dL Final     Creatinine 11/19/2019 1.03  0.70 - 1.30 mg/dL Final     GFR Estimate 11/19/2019 72  >60 mL/min/[1.73_m2] Final     GFR Estimate If Black 11/19/2019 88  >60 mL/min/[1.73_m2] Final     Calcium 11/19/2019 9.6  8.6 - 10.3 mg/dL Final     WBC 11/19/2019 6.4  4.0 - 11.0 10e9/L Final     RBC Count 11/19/2019 5.15  4.4 - 5.9 10e12/L Final     Hemoglobin 11/19/2019 15.4  13.3 - 17.7 g/dL Final     Hematocrit 11/19/2019 46.7  40.0 - 53.0 % Final     MCV 11/19/2019 91  78 - 100 fl Final     MCH 11/19/2019 29.9  26.5 - 33.0 pg Final     MCHC 11/19/2019 33.0  31.5 - 36.5 g/dL Final     RDW 11/19/2019 12.4  10.0 - 15.0 % Final     Platelet Count 11/19/2019 268  150 - 450 10e9/L Final            ASSESSMENT:       ICD-10-CM    1. Chest pain, exertional R07.9 EKG 12-lead complete w/read -  "Clinics     nitroGLYcerin (NITROSTAT) 0.4 MG sublingual tablet     metoprolol succinate ER (TOPROL-XL) 25 MG 24 hr tablet     rosuvastatin (CRESTOR) 20 MG tablet   2. High coronary artery calcium score at 1118.2 on 11/21/2019 R93.1 nitroGLYcerin (NITROSTAT) 0.4 MG sublingual tablet     rosuvastatin (CRESTOR) 20 MG tablet   3. Stenosis of right coronary artery I25.10 nitroGLYcerin (NITROSTAT) 0.4 MG sublingual tablet     rosuvastatin (CRESTOR) 20 MG tablet   4. Stenosis of left anterior descending (LAD) artery I25.10 nitroGLYcerin (NITROSTAT) 0.4 MG sublingual tablet     rosuvastatin (CRESTOR) 20 MG tablet   5. ASCVD (arteriosclerotic cardiovascular disease) I25.10 EKG 12-lead complete w/read - Clinics     nitroGLYcerin (NITROSTAT) 0.4 MG sublingual tablet     rosuvastatin (CRESTOR) 20 MG tablet   6. CAD, multiple vessel I25.10 nitroGLYcerin (NITROSTAT) 0.4 MG sublingual tablet     rosuvastatin (CRESTOR) 20 MG tablet   7. Essential hypertension I10 metoprolol succinate ER (TOPROL-XL) 25 MG 24 hr tablet   8. FARNAZ on CPAP G47.33     Z99.89    9. Pre-diabetes R73.03    10. Exertional chest pain R07.9 metoprolol succinate ER (TOPROL-XL) 25 MG 24 hr tablet   11. Lumbar radiculopathy M54.16    12. High coronary artery calcium score R93.1 nitroGLYcerin (NITROSTAT) 0.4 MG sublingual tablet         PLAN:   1.  We discussed his symptoms, his stress test, and the abnormal CTA of the coronaries.  He has been scheduled for cardiac cath on 12/11/2019.  He also has an appointment [facility] on 12/5/2019.  He is hoping to get the angiogram sooner.  His wife describes him as being \"AAA\" in which she means type A personality x3.  His symptoms are keeping him up at night.  He is hesitant to take medications.  It was stressed needs to take aspirin 81 mg daily, cholesterol medication, and he feels he is only tolerant of metoprolol 25 mg quarter tablet secondary to cold hands.  2.  81 mg aspirin daily for life  3.  Will be given a " prescription for sublingual nitro as needed for chest pain.  4.  We will change metoprolol 6.25 mg tartrate twice a day to metoprolol 6.25 mg XL daily.  5.  We will stop Lipitor.  6.  We will start on Crestor 20 mg daily.  7.  We will move his cardiac cath from 12/13/2019 to 12/11/2019.  8.  He plans to keep his appoint with [facility] to see if he can get a sooner cardiac catheterization.   9.  He was told to minimize his activities until his cardiac catheterization was completed.  10.  We will need to address his blood pressure in the future.  11.  We reviewed his stress test and a CTA of his coronaries.  12.  He will be seen in follow-up after completion of his procedure.        Thank you for allowing me to participate in the care of your patient. Please do not hesitate to contact me if you have any questions.     Raheel Mendez, DO

## 2019-12-03 ENCOUNTER — OFFICE VISIT (OUTPATIENT)
Dept: CARDIOLOGY | Facility: OTHER | Age: 64
End: 2019-12-03
Attending: INTERNAL MEDICINE
Payer: COMMERCIAL

## 2019-12-03 VITALS
RESPIRATION RATE: 16 BRPM | HEART RATE: 90 BPM | HEIGHT: 71 IN | DIASTOLIC BLOOD PRESSURE: 88 MMHG | OXYGEN SATURATION: 98 % | WEIGHT: 226.7 LBS | BODY MASS INDEX: 31.74 KG/M2 | TEMPERATURE: 97 F | SYSTOLIC BLOOD PRESSURE: 142 MMHG

## 2019-12-03 DIAGNOSIS — Z91.148 NONCOMPLIANCE WITH MEDICATION REGIMEN: ICD-10-CM

## 2019-12-03 DIAGNOSIS — I25.10 CAD, MULTIPLE VESSEL: ICD-10-CM

## 2019-12-03 DIAGNOSIS — R73.03 PRE-DIABETES: Chronic | ICD-10-CM

## 2019-12-03 DIAGNOSIS — R07.9 EXERTIONAL CHEST PAIN: ICD-10-CM

## 2019-12-03 DIAGNOSIS — R93.1 HIGH CORONARY ARTERY CALCIUM SCORE: ICD-10-CM

## 2019-12-03 DIAGNOSIS — I25.10 STENOSIS OF LEFT ANTERIOR DESCENDING (LAD) ARTERY: ICD-10-CM

## 2019-12-03 DIAGNOSIS — I25.10 ASCVD (ARTERIOSCLEROTIC CARDIOVASCULAR DISEASE): ICD-10-CM

## 2019-12-03 DIAGNOSIS — R07.9 CHEST PAIN, EXERTIONAL: Primary | ICD-10-CM

## 2019-12-03 DIAGNOSIS — I10 ESSENTIAL HYPERTENSION: ICD-10-CM

## 2019-12-03 DIAGNOSIS — M54.16 LUMBAR RADICULOPATHY: Chronic | ICD-10-CM

## 2019-12-03 DIAGNOSIS — G47.33 OSA ON CPAP: Chronic | ICD-10-CM

## 2019-12-03 DIAGNOSIS — I25.10 STENOSIS OF RIGHT CORONARY ARTERY: ICD-10-CM

## 2019-12-03 PROCEDURE — 99203 OFFICE O/P NEW LOW 30 MIN: CPT | Performed by: INTERNAL MEDICINE

## 2019-12-03 PROCEDURE — G0463 HOSPITAL OUTPT CLINIC VISIT: HCPCS

## 2019-12-03 PROCEDURE — 93010 ELECTROCARDIOGRAM REPORT: CPT | Performed by: INTERNAL MEDICINE

## 2019-12-03 PROCEDURE — 93005 ELECTROCARDIOGRAM TRACING: CPT

## 2019-12-03 RX ORDER — ATORVASTATIN CALCIUM 40 MG/1
40 TABLET, FILM COATED ORAL DAILY
Qty: 90 TABLET | Refills: 3 | Status: CANCELLED | OUTPATIENT
Start: 2019-12-03

## 2019-12-03 RX ORDER — ROSUVASTATIN CALCIUM 20 MG/1
20 TABLET, COATED ORAL DAILY
Qty: 90 TABLET | Refills: 3 | Status: SHIPPED | OUTPATIENT
Start: 2019-12-03 | End: 2019-12-09

## 2019-12-03 RX ORDER — METOPROLOL SUCCINATE 25 MG/1
TABLET, EXTENDED RELEASE ORAL
Qty: 30 TABLET | Refills: 3 | Status: SHIPPED | OUTPATIENT
Start: 2019-12-03 | End: 2019-12-17

## 2019-12-03 RX ORDER — NITROGLYCERIN 0.4 MG/1
TABLET SUBLINGUAL
Qty: 25 TABLET | Refills: 3 | Status: SHIPPED | OUTPATIENT
Start: 2019-12-03 | End: 2023-09-12

## 2019-12-03 ASSESSMENT — PAIN SCALES - GENERAL: PAINLEVEL: MODERATE PAIN (4)

## 2019-12-03 ASSESSMENT — MIFFLIN-ST. JEOR: SCORE: 1827.68

## 2019-12-03 NOTE — NURSING NOTE
"Chief Complaint   Patient presents with     Consult     Chest Pain, exertional; High coronary artery calcium score; ASCVD       Initial BP (!) 142/88 (BP Location: Right arm, Patient Position: Sitting, Cuff Size: Adult Regular)   Pulse 90   Temp 97  F (36.1  C) (Temporal)   Resp 16   Ht 1.791 m (5' 10.51\")   Wt 102.8 kg (226 lb 11.2 oz)   SpO2 98%   BMI 32.06 kg/m   Estimated body mass index is 32.06 kg/m  as calculated from the following:    Height as of this encounter: 1.791 m (5' 10.51\").    Weight as of this encounter: 102.8 kg (226 lb 11.2 oz).  Medication Reconciliation: complete  Jennifer Puentes LPN    "

## 2019-12-03 NOTE — PATIENT INSTRUCTIONS
You were seen by  Raheel Mendez, DO       1. Stop Lipitor and start rosuvastatin (CRESTOR) 20 MG tablet Sig - Route: Take 1 tablet (20 mg) by mouth daily - Oral    2.Medication sent to you phamracy nitroGLYcerin (NITROSTAT) 0.4 MG sublingual tablet Sig: For chest pain place 1 tablet under the tongue every 5 minutes for 3 doses. If symptoms persist 5 minutes after 1st dose call 911.    3. Take  metoprolol succinate ER (TOPROL-XL) 25 MG 24 hr tablet Sig: TK 1/4 tb PO daily    4. Angiogram scheduled for 12/11/2019 at 1100 at [facility] educational information given.    5. No other changes at this time.        You will follow up with [facility] after Angiogram, sooner if needed.       Please call the cardiology office with problems, questions, or concerns at [phone].    If you experience chest pain, chest pressure, chest tightness, shortness of breath, fainting, lightheadedness, nausea, vomiting, or other concerning symptoms, please report to the Emergency Department or call 911. These symptoms may be emergent, and best treated in the Emergency Department.     Cardiology Nurses  Alondra Hernandez, SHERON CRUZ, ALYX PROCTOR LPN  [facility] (Unit 3C)  [phone]

## 2019-12-05 ENCOUNTER — TELEPHONE (OUTPATIENT)
Dept: CARDIOLOGY | Facility: OTHER | Age: 64
End: 2019-12-05

## 2019-12-05 ENCOUNTER — TRANSFERRED RECORDS (OUTPATIENT)
Dept: HEALTH INFORMATION MANAGEMENT | Facility: OTHER | Age: 64
End: 2019-12-05

## 2019-12-05 NOTE — TELEPHONE ENCOUNTER
Patient called was seen in Consult at [facility] with Dr. Bernal in cardiology patient will be having his Angiogram done tomorrow at [facility].  Patient states [place] closer and has family there.  Patient was calling to have nurse cancel Angiogram at [facility].  Will call [facility] scheduling to cancel Angiogram that is scheduled for 12/11/2019.  Alondra Hernandez RN on 12/5/2019 at 10:54 AM

## 2019-12-06 ENCOUNTER — TRANSFERRED RECORDS (OUTPATIENT)
Dept: HEALTH INFORMATION MANAGEMENT | Facility: OTHER | Age: 64
End: 2019-12-06

## 2019-12-09 ENCOUNTER — APPOINTMENT (OUTPATIENT)
Dept: CT IMAGING | Facility: OTHER | Age: 64
End: 2019-12-09
Attending: FAMILY MEDICINE
Payer: MEDICARE

## 2019-12-09 ENCOUNTER — TRANSFERRED RECORDS (OUTPATIENT)
Dept: HEALTH INFORMATION MANAGEMENT | Facility: OTHER | Age: 64
End: 2019-12-09

## 2019-12-09 ENCOUNTER — HOSPITAL ENCOUNTER (EMERGENCY)
Facility: OTHER | Age: 64
Discharge: ANOTHER HOSPITAL | End: 2019-12-09
Attending: FAMILY MEDICINE | Admitting: FAMILY MEDICINE
Payer: MEDICARE

## 2019-12-09 VITALS
HEART RATE: 81 BPM | RESPIRATION RATE: 20 BRPM | SYSTOLIC BLOOD PRESSURE: 148 MMHG | WEIGHT: 226 LBS | BODY MASS INDEX: 32.35 KG/M2 | DIASTOLIC BLOOD PRESSURE: 100 MMHG | HEIGHT: 70 IN | TEMPERATURE: 98 F | OXYGEN SATURATION: 99 %

## 2019-12-09 DIAGNOSIS — R07.9 CHEST PAIN, UNSPECIFIED TYPE: ICD-10-CM

## 2019-12-09 LAB
ALBUMIN SERPL-MCNC: 4.1 G/DL (ref 3.5–5.7)
ALP SERPL-CCNC: 55 U/L (ref 34–104)
ALT SERPL W P-5'-P-CCNC: 9 U/L (ref 7–52)
ANION GAP SERPL CALCULATED.3IONS-SCNC: 10 MMOL/L (ref 3–14)
AST SERPL W P-5'-P-CCNC: 9 U/L (ref 13–39)
BASOPHILS # BLD AUTO: 0.1 10E9/L (ref 0–0.2)
BASOPHILS NFR BLD AUTO: 0.8 %
BILIRUB SERPL-MCNC: 0.8 MG/DL (ref 0.3–1)
BUN SERPL-MCNC: 19 MG/DL (ref 7–25)
CALCIUM SERPL-MCNC: 9.3 MG/DL (ref 8.6–10.3)
CHLORIDE SERPL-SCNC: 102 MMOL/L (ref 98–107)
CO2 SERPL-SCNC: 24 MMOL/L (ref 21–31)
CREAT SERPL-MCNC: 0.94 MG/DL (ref 0.7–1.3)
DIFFERENTIAL METHOD BLD: NORMAL
EOSINOPHIL # BLD AUTO: 0.2 10E9/L (ref 0–0.7)
EOSINOPHIL NFR BLD AUTO: 2.7 %
ERYTHROCYTE [DISTWIDTH] IN BLOOD BY AUTOMATED COUNT: 12.4 % (ref 10–15)
GFR SERPL CREATININE-BSD FRML MDRD: 81 ML/MIN/{1.73_M2}
GLUCOSE SERPL-MCNC: 172 MG/DL (ref 70–105)
HCT VFR BLD AUTO: 43.5 % (ref 40–53)
HGB BLD-MCNC: 15 G/DL (ref 13.3–17.7)
IMM GRANULOCYTES # BLD: 0 10E9/L (ref 0–0.4)
IMM GRANULOCYTES NFR BLD: 0.3 %
LYMPHOCYTES # BLD AUTO: 1.4 10E9/L (ref 0.8–5.3)
LYMPHOCYTES NFR BLD AUTO: 18.6 %
MCH RBC QN AUTO: 30.1 PG (ref 26.5–33)
MCHC RBC AUTO-ENTMCNC: 34.5 G/DL (ref 31.5–36.5)
MCV RBC AUTO: 87 FL (ref 78–100)
MONOCYTES # BLD AUTO: 0.6 10E9/L (ref 0–1.3)
MONOCYTES NFR BLD AUTO: 8.2 %
NEUTROPHILS # BLD AUTO: 5.1 10E9/L (ref 1.6–8.3)
NEUTROPHILS NFR BLD AUTO: 69.4 %
PLATELET # BLD AUTO: 238 10E9/L (ref 150–450)
POTASSIUM SERPL-SCNC: 3.7 MMOL/L (ref 3.5–5.1)
PROT SERPL-MCNC: 6.5 G/DL (ref 6.4–8.9)
RBC # BLD AUTO: 4.98 10E12/L (ref 4.4–5.9)
SODIUM SERPL-SCNC: 136 MMOL/L (ref 134–144)
TROPONIN I SERPL-MCNC: 31.4 PG/ML
TROPONIN I SERPL-MCNC: 37.8 PG/ML
WBC # BLD AUTO: 7.4 10E9/L (ref 4–11)

## 2019-12-09 PROCEDURE — 25000132 ZZH RX MED GY IP 250 OP 250 PS 637: Mod: GY | Performed by: FAMILY MEDICINE

## 2019-12-09 PROCEDURE — 93005 ELECTROCARDIOGRAM TRACING: CPT | Mod: 76 | Performed by: FAMILY MEDICINE

## 2019-12-09 PROCEDURE — 93005 ELECTROCARDIOGRAM TRACING: CPT | Performed by: FAMILY MEDICINE

## 2019-12-09 PROCEDURE — 36415 COLL VENOUS BLD VENIPUNCTURE: CPT | Performed by: FAMILY MEDICINE

## 2019-12-09 PROCEDURE — 80053 COMPREHEN METABOLIC PANEL: CPT | Performed by: FAMILY MEDICINE

## 2019-12-09 PROCEDURE — 85025 COMPLETE CBC W/AUTO DIFF WBC: CPT | Performed by: FAMILY MEDICINE

## 2019-12-09 PROCEDURE — 25000128 H RX IP 250 OP 636: Performed by: FAMILY MEDICINE

## 2019-12-09 PROCEDURE — 99285 EMERGENCY DEPT VISIT HI MDM: CPT | Mod: 25 | Performed by: FAMILY MEDICINE

## 2019-12-09 PROCEDURE — 25500064 ZZH RX 255 OP 636: Performed by: FAMILY MEDICINE

## 2019-12-09 PROCEDURE — 96374 THER/PROPH/DIAG INJ IV PUSH: CPT | Performed by: FAMILY MEDICINE

## 2019-12-09 PROCEDURE — 96376 TX/PRO/DX INJ SAME DRUG ADON: CPT | Performed by: FAMILY MEDICINE

## 2019-12-09 PROCEDURE — 84484 ASSAY OF TROPONIN QUANT: CPT | Mod: 91 | Performed by: FAMILY MEDICINE

## 2019-12-09 PROCEDURE — 71275 CT ANGIOGRAPHY CHEST: CPT

## 2019-12-09 PROCEDURE — 99285 EMERGENCY DEPT VISIT HI MDM: CPT | Mod: Z6 | Performed by: FAMILY MEDICINE

## 2019-12-09 PROCEDURE — 93010 ELECTROCARDIOGRAM REPORT: CPT | Mod: 76 | Performed by: INTERNAL MEDICINE

## 2019-12-09 RX ORDER — CLOPIDOGREL BISULFATE 75 MG/1
75 TABLET ORAL DAILY
COMMUNITY
End: 2020-05-19

## 2019-12-09 RX ORDER — NITROGLYCERIN 0.4 MG/1
0.4 TABLET SUBLINGUAL EVERY 5 MIN PRN
Status: DISCONTINUED | OUTPATIENT
Start: 2019-12-09 | End: 2019-12-09 | Stop reason: HOSPADM

## 2019-12-09 RX ORDER — LORAZEPAM 2 MG/ML
1 INJECTION INTRAMUSCULAR ONCE
Status: COMPLETED | OUTPATIENT
Start: 2019-12-09 | End: 2019-12-09

## 2019-12-09 RX ORDER — ROSUVASTATIN CALCIUM 20 MG/1
20 TABLET, COATED ORAL DAILY
COMMUNITY
End: 2020-01-10

## 2019-12-09 RX ORDER — ASPIRIN 81 MG/1
162 TABLET, CHEWABLE ORAL ONCE
Status: COMPLETED | OUTPATIENT
Start: 2019-12-09 | End: 2019-12-09

## 2019-12-09 RX ORDER — LORAZEPAM 2 MG/ML
0.5 INJECTION INTRAMUSCULAR ONCE
Status: COMPLETED | OUTPATIENT
Start: 2019-12-09 | End: 2019-12-09

## 2019-12-09 RX ORDER — CLOPIDOGREL BISULFATE 75 MG/1
75 TABLET ORAL ONCE
Status: COMPLETED | OUTPATIENT
Start: 2019-12-09 | End: 2019-12-09

## 2019-12-09 RX ORDER — LORAZEPAM 0.5 MG/1
0.5 TABLET ORAL ONCE
Status: DISCONTINUED | OUTPATIENT
Start: 2019-12-09 | End: 2019-12-09

## 2019-12-09 RX ORDER — ATORVASTATIN CALCIUM 20 MG/1
20 TABLET, FILM COATED ORAL DAILY
COMMUNITY
End: 2019-12-09

## 2019-12-09 RX ORDER — ATORVASTATIN CALCIUM 10 MG/1
10 TABLET, FILM COATED ORAL DAILY
Refills: 3 | COMMUNITY
Start: 2019-11-25 | End: 2019-12-17

## 2019-12-09 RX ADMIN — LORAZEPAM 0.5 MG: 2 INJECTION, SOLUTION INTRAMUSCULAR; INTRAVENOUS at 11:17

## 2019-12-09 RX ADMIN — ASPIRIN 81 MG 162 MG: 81 TABLET ORAL at 10:31

## 2019-12-09 RX ADMIN — LORAZEPAM 1 MG: 2 INJECTION, SOLUTION INTRAMUSCULAR; INTRAVENOUS at 14:17

## 2019-12-09 RX ADMIN — CLOPIDOGREL BISULFATE 75 MG: 75 TABLET, FILM COATED ORAL at 10:43

## 2019-12-09 RX ADMIN — NITROGLYCERIN 0.4 MG: 0.4 TABLET SUBLINGUAL at 10:29

## 2019-12-09 RX ADMIN — NITROGLYCERIN 0.4 MG: 0.4 TABLET SUBLINGUAL at 10:43

## 2019-12-09 RX ADMIN — IOHEXOL 96 ML: 350 INJECTION, SOLUTION INTRAVENOUS at 11:24

## 2019-12-09 ASSESSMENT — ENCOUNTER SYMPTOMS
NUMBNESS: 0
ABDOMINAL PAIN: 0
FEVER: 0
WEAKNESS: 0
DIZZINESS: 0
CHILLS: 0
PALPITATIONS: 0
SHORTNESS OF BREATH: 1
BACK PAIN: 1
FREQUENCY: 1
DIAPHORESIS: 1
NAUSEA: 0
NERVOUS/ANXIOUS: 1
VOMITING: 0

## 2019-12-09 ASSESSMENT — MIFFLIN-ST. JEOR: SCORE: 1816.38

## 2019-12-09 NOTE — ED TRIAGE NOTES
Pt presents to the ER with wife via private car. Pt complaining of chest pain that started around 0400 as a sharp pain with deep breathing and than at 0900 when he woke up the pain started radiating down his left arm and is a sharp tightness in addition to the sharp pain with deep breathing. Rates pain 2-3/10. Pt does report 2 episodes of being diaphoretic last night while sleeping. Pt had stent placed on Friday 12/6 at [facility].

## 2019-12-09 NOTE — ED PROVIDER NOTES
"  History     Chief Complaint   Patient presents with     Chest Pain     HPI  Rj Juarez is a 63 year old male who presents with a chief complaint of chest pain.     Patient underwent a stent placement on 12/6/19 and has since had indigestion but no other symptoms. Patient woke up around 0400 this morning to use the bathroom but then noticed that he was having \"sharp\" chest pain with deep inspiration as well as some diaphoresis and radiation of pain down his left arm. He woke up two more times and continued to have this chest pain. He states that the chest pain is constant at 1-2/10 in severity but will increase to 3/10 with deep inspiration.     Since his stent placement he has been having \"indigestion\" but denies any chest pain prior to today. He did not take his Plavix this morning but did take his baby aspirin.     Per chart review:   12/3/19: Cardiology consult:  1.  We discussed his symptoms, his stress test, and the abnormal CTA of the coronaries.  He has been scheduled for cardiac cath on 12/11/2019.  He also has an appointment [facility] on 12/5/2019.  He is hoping to get the angiogram sooner.    8.  He plans to keep his appoint with [facility] to see if he can get a sooner cardiac catheterization.   9.  He was told to minimize his activities until his cardiac catheterization was completed.    Allergies:  Allergies   Allergen Reactions     Ciprofloxacin Other (See Comments)     Tendon issue , and IBS     Midazolam      Other reaction(s): Other - Describe In Comment Field  Difficult to wake up       Problem List:    Patient Active Problem List    Diagnosis Date Noted     Stenosis of left anterior descending (LAD) artery 12/03/2019     Priority: Medium     ASCVD (arteriosclerotic cardiovascular disease) 12/03/2019     Priority: Medium     Noncompliance with medication regimen 12/03/2019     Priority: Medium     Chest pain, exertional 11/27/2019     Priority: Medium     Added automatically from request " for surgery [id number]       High coronary artery calcium score at 1118.2 on 11/21/2019 11/27/2019     Priority: Medium     Added automatically from request for surgery [id number]       Stenosis of right coronary artery 11/27/2019     Priority: Medium     Added automatically from request for surgery [id number]       CAD, multiple vessel 11/27/2019     Priority: Medium     Added automatically from request for surgery [id number]       Exertional chest pain 11/19/2019     Priority: Medium     Gastroesophageal reflux disease with esophagitis 11/19/2019     Priority: Medium     FARNAZ on CPAP 08/22/2019     Priority: Medium     Essential hypertension 08/22/2019     Priority: Medium     Pre-diabetes 08/22/2019     Priority: Medium     NSAID long-term use 08/22/2019     Priority: Medium     Primary osteoarthritis of right knee  04/09/2019     Priority: Medium     Effusion of right knee 04/09/2019     Priority: Medium     Posterior knee pain, right 04/09/2019     Priority: Medium     Elevated prostate specific antigen (PSA) 11/29/2018     Priority: Medium     Lumbar radiculopathy 02/20/2017     Priority: Medium     Benign essential tremor 03/18/2016     Priority: Medium     Irritable bowel syndrome with diarrhea 03/18/2016     Priority: Medium     H/O adenomatous polyp of colon 12/17/2009     Priority: Medium        Past Medical History:    Past Medical History:   Diagnosis Date     Benign lipomatous neoplasm      Calculus of kidney      Carpal tunnel syndrome      Closed fracture of patella      Diverticulosis of large intestine without perforation or abscess without bleeding      Headache      Other specified forms of tremor      Other urticaria (CODE)      Pain in joint      Umbilical hernia without obstruction or gangrene        Past Surgical History:    Past Surgical History:   Procedure Laterality Date     COLONOSCOPY  01/28/2014 2009,2014,F/U 2019     COLONOSCOPY  03/04/2019    Serrated adenoma, follow up 1 year      ESOPHAGOSCOPY, GASTROSCOPY, DUODENOSCOPY (EGD), COMBINED      14,EGD     OTHER SURGICAL HISTORY      9/10/2014,43485.0,CO REPAIR ING HERNIA  >5 TRS BETTINA     OTHER SURGICAL HISTORY      2018,66971.0,CO REPAIR UMBILICAL NAZARIO  >5 TRS REDUC     RELEASE CARPAL TUNNEL      3,2004,Both hands     SIGMOIDOSCOPY FLEXIBLE      No Comments Provided       Family History:    Family History   Problem Relation Age of Onset     Hypertension Mother         Hypertension     Diabetes Mother         Diabetes     Other - See Comments Mother         Spinal stenosis     Other - See Comments Father         scleroderma which was quite severe     Cancer Father         Cancer, of adenocarcinoma of the lung.  He was a nonsmoker.     Diabetes Brother         Diabetes     Other - See Comments Brother         Pancreatitis     Substance Abuse Brother         Alcohol/Drug,Alcoholic, has been through treatment a number of times.       Social History:  Marital Status:   [2]  Social History     Tobacco Use     Smoking status: Never Smoker     Smokeless tobacco: Never Used   Substance Use Topics     Alcohol use: No     Drug use: No        Medications:    aspirin (ASA) 81 MG tablet  atorvastatin (LIPITOR) 10 MG tablet  clopidogrel (PLAVIX) 75 MG tablet  hydrochlorothiazide (HYDRODIURIL) 25 MG tablet  metoprolol succinate ER (TOPROL-XL) 25 MG 24 hr tablet  omeprazole (PRILOSEC) 20 MG DR capsule  rosuvastatin (CRESTOR) 20 MG tablet  nitroGLYcerin (NITROSTAT) 0.4 MG sublingual tablet  triamcinolone (KENALOG) 0.1 % cream          Review of Systems   Constitutional: Positive for diaphoresis. Negative for chills and fever.   HENT: Negative for congestion and tinnitus.    Respiratory: Positive for shortness of breath.    Cardiovascular: Positive for chest pain. Negative for palpitations.   Gastrointestinal: Negative for abdominal pain, nausea and vomiting.   Genitourinary: Positive for frequency (Wakes hourly to urinate).  "  Musculoskeletal: Positive for back pain (chronic).   Neurological: Negative for dizziness, weakness and numbness.   Psychiatric/Behavioral: The patient is nervous/anxious.    All other systems reviewed and are negative.      Physical Exam   BP: (!) 177/111  Pulse: 86  Heart Rate: 105  Temp: 98  F (36.7  C)  Resp: 17  Height: 177.8 cm (5' 10\")  Weight: 102.5 kg (226 lb)  SpO2: 98 %      Physical Exam  Constitutional:       General: He is not in acute distress.     Appearance: He is well-developed. He is not ill-appearing.   HENT:      Head: Normocephalic and atraumatic.   Eyes:      Extraocular Movements: Extraocular movements intact.      Pupils: Pupils are equal, round, and reactive to light.   Cardiovascular:      Rate and Rhythm: Regular rhythm. Tachycardia present.      Heart sounds: Normal heart sounds. No murmur.   Pulmonary:      Effort: Pulmonary effort is normal.      Breath sounds: Normal breath sounds.   Chest:      Chest wall: No tenderness or crepitus.   Abdominal:      General: Bowel sounds are normal.      Palpations: Abdomen is soft.      Tenderness: There is no abdominal tenderness. There is no guarding or rebound.   Musculoskeletal:      Right lower leg: He exhibits no tenderness. No edema.      Left lower leg: He exhibits no tenderness. No edema.   Skin:     General: Skin is warm and dry.   Neurological:      Mental Status: He is alert.   Psychiatric:         Mood and Affect: Mood is anxious.         ED Course        Procedures               EKG Interpretation:      Interpreted by Raheel Sheikh MD  Time reviewed: 1018  Symptoms at time of EKG: pleuritic chest pain   Rhythm: sinus tachycardia  Rate: 100-110  Axis: Normal  Ectopy: none  Conduction: normal  ST Segments/ T Waves: ST Segment Depression V2, V3 and V4  Q Waves: none  Comparison to prior: ST depression in V3, V4 new compared to ECG on 12/3/19    Clinical Impression: new ST segment depression compared to prior ECG    Repeat ECG at 1317 " with normal sinus rhythm with ventricular rate of 87bpm without ST depression      Critical Care time:  none               Results for orders placed or performed during the hospital encounter of 12/09/19 (from the past 24 hour(s))   CBC with platelets differential   Result Value Ref Range    WBC 7.4 4.0 - 11.0 10e9/L    RBC Count 4.98 4.4 - 5.9 10e12/L    Hemoglobin 15.0 13.3 - 17.7 g/dL    Hematocrit 43.5 40.0 - 53.0 %    MCV 87 78 - 100 fl    MCH 30.1 26.5 - 33.0 pg    MCHC 34.5 31.5 - 36.5 g/dL    RDW 12.4 10.0 - 15.0 %    Platelet Count 238 150 - 450 10e9/L    Diff Method Automated Method     % Neutrophils 69.4 %    % Lymphocytes 18.6 %    % Monocytes 8.2 %    % Eosinophils 2.7 %    % Basophils 0.8 %    % Immature Granulocytes 0.3 %    Absolute Neutrophil 5.1 1.6 - 8.3 10e9/L    Absolute Lymphocytes 1.4 0.8 - 5.3 10e9/L    Absolute Monocytes 0.6 0.0 - 1.3 10e9/L    Absolute Eosinophils 0.2 0.0 - 0.7 10e9/L    Absolute Basophils 0.1 0.0 - 0.2 10e9/L    Abs Immature Granulocytes 0.0 0 - 0.4 10e9/L   Comprehensive metabolic panel   Result Value Ref Range    Sodium 136 134 - 144 mmol/L    Potassium 3.7 3.5 - 5.1 mmol/L    Chloride 102 98 - 107 mmol/L    Carbon Dioxide 24 21 - 31 mmol/L    Anion Gap 10 3 - 14 mmol/L    Glucose 172 (H) 70 - 105 mg/dL    Urea Nitrogen 19 7 - 25 mg/dL    Creatinine 0.94 0.70 - 1.30 mg/dL    GFR Estimate 81 >60 mL/min/[1.73_m2]    GFR Estimate If Black >90 >60 mL/min/[1.73_m2]    Calcium 9.3 8.6 - 10.3 mg/dL    Bilirubin Total 0.8 0.3 - 1.0 mg/dL    Albumin 4.1 3.5 - 5.7 g/dL    Protein Total 6.5 6.4 - 8.9 g/dL    Alkaline Phosphatase 55 34 - 104 U/L    ALT 9 7 - 52 U/L    AST 9 (L) 13 - 39 U/L   Troponin GH   Result Value Ref Range    Troponin 31.4 <34.0 pg/mL   CT Chest Pulmonary Embolism w Contrast    Narrative    PROCEDURE: CT CHEST PULMONARY EMBOLISM W CONTRAST 12/9/2019 11:29 AM    HISTORY: PE suspected, high pretest prob    COMPARISONS: None.    TECHNIQUE: Axial postcontrast  enhanced images were obtained with  coronal and sagittal MIP images.    FINDINGS: There is excellent opacification of pulmonary vessels. No  pulmonary embolus is seen.    No enlarged lymph nodes are seen in the mediastinum, madyson or axilla.  There is some minimal dependent atelectasis. No lung nodule or mass is  seen.    Heart is not enlarged. There is no aneurysm of the aorta. No pleural  or pericardial effusion is seen.    Limited images through the upper abdomen show no suspicious  abnormality. Degenerative changes seen in the spine.           Impression    IMPRESSION: No pulmonary embolus.    BENITO MCGRATH MD   Troponin GH (now)   Result Value Ref Range    Troponin 37.8 (H) <34.0 pg/mL       Medications   nitroGLYcerin (NITROSTAT) sublingual tablet 0.4 mg (0.4 mg Sublingual Given 12/9/19 1043)   aspirin (ASA) chewable tablet 162 mg (162 mg Oral Given 12/9/19 1031)   clopidogrel (PLAVIX) tablet 75 mg (75 mg Oral Given 12/9/19 1043)   iohexol (OMNIPAQUE) 350 mg/mL solution 96 mL (96 mLs Intravenous Given 12/9/19 1124)   LORazepam (ATIVAN) injection 0.5 mg (0.5 mg Intravenous Given 12/9/19 1117)   LORazepam (ATIVAN) injection 1 mg (1 mg Intravenous Given 12/9/19 1417)       Assessments & Plan (with Medical Decision Making)     Patient with a history of coronary artery disease with recent stent placement presenting with pleuritic chest pain, hypertension, and tachycardia. Patient with a negative CT PE. Troponin initially normal but repeat troponin mildly elevated. Patient did have ST depression on ECG today compared to ECG obtained prior to stent placement but suspect recent procedure attributing to changes. Call placed to cardiology team at [facility] that did stent placement. They state that there were no significant abnormalities on post stent ECG. Concern for potential stent occlusion or perioperative changes such as pericarditis. Plan to transfer patient to [facility] for observation and potential work-up.  Spoke to Dr. Paulino who accepts admission. Consider anxiety as contributing to symptoms and vital sign abnormalities.     I have reviewed the nursing notes.    I have reviewed the findings, diagnosis, plan and need for follow up with the patient.  HEART Score  Background  Calculates the overall risk of adverse event in patient's presenting with chest pain.  Based on 5 criteria (each assigned 0-2 points) including suspiciousness of history, EKG, age, risk factors and troponin.    Data  63 year old male  has Benign essential tremor; H/O adenomatous polyp of colon; Irritable bowel syndrome with diarrhea; Lumbar radiculopathy; Elevated prostate specific antigen (PSA); Primary osteoarthritis of right knee ; Effusion of right knee; Posterior knee pain, right; FARNAZ on CPAP; Essential hypertension; Pre-diabetes; NSAID long-term use; Exertional chest pain; Gastroesophageal reflux disease with esophagitis; Chest pain, exertional; High coronary artery calcium score at 1118.2 on 11/21/2019; Stenosis of right coronary artery; CAD, multiple vessel; Stenosis of left anterior descending (LAD) artery; ASCVD (arteriosclerotic cardiovascular disease); and Noncompliance with medication regimen on their problem list.   reports that he has never smoked. He has never used smokeless tobacco.  family history includes Cancer in his father; Diabetes in his brother and mother; Hypertension in his mother; Other - See Comments in his brother, father, and mother; Substance Abuse in his brother.  No results found for: TROPI  Criteria   0-2 points for each of 5 items (maximum of 10 points):  Score 2- History highly suspicious for coronary syndrome  Score 0- EKG Normal  Score 2- Age 65 years or older  Score 2- Three or more risk factors for or history of atherosclerotic disease  Score 0- Within normal limits for troponin levels  Interpretation  Risk of adverse outcome  Heart Score: 6  Total Score 4-6- Adverse Outcome Risk 20.3% - Supports admission  with standard rule-out management -serial troponins and stress testing    New Prescriptions    No medications on file       Final diagnoses:   Chest pain, unspecified type     Raheel Sheikh MD  12/9/2019   [facility]       Note started by Dr Sheikh as above,  I revised, edited and addended note as needed.  In addition patient was seen and examined by myself including both history and physical examination. My findings are reflected in the note above.               Hank Diaz MD  12/11/19 1246       Hank Diaz MD  12/11/19 8246

## 2019-12-09 NOTE — ED NOTES
Pt reports chest pain sharp with inspiration or cough only at 4/10.  Reports this is worse than when he arrived.  Taking PO water well, denies nausea. Wife remains at bedside.

## 2019-12-10 ENCOUNTER — TRANSFERRED RECORDS (OUTPATIENT)
Dept: HEALTH INFORMATION MANAGEMENT | Facility: OTHER | Age: 64
End: 2019-12-10

## 2019-12-10 LAB
CREAT SERPL-MCNC: 1.05 MG/DL (ref 0.8–1.5)
GLUCOSE SERPL-MCNC: 133 MG/DL (ref 60–99)
POTASSIUM SERPL-SCNC: 3.8 MEQ/L (ref 3.5–5.1)

## 2019-12-11 ENCOUNTER — TELEPHONE (OUTPATIENT)
Dept: CARDIAC REHAB | Facility: OTHER | Age: 64
End: 2019-12-11

## 2019-12-11 DIAGNOSIS — Z95.5 S/P CORONARY ARTERY STENT PLACEMENT: Primary | ICD-10-CM

## 2019-12-11 NOTE — TELEPHONE ENCOUNTER
Patient came into cardiac rehab to see if we received a referral for him, from [facility], after receiving a stent. His records were reviewed, and he was set up with an appointment on 12/17/19 at 09:00. His records were requested from [facility] also.

## 2019-12-17 ENCOUNTER — TRANSFERRED RECORDS (OUTPATIENT)
Dept: CARDIAC REHAB | Facility: OTHER | Age: 64
End: 2019-12-17

## 2019-12-17 ENCOUNTER — HOSPITAL ENCOUNTER (OUTPATIENT)
Dept: CARDIAC REHAB | Facility: OTHER | Age: 64
End: 2019-12-17
Attending: INTERNAL MEDICINE
Payer: MEDICARE

## 2019-12-17 DIAGNOSIS — Z95.5 S/P CORONARY ARTERY STENT PLACEMENT: ICD-10-CM

## 2019-12-17 PROCEDURE — 93798 PHYS/QHP OP CAR RHAB W/ECG: CPT

## 2019-12-17 PROCEDURE — 40000116 ZZH STATISTIC OP CR VISIT

## 2019-12-17 RX ORDER — ISOSORBIDE MONONITRATE 30 MG/1
TABLET, EXTENDED RELEASE ORAL
COMMUNITY
Start: 2019-12-10 | End: 2019-12-24

## 2019-12-17 RX ORDER — CARVEDILOL 6.25 MG/1
TABLET ORAL
Refills: 3 | COMMUNITY
Start: 2019-12-09 | End: 2020-01-10

## 2019-12-17 RX ORDER — TRAMADOL HYDROCHLORIDE 50 MG/1
TABLET ORAL
COMMUNITY
Start: 2019-12-13 | End: 2020-06-16

## 2019-12-17 ASSESSMENT — PATIENT HEALTH QUESTIONNAIRE - PHQ9: SUM OF ALL RESPONSES TO PHQ QUESTIONS 1-9: 10

## 2019-12-18 ENCOUNTER — HOSPITAL ENCOUNTER (OUTPATIENT)
Dept: CARDIAC REHAB | Facility: OTHER | Age: 64
End: 2019-12-18
Attending: INTERNAL MEDICINE
Payer: MEDICARE

## 2019-12-18 PROCEDURE — 40000116 ZZH STATISTIC OP CR VISIT

## 2019-12-18 PROCEDURE — 93798 PHYS/QHP OP CAR RHAB W/ECG: CPT

## 2019-12-20 ENCOUNTER — HOSPITAL ENCOUNTER (OUTPATIENT)
Dept: CARDIAC REHAB | Facility: OTHER | Age: 64
End: 2019-12-20
Attending: INTERNAL MEDICINE
Payer: MEDICARE

## 2019-12-20 PROCEDURE — 40000116 ZZH STATISTIC OP CR VISIT

## 2019-12-20 PROCEDURE — 93798 PHYS/QHP OP CAR RHAB W/ECG: CPT

## 2019-12-24 ENCOUNTER — OFFICE VISIT (OUTPATIENT)
Dept: CARDIOLOGY | Facility: OTHER | Age: 64
End: 2019-12-24
Attending: INTERNAL MEDICINE
Payer: COMMERCIAL

## 2019-12-24 VITALS
SYSTOLIC BLOOD PRESSURE: 136 MMHG | HEART RATE: 78 BPM | DIASTOLIC BLOOD PRESSURE: 80 MMHG | BODY MASS INDEX: 32 KG/M2 | TEMPERATURE: 96 F | WEIGHT: 223 LBS | RESPIRATION RATE: 18 BRPM | OXYGEN SATURATION: 96 %

## 2019-12-24 DIAGNOSIS — I25.10 CORONARY ARTERY DISEASE INVOLVING NATIVE CORONARY ARTERY OF NATIVE HEART WITHOUT ANGINA PECTORIS: Primary | ICD-10-CM

## 2019-12-24 DIAGNOSIS — I25.10 ASCVD (ARTERIOSCLEROTIC CARDIOVASCULAR DISEASE): ICD-10-CM

## 2019-12-24 DIAGNOSIS — G89.29 CHRONIC MIDLINE LOW BACK PAIN WITH SCIATICA, SCIATICA LATERALITY UNSPECIFIED: ICD-10-CM

## 2019-12-24 DIAGNOSIS — G47.33 OSA ON CPAP: Chronic | ICD-10-CM

## 2019-12-24 DIAGNOSIS — Z98.890 STATUS POST CARDIAC CATHETERIZATION: ICD-10-CM

## 2019-12-24 DIAGNOSIS — Z98.890 HISTORY OF CARDIAC CATH: ICD-10-CM

## 2019-12-24 DIAGNOSIS — R73.03 PRE-DIABETES: Chronic | ICD-10-CM

## 2019-12-24 DIAGNOSIS — I25.10 CAD, MULTIPLE VESSEL: ICD-10-CM

## 2019-12-24 DIAGNOSIS — M54.40 CHRONIC MIDLINE LOW BACK PAIN WITH SCIATICA, SCIATICA LATERALITY UNSPECIFIED: ICD-10-CM

## 2019-12-24 DIAGNOSIS — R93.1 HIGH CORONARY ARTERY CALCIUM SCORE: ICD-10-CM

## 2019-12-24 DIAGNOSIS — I10 ESSENTIAL HYPERTENSION: ICD-10-CM

## 2019-12-24 DIAGNOSIS — Z79.1 NSAID LONG-TERM USE: ICD-10-CM

## 2019-12-24 DIAGNOSIS — M54.16 LUMBAR RADICULOPATHY: Chronic | ICD-10-CM

## 2019-12-24 PROBLEM — R07.9 CHEST PAIN, EXERTIONAL: Status: RESOLVED | Noted: 2019-11-27 | Resolved: 2019-12-24

## 2019-12-24 PROBLEM — R07.9 EXERTIONAL CHEST PAIN: Status: RESOLVED | Noted: 2019-11-19 | Resolved: 2019-12-24

## 2019-12-24 PROCEDURE — G0463 HOSPITAL OUTPT CLINIC VISIT: HCPCS

## 2019-12-24 PROCEDURE — 99215 OFFICE O/P EST HI 40 MIN: CPT | Performed by: INTERNAL MEDICINE

## 2019-12-24 RX ORDER — METOPROLOL SUCCINATE 25 MG/1
TABLET, EXTENDED RELEASE ORAL
Qty: 30 TABLET | Refills: 3 | Status: SHIPPED | OUTPATIENT
Start: 2019-12-24 | End: 2020-01-10

## 2019-12-24 ASSESSMENT — PAIN SCALES - GENERAL: PAINLEVEL: MODERATE PAIN (4)

## 2019-12-24 ASSESSMENT — PATIENT HEALTH QUESTIONNAIRE - PHQ9: SUM OF ALL RESPONSES TO PHQ QUESTIONS 1-9: 11

## 2019-12-24 NOTE — NURSING NOTE
"Patient comes in for follow up after stent placement.  Madeline Kirk LPN ....................12/24/2019   11:25 AM  Chief Complaint   Patient presents with     Follow Up     stent placement       Initial /80 (BP Location: Right arm, Patient Position: Sitting, Cuff Size: Adult Large)   Pulse 78   Temp 96  F (35.6  C) (Tympanic)   Resp 18   Wt 101.2 kg (223 lb)   SpO2 96%   BMI 32.00 kg/m   Estimated body mass index is 32 kg/m  as calculated from the following:    Height as of 12/9/19: 1.778 m (5' 10\").    Weight as of this encounter: 101.2 kg (223 lb).  Meds Reconciled: complete  Pt is on Aspirin  Pt is on a Statin  PHQ and/or SHAKEEL reviewed. Pt referred to PCP/MH Provider as appropriate.    Madeline Kirk LPN      "

## 2019-12-24 NOTE — PROGRESS NOTES
Cardiology Progress Note     Assessment & Plan   Rj Juarez is a 63 year old male who is being seen in follow-up to a visit from 12/3/19.    He was seen in the clinic on 8/22/2019, essentially to establish care. He was previously seen by Dr. Carlisle.  He was noted to have rib fractures to his left side.  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  He had denied chest pain at that time.     He reports that in late September, he started waking up in the night with increasing heartburn.  He has not had a history of acid reflux but does take 3000 to 4000 mg of ibuprofen on a regular basis for back pain. He is friends of Dr. Carlisle and had been mentioning his symptoms. Dr. Carlisle had recommended he be seen.     He had gone goose hunting on October 3, 2019.  He was able to obtain these geese and would quickly retrieve them.  In so doing, he describes tightness to his chest with shortness of breath.  At that time, his discomfort did not radiate.     He is also been cutting firewood a regular basis which has caused him to be short of breath with chest discomfort.     He went hunting for deer.  With walking through the woods, he describes a tightness to his chest with shortness of breath.  With the symptoms, he started to have nausea but no vomiting, shortness of breath, diaphoresis, fatigue, and a substernal burning brought on with activity and relieved with stress.     He was seen in the clinic again on 11/19/2019 after being seen in the ER on 11/7/2019.  He has been having stuttering chest pain for 1 to 4 weeks with exertional activity at around 11 AM doing some yard work pushing a wheelbarrow.  He described a substernal chest pressure. With that, he had been having nausea, shortness of breath, diaphoresis, and lightheadedness.  He has been increasingly fatigued. He would have a burning sensation to his chest, relieved with rest.  His symptoms have been 2 out of 10.  He had  not been taking medications for blood pressure as he had been told to do so multiple times previously.  He has been resistant to taking medications.  He does not like to be on medications.  He was given 4 baby aspirin and a sublingual nitro which really improved his symptoms.  He was also given a GI cocktail which seemed to improve his symptoms.  His EKG and troponin were essentially normal.  He did/does have a component of anterior chest wall discomfort which is reproducible but not entirely reproducible.  He was decided he be transferred to [facility].     He was transferred to [facility] from 11/7/2019 to 11/8/2019.  Chest x-ray was normal. Cardiac enzymes were normal.  They were concerned that he has acid reflux.  He was told to follow-up with his PCP in 7 to 10 days and have a stress test.  It was suggested he have an EGD.  It was recommended he have an outpatient stress test at [facility] but was declined changes to [place].  He was started on Prilosec and metoprolol 6.25 mg twice a day.  He was told to discontinue taking NSAID's.     He went on to have a stress test on 11/14/2019 which showed no reversible ischemia or infarction.  His EF went from 63% and dropped to 58%.  This was obviously concerning.     He was seen in the clinic once again on 11/19/2019 and his hospitalization was reviewed.  He was set up for a CTA of his coronaries.     He had a CTA of his coronary on 11/21/2019.  His total calcium score was 1118.2.  He had concerns for severe disease involving the LAD and RCA.  LAD was approximately 54% to 79% blocked.  His RCA was 67% to 89% blocked.  Left main patent and the circumflex had mild narrowing throughout the proximal vessel.     With his ongoing symptoms and his abnormal CTA of the coronaries, he was referred to cardiology.  He was seen on 12/3/2019.  He was hoping to have the procedure done at [facility]. He had a cardiac catheterization on 12/6/19 with stenting to his distal LCX.  He  has an appointment at [facility] on 12/5/2019.    12/24/2019:  He had a cardiac catheterization on 12/6/2019.  He had a stent placed to his distal left circumflex.  He is currently on aspirin, Plavix, Coreg 6.25 mg twice a day, hydrochlorothiazide 12.5 mg daily, Crestor 20 mg daily, Imdur 30 mg daily, and sublingual nitro as needed for chest pain.  He describes having headaches secondary to Imdur.  This medication was discontinued today.  He is also having back pain with weakness to his legs.  I suspect this is a back issue.  He is accustomed to taking ibuprofen regularly.  He understands that ibuprofen increases his bleeding risk and needs to be used sparingly.  Extended usage of ibuprofen can destroy his kidneys putting him on dialysis.  This was also explained.  He can use Tylenol but this is hard on the liver.  Ultimately, it was suggested he go to PT, consider injections with radiology, or see orthopedic/neuro for back surgery.  He has been to the spine [place].  He is not interested in surgery at this point.  He will be referred back to his primary related to his back pain and weakness.  He is also having sleeping issues.  He plans to follow-up with [facility] cardiology in April, 2020.    Impression:  1.  CAD S/P stent placement to his distal circumflex on 12/6/2019.  2.  Angina-resolved.  3.  High calcium score of 1118.2 on 11/21/2019.  4.  Hypertension.  5.  FARNAZ on CPAP  6.  Prediabetes.  7.  Osteoarthritis of the lumbar spine noted on MRI from 10/27/2019.  8.  Leg pain with radiculopathy    Plan:  1.  Now that he has a stent in place and is having headaches, will discontinue Imdur 30 mg daily.  2.  He is having back pain with radiculopathy which I suspect is secondary to arthritis.  However, he was concerned the medications could be causing his issue.  He will hold Crestor 20 mg for a week to see if this improves his symptoms.  If it does, would consider Lipitor in the future.  I suspect his  issues is related to his back and not to the medications.  3.  We will continue on metoprolol 6.25 mg XL daily.  Previously.he  was on 6.25 mg twice a day.  He prefers a once a day dosing.  He is on his minimal dose as he is concerned with cold hands.  4.  He plans to follow-up with [facility] in April.  He will keep this appointment.  5.  He is been referred to his primary for ongoing back pain with radiculopathy.  6.  Follow-up on an as-needed basis in the future.        Raheel Mendez        Physical Exam   Temp: 96  F (35.6  C) Temp src: Tympanic BP: 136/80 Pulse: 78   Resp: 18 SpO2: 96 %      Vitals:    12/24/19 1122   Weight: 101.2 kg (223 lb)     Vital Signs with Ranges  Temp:  [96  F (35.6  C)] 96  F (35.6  C)  Pulse:  [78] 78  Resp:  [18] 18  BP: (136)/(80) 136/80  SpO2:  [96 %] 96 %   ROS is negative except that which was noted in the HPI.     Peripheral IV 12/09/19 Right Upper arm (Active)   Site Assessment WDL 12/9/2019 11:03 AM   Number of days: 15       Constitutional: awake, alert, cooperative, no apparent distress, and appears stated age  Eyes: Lids and lashes normal, pupils equal, sclera clear, conjunctiva normal  ENT: Normocephalic, without obvious abnormality, atraumatic.  Respiratory: No increased work of breathing, good air exchange, clear to auscultation bilaterally, no crackles or wheezing  Cardiovascular: Normal apical impulse, regular rate and rhythm, normal S1 and S2, no S3 or S4, and no murmur noted  Musculoskeletal: no lower extremity pitting edema present  Neurologic: Awake, alert, oriented to name, place and time.    Neuropsychiatric: General: normal, calm and normal eye contact    Medications         Data   No results found for this or any previous visit (from the past 24 hour(s)).  No results found for this or any previous visit (from the past 24 hour(s)).

## 2019-12-27 ENCOUNTER — HOSPITAL ENCOUNTER (OUTPATIENT)
Dept: CARDIAC REHAB | Facility: OTHER | Age: 64
End: 2019-12-27
Attending: INTERNAL MEDICINE
Payer: MEDICARE

## 2019-12-27 PROCEDURE — 93798 PHYS/QHP OP CAR RHAB W/ECG: CPT

## 2019-12-27 PROCEDURE — 40000116 ZZH STATISTIC OP CR VISIT

## 2020-01-03 ENCOUNTER — HOSPITAL ENCOUNTER (OUTPATIENT)
Dept: CARDIAC REHAB | Facility: OTHER | Age: 65
End: 2020-01-03
Attending: INTERNAL MEDICINE
Payer: MEDICARE

## 2020-01-03 PROCEDURE — 93798 PHYS/QHP OP CAR RHAB W/ECG: CPT

## 2020-01-03 PROCEDURE — 40000116 ZZH STATISTIC OP CR VISIT

## 2020-01-10 ENCOUNTER — OFFICE VISIT (OUTPATIENT)
Dept: PEDIATRICS | Facility: OTHER | Age: 65
End: 2020-01-10
Attending: INTERNAL MEDICINE
Payer: COMMERCIAL

## 2020-01-10 VITALS
OXYGEN SATURATION: 97 % | TEMPERATURE: 97.8 F | BODY MASS INDEX: 30.8 KG/M2 | HEIGHT: 71 IN | SYSTOLIC BLOOD PRESSURE: 128 MMHG | WEIGHT: 220 LBS | DIASTOLIC BLOOD PRESSURE: 80 MMHG | RESPIRATION RATE: 16 BRPM | HEART RATE: 74 BPM

## 2020-01-10 DIAGNOSIS — I10 ESSENTIAL HYPERTENSION: ICD-10-CM

## 2020-01-10 DIAGNOSIS — M51.369 DDD (DEGENERATIVE DISC DISEASE), LUMBAR: ICD-10-CM

## 2020-01-10 DIAGNOSIS — M48.061 SPINAL STENOSIS OF LUMBAR REGION, UNSPECIFIED WHETHER NEUROGENIC CLAUDICATION PRESENT: Primary | ICD-10-CM

## 2020-01-10 DIAGNOSIS — Z98.890 HISTORY OF CARDIAC CATH: ICD-10-CM

## 2020-01-10 DIAGNOSIS — I25.10 CAD, MULTIPLE VESSEL: ICD-10-CM

## 2020-01-10 DIAGNOSIS — I25.10 CORONARY ARTERY DISEASE INVOLVING NATIVE CORONARY ARTERY OF NATIVE HEART WITHOUT ANGINA PECTORIS: ICD-10-CM

## 2020-01-10 DIAGNOSIS — M54.16 LUMBAR RADICULOPATHY: ICD-10-CM

## 2020-01-10 DIAGNOSIS — I25.10 ASCVD (ARTERIOSCLEROTIC CARDIOVASCULAR DISEASE): ICD-10-CM

## 2020-01-10 DIAGNOSIS — Z98.890 STATUS POST CARDIAC CATHETERIZATION: ICD-10-CM

## 2020-01-10 PROCEDURE — 99214 OFFICE O/P EST MOD 30 MIN: CPT | Performed by: INTERNAL MEDICINE

## 2020-01-10 PROCEDURE — G0463 HOSPITAL OUTPT CLINIC VISIT: HCPCS

## 2020-01-10 RX ORDER — METOPROLOL SUCCINATE 25 MG/1
12.5 TABLET, EXTENDED RELEASE ORAL DAILY
Qty: 45 TABLET | Refills: 3 | Status: SHIPPED | OUTPATIENT
Start: 2020-01-10 | End: 2020-05-19

## 2020-01-10 RX ORDER — PREDNISONE 20 MG/1
20 TABLET ORAL DAILY
Qty: 5 TABLET | Refills: 0 | Status: SHIPPED | OUTPATIENT
Start: 2020-01-10 | End: 2020-05-19

## 2020-01-10 RX ORDER — ROSUVASTATIN CALCIUM 20 MG/1
20 TABLET, COATED ORAL DAILY
Qty: 90 TABLET | Refills: 3 | Status: SHIPPED | OUTPATIENT
Start: 2020-01-10 | End: 2020-12-24

## 2020-01-10 ASSESSMENT — PAIN SCALES - GENERAL: PAINLEVEL: SEVERE PAIN (6)

## 2020-01-10 ASSESSMENT — PATIENT HEALTH QUESTIONNAIRE - PHQ9: SUM OF ALL RESPONSES TO PHQ QUESTIONS 1-9: 12

## 2020-01-10 ASSESSMENT — MIFFLIN-ST. JEOR: SCORE: 1792.1

## 2020-01-10 NOTE — NURSING NOTE
Patient presents to clinic for ongoing leg pain and medication questions.  Tasha Sotelo LPN ....................  1/10/2020   2:07 PM    No LMP for male patient.  Medication Reconciliation: complete    Tasha Sotelo LPN  1/10/2020 2:07 PM

## 2020-01-10 NOTE — PROGRESS NOTES
"Subjective  Rj Juarez is a 64 year old male who presents for back and leg pain.  \"I cannot walk.\"  He is been having problems since his recent heart issues.  He thought maybe Crestor was causing it so he stopped it.  He thinks maybe the calf aching improved although not substantially.  He is wondering if he should restart it.  It is present across the low back left greater than right radiating from the upper outer buttocks toward the groin.  Worse with walking and laying flat and better with sleeping in a chair or leaning on a shopping cart.  No change with Tylenol or tramadol.  No foot drop.  No saddle anesthesia.  No fecal or urinary incontinence.  He is frustrated because he needs to work.  His wife is frustrated that he continues to drag around 50 bags of chicken feed.    Problem List/PMH: reviewed in EMR, and made relevant updates today.  Medications: reviewed in EMR, and made relevant updates today.  Allergies: reviewed in EMR, and made relevant updates today.    Social Hx:  Social History     Tobacco Use     Smoking status: Never Smoker     Smokeless tobacco: Never Used   Substance Use Topics     Alcohol use: No     Drug use: No     Social History     Social History Narrative    .      2 children.  Both children grown and living elsewhere.      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     I reviewed social history and made relevant updates today.    Family Hx:   Family History   Problem Relation Age of Onset     Hypertension Mother         Hypertension     Diabetes Mother         Diabetes     Other - See Comments Mother         Spinal stenosis     Other - See Comments Father         scleroderma which was quite severe     Cancer Father         Cancer, of adenocarcinoma of the lung.  He was a nonsmoker.     Diabetes Brother         Diabetes     Other - See Comments Brother         Pancreatitis     Substance Abuse Brother         " "Alcohol/Drug,Alcoholic, has been through treatment a number of times.       Objective  Vitals: reviewed in EMR.  /80 (BP Location: Right arm, Patient Position: Sitting, Cuff Size: Adult Large)   Pulse 74   Temp 97.8  F (36.6  C) (Tympanic)   Resp 16   Ht 1.791 m (5' 10.5\")   Wt 99.8 kg (220 lb)   SpO2 97%   BMI 31.12 kg/m      Gen: Pleasant male, NAD.  HEENT: MMM  Neck: Supple  Pulm: Breathing easily  Neuro: Grossly intact.  Lower extremity strength is 5/5 at the ankles bilaterally.  Back: No midline tenderness to palpation or percussion.  Mild tenderness to palpation in the upper outer buttocks bilaterally left greater than right.  Skin: No concerning lesions.  Psychiatric: Normal affect and insight. Does not appear anxious or depressed.        Exam Information     Exam Date Exam Time Accession # Performing Department Results    10/26/16  6:26 PM [id number] [facility] Historical    PACS Images      Show images for MR Lumbar Spine w/o Contrast   Study Result     Exam: MR SPINE LUMBAR WO     History: Lumbar radiculopathy     Technique: Sagittal T1, T2 and STIR sequences and axial proton density and T2-weighted images were obtained.     Interpretation marrow signal intensity is within normal limits except for reactive endplate changes most marked at the L3-4 level. Spinal canal is small on a congenital basis below the L2 level. There is multilevel degenerative change. There is no acute   compression fracture.     At the L5-S1 level the disc is dehydrated. Mild broad-based disc bulge slightly indents the ventral thecal sac. Mild right and moderate left facet degenerative change is seen. There is no significant nerve root ganglion compression.     At the L4-5 level there is mild narrowing and dehydration of the disc. Annular tear is seen dorsally. There is a broad-based disc bulge with more focal left paramedian protrusion. Pedicles are short. There is moderate " facet hypertrophic change. There is   at least moderate central stenosis with crowding of nerve roots and effacement of CSF. More severe compression of the left L5 nerve root is seen. Disc protrudes into the inferior neural foramina.     At the L3-4 level there is moderate narrowing and dehydration of the disc. Broad-based disc bulges seen at eccentric to the right. Pedicles are short and there is mild bilateral facet hypertrophic change. There is moderate central stenosis with crowding   of nerve roots and there is more focal mass effect on the right L4 nerve root. Disc protrudes into the inferior neural foramina and contacts the undersurface of the right L3 nerve root ganglion.     At the L2-3 level there is moderate narrowing and dehydration of the disc. Mild broad-based disc bulges seen. There is mild central stenosis. Mild facet hypertrophic changes seen. Disc protrudes into the inferior neural foramina on the right.     At the L1-2 level the disc is fairly normal. Mild facet hypertrophic changes seen.     Findings:  1. Multilevel degenerative disease with moderate central stenosis at L3-4 and L4-5. Masses eccentric to the left at L4-5 and to the right at L3-4.  2. Central protrusion at the L5-S1 level which contacts the ventral thecal sac and proximal S1 nerve roots.  3. Multilevel facet degenerative change.           Impression:     Electronically Signed By: Karla Palacio M.D. on 10/27/2016 9:51 AM         Assessment    ICD-10-CM    1. Spinal stenosis of lumbar region, unspecified whether neurogenic claudication present M48.061 predniSONE (DELTASONE) 20 MG tablet     PHYSICAL THERAPY REFERRAL     diclofenac (VOLTAREN) 1 % topical gel   2. Coronary artery disease involving native coronary artery of native heart without angina pectoris I25.10 metoprolol succinate ER (TOPROL-XL) 25 MG 24 hr tablet     rosuvastatin (CRESTOR) 20 MG tablet   3. DDD (degenerative disc disease), lumbar M51.36 predniSONE  (DELTASONE) 20 MG tablet     PHYSICAL THERAPY REFERRAL     diclofenac (VOLTAREN) 1 % topical gel   4. Lumbar radiculopathy M54.16 predniSONE (DELTASONE) 20 MG tablet     PHYSICAL THERAPY REFERRAL     diclofenac (VOLTAREN) 1 % topical gel   5. Status post cardiac catheterization on 12/6/2019 through [facility] with a stenting to his distal circumflex Z98.890 metoprolol succinate ER (TOPROL-XL) 25 MG 24 hr tablet   6. History of cardiac cath on 12/6/2019 Z98.890 metoprolol succinate ER (TOPROL-XL) 25 MG 24 hr tablet   7. CAD, multiple vessel I25.10 metoprolol succinate ER (TOPROL-XL) 25 MG 24 hr tablet   8. Essential hypertension I10 metoprolol succinate ER (TOPROL-XL) 25 MG 24 hr tablet   9. ASCVD (arteriosclerotic cardiovascular disease) I25.10 metoprolol succinate ER (TOPROL-XL) 25 MG 24 hr tablet     Orders Placed This Encounter   Procedures     PHYSICAL THERAPY REFERRAL       I think the most likely cause for his symptoms is from his lumbar spinal central canal stenosis given the increase in pain with lying flat and standing up straight.  We discussed options and decided to move forward with physical therapy.  It is unlikely his cardiologist would recommend discontinuing Plavix so he could have surgery until he has been at least 6 months postoperatively from his stent.    Plan   -- Expected clinical course discussed   -- Medications and their side effects discussed   --Physical therapy consult   --Warning signs discussed repeat evaluation recommended if concerns   --Low threshold for lumbar MRI   --Trial of diclofenac gel   --Return to metoprolol succinate, see if he tolerates 12.5 mg dosing.  Monitor heart rate and blood pressure at home.  Notify MD with concerns.      Signed, Doug Porras MD, FAAP, FACP  Internal Medicine & Pediatrics

## 2020-01-13 ENCOUNTER — TELEPHONE (OUTPATIENT)
Dept: PEDIATRICS | Facility: OTHER | Age: 65
End: 2020-01-13

## 2020-01-13 NOTE — TELEPHONE ENCOUNTER
[facility] RX Diclofenac Denied.  It did not have osteoarthritis pain in joints suseptable  to topical treatments such as feet, hands, knees, and wrists. ( DX)  I  faxed the  denial and appeal to the nurses at [phone]      Thanks,      Sarah Beth

## 2020-01-13 NOTE — TELEPHONE ENCOUNTER
Have him bring paper Rx and GoodRx card to [facility] [address]. Can buy for around $25 without using insurance.    Signed, Doug Porras MD, FAAP, FACP  Internal Medicine & Pediatrics

## 2020-02-03 NOTE — PROGRESS NOTES
Cardiac Rehab Discharge Summary    Reason for discharge:    Pt unable to tolerate cardiac rehab, due to back pain.  He saw Dr. Porras on 1/10/20 who ordered PT for patient.      Progress towards goals:  Goals not met.  Barriers to achieving goals:   limited tolerance for therapy.    Recommendation(s):    Physical Therapy more appropriate for patient at this point.

## 2020-02-07 ENCOUNTER — TELEPHONE (OUTPATIENT)
Dept: PEDIATRICS | Facility: OTHER | Age: 65
End: 2020-02-07

## 2020-02-07 ENCOUNTER — TRANSFERRED RECORDS (OUTPATIENT)
Dept: HEALTH INFORMATION MANAGEMENT | Facility: OTHER | Age: 65
End: 2020-02-07

## 2020-02-07 DIAGNOSIS — M54.16 LUMBAR RADICULOPATHY: Primary | ICD-10-CM

## 2020-02-07 DIAGNOSIS — M48.061 SPINAL STENOSIS OF LUMBAR REGION, UNSPECIFIED WHETHER NEUROGENIC CLAUDICATION PRESENT: ICD-10-CM

## 2020-02-07 NOTE — TELEPHONE ENCOUNTER
Spoke to pt and states that PT cannot do anything else for his back pain.  Pt states that PT was going to contact Doug Porras MD to get MRI of spine and then referral for Spine surgery if needed.  States that PT should be sending letter or telephone note with this information.  Tasha Sotelo LPN ....................  2/7/2020   2:23 PM

## 2020-02-10 ENCOUNTER — TELEPHONE (OUTPATIENT)
Dept: PEDIATRICS | Facility: OTHER | Age: 65
End: 2020-02-10

## 2020-02-10 ENCOUNTER — HOSPITAL ENCOUNTER (OUTPATIENT)
Dept: MRI IMAGING | Facility: OTHER | Age: 65
Discharge: HOME | End: 2020-02-10
Attending: INTERNAL MEDICINE | Admitting: INTERNAL MEDICINE
Payer: MEDICARE

## 2020-02-10 DIAGNOSIS — F41.9 ANXIETY: ICD-10-CM

## 2020-02-10 DIAGNOSIS — M48.061 SPINAL STENOSIS OF LUMBAR REGION, UNSPECIFIED WHETHER NEUROGENIC CLAUDICATION PRESENT: ICD-10-CM

## 2020-02-10 DIAGNOSIS — F40.240 CLAUSTROPHOBIA: ICD-10-CM

## 2020-02-10 DIAGNOSIS — M54.16 LUMBAR RADICULOPATHY: ICD-10-CM

## 2020-02-10 PROCEDURE — 72148 MRI LUMBAR SPINE W/O DYE: CPT

## 2020-02-10 RX ORDER — ALPRAZOLAM 0.25 MG
TABLET ORAL
Qty: 2 TABLET | Refills: 0 | Status: SHIPPED | OUTPATIENT
Start: 2020-02-10 | End: 2020-07-10

## 2020-02-10 RX ORDER — ALPRAZOLAM 0.25 MG
0.25 TABLET ORAL PRN
Qty: 2 TABLET | Refills: 0 | Status: CANCELLED | OUTPATIENT
Start: 2020-02-10

## 2020-02-10 NOTE — TELEPHONE ENCOUNTER
Pt notified rx ready.  Lissette Bowers CMA ([facility])......................2/10/2020  11:00 AM

## 2020-02-10 NOTE — TELEPHONE ENCOUNTER
Spoke to patient.  He states that he would like a prescription for Xanax as he has an MRI scheduled today.  Prescription and pharmacy david'd up.  Jennifer Puentes LPN 2/10/2020   10:14 AM

## 2020-02-12 ENCOUNTER — TRANSFERRED RECORDS (OUTPATIENT)
Dept: HEALTH INFORMATION MANAGEMENT | Facility: OTHER | Age: 65
End: 2020-02-12

## 2020-04-21 ENCOUNTER — TRANSFERRED RECORDS (OUTPATIENT)
Dept: HEALTH INFORMATION MANAGEMENT | Facility: OTHER | Age: 65
End: 2020-04-21

## 2020-05-19 ENCOUNTER — OFFICE VISIT (OUTPATIENT)
Dept: PEDIATRICS | Facility: OTHER | Age: 65
End: 2020-05-19
Attending: INTERNAL MEDICINE
Payer: COMMERCIAL

## 2020-05-19 VITALS
BODY MASS INDEX: 30.69 KG/M2 | TEMPERATURE: 97.5 F | SYSTOLIC BLOOD PRESSURE: 152 MMHG | WEIGHT: 219.2 LBS | RESPIRATION RATE: 16 BRPM | DIASTOLIC BLOOD PRESSURE: 98 MMHG | OXYGEN SATURATION: 97 % | HEART RATE: 94 BPM | HEIGHT: 71 IN

## 2020-05-19 DIAGNOSIS — I25.10 ASCVD (ARTERIOSCLEROTIC CARDIOVASCULAR DISEASE): ICD-10-CM

## 2020-05-19 DIAGNOSIS — R73.03 PRE-DIABETES: Chronic | ICD-10-CM

## 2020-05-19 DIAGNOSIS — M48.062 LUMBAR STENOSIS WITH NEUROGENIC CLAUDICATION: ICD-10-CM

## 2020-05-19 DIAGNOSIS — I25.10 CAD, MULTIPLE VESSEL: ICD-10-CM

## 2020-05-19 DIAGNOSIS — Z98.890 STATUS POST CARDIAC CATHETERIZATION: ICD-10-CM

## 2020-05-19 DIAGNOSIS — I10 ESSENTIAL HYPERTENSION: ICD-10-CM

## 2020-05-19 DIAGNOSIS — Z98.890 HISTORY OF CARDIAC CATH: ICD-10-CM

## 2020-05-19 DIAGNOSIS — Z01.818 PREOP EXAMINATION: Primary | ICD-10-CM

## 2020-05-19 DIAGNOSIS — G47.33 OSA ON CPAP: Chronic | ICD-10-CM

## 2020-05-19 DIAGNOSIS — E87.6 HYPOKALEMIA: ICD-10-CM

## 2020-05-19 DIAGNOSIS — I25.10 CORONARY ARTERY DISEASE INVOLVING NATIVE CORONARY ARTERY OF NATIVE HEART WITHOUT ANGINA PECTORIS: ICD-10-CM

## 2020-05-19 PROBLEM — Z91.148 NONCOMPLIANCE WITH MEDICATION REGIMEN: Status: RESOLVED | Noted: 2019-12-03 | Resolved: 2020-05-19

## 2020-05-19 LAB
ALBUMIN UR-MCNC: NEGATIVE MG/DL
ANION GAP SERPL CALCULATED.3IONS-SCNC: 5 MMOL/L (ref 3–14)
APPEARANCE UR: CLEAR
BASOPHILS # BLD AUTO: 0.1 10E9/L (ref 0–0.2)
BASOPHILS NFR BLD AUTO: 0.9 %
BILIRUB UR QL STRIP: NEGATIVE
BUN SERPL-MCNC: 16 MG/DL (ref 7–25)
CALCIUM SERPL-MCNC: 9.3 MG/DL (ref 8.6–10.3)
CHLORIDE SERPL-SCNC: 100 MMOL/L (ref 98–107)
CO2 SERPL-SCNC: 32 MMOL/L (ref 21–31)
COLOR UR AUTO: YELLOW
CREAT SERPL-MCNC: 0.96 MG/DL (ref 0.7–1.3)
DIFFERENTIAL METHOD BLD: NORMAL
EOSINOPHIL # BLD AUTO: 0.2 10E9/L (ref 0–0.7)
EOSINOPHIL NFR BLD AUTO: 3.8 %
ERYTHROCYTE [DISTWIDTH] IN BLOOD BY AUTOMATED COUNT: 12.2 % (ref 10–15)
GFR SERPL CREATININE-BSD FRML MDRD: 78 ML/MIN/{1.73_M2}
GLUCOSE SERPL-MCNC: 144 MG/DL (ref 70–105)
GLUCOSE UR STRIP-MCNC: NEGATIVE MG/DL
HBA1C MFR BLD: 6.3 % (ref 4–6)
HCT VFR BLD AUTO: 44.8 % (ref 40–53)
HGB BLD-MCNC: 15 G/DL (ref 13.3–17.7)
HGB UR QL STRIP: NEGATIVE
IMM GRANULOCYTES # BLD: 0 10E9/L (ref 0–0.4)
IMM GRANULOCYTES NFR BLD: 0.5 %
KETONES UR STRIP-MCNC: NEGATIVE MG/DL
LEUKOCYTE ESTERASE UR QL STRIP: NEGATIVE
LYMPHOCYTES # BLD AUTO: 1.9 10E9/L (ref 0.8–5.3)
LYMPHOCYTES NFR BLD AUTO: 30.1 %
MCH RBC QN AUTO: 30.3 PG (ref 26.5–33)
MCHC RBC AUTO-ENTMCNC: 33.5 G/DL (ref 31.5–36.5)
MCV RBC AUTO: 91 FL (ref 78–100)
MONOCYTES # BLD AUTO: 0.8 10E9/L (ref 0–1.3)
MONOCYTES NFR BLD AUTO: 12.4 %
NEUTROPHILS # BLD AUTO: 3.3 10E9/L (ref 1.6–8.3)
NEUTROPHILS NFR BLD AUTO: 52.3 %
NITRATE UR QL: NEGATIVE
PH UR STRIP: 6 PH (ref 5–9)
PLATELET # BLD AUTO: 228 10E9/L (ref 150–450)
POTASSIUM SERPL-SCNC: 3.1 MMOL/L (ref 3.5–5.1)
RBC # BLD AUTO: 4.95 10E12/L (ref 4.4–5.9)
SODIUM SERPL-SCNC: 137 MMOL/L (ref 134–144)
SOURCE: ABNORMAL
SP GR UR STRIP: >1.03 (ref 1–1.03)
UROBILINOGEN UR STRIP-ACNC: 1 EU/DL (ref 0.2–1)
WBC # BLD AUTO: 6.4 10E9/L (ref 4–11)

## 2020-05-19 PROCEDURE — 80048 BASIC METABOLIC PNL TOTAL CA: CPT | Mod: ZL | Performed by: INTERNAL MEDICINE

## 2020-05-19 PROCEDURE — 36415 COLL VENOUS BLD VENIPUNCTURE: CPT | Mod: ZL | Performed by: INTERNAL MEDICINE

## 2020-05-19 PROCEDURE — 81003 URINALYSIS AUTO W/O SCOPE: CPT | Mod: ZL | Performed by: INTERNAL MEDICINE

## 2020-05-19 PROCEDURE — 83036 HEMOGLOBIN GLYCOSYLATED A1C: CPT | Mod: ZL | Performed by: INTERNAL MEDICINE

## 2020-05-19 PROCEDURE — G0463 HOSPITAL OUTPT CLINIC VISIT: HCPCS

## 2020-05-19 PROCEDURE — 85025 COMPLETE CBC W/AUTO DIFF WBC: CPT | Mod: ZL | Performed by: INTERNAL MEDICINE

## 2020-05-19 PROCEDURE — 99214 OFFICE O/P EST MOD 30 MIN: CPT | Performed by: INTERNAL MEDICINE

## 2020-05-19 RX ORDER — METOPROLOL SUCCINATE 25 MG/1
25 TABLET, EXTENDED RELEASE ORAL DAILY
Qty: 90 TABLET | Refills: 3 | Status: SHIPPED | OUTPATIENT
Start: 2020-05-19 | End: 2020-12-24

## 2020-05-19 RX ORDER — POTASSIUM CHLORIDE 750 MG/1
10 TABLET, EXTENDED RELEASE ORAL DAILY
Qty: 30 TABLET | Refills: 1 | Status: SHIPPED | OUTPATIENT
Start: 2020-05-19 | End: 2020-07-10

## 2020-05-19 RX ORDER — GABAPENTIN 300 MG/1
300 CAPSULE ORAL DAILY
COMMUNITY
End: 2020-09-02

## 2020-05-19 RX ORDER — HYDROCHLOROTHIAZIDE 25 MG/1
25 TABLET ORAL DAILY
Qty: 90 TABLET | Refills: 3 | Status: SHIPPED | OUTPATIENT
Start: 2020-05-19 | End: 2020-07-10

## 2020-05-19 ASSESSMENT — MIFFLIN-ST. JEOR: SCORE: 1788.47

## 2020-05-19 ASSESSMENT — PAIN SCALES - GENERAL: PAINLEVEL: SEVERE PAIN (6)

## 2020-05-19 NOTE — PROGRESS NOTES
"PREOPERATIVE HISTORY & PHYSICAL  Date of Exam: 5/19/2020  Chief Complaint   Patient presents with     Pre-Op Exam       Nursing Notes:   Zoie Walden MA  5/19/2020  8:26 AM  Signed  Chief Complaint   Patient presents with     Pre-Op Exam     Patient is here for a pre-op exam. He is scheduled to have decompression levels L3 to L5 on 5/26/2020.    Initial There were no vitals taken for this visit. Estimated body mass index is 31.12 kg/m  as calculated from the following:    Height as of 1/10/20: 1.791 m (5' 10.5\").    Weight as of 1/10/20: 99.8 kg (220 lb).  Medication Reconciliation: complete    Zoie Walden MA    Date of Surgery: 5/26/2020  Type of Surgery: Decompression L3 to L5   Surgeon: Venkat Wray MD   Hospital:  [facility]  Fax: [phone]     Fever/Chills or other infectious symptoms in past month: no  >10lb weight loss in past two months: no  O2 SAT: 97     Health Care Directive/Code status: No   Hx of blood transfusions:   no  Td up to date:  yes  History of VRE/MRSA:  no      Preoperative Evaluation: Obstructive Sleep Apnea screening     S: Snore -  Do you snore loudly? (louder than talking or loud enough to be heard through closed doors)yes  T: Tired - Do you often feel tired, fatigued, or sleepy during the daytime?yes  O: Observed - Has anyone ever observed you stop breathing during your sleep?no  P: Pressure - Do you have or are you being treated for high blood pressure?yes  B: BMI - BMI greater than 35kg/m2?no  A: Age - Age over 50 years old?yes  N: Neck - Neck circumference greater than 40 cm?no  G: Gender - Gender: Male?yes     Total number of \"YES\" responses:  5     Scoring: Low risk of FARNAZ 0-2             At Risk of FARNAZ: >3      High Risk of FARNAZ: 5-8     Zoie Walden MA 5/19/2020 8:11 AM      HPI:  I was asked to see Mr. Rj Juarez by Dr. Wray for preoperative management of hypertension.    Rj Juarez is a 64 year old male with a history of hypertension, CAD " s/p stent here for preoperative examination.  He follows with Dr. Quiroz of cardiology.  Dr. Quiroz recommended discontinuing Plavix which he did last Friday.  He is been limited based on his back pain.  He would like a prescription for a walker with a seat.  He is been trying to use some crutches to get around.  He has been quarantined in his home since March.  Home blood pressures 120-130/70-80.  Pulse is in the 60s to 70s.  No chest pains with exertion.  He had one episode of heartburn with his arm exercise bike which resolved with Tums.     Patient Active Problem List    Diagnosis Date Noted     Lumbar stenosis with neurogenic claudication 05/19/2020     Priority: Medium     Status post cardiac catheterization on 12/6/2019 through [facility] with a stenting to his distal circumflex 12/24/2019     Priority: Medium     History of cardiac cath on 12/6/2019 12/24/2019     Priority: Medium     Chronic midline low back pain with sciatica, sciatica laterality unspecified 12/24/2019     Priority: Medium     ASCVD (arteriosclerotic cardiovascular disease) 12/03/2019     Priority: Medium     High coronary artery calcium score at 1118.2 on 11/21/2019 11/27/2019     Priority: Medium     Added automatically from request for surgery [id number]       CAD, multiple vessel 11/27/2019     Priority: Medium     Added automatically from request for surgery [id number]       GERD 11/19/2019     Priority: Medium     FARNAZ on CPAP 08/22/2019     Priority: Medium     Essential hypertension 08/22/2019     Priority: Medium     Pre-diabetes 08/22/2019     Priority: Medium     NSAID long-term use 08/22/2019     Priority: Medium     Primary osteoarthritis of right knee  04/09/2019     Priority: Medium     Effusion of right knee 04/09/2019     Priority: Medium     Posterior knee pain, right 04/09/2019     Priority: Medium     Elevated prostate specific antigen (PSA) 11/29/2018     Priority: Medium     Lumbar radiculopathy 02/20/2017     Priority:  Medium     Benign essential tremor 03/18/2016     Priority: Medium     Irritable bowel syndrome with diarrhea 03/18/2016     Priority: Medium     H/O adenomatous polyp of colon 12/17/2009     Priority: Medium     Past Medical History:   Diagnosis Date     Benign lipomatous neoplasm     1/20/2014     Calculus of kidney     possible     Carpal tunnel syndrome     Both hands     Closed fracture of patella     05/06/09,Sustained right superior pole patellar fracture which underwent conservative management     Diverticulosis of large intestine without perforation or abscess without bleeding     1/28/2014     Exertional chest pain 11/19/2019     Headache     No Comments Provided     Other specified forms of tremor     3/2/2011     Other urticaria (CODE)     3/2/2011     Pain in joint     No Comments Provided     Umbilical hernia without obstruction or gangrene     1/26/2018     Past Surgical History:   Procedure Laterality Date     COLONOSCOPY  01/28/2014 2009,2014,F/U 2019     COLONOSCOPY  03/01/2019    Serrated adenoma, follow up 1 year     ESOPHAGOSCOPY, GASTROSCOPY, DUODENOSCOPY (EGD), COMBINED      1/28/14,EGD     OTHER SURGICAL HISTORY      9/10/2014,44450.0,MT REPAIR ING HERNIA  >5 TRS BETTINA     OTHER SURGICAL HISTORY      1/26/2018,48460.0,MT REPAIR UMBILICAL NAZARIO  >5 TRS REDUC     RELEASE CARPAL TUNNEL      3,12/2004,Both hands     SIGMOIDOSCOPY FLEXIBLE      No Comments Provided     Current Outpatient Medications   Medication Sig Dispense Refill     aspirin (ASA) 81 MG tablet Take 1 tablet (81 mg) by mouth daily       gabapentin (NEURONTIN) 300 MG capsule Take 300 mg by mouth daily       hydrochlorothiazide (HYDRODIURIL) 25 MG tablet Take 1 tablet (25 mg) by mouth daily 90 tablet 3     metoprolol succinate ER (TOPROL-XL) 25 MG 24 hr tablet Take 1 tablet (25 mg) by mouth daily 90 tablet 3     order for DME Walker, 4ww with seat. Spinal stenosis. 99. 1 each 11     potassium chloride ER (K-TAB/KLOR-CON) 10 MEQ  CR tablet Take 1 tablet (10 mEq) by mouth daily 30 tablet 1     rosuvastatin (CRESTOR) 20 MG tablet Take 1 tablet (20 mg) by mouth daily 90 tablet 3     traMADol (ULTRAM) 50 MG tablet        triamcinolone (KENALOG) 0.1 % cream Apply 1 Film topically 3 times daily       ALPRAZolam (XANAX) 0.25 MG tablet 1 tablet 30-60 minutes prior to procedure (Patient not taking: Reported on 2020) 2 tablet 0     nitroGLYcerin (NITROSTAT) 0.4 MG sublingual tablet For chest pain place 1 tablet under the tongue every 5 minutes for 3 doses. If symptoms persist 5 minutes after 1st dose call 911. (Patient not taking: Reported on 2020) 25 tablet 3     Allergies   Allergen Reactions     Ciprofloxacin Other (See Comments)     Tendon issue , and IBS     Midazolam      Other reaction(s): Other - Describe In Comment Field  Difficult to wake up     Family History   Problem Relation Age of Onset     Hypertension Mother         Hypertension     Diabetes Mother         Diabetes     Other - See Comments Mother         Spinal stenosis     Other - See Comments Father         scleroderma which was quite severe     Cancer Father         Cancer, of adenocarcinoma of the lung.  He was a nonsmoker.     Diabetes Brother         Diabetes     Other - See Comments Brother         Pancreatitis     Substance Abuse Brother         Alcohol/Drug,Alcoholic, has been through treatment a number of times.     Social History     Socioeconomic History     Marital status:      Spouse name: None     Number of children: None     Years of education: None     Highest education level: None   Occupational History     None   Social Needs     Financial resource strain: None     Food insecurity     Worry: None     Inability: None     Transportation needs     Medical: None     Non-medical: None   Tobacco Use     Smoking status: Never Smoker     Smokeless tobacco: Never Used   Substance and Sexual Activity     Alcohol use: No     Drug use: No     Sexual  "activity: Not Currently   Lifestyle     Physical activity     Days per week: None     Minutes per session: None     Stress: None   Relationships     Social connections     Talks on phone: None     Gets together: None     Attends [employer] service: None     Active member of club or organization: None     Attends meetings of clubs or organizations: None     Relationship status: None     Intimate partner violence     Fear of current or ex partner: None     Emotionally abused: None     Physically abused: None     Forced sexual activity: None   Other Topics Concern     Parent/sibling w/ CABG, MI or angioplasty before 65F 55M? Not Asked   Social History Narrative    .      2 children.  Both children grown and living elsewhere.      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       REVIEW OF SYSTEMS:  Review of Systems:  Skin: Negative  Eyes: Negative  Ears/Nose/Throat: Negative  Respiratory: Negative  Cardiovascular: Negative  Gastrointestinal: Negative  Genitourinary: Negative  Musculoskeletal: See HPI  Neurologic: Negative  Psychiatric: Negative  Hematologic/Lymphatic/Immunologic: Negative  Endocrine: Negative      EXAM:   Vitals: reviewed in EMR.  BP (!) 152/98   Pulse 94   Temp 97.5  F (36.4  C) (Tympanic)   Resp 16   Ht 1.791 m (5' 10.5\")   Wt 99.4 kg (219 lb 3.2 oz)   SpO2 97%   BMI 31.01 kg/m      Gen: Pleasant male, NAD.  HEENT: MMM, no OP erythema.   Neck: Supple, no JVD, no bruits.  CV: RRR no m/r/g.   Pulm: CTAB no w/r/r  Neuro: Grossly intact  Msk: No lower extremity edema.  Skin: No concerning lesions.  Psychiatric: Normal affect and insight. Does not appear anxious or depressed.    DIAGNOSTICS:   Results for orders placed or performed in visit on 05/19/20 (from the past 48 hour(s))   Hemoglobin A1c   Result Value Ref Range    Hemoglobin A1C 6.3 (H) 4.0 - 6.0 %   Basic metabolic panel   Result Value Ref Range    Sodium 137 134 - 144 mmol/L    " Potassium 3.1 (L) 3.5 - 5.1 mmol/L    Chloride 100 98 - 107 mmol/L    Carbon Dioxide 32 (H) 21 - 31 mmol/L    Anion Gap 5 3 - 14 mmol/L    Glucose 144 (H) 70 - 105 mg/dL    Urea Nitrogen 16 7 - 25 mg/dL    Creatinine 0.96 0.70 - 1.30 mg/dL    GFR Estimate 78 >60 mL/min/[1.73_m2]    GFR Estimate If Black >90 >60 mL/min/[1.73_m2]    Calcium 9.3 8.6 - 10.3 mg/dL   CBC with platelets differential   Result Value Ref Range    WBC 6.4 4.0 - 11.0 10e9/L    RBC Count 4.95 4.4 - 5.9 10e12/L    Hemoglobin 15.0 13.3 - 17.7 g/dL    Hematocrit 44.8 40.0 - 53.0 %    MCV 91 78 - 100 fl    MCH 30.3 26.5 - 33.0 pg    MCHC 33.5 31.5 - 36.5 g/dL    RDW 12.2 10.0 - 15.0 %    Platelet Count 228 150 - 450 10e9/L    Diff Method Automated Method     % Neutrophils 52.3 %    % Lymphocytes 30.1 %    % Monocytes 12.4 %    % Eosinophils 3.8 %    % Basophils 0.9 %    % Immature Granulocytes 0.5 %    Absolute Neutrophil 3.3 1.6 - 8.3 10e9/L    Absolute Lymphocytes 1.9 0.8 - 5.3 10e9/L    Absolute Monocytes 0.8 0.0 - 1.3 10e9/L    Absolute Eosinophils 0.2 0.0 - 0.7 10e9/L    Absolute Basophils 0.1 0.0 - 0.2 10e9/L    Abs Immature Granulocytes 0.0 0 - 0.4 10e9/L   *UA reflex to Microscopic   Result Value Ref Range    Color Urine Yellow     Appearance Urine Clear     Glucose Urine Negative NEG^Negative mg/dL    Bilirubin Urine Negative NEG^Negative    Ketones Urine Negative NEG^Negative mg/dL    Specific Gravity Urine >1.030 (H) 1.000 - 1.030    Blood Urine Negative NEG^Negative    pH Urine 6.0 5.0 - 9.0 pH    Protein Albumin Urine Negative NEG^Negative mg/dL    Urobilinogen Urine 1.0 0.2 - 1.0 EU/dL    Nitrite Urine Negative NEG^Negative    Leukocyte Esterase Urine Negative NEG^Negative    Source Midstream Urine      EKG performed at [facility] December 2019, unchanged from prior    IMPRESSION:     ICD-10-CM    1. Preop examination  Z01.818 CBC with platelets differential     COVID-19 Virus (Coronavirus) Antibody     CBC with platelets differential      *UA reflex to Microscopic     CANCELED: UA reflex to Microscopic   2. Lumbar stenosis with neurogenic claudication  M48.062 order for DME   3. FARNAZ on CPAP  G47.33     Z99.89    4. Pre-diabetes  R73.03 Hemoglobin A1c     Hemoglobin A1c   5. CAD, multiple vessel  I25.10 metoprolol succinate ER (TOPROL-XL) 25 MG 24 hr tablet   6. ASCVD (arteriosclerotic cardiovascular disease)  I25.10 metoprolol succinate ER (TOPROL-XL) 25 MG 24 hr tablet   7. Status post cardiac catheterization on 12/6/2019 through [facility] with a stenting to his distal circumflex  Z98.890 metoprolol succinate ER (TOPROL-XL) 25 MG 24 hr tablet   8. Essential hypertension  I10 metoprolol succinate ER (TOPROL-XL) 25 MG 24 hr tablet     hydrochlorothiazide (HYDRODIURIL) 25 MG tablet     Basic metabolic panel     Basic metabolic panel   9. Coronary artery disease involving native coronary artery of native heart without angina pectoris  I25.10 metoprolol succinate ER (TOPROL-XL) 25 MG 24 hr tablet   10. History of cardiac cath on 12/6/2019  Z98.890 metoprolol succinate ER (TOPROL-XL) 25 MG 24 hr tablet   11. Hypokalemia  E87.6 potassium chloride ER (K-TAB/KLOR-CON) 10 MEQ CR tablet       For above listed surgery and anesthesia:   Patient is at increased risk for perioperative complications based on hypertension, atherosclerotic cardiovascular disease status post angioplasty with stent, prediabetes, obstructive sleep apnea, age.    RECOMMENDATIONS:   APPROVAL GIVEN to proceed with proposed procedure, without further diagnostic evaluation    Patient is on chronic pain medications: No  Patient is on antiplatelet/anticoagulation: No  Other medications that need adjustment perioperatively: No  Patient Instructions    -- No change to prescriptions   -- COVID-19 test before surgery   -- Hold aspirin for 5-7 days before surgery, unless you have had a stent then continue aspirin.   -- Hold ibuprofen/Advil for 2-3 days before surgery   -- Hold naproxen/Aleve  for 5-7 days before surgery   -- Acetaminophen (Tylenol) is okay   -- Hold vitamins and herbal remedies for 7 days before surgery        Signed, Doug Porras MD, FAAP, FACP  Internal Medicine & Pediatrics    CC Dr. Wray, [facility]

## 2020-05-19 NOTE — LETTER
May 19, 2020      Rj Juarez  [address] [facility] [address]        Dear ,    We are writing to inform you of your test results.    Potassium level a little low, likely from the hydrochlorothiazide.  I'd rather start the supplement now and consider making adjustments after you've had your surgery.    Signed, Doug Porras MD, FAAP, FACP  Internal Medicine & Pediatrics      Resulted Orders   Hemoglobin A1c   Result Value Ref Range    Hemoglobin A1C 6.3 (H) 4.0 - 6.0 %   Basic metabolic panel   Result Value Ref Range    Sodium 137 134 - 144 mmol/L    Potassium 3.1 (L) 3.5 - 5.1 mmol/L    Chloride 100 98 - 107 mmol/L    Carbon Dioxide 32 (H) 21 - 31 mmol/L    Anion Gap 5 3 - 14 mmol/L    Glucose 144 (H) 70 - 105 mg/dL    Urea Nitrogen 16 7 - 25 mg/dL    Creatinine 0.96 0.70 - 1.30 mg/dL    GFR Estimate 78 >60 mL/min/[1.73_m2]    GFR Estimate If Black >90 >60 mL/min/[1.73_m2]    Calcium 9.3 8.6 - 10.3 mg/dL   CBC with platelets differential   Result Value Ref Range    WBC 6.4 4.0 - 11.0 10e9/L    RBC Count 4.95 4.4 - 5.9 10e12/L    Hemoglobin 15.0 13.3 - 17.7 g/dL    Hematocrit 44.8 40.0 - 53.0 %    MCV 91 78 - 100 fl    MCH 30.3 26.5 - 33.0 pg    MCHC 33.5 31.5 - 36.5 g/dL    RDW 12.2 10.0 - 15.0 %    Platelet Count 228 150 - 450 10e9/L    Diff Method Automated Method     % Neutrophils 52.3 %    % Lymphocytes 30.1 %    % Monocytes 12.4 %    % Eosinophils 3.8 %    % Basophils 0.9 %    % Immature Granulocytes 0.5 %    Absolute Neutrophil 3.3 1.6 - 8.3 10e9/L    Absolute Lymphocytes 1.9 0.8 - 5.3 10e9/L    Absolute Monocytes 0.8 0.0 - 1.3 10e9/L    Absolute Eosinophils 0.2 0.0 - 0.7 10e9/L    Absolute Basophils 0.1 0.0 - 0.2 10e9/L    Abs Immature Granulocytes 0.0 0 - 0.4 10e9/L   *UA reflex to Microscopic   Result Value Ref Range    Color Urine Yellow     Appearance Urine Clear     Glucose Urine Negative NEG^Negative mg/dL    Bilirubin Urine Negative NEG^Negative    Ketones Urine  Negative NEG^Negative mg/dL    Specific Gravity Urine >1.030 (H) 1.000 - 1.030    Blood Urine Negative NEG^Negative    pH Urine 6.0 5.0 - 9.0 pH    Protein Albumin Urine Negative NEG^Negative mg/dL    Urobilinogen Urine 1.0 0.2 - 1.0 EU/dL    Nitrite Urine Negative NEG^Negative    Leukocyte Esterase Urine Negative NEG^Negative    Source Midstream Urine        If you have any questions or concerns, please call the clinic at the number listed above.       Sincerely,        Doug Porras MD

## 2020-05-19 NOTE — NURSING NOTE
"Chief Complaint   Patient presents with     Pre-Op Exam     Patient is here for a pre-op exam. He is scheduled to have decompression levels L3 to L5 on 5/26/2020.    Initial There were no vitals taken for this visit. Estimated body mass index is 31.12 kg/m  as calculated from the following:    Height as of 1/10/20: 1.791 m (5' 10.5\").    Weight as of 1/10/20: 99.8 kg (220 lb).  Medication Reconciliation: complete    Zoie Walden MA  "

## 2020-05-19 NOTE — PATIENT INSTRUCTIONS
-- No change to prescriptions   -- COVID-19 test before surgery   -- Hold aspirin for 5-7 days before surgery, unless you have had a stent then continue aspirin.   -- Hold ibuprofen/Advil for 2-3 days before surgery   -- Hold naproxen/Aleve for 5-7 days before surgery   -- Acetaminophen (Tylenol) is okay   -- Hold vitamins and herbal remedies for 7 days before surgery

## 2020-05-19 NOTE — PROGRESS NOTES
"Date of Surgery: 5/26/2020  Type of Surgery: Decompression L3 to L5   Surgeon: Venkat Wray MD   Hospital:  [facility]  Fax: [phone]    Fever/Chills or other infectious symptoms in past month: no  >10lb weight loss in past two months: no  O2 SAT: 97    Health Care Directive/Code status: No   Hx of blood transfusions:   no  Td up to date:  yes  History of VRE/MRSA:  no     Preoperative Evaluation: Obstructive Sleep Apnea screening    S: Snore -  Do you snore loudly? (louder than talking or loud enough to be heard through closed doors)yes  T: Tired - Do you often feel tired, fatigued, or sleepy during the daytime?yes  O: Observed - Has anyone ever observed you stop breathing during your sleep?no  P: Pressure - Do you have or are you being treated for high blood pressure?yes  B: BMI - BMI greater than 35kg/m2?no  A: Age - Age over 50 years old?yes  N: Neck - Neck circumference greater than 40 cm?no  G: Gender - Gender: Male?yes    Total number of \"YES\" responses:  5    Scoring: Low risk of FARNAZ 0-2  At Risk of FARNAZ: >3 High Risk of FARNAZ: 5-8    Zoie Walden MA 5/19/2020 8:11 AM    "

## 2020-05-21 ENCOUNTER — ALLIED HEALTH/NURSE VISIT (OUTPATIENT)
Dept: FAMILY MEDICINE | Facility: OTHER | Age: 65
End: 2020-05-21
Attending: INTERNAL MEDICINE
Payer: MEDICARE

## 2020-05-21 DIAGNOSIS — Z01.818 PREOP EXAMINATION: ICD-10-CM

## 2020-05-21 DIAGNOSIS — M48.061 SPINAL STENOSIS OF LUMBAR REGION, UNSPECIFIED WHETHER NEUROGENIC CLAUDICATION PRESENT: ICD-10-CM

## 2020-05-21 DIAGNOSIS — M54.16 LUMBAR RADICULOPATHY: ICD-10-CM

## 2020-05-21 DIAGNOSIS — M51.369 DDD (DEGENERATIVE DISC DISEASE), LUMBAR: ICD-10-CM

## 2020-05-21 LAB
SARS-COV-2 PCR COMMENT: NORMAL
SARS-COV-2 RNA SPEC QL NAA+PROBE: NEGATIVE
SARS-COV-2 RNA SPEC QL NAA+PROBE: NORMAL
SPECIMEN SOURCE: NORMAL
SPECIMEN SOURCE: NORMAL

## 2020-05-21 PROCEDURE — 99207 ZZC DROP WITH A PROCEDURE: CPT

## 2020-05-21 PROCEDURE — U0003 INFECTIOUS AGENT DETECTION BY NUCLEIC ACID (DNA OR RNA); SEVERE ACUTE RESPIRATORY SYNDROME CORONAVIRUS 2 (SARS-COV-2) (CORONAVIRUS DISEASE [COVID-19]), AMPLIFIED PROBE TECHNIQUE, MAKING USE OF HIGH THROUGHPUT TECHNOLOGIES AS DESCRIBED BY CMS-2020-01-R: HCPCS | Mod: ZL | Performed by: INTERNAL MEDICINE

## 2020-06-12 DIAGNOSIS — G89.29 CHRONIC MIDLINE LOW BACK PAIN WITH SCIATICA, SCIATICA LATERALITY UNSPECIFIED: Primary | ICD-10-CM

## 2020-06-12 DIAGNOSIS — M54.40 CHRONIC MIDLINE LOW BACK PAIN WITH SCIATICA, SCIATICA LATERALITY UNSPECIFIED: Primary | ICD-10-CM

## 2020-06-16 RX ORDER — TRAMADOL HYDROCHLORIDE 50 MG/1
TABLET ORAL
Qty: 100 TABLET | Refills: 5 | Status: SHIPPED | OUTPATIENT
Start: 2020-06-16 | End: 2020-07-10

## 2020-06-16 NOTE — TELEPHONE ENCOUNTER
Routing refill request to provider for review/approval because:  Drug not on the [facility] refill protocol     LOV: 5/19/2020  Katty Douglas RN on 6/16/2020 at 8:42 AM

## 2020-07-02 ENCOUNTER — TRANSFERRED RECORDS (OUTPATIENT)
Dept: HEALTH INFORMATION MANAGEMENT | Facility: OTHER | Age: 65
End: 2020-07-02

## 2020-07-10 ENCOUNTER — VIRTUAL VISIT (OUTPATIENT)
Dept: PEDIATRICS | Facility: OTHER | Age: 65
End: 2020-07-10
Attending: INTERNAL MEDICINE
Payer: COMMERCIAL

## 2020-07-10 VITALS
WEIGHT: 227 LBS | TEMPERATURE: 97.6 F | DIASTOLIC BLOOD PRESSURE: 84 MMHG | SYSTOLIC BLOOD PRESSURE: 129 MMHG | HEART RATE: 68 BPM | BODY MASS INDEX: 32.11 KG/M2

## 2020-07-10 DIAGNOSIS — F40.240 CLAUSTROPHOBIA: ICD-10-CM

## 2020-07-10 DIAGNOSIS — H43.819 PVD (POSTERIOR VITREOUS DETACHMENT), UNSPECIFIED LATERALITY: ICD-10-CM

## 2020-07-10 DIAGNOSIS — Z98.890 HX OF DECOMPRESSIVE LUMBAR LAMINECTOMY: ICD-10-CM

## 2020-07-10 DIAGNOSIS — M25.552 HIP PAIN, LEFT: Primary | ICD-10-CM

## 2020-07-10 PROCEDURE — 99213 OFFICE O/P EST LOW 20 MIN: CPT | Mod: 95 | Performed by: INTERNAL MEDICINE

## 2020-07-10 RX ORDER — ALPRAZOLAM 0.25 MG
TABLET ORAL
Qty: 2 TABLET | Refills: 0 | Status: SHIPPED | OUTPATIENT
Start: 2020-07-10 | End: 2020-10-03

## 2020-07-10 NOTE — PROGRESS NOTES
"Rj Juarez is a 64 year old male who is being evaluated via a billable telephone visit.      The patient has been notified of following:     \"This telephone visit will be conducted via a call between you and your physician/provider. We have found that certain health care needs can be provided without the need for a physical exam.  This service lets us provide the care you need with a short phone conversation.  If a prescription is necessary we can send it directly to your pharmacy.  If lab work is needed we can place an order for that and you can then stop by our lab to have the test done at a later time.    Telephone visits are billed at different rates depending on your insurance coverage. During this emergency period, for some insurers they may be billed the same as an in-person visit.  Please reach out to your insurance provider with any questions.    If during the course of the call the physician/provider feels a telephone visit is not appropriate, you will not be charged for this service.\"    Patient has given verbal consent for Telephone visit?  Yes    What phone number would you like to be contacted at? [phone]    How would you like to obtain your AVS? MyChart & mail a copy     Subjective     Rj Juarez is a 64 year old male who presents via phone visit today for the following health issues:    Had surgery 5/26.  At 5 week f/u, Dr. Wray recommended some other things done in PCP.  Surgery was successful, still having hip problems.  Having pain in hip area.  Probably more involvement in hip joint.  Dr. Wray recommending an MRI and injection.  Pain from groin to knee on left side  Has application for disability parking.  Starting PT on 7/15  BPs 125-140 systolic  After mowing the grass, some nerve pain in ankles has returned.  Burning of skin surface in hip area  Rash near surgery site, itchy.    Reviewed and updated as needed this visit by Provider         Review of Systems "   Constitutional, HEENT, cardiovascular, pulmonary, gi and gu systems are negative, except as otherwise noted.       Objective   Reported vitals:  /84   Pulse 68   Temp 97.6  F (36.4  C)   Wt 103 kg (227 lb)   BMI 32.11 kg/m     healthy, alert and no distress  PSYCH: Alert and oriented times 3; coherent speech, normal   rate and volume, able to articulate logical thoughts, able   to abstract reason, no tangential thoughts, no hallucinations   or delusions  His affect is normal  RESP: No cough, no audible wheezing, able to talk in full sentences  Remainder of exam unable to be completed due to telephone visits    Diagnostic Test Results:  Labs reviewed in [facility]  Labs:  Lab Results   Component Value Date    WBC 6.4 05/19/2020    HGB 15.0 05/19/2020    HCT 44.8 05/19/2020     05/19/2020    CHOL 146 11/08/2019    TRIG 146 11/08/2019    HDL 33 11/08/2019    ALT 9 12/09/2019    AST 9 (L) 12/09/2019     05/19/2020    BUN 16 05/19/2020    CO2 32 (H) 05/19/2020       Glucose   Date Value Ref Range Status   05/19/2020 144 (H) 70 - 105 mg/dL Final   12/10/2019 133 (A) 60 - 99 mg/dL Final     Hemoglobin A1C   Date Value Ref Range Status   05/19/2020 6.3 (H) 4.0 - 6.0 % Final   08/22/2019 6.4 (H) 4.0 - 6.0 % Final     Creatinine   Date Value Ref Range Status   05/19/2020 0.96 0.70 - 1.30 mg/dL Final   12/10/2019 1.05 0.80 - 1.50 mg/dL Final     LDL Cholesterol Calculated   Date Value Ref Range Status   11/08/2019 84 <100 mg/dL Final     No results found for: [facility]                Assessment/Plan:    ICD-10-CM    1. Hip pain, left  M25.552 MR Hip Left w/o Contrast     XR Joint Injection Major Left   2. PVD (posterior vitreous detachment), unspecified laterality  H43.819    3. Claustrophobia  F40.240 ALPRAZolam (XANAX) 0.25 MG tablet   4. Hx of decompressive lumbar laminectomy  Z98.890       --MRI hip, injection of hip as recommended by Dr. Chavez   --Premedication with alprazolam for claustrophobia   --Agree  with physical therapy as previously scheduled   --Encouraged him to follow restrictions as recommended by his surgeon   --I will complete parking permit when available to me   --Blood pressures pretty close, no adjustments at this time      Phone call duration:  20 minutes    Signed, Doug Porras MD, FAAP, FACP  Internal Medicine & Pediatrics

## 2020-07-10 NOTE — NURSING NOTE
"Chief Complaint   Patient presents with     Referral     Post back surgery      Patient had L3- L5 decompression on 5/26/2020. Patient states he was told by his surgeon that he could resume normal activity. Patient thinks he overdid it and is now having pain again. He also states the surgeon told him he qualified for a disability parking permit but recommended that Dr. Porras fill the form out. He has the form and his portion of the form filled out. Wondering if he can drop the form off for Dr. Porras to fill out the rest?     Initial /84   Pulse 68   Temp 97.6  F (36.4  C)   Wt 103 kg (227 lb)   BMI 32.11 kg/m   Estimated body mass index is 32.11 kg/m  as calculated from the following:    Height as of 5/19/20: 1.791 m (5' 10.5\").    Weight as of this encounter: 103 kg (227 lb).  Medication Reconciliation: complete    Zoie Wladen MA  "

## 2020-07-15 ENCOUNTER — TRANSFERRED RECORDS (OUTPATIENT)
Dept: HEALTH INFORMATION MANAGEMENT | Facility: OTHER | Age: 65
End: 2020-07-15

## 2020-07-17 ENCOUNTER — HOSPITAL ENCOUNTER (OUTPATIENT)
Dept: MRI IMAGING | Facility: OTHER | Age: 65
End: 2020-07-17
Attending: INTERNAL MEDICINE
Payer: MEDICARE

## 2020-07-17 ENCOUNTER — HOSPITAL ENCOUNTER (OUTPATIENT)
Dept: GENERAL RADIOLOGY | Facility: OTHER | Age: 65
End: 2020-07-17
Attending: INTERNAL MEDICINE
Payer: MEDICARE

## 2020-07-17 DIAGNOSIS — M25.552 HIP PAIN, LEFT: ICD-10-CM

## 2020-07-17 PROBLEM — M94.252 CHONDROMALACIA, LEFT HIP: Status: ACTIVE | Noted: 2020-07-17

## 2020-07-17 PROCEDURE — 25000128 H RX IP 250 OP 636: Performed by: RADIOLOGY

## 2020-07-17 PROCEDURE — 25500064 ZZH RX 255 OP 636: Performed by: RADIOLOGY

## 2020-07-17 PROCEDURE — 73721 MRI JNT OF LWR EXTRE W/O DYE: CPT | Mod: LT

## 2020-07-17 PROCEDURE — 20610 DRAIN/INJ JOINT/BURSA W/O US: CPT | Mod: LT

## 2020-07-17 PROCEDURE — 25000125 ZZHC RX 250: Performed by: RADIOLOGY

## 2020-07-17 RX ORDER — BUPIVACAINE HYDROCHLORIDE 5 MG/ML
3 INJECTION, SOLUTION EPIDURAL; INTRACAUDAL ONCE
Status: COMPLETED | OUTPATIENT
Start: 2020-07-17 | End: 2020-07-17

## 2020-07-17 RX ORDER — LIDOCAINE HYDROCHLORIDE 10 MG/ML
2 INJECTION, SOLUTION EPIDURAL; INFILTRATION; INTRACAUDAL; PERINEURAL ONCE
Status: COMPLETED | OUTPATIENT
Start: 2020-07-17 | End: 2020-07-17

## 2020-07-17 RX ORDER — TRIAMCINOLONE ACETONIDE 40 MG/ML
40 INJECTION, SUSPENSION INTRA-ARTICULAR; INTRAMUSCULAR ONCE
Status: COMPLETED | OUTPATIENT
Start: 2020-07-17 | End: 2020-07-17

## 2020-07-17 RX ADMIN — BUPIVACAINE HYDROCHLORIDE 3 ML: 5 INJECTION, SOLUTION EPIDURAL; INTRACAUDAL at 10:20

## 2020-07-17 RX ADMIN — IOHEXOL 2 ML: 240 INJECTION, SOLUTION INTRATHECAL; INTRAVASCULAR; INTRAVENOUS; ORAL at 10:21

## 2020-07-17 RX ADMIN — TRIAMCINOLONE ACETONIDE 40 MG: 40 INJECTION, SUSPENSION INTRA-ARTICULAR; INTRAMUSCULAR at 10:21

## 2020-07-17 RX ADMIN — LIDOCAINE HYDROCHLORIDE 2 ML: 10 INJECTION, SOLUTION INFILTRATION; PERINEURAL at 10:21

## 2020-07-28 ENCOUNTER — OFFICE VISIT (OUTPATIENT)
Dept: ORTHOPEDICS | Facility: OTHER | Age: 65
End: 2020-07-28
Attending: ORTHOPAEDIC SURGERY
Payer: COMMERCIAL

## 2020-07-28 VITALS
WEIGHT: 216 LBS | DIASTOLIC BLOOD PRESSURE: 110 MMHG | HEART RATE: 80 BPM | SYSTOLIC BLOOD PRESSURE: 162 MMHG | BODY MASS INDEX: 30.55 KG/M2

## 2020-07-28 DIAGNOSIS — M25.552 LEFT HIP PAIN: Primary | ICD-10-CM

## 2020-07-28 PROCEDURE — G0463 HOSPITAL OUTPT CLINIC VISIT: HCPCS

## 2020-07-28 PROCEDURE — 99213 OFFICE O/P EST LOW 20 MIN: CPT | Performed by: ORTHOPAEDIC SURGERY

## 2020-07-28 NOTE — PROGRESS NOTES
Patient is here for consult on his left hip pain.  Roya Richardson LPN .....................7/28/2020 2:23 PM

## 2020-07-29 NOTE — PROGRESS NOTES
Visit Date:   07/28/2020      REASON FOR EVALUATION:  Left hip pain.      INTERVAL HISTORY:  Hector comes in with regards to left hip pain that was getting progressively worse here over time.   Underwent back surgery as well as recent heart surgery.  He also had a recent hip injection done on the 17th.  Reports significant increase in pain, difficulty with walking.  He had an MRI scan done here recently that showed advanced arthritic changes in the hip joint itself with subluxation.  Right hip does show some hip arthritis as well.  Back surgery has helped out to some extent, but it did not really help much in the way of alleviation on the hip.  He would like to revisit using ibuprofen at this point in time.  He is here for further dressing and assessment at this point in time.      The patient had an MI as well that necessitated stent placement.  He is not on any blood thinners for that, just using simple baby aspirin at this point in time.  Other health status is in reasonably good shape.  Dr. Porras does do his cares at this point in time.  He is here to look at his options for definitive fixation.      ALLERGIES:  UPDATED, AND ARE NOTED TO BE CIPROFLOXACIN AS WELL AS MIDAZOLAM.      MEDICATIONS:  Reviewed.      REVIEW OF SYSTEMS:  12-point otherwise negative with the exception as stated above.      PHYSICAL EXAMINATION:  The patient is 5 feet 10 inches, 266 pounds, BMI 32.  He is alert and oriented x 3, cooperative with exam, in no acute distress.  He does ambulate with some significant gait antalgia.  Affect is appropriate here as well.  Examination of the left hip shows hip flexion 90, internal rotation 0, external rotation of 15, abduction about 15,  weakness on hip flexion as well as with internal rotation at this point in time.  Neurovascular examination otherwise intact.  Dorsalis pedis pulse otherwise 2+ here in addition to that.      MRI scan reviewed and shows advanced arthritic changes, grade 4 in nature.       IMPRESSION:  Left hip pain, endstage osteoarthrosis with previous history of back pathology and recent MI with stent placement.      PLAN:  At this time, we have discussed the options.  I advised him on left direct anterior total hip replacement.  Given his recent cardiac history, would advise for [facility] for the intervention.  We will look at September timeframe given that he had recent cortisone and like to wait 2 months from his injection.  It was done on the .  I will have Sophie coordinate planning for him moving forward.  He is in agreement with that plan.  We will plan for a couple-day stay, transition to aspirin postoperatively and then further cares done at [facility].                  SHANA MONTELONGO MD             D: 2020   T: 2020   MT:       Name:     JEFE JACOB   MRN:      [id number]        Account:      [id number]   :      1955           Visit Date:   2020      Document: [id number]

## 2020-09-02 ENCOUNTER — OFFICE VISIT (OUTPATIENT)
Dept: PEDIATRICS | Facility: OTHER | Age: 65
End: 2020-09-02
Attending: INTERNAL MEDICINE
Payer: COMMERCIAL

## 2020-09-02 ENCOUNTER — TELEPHONE (OUTPATIENT)
Dept: PEDIATRICS | Facility: OTHER | Age: 65
End: 2020-09-02

## 2020-09-02 VITALS
SYSTOLIC BLOOD PRESSURE: 128 MMHG | HEIGHT: 71 IN | HEART RATE: 75 BPM | DIASTOLIC BLOOD PRESSURE: 76 MMHG | BODY MASS INDEX: 30.38 KG/M2 | WEIGHT: 217 LBS | RESPIRATION RATE: 16 BRPM | TEMPERATURE: 96.3 F | OXYGEN SATURATION: 97 %

## 2020-09-02 DIAGNOSIS — G47.33 OSA ON CPAP: Chronic | ICD-10-CM

## 2020-09-02 DIAGNOSIS — M25.552 ARTHRALGIA OF LEFT HIP: ICD-10-CM

## 2020-09-02 DIAGNOSIS — Z01.818 PRE-OP EXAM: Primary | ICD-10-CM

## 2020-09-02 DIAGNOSIS — I10 ESSENTIAL HYPERTENSION: Chronic | ICD-10-CM

## 2020-09-02 DIAGNOSIS — I25.10 CAD, MULTIPLE VESSEL: Chronic | ICD-10-CM

## 2020-09-02 DIAGNOSIS — R73.03 PRE-DIABETES: Chronic | ICD-10-CM

## 2020-09-02 LAB
ALBUMIN UR-MCNC: NEGATIVE MG/DL
ANION GAP SERPL CALCULATED.3IONS-SCNC: 4 MMOL/L (ref 3–14)
APPEARANCE UR: CLEAR
BILIRUB UR QL STRIP: NEGATIVE
BUN SERPL-MCNC: 11 MG/DL (ref 7–25)
CALCIUM SERPL-MCNC: 9.5 MG/DL (ref 8.6–10.3)
CHLORIDE SERPL-SCNC: 103 MMOL/L (ref 98–107)
CO2 SERPL-SCNC: 32 MMOL/L (ref 21–31)
COLOR UR AUTO: YELLOW
CREAT SERPL-MCNC: 0.93 MG/DL (ref 0.7–1.3)
ERYTHROCYTE [DISTWIDTH] IN BLOOD BY AUTOMATED COUNT: 12.6 % (ref 10–15)
GFR SERPL CREATININE-BSD FRML MDRD: 81 ML/MIN/{1.73_M2}
GLUCOSE SERPL-MCNC: 129 MG/DL (ref 70–105)
GLUCOSE UR STRIP-MCNC: NEGATIVE MG/DL
HBA1C MFR BLD: 6.1 % (ref 4–6)
HCT VFR BLD AUTO: 47.8 % (ref 40–53)
HGB BLD-MCNC: 15.6 G/DL (ref 13.3–17.7)
HGB UR QL STRIP: NEGATIVE
INTERPRETATION ECG - MUSE: NORMAL
KETONES UR STRIP-MCNC: NEGATIVE MG/DL
LEUKOCYTE ESTERASE UR QL STRIP: NEGATIVE
MCH RBC QN AUTO: 29.9 PG (ref 26.5–33)
MCHC RBC AUTO-ENTMCNC: 32.6 G/DL (ref 31.5–36.5)
MCV RBC AUTO: 92 FL (ref 78–100)
NITRATE UR QL: NEGATIVE
PH UR STRIP: 6.5 PH (ref 5–7)
PLATELET # BLD AUTO: 252 10E9/L (ref 150–450)
POTASSIUM SERPL-SCNC: 4.4 MMOL/L (ref 3.5–5.1)
RBC # BLD AUTO: 5.22 10E12/L (ref 4.4–5.9)
SODIUM SERPL-SCNC: 139 MMOL/L (ref 134–144)
SOURCE: NORMAL
SP GR UR STRIP: 1.01 (ref 1–1.03)
UROBILINOGEN UR STRIP-MCNC: NORMAL MG/DL (ref 0–2)
WBC # BLD AUTO: 5.2 10E9/L (ref 4–11)

## 2020-09-02 PROCEDURE — 36415 COLL VENOUS BLD VENIPUNCTURE: CPT | Mod: ZL | Performed by: INTERNAL MEDICINE

## 2020-09-02 PROCEDURE — G0463 HOSPITAL OUTPT CLINIC VISIT: HCPCS

## 2020-09-02 PROCEDURE — 81003 URINALYSIS AUTO W/O SCOPE: CPT | Mod: ZL | Performed by: INTERNAL MEDICINE

## 2020-09-02 PROCEDURE — 99214 OFFICE O/P EST MOD 30 MIN: CPT | Performed by: INTERNAL MEDICINE

## 2020-09-02 PROCEDURE — 80048 BASIC METABOLIC PNL TOTAL CA: CPT | Mod: ZL | Performed by: INTERNAL MEDICINE

## 2020-09-02 PROCEDURE — 85027 COMPLETE CBC AUTOMATED: CPT | Mod: ZL | Performed by: INTERNAL MEDICINE

## 2020-09-02 PROCEDURE — 83036 HEMOGLOBIN GLYCOSYLATED A1C: CPT | Mod: ZL | Performed by: INTERNAL MEDICINE

## 2020-09-02 PROCEDURE — 93010 ELECTROCARDIOGRAM REPORT: CPT | Performed by: INTERNAL MEDICINE

## 2020-09-02 PROCEDURE — G0463 HOSPITAL OUTPT CLINIC VISIT: HCPCS | Mod: 25

## 2020-09-02 PROCEDURE — 93005 ELECTROCARDIOGRAM TRACING: CPT

## 2020-09-02 ASSESSMENT — PATIENT HEALTH QUESTIONNAIRE - PHQ9: SUM OF ALL RESPONSES TO PHQ QUESTIONS 1-9: 0

## 2020-09-02 ASSESSMENT — MIFFLIN-ST. JEOR: SCORE: 1778.5

## 2020-09-02 NOTE — TELEPHONE ENCOUNTER
Disability parking permit form mailed to [place] [employer].     Zoie Walden CMA on 9/2/2020 at 1:37 PM

## 2020-09-02 NOTE — PROGRESS NOTES
"Date of Surgery: 9/18/2020  Type of Surgery: Left total hip arthroplasty   Surgeon: Wade Ochoa MD   Hospital:  [facility]   Fax: [phone]    Fever/Chills or other infectious symptoms in past month: no  >10lb weight loss in past two months: no  O2 SAT:     Health Care Directive/Code status: No   Hx of blood transfusions:   no  Td up to date:  yes  History of VRE/MRSA:  no     Preoperative Evaluation: Obstructive Sleep Apnea screening    S: Snore -  Do you snore loudly? (louder than talking or loud enough to be heard through closed doors)yes  T: Tired - Do you often feel tired, fatigued, or sleepy during the daytime?yes  O: Observed - Has anyone ever observed you stop breathing during your sleep?no  P: Pressure - Do you have or are you being treated for high blood pressure?yes  B: BMI - BMI greater than 35kg/m2?no  A: Age - Age over 50 years old?yes  N: Neck - Neck circumference greater than 40 cm?no  G: Gender - Gender: Male?yes    Total number of \"YES\" responses:  5    Scoring: Low risk of FARNAZ 0-2  At Risk of FARNAZ: >3 High Risk of FARNAZ: 5-8    Zoie Walden MA 9/2/2020 11:10 AM    "

## 2020-09-02 NOTE — NURSING NOTE
"Chief Complaint   Patient presents with     Pre-Op Exam     9/18 left hip      Date of Surgery: 9/18/2020  Type of Surgery: Left total hip arthroplasty   Surgeon: Wade Ochoa MD   Hospital:  [facility]   Fax: [phone]    Initial There were no vitals taken for this visit. Estimated body mass index is 30.55 kg/m  as calculated from the following:    Height as of 5/19/20: 1.791 m (5' 10.5\").    Weight as of 7/28/20: 98 kg (216 lb).  Medication Reconciliation: complete    Zoie Walden MA  "

## 2020-09-02 NOTE — PROGRESS NOTES
"PREOPERATIVE HISTORY & PHYSICAL  Date of Exam: 9/2/2020  Chief Complaint   Patient presents with     Pre-Op Exam     9/18 left hip        Nursing Notes:   Zoie Walden MA  9/2/2020 11:35 AM  Signed  Chief Complaint   Patient presents with     Pre-Op Exam     9/18 left hip      Date of Surgery: 9/18/2020  Type of Surgery: Left total hip arthroplasty   Surgeon: Wade Ochoa MD   Hospital:  [facility]   Fax: [phone]    Initial There were no vitals taken for this visit. Estimated body mass index is 30.55 kg/m  as calculated from the following:    Height as of 5/19/20: 1.791 m (5' 10.5\").    Weight as of 7/28/20: 98 kg (216 lb).  Medication Reconciliation: complete    Zoie Walden MA    HPI:  I was asked to see Mr. Rj Juarez by Dr. Ochoa for preoperative management of pre-diabetes.    Rj Juarez is a 64 year old male with a history of prediabetes, coronary disease status post angioplasty with stent, lumbar radiculopathy status post decompressive laminectomy here for preoperative examination.  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    Patient Active Problem List    Diagnosis Date Noted     Chondromalacia, left hip 07/17/2020     Priority: Medium     Hx of decompressive lumbar laminectomy 07/10/2020     Priority: Medium     Lumbar stenosis with neurogenic claudication 05/19/2020     Priority: Medium     Status post cardiac catheterization on 12/6/2019 through [facility] with a stenting to his distal circumflex 12/24/2019     Priority: Medium     History of cardiac cath on 12/6/2019 12/24/2019     Priority: Medium     Chronic midline low back pain with sciatica, sciatica laterality unspecified 12/24/2019     Priority: Medium     ASCVD (arteriosclerotic cardiovascular disease) 12/03/2019     Priority: Medium     High coronary artery calcium score at 1118.2 on 11/21/2019 11/27/2019     Priority: Medium     Added " automatically from request for surgery [id number]       CAD, multiple vessel 11/27/2019     Priority: Medium     Added automatically from request for surgery [id number]       GERD 11/19/2019     Priority: Medium     FARNAZ on CPAP 08/22/2019     Priority: Medium     Essential hypertension 08/22/2019     Priority: Medium     Pre-diabetes 08/22/2019     Priority: Medium     NSAID long-term use 08/22/2019     Priority: Medium     Primary osteoarthritis of right knee  04/09/2019     Priority: Medium     Effusion of right knee 04/09/2019     Priority: Medium     Posterior knee pain, right 04/09/2019     Priority: Medium     Elevated prostate specific antigen (PSA) 11/29/2018     Priority: Medium     Lumbar radiculopathy 02/20/2017     Priority: Medium     Benign essential tremor 03/18/2016     Priority: Medium     Irritable bowel syndrome with diarrhea 03/18/2016     Priority: Medium     H/O adenomatous polyp of colon 12/17/2009     Priority: Medium     Past Medical History:   Diagnosis Date     Benign lipomatous neoplasm     1/20/2014     Calculus of kidney     possible     Carpal tunnel syndrome     Both hands     Closed fracture of patella     05/06/09,Sustained right superior pole patellar fracture which underwent conservative management     Diverticulosis of large intestine without perforation or abscess without bleeding     1/28/2014     Exertional chest pain 11/19/2019     Headache     No Comments Provided     Other specified forms of tremor     3/2/2011     Other urticaria (CODE)     3/2/2011     Pain in joint     No Comments Provided     Umbilical hernia without obstruction or gangrene     1/26/2018     Past Surgical History:   Procedure Laterality Date     COLONOSCOPY  01/28/2014 2009,2014,F/U 2019     COLONOSCOPY  03/01/2019    Serrated adenoma, follow up 1 year     ESOPHAGOSCOPY, GASTROSCOPY, DUODENOSCOPY (EGD), COMBINED      1/28/14,EGD     OTHER SURGICAL HISTORY      9/10/2014,17805.0,[place] REPAIR ING HERNIA  >5 TRS  BETTINA     OTHER SURGICAL HISTORY      2018,44489.0,[place] REPAIR UMBILICAL NAZARIO  >5 TRS REDUC     RELEASE CARPAL TUNNEL      3,2004,Both hands     SIGMOIDOSCOPY FLEXIBLE      No Comments Provided     Current Outpatient Medications   Medication Sig Dispense Refill     ALPRAZolam (XANAX) 0.25 MG tablet 1 tablet 30-60 minutes prior to procedure 2 tablet 0     aspirin (ASA) 81 MG tablet Take 1 tablet (81 mg) by mouth daily       metoprolol succinate ER (TOPROL-XL) 25 MG 24 hr tablet Take 1 tablet (25 mg) by mouth daily 90 tablet 3     Multiple Vitamins-Minerals (EYE VITAMINS PO)        nitroGLYcerin (NITROSTAT) 0.4 MG sublingual tablet For chest pain place 1 tablet under the tongue every 5 minutes for 3 doses. If symptoms persist 5 minutes after 1st dose call 911. 25 tablet 3     order for DME Walker, 4ww with seat. Spinal stenosis. 99. 1 each 11     rosuvastatin (CRESTOR) 20 MG tablet Take 1 tablet (20 mg) by mouth daily 90 tablet 3     triamcinolone (KENALOG) 0.1 % cream Apply 1 Film topically 3 times daily       Allergies   Allergen Reactions     Ciprofloxacin Other (See Comments)     Tendon issue , and IBS     Midazolam      Other reaction(s): Other - Describe In Comment Field  Difficult to wake up     Family History   Problem Relation Age of Onset     Hypertension Mother         Hypertension     Diabetes Mother         Diabetes     Other - See Comments Mother         Spinal stenosis     Other - See Comments Father         scleroderma which was quite severe     Cancer Father         Cancer, of adenocarcinoma of the lung.  He was a nonsmoker.     Diabetes Brother         Diabetes     Other - See Comments Brother         Pancreatitis     Substance Abuse Brother         Alcohol/Drug,Alcoholic, has been through treatment a number of times.     Social History     Socioeconomic History     Marital status:      Spouse name: None     Number of children: None     Years of education: None     Highest education  "level: None   Occupational History     None   Social Needs     Financial resource strain: None     Food insecurity     Worry: None     Inability: None     Transportation needs     Medical: None     Non-medical: None   Tobacco Use     Smoking status: Never Smoker     Smokeless tobacco: Never Used   Substance and Sexual Activity     Alcohol use: No     Drug use: No     Sexual activity: Not Currently   Lifestyle     Physical activity     Days per week: None     Minutes per session: None     Stress: None   Relationships     Social connections     Talks on phone: None     Gets together: None     Attends Zoroastrian service: None     Active member of club or organization: None     Attends meetings of clubs or organizations: None     Relationship status: None     Intimate partner violence     Fear of current or ex partner: None     Emotionally abused: None     Physically abused: None     Forced sexual activity: None   Other Topics Concern     Parent/sibling w/ CABG, MI or angioplasty before 65F 55M? Not Asked   Social History Narrative    .      2 children.  Both children grown and living elsewhere.      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       REVIEW OF SYSTEMS:  Review of Systems:  Skin: Negative  Eyes: Negative  Ears/Nose/Throat: Negative  Respiratory: Negative  Cardiovascular: Negative  Gastrointestinal: Negative  Genitourinary: Negative  Musculoskeletal: See HPI  Neurologic: Negative  Psychiatric: Negative  Hematologic/Lymphatic/Immunologic: Negative  Endocrine: Negative      EXAM:   Vitals: reviewed in EMR.  /76 (BP Location: Right arm, Patient Position: Sitting, Cuff Size: Adult Large)   Pulse 75   Temp 96.3  F (35.7  C) (Tympanic)   Resp 16   Ht 1.791 m (5' 10.5\")   Wt 98.4 kg (217 lb)   SpO2 97%   BMI 30.70 kg/m      Gen: Pleasant male, NAD.  HEENT: MMM, no OP erythema.   Neck: Supple, no JVD, no bruits.  CV: RRR no m/r/g.   Pulm: CTAB " no w/r/r  Neuro: Grossly intact  Msk: No lower extremity edema.  Skin: No concerning lesions.  Psychiatric: Normal affect and insight. Does not appear anxious or depressed.    DIAGNOSTICS:     Results for orders placed or performed in visit on 09/02/20 (from the past 48 hour(s))   EKG 12-lead, tracing only   Result Value Ref Range    Interpretation ECG Click View Image link to view waveform and result    UA reflex to Microscopic and Culture    Specimen: Midstream Urine   Result Value Ref Range    Color Urine Yellow     Appearance Urine Clear     Glucose Urine Negative NEG^Negative mg/dL    Bilirubin Urine Negative NEG^Negative    Ketones Urine Negative NEG^Negative mg/dL    Specific Gravity Urine 1.009 1.003 - 1.035    Blood Urine Negative NEG^Negative    pH Urine 6.5 5.0 - 7.0 pH    Protein Albumin Urine Negative NEG^Negative mg/dL    Urobilinogen mg/dL Normal 0.0 - 2.0 mg/dL    Nitrite Urine Negative NEG^Negative    Leukocyte Esterase Urine Negative NEG^Negative    Source Midstream Urine    Hemoglobin A1c   Result Value Ref Range    Hemoglobin A1C 6.1 (H) 4.0 - 6.0 %   Basic metabolic panel   Result Value Ref Range    Sodium 139 134 - 144 mmol/L    Potassium 4.4 3.5 - 5.1 mmol/L    Chloride 103 98 - 107 mmol/L    Carbon Dioxide 32 (H) 21 - 31 mmol/L    Anion Gap 4 3 - 14 mmol/L    Glucose 129 (H) 70 - 105 mg/dL    Urea Nitrogen 11 7 - 25 mg/dL    Creatinine 0.93 0.70 - 1.30 mg/dL    GFR Estimate 81 >60 mL/min/[1.73_m2]    GFR Estimate If Black >90 >60 mL/min/[1.73_m2]    Calcium 9.5 8.6 - 10.3 mg/dL   CBC with platelets   Result Value Ref Range    WBC 5.2 4.0 - 11.0 10e9/L    RBC Count 5.22 4.4 - 5.9 10e12/L    Hemoglobin 15.6 13.3 - 17.7 g/dL    Hematocrit 47.8 40.0 - 53.0 %    MCV 92 78 - 100 fl    MCH 29.9 26.5 - 33.0 pg    MCHC 32.6 31.5 - 36.5 g/dL    RDW 12.6 10.0 - 15.0 %    Platelet Count 252 150 - 450 10e9/L             IMPRESSION:     ICD-10-CM    1. Pre-op exam  Z01.818 EKG 12-lead, tracing only     CBC  with platelets     Basic metabolic panel     Hemoglobin A1c     UA reflex to Microscopic and Culture     Asymptomatic COVID-19 Virus (Coronavirus) by PCR     Hemoglobin A1c     Basic metabolic panel     CBC with platelets   2. Arthralgia of left hip  M25.552    3. Pre-diabetes  R73.03 Hemoglobin A1c     Hemoglobin A1c   4. FARNAZ on CPAP  G47.33     Z99.89    5. Essential hypertension  I10    6. CAD, multiple vessel  I25.10        For above listed surgery and anesthesia:   Patient is at increased risk for perioperative complications based on prediabetes, obstructive sleep apnea on CPAP, coronary artery disease status post angioplasty with stent, obesity, age.    RECOMMENDATIONS:   APPROVAL GIVEN to proceed with proposed procedure, without further diagnostic evaluation    Patient is on chronic pain medications: No  Patient is on antiplatelet/anticoagulation: No  Other medications that need adjustment perioperatively: No  Patient Instructions    -- COVID 19 test 3 days before surgery   -- Hold aspirin for 5-7 days before surgery   -- Hold ibuprofen/Advil for 2-3 days before surgery   -- Hold naproxen/Aleve for 5-7 days before surgery   -- Acetaminophen (Tylenol) is okay   -- Hold vitamins and herbal remedies for 7 days before surgery        Signed, Doug Porras MD, FAAP, FACP  Internal Medicine & Pediatrics    See Dr. Ochoa, [facility]

## 2020-09-02 NOTE — Clinical Note
HIM: Please send a copy of my H&P/note, last EKG scan and report.    Signed, Doug Porras MD, FAAP, FACP  Internal Medicine & Pediatrics

## 2020-09-02 NOTE — PATIENT INSTRUCTIONS
-- COVID 19 test 3 days before surgery   -- Hold aspirin for 5-7 days before surgery   -- Hold ibuprofen/Advil for 2-3 days before surgery   -- Hold naproxen/Aleve for 5-7 days before surgery   -- Acetaminophen (Tylenol) is okay   -- Hold vitamins and herbal remedies for 7 days before surgery

## 2020-09-14 ENCOUNTER — ALLIED HEALTH/NURSE VISIT (OUTPATIENT)
Dept: FAMILY MEDICINE | Facility: OTHER | Age: 65
End: 2020-09-14
Attending: INTERNAL MEDICINE
Payer: MEDICARE

## 2020-09-14 DIAGNOSIS — Z01.818 PRE-OP EXAM: ICD-10-CM

## 2020-09-14 PROCEDURE — U0003 INFECTIOUS AGENT DETECTION BY NUCLEIC ACID (DNA OR RNA); SEVERE ACUTE RESPIRATORY SYNDROME CORONAVIRUS 2 (SARS-COV-2) (CORONAVIRUS DISEASE [COVID-19]), AMPLIFIED PROBE TECHNIQUE, MAKING USE OF HIGH THROUGHPUT TECHNOLOGIES AS DESCRIBED BY CMS-2020-01-R: HCPCS | Mod: ZL | Performed by: INTERNAL MEDICINE

## 2020-09-14 PROCEDURE — C9803 HOPD COVID-19 SPEC COLLECT: HCPCS

## 2020-09-14 PROCEDURE — 99207 ZZC NO CHARGE NURSE ONLY: CPT

## 2020-09-14 NOTE — NURSING NOTE
Patient swabbed for COVID-19 testing for surgery.       Bryant Anne LPN on 9/14/2020 at 11:24 AM

## 2020-09-15 LAB
SARS-COV-2 RNA SPEC QL NAA+PROBE: NOT DETECTED
SPECIMEN SOURCE: NORMAL

## 2020-09-18 ENCOUNTER — TRANSFERRED RECORDS (OUTPATIENT)
Dept: HEALTH INFORMATION MANAGEMENT | Facility: OTHER | Age: 65
End: 2020-09-18

## 2020-09-20 ENCOUNTER — HOSPITAL ENCOUNTER (EMERGENCY)
Facility: OTHER | Age: 65
Discharge: HOME | End: 2020-09-20
Attending: PHYSICIAN ASSISTANT | Admitting: PHYSICIAN ASSISTANT
Payer: MEDICARE

## 2020-09-20 ENCOUNTER — APPOINTMENT (OUTPATIENT)
Dept: GENERAL RADIOLOGY | Facility: OTHER | Age: 65
End: 2020-09-20
Attending: PHYSICIAN ASSISTANT
Payer: MEDICARE

## 2020-09-20 ENCOUNTER — NURSE TRIAGE (OUTPATIENT)
Dept: NURSING | Facility: CLINIC | Age: 65
End: 2020-09-20

## 2020-09-20 VITALS
RESPIRATION RATE: 16 BRPM | HEIGHT: 70 IN | WEIGHT: 219 LBS | DIASTOLIC BLOOD PRESSURE: 74 MMHG | SYSTOLIC BLOOD PRESSURE: 118 MMHG | OXYGEN SATURATION: 99 % | TEMPERATURE: 97.8 F | HEART RATE: 76 BPM | BODY MASS INDEX: 31.35 KG/M2

## 2020-09-20 DIAGNOSIS — G89.18 ACUTE POST-OPERATIVE PAIN: ICD-10-CM

## 2020-09-20 LAB
ALBUMIN UR-MCNC: NEGATIVE MG/DL
ANION GAP SERPL CALCULATED.3IONS-SCNC: 5 MMOL/L (ref 3–14)
APPEARANCE UR: CLEAR
BASOPHILS # BLD AUTO: 0.1 10E9/L (ref 0–0.2)
BASOPHILS NFR BLD AUTO: 0.7 %
BILIRUB UR QL STRIP: NEGATIVE
BUN SERPL-MCNC: 15 MG/DL (ref 7–25)
CALCIUM SERPL-MCNC: 8.7 MG/DL (ref 8.6–10.3)
CHLORIDE SERPL-SCNC: 100 MMOL/L (ref 98–107)
CO2 SERPL-SCNC: 29 MMOL/L (ref 21–31)
COLOR UR AUTO: NORMAL
CREAT SERPL-MCNC: 0.87 MG/DL (ref 0.7–1.3)
DIFFERENTIAL METHOD BLD: ABNORMAL
EOSINOPHIL # BLD AUTO: 0.2 10E9/L (ref 0–0.7)
EOSINOPHIL NFR BLD AUTO: 3 %
ERYTHROCYTE [DISTWIDTH] IN BLOOD BY AUTOMATED COUNT: 12.7 % (ref 10–15)
GFR SERPL CREATININE-BSD FRML MDRD: 88 ML/MIN/{1.73_M2}
GLUCOSE SERPL-MCNC: 117 MG/DL (ref 70–105)
GLUCOSE UR STRIP-MCNC: NEGATIVE MG/DL
HCT VFR BLD AUTO: 32.3 % (ref 40–53)
HGB BLD-MCNC: 10.5 G/DL (ref 13.3–17.7)
HGB UR QL STRIP: NEGATIVE
IMM GRANULOCYTES # BLD: 0 10E9/L (ref 0–0.4)
IMM GRANULOCYTES NFR BLD: 0.5 %
KETONES UR STRIP-MCNC: NEGATIVE MG/DL
LEUKOCYTE ESTERASE UR QL STRIP: NEGATIVE
LYMPHOCYTES # BLD AUTO: 1.4 10E9/L (ref 0.8–5.3)
LYMPHOCYTES NFR BLD AUTO: 19.3 %
MCH RBC QN AUTO: 29.8 PG (ref 26.5–33)
MCHC RBC AUTO-ENTMCNC: 32.5 G/DL (ref 31.5–36.5)
MCV RBC AUTO: 92 FL (ref 78–100)
MONOCYTES # BLD AUTO: 1.1 10E9/L (ref 0–1.3)
MONOCYTES NFR BLD AUTO: 14.4 %
NEUTROPHILS # BLD AUTO: 4.5 10E9/L (ref 1.6–8.3)
NEUTROPHILS NFR BLD AUTO: 62.1 %
NITRATE UR QL: NEGATIVE
PH UR STRIP: 5.5 PH (ref 5–7)
PLATELET # BLD AUTO: 210 10E9/L (ref 150–450)
POTASSIUM SERPL-SCNC: 4 MMOL/L (ref 3.5–5.1)
RBC # BLD AUTO: 3.52 10E12/L (ref 4.4–5.9)
SODIUM SERPL-SCNC: 134 MMOL/L (ref 134–144)
SOURCE: NORMAL
SP GR UR STRIP: 1.01 (ref 1–1.03)
UROBILINOGEN UR STRIP-MCNC: NORMAL MG/DL (ref 0–2)
WBC # BLD AUTO: 7.3 10E9/L (ref 4–11)

## 2020-09-20 PROCEDURE — 99283 EMERGENCY DEPT VISIT LOW MDM: CPT | Mod: Z6 | Performed by: PHYSICIAN ASSISTANT

## 2020-09-20 PROCEDURE — 25000132 ZZH RX MED GY IP 250 OP 250 PS 637: Mod: GY | Performed by: PHYSICIAN ASSISTANT

## 2020-09-20 PROCEDURE — 99284 EMERGENCY DEPT VISIT MOD MDM: CPT | Mod: 25 | Performed by: PHYSICIAN ASSISTANT

## 2020-09-20 PROCEDURE — 80048 BASIC METABOLIC PNL TOTAL CA: CPT | Performed by: PHYSICIAN ASSISTANT

## 2020-09-20 PROCEDURE — 71045 X-RAY EXAM CHEST 1 VIEW: CPT

## 2020-09-20 PROCEDURE — 81003 URINALYSIS AUTO W/O SCOPE: CPT | Performed by: PHYSICIAN ASSISTANT

## 2020-09-20 PROCEDURE — 85025 COMPLETE CBC W/AUTO DIFF WBC: CPT | Performed by: PHYSICIAN ASSISTANT

## 2020-09-20 PROCEDURE — 36415 COLL VENOUS BLD VENIPUNCTURE: CPT | Performed by: PHYSICIAN ASSISTANT

## 2020-09-20 RX ORDER — OXYCODONE HYDROCHLORIDE 5 MG/1
10 TABLET ORAL ONCE
Status: COMPLETED | OUTPATIENT
Start: 2020-09-20 | End: 2020-09-20

## 2020-09-20 RX ADMIN — OXYCODONE HYDROCHLORIDE 10 MG: 5 TABLET ORAL at 16:04

## 2020-09-20 ASSESSMENT — ENCOUNTER SYMPTOMS
CHILLS: 1
BRUISES/BLEEDS EASILY: 0
FEVER: 1
CONFUSION: 0
HEMATURIA: 0
WOUND: 0
ABDOMINAL PAIN: 0
CHEST TIGHTNESS: 0
SHORTNESS OF BREATH: 0
ADENOPATHY: 0
BACK PAIN: 0

## 2020-09-20 ASSESSMENT — MIFFLIN-ST. JEOR: SCORE: 1779.63

## 2020-09-20 NOTE — ED PROVIDER NOTES
History     Chief Complaint   Patient presents with     Post-op Problem     HPI  Rj Juarez is a 64 year old male who presents to the ED for evaluation of a post-op problem. Left hip replacement surgery 2 days ago at [facility], released from hospital yesterday. Increased pain, report of fever/chills last night. No fever in the ED. Denies chest pain, sob, abd pain. He does say he has a slight pain when urinating, however he did have a ibanez catheter while in the hospital. Slight cough that has been present for quite a while.     Allergies:  Allergies   Allergen Reactions     Ciprofloxacin Other (See Comments)     Tendon issue , and IBS     Midazolam      Other reaction(s): Other - Describe In Comment Field  Difficult to wake up       Problem List:    Patient Active Problem List    Diagnosis Date Noted     Chondromalacia, left hip 07/17/2020     Priority: Medium     Hx of decompressive lumbar laminectomy 07/10/2020     Priority: Medium     Lumbar stenosis with neurogenic claudication 05/19/2020     Priority: Medium     Status post cardiac catheterization on 12/6/2019 through [facility] with a stenting to his distal circumflex 12/24/2019     Priority: Medium     History of cardiac cath on 12/6/2019 12/24/2019     Priority: Medium     Chronic midline low back pain with sciatica, sciatica laterality unspecified 12/24/2019     Priority: Medium     ASCVD (arteriosclerotic cardiovascular disease) 12/03/2019     Priority: Medium     High coronary artery calcium score at 1118.2 on 11/21/2019 11/27/2019     Priority: Medium     Added automatically from request for surgery [id number]       CAD, multiple vessel 11/27/2019     Priority: Medium     Added automatically from request for surgery [id number]       GERD 11/19/2019     Priority: Medium     FARNAZ on CPAP 08/22/2019     Priority: Medium     Essential hypertension 08/22/2019     Priority: Medium     Pre-diabetes 08/22/2019     Priority: Medium     NSAID long-term use  2019     Priority: Medium     Primary osteoarthritis of right knee  2019     Priority: Medium     Effusion of right knee 2019     Priority: Medium     Posterior knee pain, right 2019     Priority: Medium     Elevated prostate specific antigen (PSA) 2018     Priority: Medium     Lumbar radiculopathy 2017     Priority: Medium     Benign essential tremor 2016     Priority: Medium     Irritable bowel syndrome with diarrhea 2016     Priority: Medium     H/O adenomatous polyp of colon 2009     Priority: Medium        Past Medical History:    Past Medical History:   Diagnosis Date     Benign lipomatous neoplasm      Calculus of kidney      Carpal tunnel syndrome      Closed fracture of patella      Diverticulosis of large intestine without perforation or abscess without bleeding      Exertional chest pain 2019     Headache      Other specified forms of tremor      Other urticaria (CODE)      Pain in joint      Umbilical hernia without obstruction or gangrene        Past Surgical History:    Past Surgical History:   Procedure Laterality Date     COLONOSCOPY  2014,,F/U      COLONOSCOPY  2019    Serrated adenoma, follow up 1 year     ESOPHAGOSCOPY, GASTROSCOPY, DUODENOSCOPY (EGD), COMBINED      14,EGD     OTHER SURGICAL HISTORY      9/10/2014,27711.0,[place] REPAIR ING HERNIA  >5 TRS BETTINA     OTHER SURGICAL HISTORY      2018,00984.0,[place] REPAIR UMBILICAL NAZARIO  >5 TRS REDUC     RELEASE CARPAL TUNNEL      3,2004,Both hands     SIGMOIDOSCOPY FLEXIBLE      No Comments Provided       Family History:    Family History   Problem Relation Age of Onset     Hypertension Mother         Hypertension     Diabetes Mother         Diabetes     Other - See Comments Mother         Spinal stenosis     Other - See Comments Father         scleroderma which was quite severe     Cancer Father         Cancer, of adenocarcinoma of the lung.  He was a  "nonsmoker.     Diabetes Brother         Diabetes     Other - See Comments Brother         Pancreatitis     Substance Abuse Brother         Alcohol/Drug,Alcoholic, has been through treatment a number of times.       Social History:  Marital Status:   [2]  Social History     Tobacco Use     Smoking status: Never Smoker     Smokeless tobacco: Never Used   Substance Use Topics     Alcohol use: No     Drug use: No        Medications:    aspirin (ASA) 81 MG tablet  metoprolol succinate ER (TOPROL-XL) 25 MG 24 hr tablet  Multiple Vitamins-Minerals (EYE VITAMINS PO)  rosuvastatin (CRESTOR) 20 MG tablet  ALPRAZolam (XANAX) 0.25 MG tablet  nitroGLYcerin (NITROSTAT) 0.4 MG sublingual tablet  order for DME  triamcinolone (KENALOG) 0.1 % cream          Review of Systems   Constitutional: Positive for chills and fever.   HENT: Negative for congestion.    Eyes: Negative for visual disturbance.   Respiratory: Negative for chest tightness and shortness of breath.    Cardiovascular: Negative for chest pain.   Gastrointestinal: Negative for abdominal pain.   Genitourinary: Negative for hematuria.   Musculoskeletal: Negative for back pain.        Left hip pain   Skin: Negative for rash and wound.   Neurological: Negative for syncope.   Hematological: Negative for adenopathy. Does not bruise/bleed easily.   Psychiatric/Behavioral: Negative for confusion.       Physical Exam   BP: (!) 150/88  Pulse: 98  Temp: 97.5  F (36.4  C)  Resp: 20  Height: 177.8 cm (5' 10\")  Weight: 99.3 kg (219 lb)  SpO2: 98 %      Physical Exam  Constitutional:       General: He is not in acute distress.     Appearance: He is well-developed. He is not diaphoretic.   HENT:      Head: Normocephalic and atraumatic.   Eyes:      General: No scleral icterus.     Conjunctiva/sclera: Conjunctivae normal.   Neck:      Musculoskeletal: Neck supple.   Cardiovascular:      Rate and Rhythm: Normal rate and regular rhythm.   Pulmonary:      Effort: Pulmonary effort is " normal.      Breath sounds: Normal breath sounds.   Abdominal:      Palpations: Abdomen is soft.      Tenderness: There is no abdominal tenderness.   Musculoskeletal:         General: No deformity.      Comments: Minimal redness/blanching to skin surrounding aquacell bandage, no Increased warmth, good distal CMS   Lymphadenopathy:      Cervical: No cervical adenopathy.   Skin:     General: Skin is warm and dry.      Findings: No rash.   Neurological:      Mental Status: He is alert and oriented to person, place, and time. Mental status is at baseline.   Psychiatric:         Mood and Affect: Mood normal.         Behavior: Behavior normal.         Thought Content: Thought content normal.         Judgment: Judgment normal.         ED Course        Procedures               Critical Care time:  none               Results for orders placed or performed during the hospital encounter of 09/20/20 (from the past 24 hour(s))   CBC with platelets differential   Result Value Ref Range    WBC 7.3 4.0 - 11.0 10e9/L    RBC Count 3.52 (L) 4.4 - 5.9 10e12/L    Hemoglobin 10.5 (L) 13.3 - 17.7 g/dL    Hematocrit 32.3 (L) 40.0 - 53.0 %    MCV 92 78 - 100 fl    MCH 29.8 26.5 - 33.0 pg    MCHC 32.5 31.5 - 36.5 g/dL    RDW 12.7 10.0 - 15.0 %    Platelet Count 210 150 - 450 10e9/L    Diff Method Automated Method     % Neutrophils 62.1 %    % Lymphocytes 19.3 %    % Monocytes 14.4 %    % Eosinophils 3.0 %    % Basophils 0.7 %    % Immature Granulocytes 0.5 %    Absolute Neutrophil 4.5 1.6 - 8.3 10e9/L    Absolute Lymphocytes 1.4 0.8 - 5.3 10e9/L    Absolute Monocytes 1.1 0.0 - 1.3 10e9/L    Absolute Eosinophils 0.2 0.0 - 0.7 10e9/L    Absolute Basophils 0.1 0.0 - 0.2 10e9/L    Abs Immature Granulocytes 0.0 0 - 0.4 10e9/L   Basic metabolic panel   Result Value Ref Range    Sodium 134 134 - 144 mmol/L    Potassium 4.0 3.5 - 5.1 mmol/L    Chloride 100 98 - 107 mmol/L    Carbon Dioxide 29 21 - 31 mmol/L    Anion Gap 5 3 - 14 mmol/L    Glucose  117 (H) 70 - 105 mg/dL    Urea Nitrogen 15 7 - 25 mg/dL    Creatinine 0.87 0.70 - 1.30 mg/dL    GFR Estimate 88 >60 mL/min/[1.73_m2]    GFR Estimate If Black >90 >60 mL/min/[1.73_m2]    Calcium 8.7 8.6 - 10.3 mg/dL   XR Chest Port 1 View    Narrative    PROCEDURE:  XR CHEST PORT 1 VW    HISTORY: post op hip replacement, fever, cough. .    COMPARISON:  12/9/2019    FINDINGS:    The cardiomediastinal contours are stable.  No focal consolidation, effusion or pneumothorax.      Impression    IMPRESSION:  Stable chest.      JAQUI HERNANDEZ MD   UA reflex to Microscopic   Result Value Ref Range    Color Urine Light Yellow     Appearance Urine Clear     Glucose Urine Negative NEG^Negative mg/dL    Bilirubin Urine Negative NEG^Negative    Ketones Urine Negative NEG^Negative mg/dL    Specific Gravity Urine 1.013 1.003 - 1.035    Blood Urine Negative NEG^Negative    pH Urine 5.5 5.0 - 7.0 pH    Protein Albumin Urine Negative NEG^Negative mg/dL    Urobilinogen mg/dL Normal 0.0 - 2.0 mg/dL    Nitrite Urine Negative NEG^Negative    Leukocyte Esterase Urine Negative NEG^Negative    Source Midstream Urine        Medications   oxyCODONE (ROXICODONE) tablet 10 mg (10 mg Oral Given 9/20/20 1604)       Assessments & Plan (with Medical Decision Making)   Pt nontoxic in NAD. Heart, lung, bowel sounds normal, abd soft, nontender to palpation, nondistended. VSS, afebrile    He does have Minimal redness/blanching to skin surrounding aquacell bandage, no Increased warmth, good distal CMS. Similar color skin to his opposite non surgical leg.     He has no leukocytosis, UA and CXR appear well. Slight anemia, expected after surgery.     I discussed with Dr. Marcial, OA. He does not recommend treatment with antibiotics at this time as it seems less likely that an infection is occurring.  The patient is however told that he can follow-up with Dr. Marcial tomorrow in the clinic if needed as well as scheduling an appointment for follow-up with  Dr. Ochoa.     He will continue with his surgical discharge plan. Strict return precautions are given to the pt, they will return if symptoms are worsening or concerning. The pt understands and agrees with the plan and they are discharged.     Matty Marques PA-C        I have reviewed the nursing notes.    I have reviewed the findings, diagnosis, plan and need for follow up with the patient.       Current Discharge Medication List          Final diagnoses:   Acute post-operative pain       9/20/2020   [facility]     Matty Marques PA  09/20/20 2103

## 2020-09-20 NOTE — DISCHARGE INSTRUCTIONS
Get plenty of fluids and rest.  As we discussed your vital signs and lab work appears very well today.  Speak with Dr. Marcial, orthopedic surgeon with [facility].  At this time he feels it would not be beneficial to start any antibiotics and to continue with your discharge plan.  He did say that if you continue to have worrisome symptoms that you can follow-up with him in the clinic tomorrow but she would probably have to call his office to help facilitate that appointment.  Otherwise you should be in contact with Dr. Ochoa and discuss the need for sooner follow-up as well.  If you are having worsening or concerning symptoms including intractable fevers, intractable pain or increased redness and swelling to your surgical area please return the ED for further evaluation.

## 2020-09-20 NOTE — ED TRIAGE NOTES
To ER via private car for post-op problems. Had Hip surgery x 2 days ago this past Friday. Over night started having chills, sweating profusely, fever, increased pain, and swelling to left leg.

## 2020-09-20 NOTE — TELEPHONE ENCOUNTER
Had a total hip done on Friday at [facility].    101 was his temp last night    Currently his temp is 97.0    Currently has the shaking chills.    Hip is swollen and red from the knee to the hip    Was just discharged from the hospital yesterday    COVID 19 Nurse Triage Plan/Patient Instructions    Please be aware that novel coronavirus (COVID-19) may be circulating in the community. If you develop symptoms such as fever, cough, or SOB or if you have concerns about the presence of another infection including coronavirus (COVID-19), please contact your health care provider or visit [email or web address].     Disposition/Instructions    ED Visit recommended. Follow protocol based instructions.     Bring Your Own Device:  Please also bring your smart device(s) (smart phones, tablets, laptops) and their charging cables for your personal use and to communicate with your care team during your visit.    Thank you for taking steps to prevent the spread of this virus.  o Limit your contact with others.  o Wear a simple mask to cover your cough.  o Wash your hands well and often.    Resources    [facility]: About COVID-19: [email or web address]    CDC: What to Do If You're Sick: www.cdc.gov/coronavirus/2019-ncov/about/steps-when-sick.html    CDC: Ending Home Isolation: www.cdc.gov/coronavirus/2019-ncov/hcp/disposition-in-home-patients.html     CDC: Caring for Someone: www.cdc.gov/coronavirus/2019-ncov/if-you-are-sick/care-for-someone.html     [facility]: Interim Guidance for Hospital Discharge to Home: www.health.[facility].[place]/diseases/coronavirus/hcp/hospdischarge.pdf    [facility] clinical trials (COVID-19 research studies): [email or web address].[email or web address]/um-[email or web address]-trials     Below are the COVID-19 hotlines at the [place] Department of Health ([facility]). Interpreters are available.   o For health questions: Call 335-417-3508 or 1-299.306.6845 (7 a.m. to 7 p.m.)  o For questions about schools and childcare:  Call 750-247-4634 or 1-777.839.2233 (7 a.m. to 7 p.m.)         Kathleen Kimbrough RN        Reason for Disposition    Sounds like a serious complication to the triager    Additional Information    Negative: Patient sounds very sick or weak to the triager    Negative: [1] Drinking very little AND [2] dehydration suspected (e.g., no urine > 12 hours, very dry mouth, very lightheaded)    Negative: [1] Vomiting AND [2] abdomen looks much more swollen than usual    Negative: [1] Vomiting AND [2] persists > 4 hours    Negative: [1] SEVERE headache AND [2] after spinal (epidural) anesthesia    Negative: [1] Widespread rash AND [2] bright red, sunburn-like    Negative: Sounds like a life-threatening emergency to the triager    Protocols used: POST-OP SYMPTOMS AND [id number]-XB

## 2020-09-20 NOTE — ED AVS SNAPSHOT
[facility]  [address]  [place] [address]  Phone:  [phone]  Fax:  [phone]                                    Rj Juarez   MRN: [id number]    Department:  [facility]   Date of Visit:  9/20/2020           After Visit Summary Signature Page    I have received my discharge instructions, and my questions have been answered. I have discussed any challenges I see with this plan with the nurse or doctor.    ..........................................................................................................................................  Patient/Patient Representative Signature      ..........................................................................................................................................  Patient Representative Print Name and Relationship to Patient    ..................................................               ................................................  Date                                   Time    ..........................................................................................................................................  Reviewed by Signature/Title    ...................................................              ..............................................  Date                                               Time          22EPIC Rev 08/18

## 2020-09-21 ENCOUNTER — OFFICE VISIT (OUTPATIENT)
Dept: ORTHOPEDICS | Facility: OTHER | Age: 65
End: 2020-09-21
Attending: ORTHOPAEDIC SURGERY
Payer: MEDICARE

## 2020-09-21 DIAGNOSIS — Z96.642 STATUS POST LEFT HIP REPLACEMENT: Primary | ICD-10-CM

## 2020-09-21 PROCEDURE — 99024 POSTOP FOLLOW-UP VISIT: CPT | Performed by: ORTHOPAEDIC SURGERY

## 2020-09-21 PROCEDURE — G0463 HOSPITAL OUTPT CLINIC VISIT: HCPCS

## 2020-09-21 PROCEDURE — 99495 TRANSJ CARE MGMT MOD F2F 14D: CPT | Performed by: ORTHOPAEDIC SURGERY

## 2020-09-21 NOTE — PROGRESS NOTES
Patient is here for follow up on his left total hip.  Roya Richardson LPN .....................9/21/2020 4:52 PM

## 2020-09-21 NOTE — PROGRESS NOTES
Visit Date:   2020      HISTORY OF PRESENT ILLNESS:  He is now 3 days status post left hip direct anterior total hip arthroplasty, doing very well at this time.  Absolutely no complaints with his hip.  His pain is fairly well controlled.  He is more concerned about the swelling in the hip.  His wife is concerned that his leg kind of gets warm when he has been sitting for a while and he has some other questions about what he is going to be able to do in the near future as he would like to go to [place] at some point here.  He is concerned about being able to go deer hunting.      PHYSICAL EXAMINATION:  His wound is completely benign.  There is no evidence for infection.  His staples remain in place.  His hip is otherwise completely benign.  He is standing on it without difficulty.      IMPRESSION AND PLAN:  This is a 64-year-old gentleman who is 2 days status post left direct anterior total hip arthroplasty.  All of his questions were answered today.  His hip is completely benign.  He will follow-up with Dr. Ochoa at his next scheduled appointment.         CRISTINA RAIN MD             D: 2020   T: 2020   MT: JANAE      Name:     JEFE JACOB   MRN:      [id number]        Account:      [id number]   :      1955           Visit Date:   2020      Document: [id number]

## 2020-09-21 NOTE — NURSING NOTE
"Chief Complaint   Patient presents with     Surgical Followup     s/p 3 days left hip replacement       Initial There were no vitals taken for this visit. Estimated body mass index is 31.42 kg/m  as calculated from the following:    Height as of 9/20/20: 1.778 m (5' 10\").    Weight as of 9/20/20: 99.3 kg (219 lb).    Christianne Nesbitt LPN    "

## 2020-09-24 ENCOUNTER — TRANSFERRED RECORDS (OUTPATIENT)
Dept: HEALTH INFORMATION MANAGEMENT | Facility: OTHER | Age: 65
End: 2020-09-24

## 2020-09-29 ENCOUNTER — ALLIED HEALTH/NURSE VISIT (OUTPATIENT)
Dept: FAMILY MEDICINE | Facility: OTHER | Age: 65
End: 2020-09-29
Attending: INTERNAL MEDICINE
Payer: MEDICARE

## 2020-09-29 ENCOUNTER — HOSPITAL ENCOUNTER (OUTPATIENT)
Dept: GENERAL RADIOLOGY | Facility: OTHER | Age: 65
End: 2020-09-29
Attending: ORTHOPAEDIC SURGERY
Payer: MEDICARE

## 2020-09-29 ENCOUNTER — OFFICE VISIT (OUTPATIENT)
Dept: ORTHOPEDICS | Facility: OTHER | Age: 65
End: 2020-09-29
Attending: ORTHOPAEDIC SURGERY
Payer: MEDICARE

## 2020-09-29 DIAGNOSIS — Z96.642 STATUS POST LEFT HIP REPLACEMENT: Primary | ICD-10-CM

## 2020-09-29 DIAGNOSIS — Z96.642 STATUS POST LEFT HIP REPLACEMENT: ICD-10-CM

## 2020-09-29 DIAGNOSIS — Z96.642 HISTORY OF LEFT HIP REPLACEMENT: ICD-10-CM

## 2020-09-29 DIAGNOSIS — Z23 NEED FOR PROPHYLACTIC VACCINATION AND INOCULATION AGAINST INFLUENZA: Primary | ICD-10-CM

## 2020-09-29 PROCEDURE — G0463 HOSPITAL OUTPT CLINIC VISIT: HCPCS

## 2020-09-29 PROCEDURE — 73502 X-RAY EXAM HIP UNI 2-3 VIEWS: CPT

## 2020-09-29 PROCEDURE — 99024 POSTOP FOLLOW-UP VISIT: CPT | Performed by: ORTHOPAEDIC SURGERY

## 2020-09-29 PROCEDURE — 90662 IIV NO PRSV INCREASED AG IM: CPT

## 2020-09-29 PROCEDURE — 90471 IMMUNIZATION ADMIN: CPT

## 2020-09-29 NOTE — PROGRESS NOTES
Patient is here for follow up on his left total hip.  Roya Richardson LPN .....................9/29/2020 9:14 AM

## 2020-09-29 NOTE — PROGRESS NOTES
Visit Date:   2020      REASON FOR EVALUATION:  Left hip replacement.      HISTORY OF PRESENT ILLNESS:  Hector comes back, left hip replacement.  Overall, reports he is concerned about how things are going for him.  He has some swelling and it is causing some effects on his nerves at this point in time.  He feels weak, feels like he is somewhat regressed at this time.  Wanted to have things checked out and evaluated at this time.  He is using a walker for ambulation.  Reports he is able to extend the knee as well.  He is here for reassessment.      PHYSICAL EXAMINATION:     EXTREMITIES:  Examination of his hip does show incisional site to be healing properly, no signs or symptoms of infection present.  Staples removed, Steri-Strips applied.  Does have some swelling, but the knee is hypersensitive across the nerve to direct palpation here as well.  Neurovascular examination is otherwise improving at this point in time.  Does have some bruising distally.  Dorsalis pedis pulse 2+.  No pain with Homans' testing at this point.      IMPRESSION:  Left total hip replacement with postoperative swelling.      PLAN:  Reassurances.  Engage in therapy process, continue pain management as well as anti-inflammatories.  I will revisit with patient otherwise in about 4 weeks, x-rays, 2 views of the left hip.  I did give him a prescription for a toilet raiser as well to help out with his symptomatic care at home in addition to that.         SHANA MONTELONGO MD             D: 2020   T: 2020   MT: STEWART      Name:     JEFE JACOB   MRN:      [id number]        Account:      [id number]   :      1955           Visit Date:   2020      Document: [id number]

## 2020-09-30 ENCOUNTER — TRANSFERRED RECORDS (OUTPATIENT)
Dept: HEALTH INFORMATION MANAGEMENT | Facility: OTHER | Age: 65
End: 2020-09-30

## 2020-10-03 ENCOUNTER — HOSPITAL ENCOUNTER (EMERGENCY)
Facility: OTHER | Age: 65
Discharge: HOME | End: 2020-10-03
Attending: FAMILY MEDICINE | Admitting: FAMILY MEDICINE
Payer: MEDICARE

## 2020-10-03 ENCOUNTER — APPOINTMENT (OUTPATIENT)
Dept: ULTRASOUND IMAGING | Facility: OTHER | Age: 65
End: 2020-10-03
Attending: FAMILY MEDICINE
Payer: MEDICARE

## 2020-10-03 VITALS
HEART RATE: 81 BPM | BODY MASS INDEX: 31.5 KG/M2 | SYSTOLIC BLOOD PRESSURE: 133 MMHG | WEIGHT: 220 LBS | OXYGEN SATURATION: 95 % | TEMPERATURE: 97.3 F | HEIGHT: 70 IN | DIASTOLIC BLOOD PRESSURE: 82 MMHG | RESPIRATION RATE: 18 BRPM

## 2020-10-03 DIAGNOSIS — M79.89 LEFT LEG SWELLING: ICD-10-CM

## 2020-10-03 PROCEDURE — 93971 EXTREMITY STUDY: CPT | Mod: LT

## 2020-10-03 PROCEDURE — 99283 EMERGENCY DEPT VISIT LOW MDM: CPT | Mod: 25 | Performed by: FAMILY MEDICINE

## 2020-10-03 PROCEDURE — 99282 EMERGENCY DEPT VISIT SF MDM: CPT | Performed by: FAMILY MEDICINE

## 2020-10-03 RX ORDER — IBUPROFEN 400 MG/1
TABLET, FILM COATED ORAL
COMMUNITY
Start: 2020-09-18

## 2020-10-03 RX ORDER — OXYCODONE AND ACETAMINOPHEN 5; 325 MG/1; MG/1
TABLET ORAL
COMMUNITY
Start: 2020-09-24 | End: 2021-03-04

## 2020-10-03 RX ORDER — OXYCODONE HYDROCHLORIDE 5 MG/1
TABLET ORAL
COMMUNITY
Start: 2020-09-21 | End: 2021-03-04

## 2020-10-03 ASSESSMENT — MIFFLIN-ST. JEOR: SCORE: 1784.16

## 2020-10-03 NOTE — ED TRIAGE NOTES
Pt comes into the ER from [facility]. Pt reports that he had hip surgery 2 weeks ago. He was seen on Sept 20th, to look for clots as the hip was swelling. Pt talked to his friend Dr. Dragan Carlisle and he told him to come in ane be checked out. Left left is significant swollen and bruised and very painful per pt. It has increased from what the pain had been.

## 2020-10-03 NOTE — ED NOTES
LLE swollen and bruised. Patient surgical scar intact. No drainage to dressing. Pedal pulses presents. Bruising to left lower foot.

## 2020-10-03 NOTE — ED NOTES
Patient ambulatory upon discharge with use of walker. Patient verbalized understanding of all discharge instructions.

## 2020-10-03 NOTE — ED TRIAGE NOTES
The pain has gotten worse over night, throbbing. Going on the course of 10 days. iburprofen and oxy not helping.

## 2020-10-03 NOTE — ED PROVIDER NOTES
History     Chief Complaint   Patient presents with     Leg Pain     HPI  Rj Juarez is a 64 year old male who presents to the ED with postoperative left lower extremity swelling.  No chest pain or shortness of breath.    Review nurse's notes below, similar history is related to me.  [facility]. Pt reports that he had hip surgery 2 weeks ago. He was seen on Sept 20th, to look for clots as the hip was swelling. Pt talked to his friend Dr. Dragan Carlisle and he told him to come in ane be checked out. Left left is significant swollen and bruised and very painful per pt. It has increased from what the pain had been.  Allergies:  Allergies   Allergen Reactions     Ciprofloxacin Other (See Comments)     Tendon issue , and IBS     Midazolam      Other reaction(s): Other - Describe In Comment Field  Difficult to wake up       Problem List:    Patient Active Problem List    Diagnosis Date Noted     Chondromalacia, left hip 07/17/2020     Priority: Medium     Hx of decompressive lumbar laminectomy 07/10/2020     Priority: Medium     Lumbar stenosis with neurogenic claudication 05/19/2020     Priority: Medium     Status post cardiac catheterization on 12/6/2019 through [facility] with a stenting to his distal circumflex 12/24/2019     Priority: Medium     History of cardiac cath on 12/6/2019 12/24/2019     Priority: Medium     Chronic midline low back pain with sciatica, sciatica laterality unspecified 12/24/2019     Priority: Medium     ASCVD (arteriosclerotic cardiovascular disease) 12/03/2019     Priority: Medium     High coronary artery calcium score at 1118.2 on 11/21/2019 11/27/2019     Priority: Medium     Added automatically from request for surgery [id number]       CAD, multiple vessel 11/27/2019     Priority: Medium     Added automatically from request for surgery [id number]       GERD 11/19/2019     Priority: Medium     FARNAZ on CPAP 08/22/2019     Priority: Medium     Essential  hypertension 08/22/2019     Priority: Medium     Pre-diabetes 08/22/2019     Priority: Medium     NSAID long-term use 08/22/2019     Priority: Medium     Primary osteoarthritis of right knee  04/09/2019     Priority: Medium     Effusion of right knee 04/09/2019     Priority: Medium     Posterior knee pain, right 04/09/2019     Priority: Medium     Elevated prostate specific antigen (PSA) 11/29/2018     Priority: Medium     Lumbar radiculopathy 02/20/2017     Priority: Medium     Benign essential tremor 03/18/2016     Priority: Medium     Irritable bowel syndrome with diarrhea 03/18/2016     Priority: Medium     H/O adenomatous polyp of colon 12/17/2009     Priority: Medium        Past Medical History:    Past Medical History:   Diagnosis Date     Benign lipomatous neoplasm      Calculus of kidney      Carpal tunnel syndrome      Closed fracture of patella      Diverticulosis of large intestine without perforation or abscess without bleeding      Exertional chest pain 11/19/2019     Headache      Other specified forms of tremor      Other urticaria (CODE)      Pain in joint      Umbilical hernia without obstruction or gangrene        Past Surgical History:    Past Surgical History:   Procedure Laterality Date     COLONOSCOPY  01/28/2014 2009,2014,F/U 2019     COLONOSCOPY  03/01/2019    Serrated adenoma, follow up 1 year     ESOPHAGOSCOPY, GASTROSCOPY, DUODENOSCOPY (EGD), COMBINED      1/28/14,EGD     OTHER SURGICAL HISTORY      9/10/2014,56839.0,NE REPAIR ING HERNIA  >5 TRS BETTINA     OTHER SURGICAL HISTORY      1/26/2018,47667.0,NE REPAIR UMBILICAL NAZARIO  >5 TRS REDUC     RELEASE CARPAL TUNNEL      3,12/2004,Both hands     SIGMOIDOSCOPY FLEXIBLE      No Comments Provided       Family History:    Family History   Problem Relation Age of Onset     Hypertension Mother         Hypertension     Diabetes Mother         Diabetes     Other - See Comments Mother         Spinal stenosis     Other - See Comments Father        "  scleroderma which was quite severe     Cancer Father         Cancer, of adenocarcinoma of the lung.  He was a nonsmoker.     Diabetes Brother         Diabetes     Other - See Comments Brother         Pancreatitis     Substance Abuse Brother         Alcohol/Drug,Alcoholic, has been through treatment a number of times.       Social History:  Marital Status:   [2]  Social History     Tobacco Use     Smoking status: Never Smoker     Smokeless tobacco: Never Used   Substance Use Topics     Alcohol use: No     Drug use: No        Medications:         aspirin (ASA) 81 MG tablet       ibuprofen (ADVIL/MOTRIN) 400 MG tablet       metoprolol succinate ER (TOPROL-XL) 25 MG 24 hr tablet       Multiple Vitamins-Minerals (EYE VITAMINS PO)       oxyCODONE (ROXICODONE) 5 MG tablet       oxyCODONE-acetaminophen (PERCOCET) 5-325 MG tablet       rosuvastatin (CRESTOR) 20 MG tablet       nitroGLYcerin (NITROSTAT) 0.4 MG sublingual tablet       order for DME       triamcinolone (KENALOG) 0.1 % cream          Review of Systems   HENT: Negative for congestion.    Eyes: Negative for redness.   Respiratory: Negative for chest tightness and shortness of breath.    Genitourinary: Negative for flank pain.       Physical Exam   BP: (!) 165/87  Pulse: 94  Temp: 97.3  F (36.3  C)  Resp: 18  Height: 177.8 cm (5' 10\")  Weight: 99.8 kg (220 lb)  SpO2: 99 %      Physical Exam  Vitals signs and nursing note reviewed.   Cardiovascular:      Rate and Rhythm: Normal rate.   Musculoskeletal:      Right lower leg: No edema.      Left lower leg: Edema present.         ED Course        Procedures            Results for orders placed or performed during the hospital encounter of 10/03/20 (from the past 24 hour(s))   US Lower Extremity Venous Duplex Left Port    Narrative    Exam:US LOWER EXTREMITY VENOUS DUPLEX LEFT PORTABLE    History: Left leg swelling    Comparisons: None    Technique: Venous duplex ultrasonography of the left lower " extremity  was performed.     Findings: The common femoral vein, superficial femoral vein and  popliteal vein are fully compressible with spontaneous and augmentable  venous flow.           Impression    Impression: No evidence of deep venous thrombosis within the left  lower extremity.    BART ROMERO MD       Medications - No data to display    Assessments & Plan (with Medical Decision Making)     I have reviewed the nursing notes.    I have reviewed the findings, diagnosis, plan and need for follow up with the patient.  Differential diagnosis includes DVT, superficial thrombophlebitis, postoperative lymphedema, postoperative swelling due to tracking of postoperative fluid down fascial planes, Cellulitis among others.  Return to ED if fever worsening symptoms such as chest pain or shortness of breath.  Patient verbalized understanding plan is agreement left ED in improved condition.  Recommend follow-up with surgeon.  Recommend keeping his leg up compression stockings as needed.  Follow for signs and symptoms of infection.    New Prescriptions    No medications on file       Final diagnoses:   Left leg swelling       10/3/2020   [facility]     Hank Diaz MD  10/04/20 2689

## 2020-10-03 NOTE — ED AVS SNAPSHOT
[facility]  [address]  [place] [address]  Phone: [phone]  Fax: [phone]                                    Rj Juarez   MRN: [id number]    Department: [facility]   Date of Visit: 10/3/2020           After Visit Summary Signature Page    I have received my discharge instructions, and my questions have been answered. I have discussed any challenges I see with this plan with the nurse or doctor.    ..........................................................................................................................................  Patient/Patient Representative Signature      ..........................................................................................................................................  Patient Representative Print Name and Relationship to Patient    ..................................................               ................................................  Date                                   Time    ..........................................................................................................................................  Reviewed by Signature/Title    ...................................................              ..............................................  Date                                               Time          22EPIC Rev 08/18

## 2020-10-04 ASSESSMENT — ENCOUNTER SYMPTOMS
FLANK PAIN: 0
EYE REDNESS: 0
CHEST TIGHTNESS: 0
SHORTNESS OF BREATH: 0

## 2020-10-26 DIAGNOSIS — Z96.642 STATUS POST LEFT HIP REPLACEMENT: Primary | ICD-10-CM

## 2020-10-27 ENCOUNTER — HOSPITAL ENCOUNTER (OUTPATIENT)
Dept: GENERAL RADIOLOGY | Facility: OTHER | Age: 65
End: 2020-10-27
Attending: ORTHOPAEDIC SURGERY
Payer: MEDICARE

## 2020-10-27 ENCOUNTER — OFFICE VISIT (OUTPATIENT)
Dept: ORTHOPEDICS | Facility: OTHER | Age: 65
End: 2020-10-27
Attending: ORTHOPAEDIC SURGERY
Payer: MEDICARE

## 2020-10-27 DIAGNOSIS — Z96.642 STATUS POST LEFT HIP REPLACEMENT: ICD-10-CM

## 2020-10-27 DIAGNOSIS — Z96.642 STATUS POST LEFT HIP REPLACEMENT: Primary | ICD-10-CM

## 2020-10-27 PROCEDURE — 99024 POSTOP FOLLOW-UP VISIT: CPT | Performed by: ORTHOPAEDIC SURGERY

## 2020-10-27 PROCEDURE — 73502 X-RAY EXAM HIP UNI 2-3 VIEWS: CPT

## 2020-10-27 PROCEDURE — G0463 HOSPITAL OUTPT CLINIC VISIT: HCPCS

## 2020-10-27 NOTE — PROGRESS NOTES
Visit Date:   10/27/2020      REASON FOR EVALUATION:  Left hip replacement.      HISTORY OF PRESENT ILLNESS:  Hector comes in for left hip replacement.  He reports that he is starting to turn the corner as far as his hip is concerned.  He is happy that he has seen that at this point in time.  He has had a lot of pain and swelling here, but that seems to be settling down here for him.  Had an ultrasound done here not long back recently.  It did not show any evidence for clotting.  He is going to therapies.  We feel he is about ready to graduate from that.      PHYSICAL EXAMINATION:  Hip shows reasonable range of motion.  His swelling is much improved strength is overall improving here as well.  Neurovascular examination is otherwise intact here in addition to that.      IMAGING:  X-rays reviewed, 2 views, left hip.  The patient's components are in stable alignment and position at this current time.      IMPRESSION:  Left total knee replacement with postoperative swelling, improving.      PLAN:  At this time, reassured in regards to this.  Continue to work through the rehabilitation strengthening.  I will see him back in a month.  Increase in activity status at this point in time as well in addition to that.         SHANA MONTELONGO MD             D: 10/27/2020   T: 10/27/2020   MT: CASIMIRO      Name:     JEFE JACOB   MRN:      [id number]        Account:      [id number]   :      1955           Visit Date:   10/27/2020      Document: [id number]

## 2020-10-27 NOTE — PROGRESS NOTES
Patient is here for follow up on his left total hip.  Roya Richardson LPN .....................10/27/2020 8:40 AM

## 2020-11-23 ENCOUNTER — TELEPHONE (OUTPATIENT)
Dept: ORTHOPEDICS | Facility: OTHER | Age: 65
End: 2020-11-23

## 2020-11-23 NOTE — TELEPHONE ENCOUNTER
Patient is afraid to come in for last follow up visit.  He's wondering if he really need to be seen.  Please return his call.

## 2020-11-23 NOTE — TELEPHONE ENCOUNTER
Dr. Ochoa will call patient tomorrow. Follow up has been cancelled.    Roya Richardson LPN .....................11/23/2020 1:55 PM

## 2020-11-23 NOTE — TELEPHONE ENCOUNTER
BRYANT is doing better. But he fell and tore his quadricep muscle. But states that he is doing alright on his total hip. Having some soreness in his achilles. Still going to therapy. That's who told him he tore his quad muscle.   Roya Richardson LPN .....................11/23/2020 1:51 PM

## 2020-12-10 DIAGNOSIS — Z12.11 SPECIAL SCREENING FOR MALIGNANT NEOPLASMS, COLON: Primary | ICD-10-CM

## 2020-12-10 DIAGNOSIS — Z01.818 PRE-OP TESTING: ICD-10-CM

## 2020-12-10 NOTE — TELEPHONE ENCOUNTER
Screening Questions for the Scheduling of Screening Colonoscopies   (If Colonoscopy is diagnostic, Provider should review the chart before scheduling.)  Are you younger than 50 or older than 80?  NO   Do you take aspirin or fish oil?  YES - ASPIRIN    (if yes, tell patient to stop 1 week prior to Colonoscopy)  Do you take warfarin (Coumadin), clopidogrel (Plavix), apixaban (Eliquis), dabigatram (Pradaxa), rivaroxaban (Xarelto) or any blood thinner? NO   Do you use oxygen at home?  NO   Do you have kidney disease? NO   Are you on dialysis? NO   Have you had a stroke or heart attack in the last year? NO   Have you had a stent in your heart or any blood vessel in the last year? NO   Have you had a transplant of any organ? NO   Have you had a colonoscopy or upper endoscopy (EGD) before? YES          When?  2019  Date of scheduled Colonoscopy. 01/15/2021  Provider AMIE HEBERT

## 2020-12-11 RX ORDER — POLYETHYLENE GLYCOL 3350, SODIUM CHLORIDE, SODIUM BICARBONATE, POTASSIUM CHLORIDE 420; 11.2; 5.72; 1.48 G/4L; G/4L; G/4L; G/4L
4000 POWDER, FOR SOLUTION ORAL ONCE
Qty: 4000 ML | Refills: 0 | Status: SHIPPED | OUTPATIENT
Start: 2020-12-11 | End: 2020-12-11

## 2020-12-11 RX ORDER — BISACODYL 5 MG/1
TABLET, DELAYED RELEASE ORAL
Qty: 2 TABLET | Refills: 0 | Status: SHIPPED | OUTPATIENT
Start: 2020-12-11 | End: 2020-12-24

## 2020-12-18 ENCOUNTER — TRANSFERRED RECORDS (OUTPATIENT)
Dept: HEALTH INFORMATION MANAGEMENT | Facility: OTHER | Age: 65
End: 2020-12-18

## 2020-12-24 ENCOUNTER — OFFICE VISIT (OUTPATIENT)
Dept: PEDIATRICS | Facility: OTHER | Age: 65
End: 2020-12-24
Attending: INTERNAL MEDICINE
Payer: COMMERCIAL

## 2020-12-24 VITALS
SYSTOLIC BLOOD PRESSURE: 150 MMHG | BODY MASS INDEX: 32.6 KG/M2 | TEMPERATURE: 97.2 F | WEIGHT: 227.2 LBS | HEART RATE: 85 BPM | RESPIRATION RATE: 18 BRPM | DIASTOLIC BLOOD PRESSURE: 96 MMHG | OXYGEN SATURATION: 98 %

## 2020-12-24 DIAGNOSIS — R07.9 CHEST PAIN, UNSPECIFIED TYPE: Primary | ICD-10-CM

## 2020-12-24 DIAGNOSIS — I25.10 CORONARY ARTERY DISEASE INVOLVING NATIVE CORONARY ARTERY OF NATIVE HEART WITHOUT ANGINA PECTORIS: ICD-10-CM

## 2020-12-24 DIAGNOSIS — I25.10 ASCVD (ARTERIOSCLEROTIC CARDIOVASCULAR DISEASE): Chronic | ICD-10-CM

## 2020-12-24 DIAGNOSIS — Z98.890 HISTORY OF CARDIAC CATH: ICD-10-CM

## 2020-12-24 DIAGNOSIS — I25.10 CAD, MULTIPLE VESSEL: ICD-10-CM

## 2020-12-24 DIAGNOSIS — K21.00 GASTROESOPHAGEAL REFLUX DISEASE WITH ESOPHAGITIS, UNSPECIFIED WHETHER HEMORRHAGE: ICD-10-CM

## 2020-12-24 DIAGNOSIS — R93.1 HIGH CORONARY ARTERY CALCIUM SCORE: ICD-10-CM

## 2020-12-24 DIAGNOSIS — R73.03 PRE-DIABETES: Chronic | ICD-10-CM

## 2020-12-24 DIAGNOSIS — I10 ESSENTIAL HYPERTENSION: Chronic | ICD-10-CM

## 2020-12-24 DIAGNOSIS — Z98.890 STATUS POST CARDIAC CATHETERIZATION: ICD-10-CM

## 2020-12-24 LAB
ALBUMIN SERPL-MCNC: 4.4 G/DL (ref 3.5–5.7)
ALP SERPL-CCNC: 70 U/L (ref 34–104)
ALT SERPL W P-5'-P-CCNC: 12 U/L (ref 7–52)
ANION GAP SERPL CALCULATED.3IONS-SCNC: 4 MMOL/L (ref 3–14)
AST SERPL W P-5'-P-CCNC: 11 U/L (ref 13–39)
BILIRUB SERPL-MCNC: 0.6 MG/DL (ref 0.3–1)
BUN SERPL-MCNC: 13 MG/DL (ref 7–25)
CALCIUM SERPL-MCNC: 9.3 MG/DL (ref 8.6–10.3)
CHLORIDE SERPL-SCNC: 107 MMOL/L (ref 98–107)
CHOLEST SERPL-MCNC: 102 MG/DL
CO2 SERPL-SCNC: 28 MMOL/L (ref 21–31)
CREAT SERPL-MCNC: 0.94 MG/DL (ref 0.7–1.3)
ERYTHROCYTE [DISTWIDTH] IN BLOOD BY AUTOMATED COUNT: 13.1 % (ref 10–15)
GFR SERPL CREATININE-BSD FRML MDRD: 80 ML/MIN/{1.73_M2}
GLUCOSE SERPL-MCNC: 152 MG/DL (ref 70–105)
HBA1C MFR BLD: 6.2 % (ref 4–6)
HCT VFR BLD AUTO: 45.5 % (ref 40–53)
HDLC SERPL-MCNC: 39 MG/DL (ref 23–92)
HGB BLD-MCNC: 14.8 G/DL (ref 13.3–17.7)
INTERPRETATION ECG - MUSE: NORMAL
LDLC SERPL CALC-MCNC: 45 MG/DL
LIPASE SERPL-CCNC: 22 U/L (ref 11–82)
MCH RBC QN AUTO: 28.6 PG (ref 26.5–33)
MCHC RBC AUTO-ENTMCNC: 32.5 G/DL (ref 31.5–36.5)
MCV RBC AUTO: 88 FL (ref 78–100)
NONHDLC SERPL-MCNC: 63 MG/DL
PLATELET # BLD AUTO: 235 10E9/L (ref 150–450)
POTASSIUM SERPL-SCNC: 3.9 MMOL/L (ref 3.5–5.1)
PROT SERPL-MCNC: 6.6 G/DL (ref 6.4–8.9)
RBC # BLD AUTO: 5.18 10E12/L (ref 4.4–5.9)
SODIUM SERPL-SCNC: 139 MMOL/L (ref 134–144)
TRIGL SERPL-MCNC: 92 MG/DL
WBC # BLD AUTO: 5.2 10E9/L (ref 4–11)

## 2020-12-24 PROCEDURE — 36415 COLL VENOUS BLD VENIPUNCTURE: CPT | Mod: ZL | Performed by: INTERNAL MEDICINE

## 2020-12-24 PROCEDURE — 99214 OFFICE O/P EST MOD 30 MIN: CPT | Performed by: INTERNAL MEDICINE

## 2020-12-24 PROCEDURE — 93005 ELECTROCARDIOGRAM TRACING: CPT

## 2020-12-24 PROCEDURE — 85027 COMPLETE CBC AUTOMATED: CPT | Mod: ZL | Performed by: INTERNAL MEDICINE

## 2020-12-24 PROCEDURE — G0463 HOSPITAL OUTPT CLINIC VISIT: HCPCS

## 2020-12-24 PROCEDURE — 80053 COMPREHEN METABOLIC PANEL: CPT | Mod: ZL | Performed by: INTERNAL MEDICINE

## 2020-12-24 PROCEDURE — 83690 ASSAY OF LIPASE: CPT | Mod: ZL | Performed by: INTERNAL MEDICINE

## 2020-12-24 PROCEDURE — 83036 HEMOGLOBIN GLYCOSYLATED A1C: CPT | Mod: ZL | Performed by: INTERNAL MEDICINE

## 2020-12-24 PROCEDURE — 93010 ELECTROCARDIOGRAM REPORT: CPT | Performed by: INTERNAL MEDICINE

## 2020-12-24 PROCEDURE — 80061 LIPID PANEL: CPT | Mod: ZL | Performed by: INTERNAL MEDICINE

## 2020-12-24 RX ORDER — METOPROLOL SUCCINATE 25 MG/1
25 TABLET, EXTENDED RELEASE ORAL DAILY
Qty: 90 TABLET | Refills: 3 | Status: SHIPPED | OUTPATIENT
Start: 2020-12-24 | End: 2022-02-25

## 2020-12-24 RX ORDER — ROSUVASTATIN CALCIUM 20 MG/1
20 TABLET, COATED ORAL DAILY
Qty: 90 TABLET | Refills: 3 | Status: SHIPPED | OUTPATIENT
Start: 2020-12-24 | End: 2022-02-21

## 2020-12-24 RX ORDER — SUCRALFATE 1 G/1
1 TABLET ORAL 4 TIMES DAILY
Qty: 300 TABLET | Refills: 3 | Status: SHIPPED | OUTPATIENT
Start: 2020-12-24 | End: 2022-06-23

## 2020-12-24 RX ORDER — FAMOTIDINE 20 MG/1
20 TABLET, FILM COATED ORAL 2 TIMES DAILY PRN
Qty: 60 TABLET | Refills: 3 | Status: SHIPPED | OUTPATIENT
Start: 2020-12-24 | End: 2021-12-06

## 2020-12-24 RX ORDER — ASPIRIN 81 MG/1
81 TABLET ORAL DAILY
COMMUNITY

## 2020-12-24 ASSESSMENT — PAIN SCALES - GENERAL: PAINLEVEL: NO PAIN (1)

## 2020-12-24 NOTE — H&P (VIEW-ONLY)
Subjective  Rj Juarez is a 64 year old male who presents for multiple concerns.  He has had a very difficult year.  He had his back surgery followed by a hip replacement on the left.  His physical exertion has been significantly reduced as a result of all these things.  His mother fell and broke her leg.  His sister was hit by a car and drug quite a ways breaking her leg.  He is looking forward to 2021.  He is having indigestion/chest pain.  He contacted his cardiologist who recommended that he get a stress test in [place].  He wants to do it in [place].  He feels the pain at the lower sternum.  Worse with unknown.  Better with eating or drinking.  No change with Tums or exertion.  He has been carrying 45 pound seed bags without any major problems.  He feels winded which she attributes that to being out of shape.  He has used 10 doses of ibuprofen this year.  He takes aspirin daily.  He drinks a lot of caffeinated tea.  Blood pressures have been going up.  Was systolic 110s-135's, now 140-145.  His weight is up due to physical inactivity.  His right hip is bothering him a lot now.  He thinks he will probably have to get it repaired in the spring.  He tries to walk 1/2 mile a day.  He has a history of hyperlipidemia and atherosclerotic cardiovascular disease status post angioplasty with stent and continues on Crestor.  History of hypertension which is previously stable on metoprolol.  He has anxiety and endorses this.    Problem List/PMH: reviewed in EMR, and made relevant updates today.  Medications: reviewed in EMR, and made relevant updates today.  Allergies: reviewed in EMR, and made relevant updates today.    Social Hx:  Social History     Tobacco Use     Smoking status: Never Smoker     Smokeless tobacco: Never Used   Substance Use Topics     Alcohol use: No     Drug use: No     Social History     Social History Narrative    .      2 children.  Both children grown and living elsewhere.       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     I reviewed social history and made relevant updates today.    Family Hx:   Family History   Problem Relation Age of Onset     Hypertension Mother         Hypertension     Diabetes Mother         Diabetes     Other - See Comments Mother         Spinal stenosis     Other - See Comments Father         scleroderma which was quite severe     Cancer Father         Cancer, of adenocarcinoma of the lung.  He was a nonsmoker.     Diabetes Brother         Diabetes     Other - See Comments Brother         Pancreatitis     Substance Abuse Brother         Alcohol/Drug,Alcoholic, has been through treatment a number of times.       Objective  Vitals: reviewed in EMR.  BP (!) 150/96 (BP Location: Right arm, Patient Position: Sitting, Cuff Size: Adult Large)   Pulse 85   Temp 97.2  F (36.2  C) (Tympanic)   Resp 18   Wt 103.1 kg (227 lb 3.2 oz)   SpO2 98%   BMI 32.60 kg/m      Gen: Pleasant male, NAD.  HEENT: MMM, no OP erythema.   Neck: Supple, no JVD, no bruits.  CV: RRR no m/r/g.   Pulm: CTAB no w/r/r  Neuro: Grossly intact  Msk: No lower extremity edema.  Skin: No concerning lesions.  Psychiatric: Normal affect and insight. Does not appear anxious or depressed.    Results for orders placed or performed in visit on 20 (from the past 48 hour(s))   EKG 12-lead, tracing only   Result Value Ref Range    Interpretation ECG Click View Image link to view waveform and result    Hemoglobin A1c   Result Value Ref Range    Hemoglobin A1C 6.2 (H) 4.0 - 6.0 %   Lipase   Result Value Ref Range    Lipase 22 11 - 82 U/L   Lipid Profile   Result Value Ref Range    Cholesterol 102 <200 mg/dL    Triglycerides 92 <150 mg/dL    HDL Cholesterol 39 23 - 92 mg/dL    LDL Cholesterol Calculated 45 <100 mg/dL    Non HDL Cholesterol 63 <130 mg/dL   Comprehensive metabolic panel   Result Value Ref Range    Sodium 139 134 - 144 mmol/L    Potassium  3.9 3.5 - 5.1 mmol/L    Chloride 107 98 - 107 mmol/L    Carbon Dioxide 28 21 - 31 mmol/L    Anion Gap 4 3 - 14 mmol/L    Glucose 152 (H) 70 - 105 mg/dL    Urea Nitrogen 13 7 - 25 mg/dL    Creatinine 0.94 0.70 - 1.30 mg/dL    GFR Estimate 80 >60 mL/min/[1.73_m2]    GFR Estimate If Black >90 >60 mL/min/[1.73_m2]    Calcium 9.3 8.6 - 10.3 mg/dL    Bilirubin Total 0.6 0.3 - 1.0 mg/dL    Albumin 4.4 3.5 - 5.7 g/dL    Protein Total 6.6 6.4 - 8.9 g/dL    Alkaline Phosphatase 70 34 - 104 U/L    ALT 12 7 - 52 U/L    AST 11 (L) 13 - 39 U/L   CBC with platelets   Result Value Ref Range    WBC 5.2 4.0 - 11.0 10e9/L    RBC Count 5.18 4.4 - 5.9 10e12/L    Hemoglobin 14.8 13.3 - 17.7 g/dL    Hematocrit 45.5 40.0 - 53.0 %    MCV 88 78 - 100 fl    MCH 28.6 26.5 - 33.0 pg    MCHC 32.5 31.5 - 36.5 g/dL    RDW 13.1 10.0 - 15.0 %    Platelet Count 235 150 - 450 10e9/L         Assessment    ICD-10-CM    1. Chest pain, unspecified type  R07.9 EKG 12-lead, tracing only     CBC with platelets     Comprehensive metabolic panel     Lipase     Lipase     Comprehensive metabolic panel     CBC with platelets     Echo Stress Echocardiogram   2. ASCVD (arteriosclerotic cardiovascular disease)  I25.10 Lipid Profile     Lipid Profile     Echo Stress Echocardiogram     metoprolol succinate ER (TOPROL-XL) 25 MG 24 hr tablet   3. High coronary artery calcium score at 1118.2 on 11/21/2019  R93.1 Echo Stress Echocardiogram   4. History of cardiac cath on 12/6/2019  Z98.890 metoprolol succinate ER (TOPROL-XL) 25 MG 24 hr tablet   5. Pre-diabetes  R73.03 Hemoglobin A1c     Hemoglobin A1c   6. Essential hypertension  I10 metoprolol succinate ER (TOPROL-XL) 25 MG 24 hr tablet   7. Gastroesophageal reflux disease with esophagitis, unspecified whether hemorrhage  K21.00 Comprehensive metabolic panel     omeprazole (PRILOSEC) 20 MG DR capsule     famotidine (PEPCID) 20 MG tablet     sucralfate (CARAFATE) 1 GM tablet     Comprehensive metabolic panel   8.  Coronary artery disease involving native coronary artery of native heart without angina pectoris  I25.10 rosuvastatin (CRESTOR) 20 MG tablet     metoprolol succinate ER (TOPROL-XL) 25 MG 24 hr tablet   9. Status post cardiac catheterization on 12/6/2019 through [facility] with a stenting to his distal circumflex  Z98.890 metoprolol succinate ER (TOPROL-XL) 25 MG 24 hr tablet   10. CAD, multiple vessel  I25.10 metoprolol succinate ER (TOPROL-XL) 25 MG 24 hr tablet     Orders Placed This Encounter   Procedures     CBC with platelets     Comprehensive metabolic panel     Lipid Profile     Lipase     Hemoglobin A1c     EKG 12-lead, tracing only     Echo Stress Echocardiogram       I think the more likely etiology is from gastroesophageal reflux disease however stable angina also on the differential.  Differential diagnosis also includes costochondritis, pleurisy, hiatal hernia, esophageal cancer, others.  If symptoms persist or worsen would recommend EGD.  We discussed the different stress test options and I recommend an exercise bicycle echocardiogram.    Plan   -- Expected clinical course discussed   -- Medications and their side effects discussed  Patient Instructions      -- Stress test     -- Start omeprazole (Prilosec) 20 mg daily   -- Use famotidine (Pepcid) 20 mg twice daily as needed for heart burn, upset stomach.   -- Use sucralfate (Carafate) 2-3 times per day to allow stomach lining to heal   -- Antacids are okay to use as well as needed (eg Tums)   -- Avoid trigger foods (eg spicy foods, caffeine, alcohol -- see longer list below)   -- Larger meal earlier in the day, smallest meal later in the day   -- Avoid NSAIDs (eg ibuprofen/Advil, naproxen/Aleve)   -- Work on healthy weight   -- Reduce stress in your life   -- After your symptoms have improved (around 30 days) consider stopping omeprazole   -- Okay to continue ranitidine as needed   -- If symptoms persist or worsen, return as we would consider  obtaining endoscopy    Check your Blood Pressure   -- Sit in a chair, feet flat on the floor for 5 minutes   -- Avoid caffeine, exercise and smoking for 30 minutes before checking   -- Have your arm at the level of your heart   -- Make sure you have the correct size cuff   -- Write them down, bring your log book in to your appointment     -- Goal blood pressure 120/70   -- Learn about DASH Diet ([email or web address]/DASHDiet - redirects to the [employer]) for dietary ways to reduce blood pressure   -- Work on 5% weight loss   -- Daily physical exercise (eg. 30 min brisk walk)        Patient Education   Lifestyle Changes for Controlling GERD  When you have GERD, stomach acid feels as if it s backing up toward your mouth. Whether or not you take medicine to control your GERD, your symptoms can often be improved with lifestyle changes. Talk to your healthcare provider about the following suggestions. These suggestions may help you get relief from your symptoms.      Raise your head  Reflux is more likely to strike when you re lying down flat, because stomach fluid can flow backward more easily. Raising the head of your bed 4 to 6 inches can help. To do this:    Slide blocks or books under the legs at the head of your bed. Or, place a wedge under the mattress. Many [employer] can make a suitable wedge for you. The wedge should run from your waist to the top of your head.    Don t just prop your head on several pillows. This increases pressure on your stomach. It can make GERD worse.  Watch your eating habits  Certain foods may increase the acid in your stomach or relax the lower esophageal sphincter. This makes GERD more likely. It s best to avoid the following if they cause you symptoms:    Coffee, tea, and carbonated drinks (with and without caffeine)    Fatty, fried, or spicy food    Mint, chocolate, onions, and tomatoes    Peppermint    Any other foods that seem to irritate your stomach or cause you pain  Relieve the  pressure  Tips include the following:    Eat smaller meals, even if you have to eat more often.    Don t lie down right after you eat. Wait a few hours for your stomach to empty.    Avoid tight belts and tight-fitting clothes.    Lose excess weight.  Tobacco and alcohol  Avoid smoking tobacco and drinking alcohol. They can make GERD symptoms worse.  Date Last Reviewed: 7/1/2016 2000-2017 [employer] [address] [place] [address], [place] [address]. All rights reserved. This information is not intended as a substitute for professional medical care. Always follow your healthcare professional's instructions.          Return if symptoms worsen or fail to improve.    Signed, Doug Porras MD, FAAP, FACP  Internal Medicine & Pediatrics

## 2020-12-24 NOTE — PROGRESS NOTES
Subjective  Rj Juarez is a 64 year old male who presents for multiple concerns.  He has had a very difficult year.  He had his back surgery followed by a hip replacement on the left.  His physical exertion has been significantly reduced as a result of all these things.  His mother fell and broke her leg.  His sister was hit by a car and drug quite a ways breaking her leg.  He is looking forward to 2021.  He is having indigestion/chest pain.  He contacted his cardiologist who recommended that he get a stress test in [place].  He wants to do it in [place].  He feels the pain at the lower sternum.  Worse with unknown.  Better with eating or drinking.  No change with Tums or exertion.  He has been carrying 45 pound seed bags without any major problems.  He feels winded which she attributes that to being out of shape.  He has used 10 doses of ibuprofen this year.  He takes aspirin daily.  He drinks a lot of caffeinated tea.  Blood pressures have been going up.  Was systolic 110s-135's, now 140-145.  His weight is up due to physical inactivity.  His right hip is bothering him a lot now.  He thinks he will probably have to get it repaired in the spring.  He tries to walk 1/2 mile a day.  He has a history of hyperlipidemia and atherosclerotic cardiovascular disease status post angioplasty with stent and continues on Crestor.  History of hypertension which is previously stable on metoprolol.  He has anxiety and endorses this.    Problem List/PMH: reviewed in EMR, and made relevant updates today.  Medications: reviewed in EMR, and made relevant updates today.  Allergies: reviewed in EMR, and made relevant updates today.    Social Hx:  Social History     Tobacco Use     Smoking status: Never Smoker     Smokeless tobacco: Never Used   Substance Use Topics     Alcohol use: No     Drug use: No     Social History     Social History Narrative    .      2 children.  Both children grown and living elsewhere.       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     I reviewed social history and made relevant updates today.    Family Hx:   Family History   Problem Relation Age of Onset     Hypertension Mother         Hypertension     Diabetes Mother         Diabetes     Other - See Comments Mother         Spinal stenosis     Other - See Comments Father         scleroderma which was quite severe     Cancer Father         Cancer, of adenocarcinoma of the lung.  He was a nonsmoker.     Diabetes Brother         Diabetes     Other - See Comments Brother         Pancreatitis     Substance Abuse Brother         Alcohol/Drug,Alcoholic, has been through treatment a number of times.       Objective  Vitals: reviewed in EMR.  BP (!) 150/96 (BP Location: Right arm, Patient Position: Sitting, Cuff Size: Adult Large)   Pulse 85   Temp 97.2  F (36.2  C) (Tympanic)   Resp 18   Wt 103.1 kg (227 lb 3.2 oz)   SpO2 98%   BMI 32.60 kg/m      Gen: Pleasant male, NAD.  HEENT: MMM, no OP erythema.   Neck: Supple, no JVD, no bruits.  CV: RRR no m/r/g.   Pulm: CTAB no w/r/r  Neuro: Grossly intact  Msk: No lower extremity edema.  Skin: No concerning lesions.  Psychiatric: Normal affect and insight. Does not appear anxious or depressed.    Results for orders placed or performed in visit on 20 (from the past 48 hour(s))   EKG 12-lead, tracing only   Result Value Ref Range    Interpretation ECG Click View Image link to view waveform and result    Hemoglobin A1c   Result Value Ref Range    Hemoglobin A1C 6.2 (H) 4.0 - 6.0 %   Lipase   Result Value Ref Range    Lipase 22 11 - 82 U/L   Lipid Profile   Result Value Ref Range    Cholesterol 102 <200 mg/dL    Triglycerides 92 <150 mg/dL    HDL Cholesterol 39 23 - 92 mg/dL    LDL Cholesterol Calculated 45 <100 mg/dL    Non HDL Cholesterol 63 <130 mg/dL   Comprehensive metabolic panel   Result Value Ref Range    Sodium 139 134 - 144 mmol/L    Potassium  3.9 3.5 - 5.1 mmol/L    Chloride 107 98 - 107 mmol/L    Carbon Dioxide 28 21 - 31 mmol/L    Anion Gap 4 3 - 14 mmol/L    Glucose 152 (H) 70 - 105 mg/dL    Urea Nitrogen 13 7 - 25 mg/dL    Creatinine 0.94 0.70 - 1.30 mg/dL    GFR Estimate 80 >60 mL/min/[1.73_m2]    GFR Estimate If Black >90 >60 mL/min/[1.73_m2]    Calcium 9.3 8.6 - 10.3 mg/dL    Bilirubin Total 0.6 0.3 - 1.0 mg/dL    Albumin 4.4 3.5 - 5.7 g/dL    Protein Total 6.6 6.4 - 8.9 g/dL    Alkaline Phosphatase 70 34 - 104 U/L    ALT 12 7 - 52 U/L    AST 11 (L) 13 - 39 U/L   CBC with platelets   Result Value Ref Range    WBC 5.2 4.0 - 11.0 10e9/L    RBC Count 5.18 4.4 - 5.9 10e12/L    Hemoglobin 14.8 13.3 - 17.7 g/dL    Hematocrit 45.5 40.0 - 53.0 %    MCV 88 78 - 100 fl    MCH 28.6 26.5 - 33.0 pg    MCHC 32.5 31.5 - 36.5 g/dL    RDW 13.1 10.0 - 15.0 %    Platelet Count 235 150 - 450 10e9/L         Assessment    ICD-10-CM    1. Chest pain, unspecified type  R07.9 EKG 12-lead, tracing only     CBC with platelets     Comprehensive metabolic panel     Lipase     Lipase     Comprehensive metabolic panel     CBC with platelets     Echo Stress Echocardiogram   2. ASCVD (arteriosclerotic cardiovascular disease)  I25.10 Lipid Profile     Lipid Profile     Echo Stress Echocardiogram     metoprolol succinate ER (TOPROL-XL) 25 MG 24 hr tablet   3. High coronary artery calcium score at 1118.2 on 11/21/2019  R93.1 Echo Stress Echocardiogram   4. History of cardiac cath on 12/6/2019  Z98.890 metoprolol succinate ER (TOPROL-XL) 25 MG 24 hr tablet   5. Pre-diabetes  R73.03 Hemoglobin A1c     Hemoglobin A1c   6. Essential hypertension  I10 metoprolol succinate ER (TOPROL-XL) 25 MG 24 hr tablet   7. Gastroesophageal reflux disease with esophagitis, unspecified whether hemorrhage  K21.00 Comprehensive metabolic panel     omeprazole (PRILOSEC) 20 MG DR capsule     famotidine (PEPCID) 20 MG tablet     sucralfate (CARAFATE) 1 GM tablet     Comprehensive metabolic panel   8.  Coronary artery disease involving native coronary artery of native heart without angina pectoris  I25.10 rosuvastatin (CRESTOR) 20 MG tablet     metoprolol succinate ER (TOPROL-XL) 25 MG 24 hr tablet   9. Status post cardiac catheterization on 12/6/2019 through [facility] with a stenting to his distal circumflex  Z98.890 metoprolol succinate ER (TOPROL-XL) 25 MG 24 hr tablet   10. CAD, multiple vessel  I25.10 metoprolol succinate ER (TOPROL-XL) 25 MG 24 hr tablet     Orders Placed This Encounter   Procedures     CBC with platelets     Comprehensive metabolic panel     Lipid Profile     Lipase     Hemoglobin A1c     EKG 12-lead, tracing only     Echo Stress Echocardiogram       I think the more likely etiology is from gastroesophageal reflux disease however stable angina also on the differential.  Differential diagnosis also includes costochondritis, pleurisy, hiatal hernia, esophageal cancer, others.  If symptoms persist or worsen would recommend EGD.  We discussed the different stress test options and I recommend an exercise bicycle echocardiogram.    Plan   -- Expected clinical course discussed   -- Medications and their side effects discussed  Patient Instructions      -- Stress test     -- Start omeprazole (Prilosec) 20 mg daily   -- Use famotidine (Pepcid) 20 mg twice daily as needed for heart burn, upset stomach.   -- Use sucralfate (Carafate) 2-3 times per day to allow stomach lining to heal   -- Antacids are okay to use as well as needed (eg Tums)   -- Avoid trigger foods (eg spicy foods, caffeine, alcohol -- see longer list below)   -- Larger meal earlier in the day, smallest meal later in the day   -- Avoid NSAIDs (eg ibuprofen/Advil, naproxen/Aleve)   -- Work on healthy weight   -- Reduce stress in your life   -- After your symptoms have improved (around 30 days) consider stopping omeprazole   -- Okay to continue ranitidine as needed   -- If symptoms persist or worsen, return as we would consider  obtaining endoscopy    Check your Blood Pressure   -- Sit in a chair, feet flat on the floor for 5 minutes   -- Avoid caffeine, exercise and smoking for 30 minutes before checking   -- Have your arm at the level of your heart   -- Make sure you have the correct size cuff   -- Write them down, bring your log book in to your appointment     -- Goal blood pressure 120/70   -- Learn about DASH Diet (http://[employer]/DASHDiet - redirects to the [employer]) for dietary ways to reduce blood pressure   -- Work on 5% weight loss   -- Daily physical exercise (eg. 30 min brisk walk)        Patient Education   Lifestyle Changes for Controlling GERD  When you have GERD, stomach acid feels as if it s backing up toward your mouth. Whether or not you take medicine to control your GERD, your symptoms can often be improved with lifestyle changes. Talk to your healthcare provider about the following suggestions. These suggestions may help you get relief from your symptoms.      Raise your head  Reflux is more likely to strike when you re lying down flat, because stomach fluid can flow backward more easily. Raising the head of your bed 4 to 6 inches can help. To do this:    Slide blocks or books under the legs at the head of your bed. Or, place a wedge under the mattress. Many [employer] can make a suitable wedge for you. The wedge should run from your waist to the top of your head.    Don t just prop your head on several pillows. This increases pressure on your stomach. It can make GERD worse.  Watch your eating habits  Certain foods may increase the acid in your stomach or relax the lower esophageal sphincter. This makes GERD more likely. It s best to avoid the following if they cause you symptoms:    Coffee, tea, and carbonated drinks (with and without caffeine)    Fatty, fried, or spicy food    Mint, chocolate, onions, and tomatoes    Peppermint    Any other foods that seem to irritate your stomach or cause you pain  Relieve the  pressure  Tips include the following:    Eat smaller meals, even if you have to eat more often.    Don t lie down right after you eat. Wait a few hours for your stomach to empty.    Avoid tight belts and tight-fitting clothes.    Lose excess weight.  Tobacco and alcohol  Avoid smoking tobacco and drinking alcohol. They can make GERD symptoms worse.  Date Last Reviewed: 7/1/2016 2000-2017 [employer] [address] [place] [address], [place] [address]. All rights reserved. This information is not intended as a substitute for professional medical care. Always follow your healthcare professional's instructions.          Return if symptoms worsen or fail to improve.    Signed, Doug Porras MD, FAAP, FACP  Internal Medicine & Pediatrics

## 2020-12-24 NOTE — NURSING NOTE
"Chief Complaint   Patient presents with     Hypertension     Stress Echo     Discuss- Cardiologist wanted to do in [place]      Gastric Problem     Shortness of Breath     Patient is here for a follow up on blood pressure. He states he has been having indigestion and shortness of breath. His Cardiologist wants him to have a stress test done in [place] but patient does not want to travel to [place] to have it done.     Initial BP (!) 168/98 (BP Location: Right arm, Patient Position: Sitting, Cuff Size: Adult Large)   Pulse 85   Temp 97.2  F (36.2  C) (Tympanic)   Resp 18   Wt 103.1 kg (227 lb 3.2 oz)   SpO2 98%   BMI 32.60 kg/m   Estimated body mass index is 32.6 kg/m  as calculated from the following:    Height as of 10/3/20: 1.778 m (5' 10\").    Weight as of this encounter: 103.1 kg (227 lb 3.2 oz).  Medication Reconciliation: complete    Zoie Walden MA  "

## 2020-12-24 NOTE — PATIENT INSTRUCTIONS
-- Stress test     -- Start omeprazole (Prilosec) 20 mg daily   -- Use famotidine (Pepcid) 20 mg twice daily as needed for heart burn, upset stomach.   -- Use sucralfate (Carafate) 2-3 times per day to allow stomach lining to heal   -- Antacids are okay to use as well as needed (eg Tums)   -- Avoid trigger foods (eg spicy foods, caffeine, alcohol -- see longer list below)   -- Larger meal earlier in the day, smallest meal later in the day   -- Avoid NSAIDs (eg ibuprofen/Advil, naproxen/Aleve)   -- Work on healthy weight   -- Reduce stress in your life   -- After your symptoms have improved (around 30 days) consider stopping omeprazole   -- Okay to continue ranitidine as needed   -- If symptoms persist or worsen, return as we would consider obtaining endoscopy    Check your Blood Pressure   -- Sit in a chair, feet flat on the floor for 5 minutes   -- Avoid caffeine, exercise and smoking for 30 minutes before checking   -- Have your arm at the level of your heart   -- Make sure you have the correct size cuff   -- Write them down, bring your log book in to your appointment     -- Goal blood pressure 120/70   -- Learn about DASH Diet (http://[employer]/DASHDiet - redirects to the [facility]) for dietary ways to reduce blood pressure   -- Work on 5% weight loss   -- Daily physical exercise (eg. 30 min brisk walk)        Patient Education   Lifestyle Changes for Controlling GERD  When you have GERD, stomach acid feels as if it s backing up toward your mouth. Whether or not you take medicine to control your GERD, your symptoms can often be improved with lifestyle changes. Talk to your healthcare provider about the following suggestions. These suggestions may help you get relief from your symptoms.      Raise your head  Reflux is more likely to strike when you re lying down flat, because stomach fluid can flow backward more easily. Raising the head of your bed 4 to 6 inches can help. To do this:    Slide blocks or books under the  legs at the head of your bed. Or, place a wedge under the mattress. Many foam stores can make a suitable wedge for you. The wedge should run from your waist to the top of your head.    Don t just prop your head on several pillows. This increases pressure on your stomach. It can make GERD worse.  Watch your eating habits  Certain foods may increase the acid in your stomach or relax the lower esophageal sphincter. This makes GERD more likely. It s best to avoid the following if they cause you symptoms:    Coffee, tea, and carbonated drinks (with and without caffeine)    Fatty, fried, or spicy food    Mint, chocolate, onions, and tomatoes    Peppermint    Any other foods that seem to irritate your stomach or cause you pain  Relieve the pressure  Tips include the following:    Eat smaller meals, even if you have to eat more often.    Don t lie down right after you eat. Wait a few hours for your stomach to empty.    Avoid tight belts and tight-fitting clothes.    Lose excess weight.  Tobacco and alcohol  Avoid smoking tobacco and drinking alcohol. They can make GERD symptoms worse.  Date Last Reviewed: 7/1/2016 2000-2017 [employer]. [address] [place] [address], [place] [address]. All rights reserved. This information is not intended as a substitute for professional medical care. Always follow your healthcare professional's instructions.

## 2021-01-03 ENCOUNTER — HEALTH MAINTENANCE LETTER (OUTPATIENT)
Age: 66
End: 2021-01-03

## 2021-01-08 ENCOUNTER — TELEPHONE (OUTPATIENT)
Dept: CARDIOLOGY | Facility: OTHER | Age: 66
End: 2021-01-08

## 2021-01-08 NOTE — TELEPHONE ENCOUNTER
Patient verified .  Reminder call for stress test with instructions given.  Patient verbalized understanding.

## 2021-01-11 ENCOUNTER — ALLIED HEALTH/NURSE VISIT (OUTPATIENT)
Dept: FAMILY MEDICINE | Facility: OTHER | Age: 66
End: 2021-01-11
Attending: SURGERY
Payer: MEDICARE

## 2021-01-11 DIAGNOSIS — R93.1 HIGH CORONARY ARTERY CALCIUM SCORE: ICD-10-CM

## 2021-01-11 DIAGNOSIS — R07.9 CHEST PAIN, UNSPECIFIED TYPE: ICD-10-CM

## 2021-01-11 DIAGNOSIS — I25.10 ASCVD (ARTERIOSCLEROTIC CARDIOVASCULAR DISEASE): Chronic | ICD-10-CM

## 2021-01-11 DIAGNOSIS — Z01.818 PRE-OP TESTING: ICD-10-CM

## 2021-01-11 PROCEDURE — U0005 INFEC AGEN DETEC AMPLI PROBE: HCPCS | Mod: ZL | Performed by: SURGERY

## 2021-01-11 PROCEDURE — C9803 HOPD COVID-19 SPEC COLLECT: HCPCS

## 2021-01-11 PROCEDURE — U0003 INFECTIOUS AGENT DETECTION BY NUCLEIC ACID (DNA OR RNA); SEVERE ACUTE RESPIRATORY SYNDROME CORONAVIRUS 2 (SARS-COV-2) (CORONAVIRUS DISEASE [COVID-19]), AMPLIFIED PROBE TECHNIQUE, MAKING USE OF HIGH THROUGHPUT TECHNOLOGIES AS DESCRIBED BY CMS-2020-01-R: HCPCS | Mod: ZL | Performed by: SURGERY

## 2021-01-11 NOTE — PROGRESS NOTES
Chief Complaint   Patient presents with     Covid 19 Testing     Procedure on 1-15-21 at [facility].    Medication Reconciliation: complete    Niesha Thompson LPN

## 2021-01-12 ENCOUNTER — HOSPITAL ENCOUNTER (OUTPATIENT)
Dept: CARDIOLOGY | Facility: OTHER | Age: 66
Discharge: HOME | End: 2021-01-12
Attending: INTERNAL MEDICINE | Admitting: INTERNAL MEDICINE
Payer: MEDICARE

## 2021-01-12 VITALS
DIASTOLIC BLOOD PRESSURE: 98 MMHG | OXYGEN SATURATION: 97 % | HEART RATE: 80 BPM | SYSTOLIC BLOOD PRESSURE: 146 MMHG | TEMPERATURE: 96.3 F

## 2021-01-12 DIAGNOSIS — I25.10 ASCVD (ARTERIOSCLEROTIC CARDIOVASCULAR DISEASE): ICD-10-CM

## 2021-01-12 DIAGNOSIS — R93.1 HIGH CORONARY ARTERY CALCIUM SCORE: ICD-10-CM

## 2021-01-12 DIAGNOSIS — R07.9 CHEST PAIN, UNSPECIFIED TYPE: ICD-10-CM

## 2021-01-12 LAB
LABORATORY COMMENT REPORT: NORMAL
SARS-COV-2 RNA RESP QL NAA+PROBE: NEGATIVE
SARS-COV-2 RNA RESP QL NAA+PROBE: NORMAL
SPECIMEN SOURCE: NORMAL
SPECIMEN SOURCE: NORMAL

## 2021-01-12 PROCEDURE — 93321 DOPPLER ECHO F-UP/LMTD STD: CPT | Mod: 26 | Performed by: INTERNAL MEDICINE

## 2021-01-12 PROCEDURE — 93325 DOPPLER ECHO COLOR FLOW MAPG: CPT | Mod: TC

## 2021-01-12 PROCEDURE — 93325 DOPPLER ECHO COLOR FLOW MAPG: CPT | Mod: 26 | Performed by: INTERNAL MEDICINE

## 2021-01-12 PROCEDURE — 255N000002 HC RX 255 OP 636: Performed by: INTERNAL MEDICINE

## 2021-01-12 PROCEDURE — 93350 STRESS TTE ONLY: CPT | Mod: 26 | Performed by: INTERNAL MEDICINE

## 2021-01-12 PROCEDURE — 93017 CV STRESS TEST TRACING ONLY: CPT

## 2021-01-12 PROCEDURE — 93016 CV STRESS TEST SUPVJ ONLY: CPT | Performed by: INTERNAL MEDICINE

## 2021-01-12 PROCEDURE — 93018 CV STRESS TEST I&R ONLY: CPT | Performed by: INTERNAL MEDICINE

## 2021-01-12 RX ADMIN — PERFLUTREN 5 ML: 6.52 INJECTION, SUSPENSION INTRAVENOUS at 10:00

## 2021-01-12 NOTE — PROGRESS NOTES
0850 The patient arrived for a stress echo.  The procedure, risks and benefits were discussed and the consent was signed.  The patient was prepped for the stress test, and an IV was placed per procedure.  The echo sonographer did the initial images with definity for image enhancement.  Dr. Porras arrived, and the patient biked 8 minutes and 33 seconds.  The patient tolerated the procedure.  Stress images were completed and the IV was removed per procedure.  The patient was released in stable condition.  The patient was instructed that the ordering MD will call with results in one to two days.  Please see the chart for complete test results.

## 2021-01-15 ENCOUNTER — ANESTHESIA EVENT (OUTPATIENT)
Dept: SURGERY | Facility: OTHER | Age: 66
End: 2021-01-15
Payer: MEDICARE

## 2021-01-15 ENCOUNTER — ANESTHESIA (OUTPATIENT)
Dept: SURGERY | Facility: OTHER | Age: 66
End: 2021-01-15
Payer: MEDICARE

## 2021-01-15 ENCOUNTER — HOSPITAL ENCOUNTER (OUTPATIENT)
Facility: OTHER | Age: 66
Discharge: HOME | End: 2021-01-15
Attending: SURGERY | Admitting: SURGERY
Payer: MEDICARE

## 2021-01-15 VITALS
OXYGEN SATURATION: 95 % | TEMPERATURE: 96.6 F | SYSTOLIC BLOOD PRESSURE: 156 MMHG | RESPIRATION RATE: 12 BRPM | DIASTOLIC BLOOD PRESSURE: 105 MMHG | WEIGHT: 227 LBS | HEART RATE: 63 BPM | BODY MASS INDEX: 32.57 KG/M2

## 2021-01-15 DIAGNOSIS — K63.5 POLYP OF SIGMOID COLON, UNSPECIFIED TYPE: ICD-10-CM

## 2021-01-15 DIAGNOSIS — K57.30 DIVERTICULOSIS OF COLON WITHOUT DIVERTICULITIS: ICD-10-CM

## 2021-01-15 DIAGNOSIS — K63.5 POLYP OF ASCENDING COLON, UNSPECIFIED TYPE: Primary | ICD-10-CM

## 2021-01-15 PROCEDURE — 250N000011 HC RX IP 250 OP 636: Performed by: NURSE ANESTHETIST, CERTIFIED REGISTERED

## 2021-01-15 PROCEDURE — 43239 EGD BIOPSY SINGLE/MULTIPLE: CPT | Performed by: NURSE ANESTHETIST, CERTIFIED REGISTERED

## 2021-01-15 PROCEDURE — 258N000003 HC RX IP 258 OP 636: Performed by: SURGERY

## 2021-01-15 PROCEDURE — 45385 COLONOSCOPY W/LESION REMOVAL: CPT | Mod: PT | Performed by: SURGERY

## 2021-01-15 PROCEDURE — 45385 COLONOSCOPY W/LESION REMOVAL: CPT | Mod: PT

## 2021-01-15 PROCEDURE — 250N000009 HC RX 250: Performed by: SURGERY

## 2021-01-15 PROCEDURE — 43239 EGD BIOPSY SINGLE/MULTIPLE: CPT | Performed by: SURGERY

## 2021-01-15 PROCEDURE — 45380 COLONOSCOPY AND BIOPSY: CPT | Mod: PT | Performed by: SURGERY

## 2021-01-15 PROCEDURE — 250N000009 HC RX 250: Performed by: NURSE ANESTHETIST, CERTIFIED REGISTERED

## 2021-01-15 PROCEDURE — 45380 COLONOSCOPY AND BIOPSY: CPT | Performed by: SURGERY

## 2021-01-15 PROCEDURE — 88305 TISSUE EXAM BY PATHOLOGIST: CPT

## 2021-01-15 PROCEDURE — 999N000010 HC STATISTIC ANES STAT CODE-CRNA PER MINUTE: Performed by: SURGERY

## 2021-01-15 RX ORDER — NALOXONE HYDROCHLORIDE 0.4 MG/ML
0.4 INJECTION, SOLUTION INTRAMUSCULAR; INTRAVENOUS; SUBCUTANEOUS
Status: DISCONTINUED | OUTPATIENT
Start: 2021-01-15 | End: 2021-01-15 | Stop reason: HOSPADM

## 2021-01-15 RX ORDER — ONDANSETRON 2 MG/ML
4 INJECTION INTRAMUSCULAR; INTRAVENOUS EVERY 6 HOURS PRN
Status: DISCONTINUED | OUTPATIENT
Start: 2021-01-15 | End: 2021-01-15 | Stop reason: HOSPADM

## 2021-01-15 RX ORDER — PROPOFOL 10 MG/ML
INJECTION, EMULSION INTRAVENOUS PRN
Status: DISCONTINUED | OUTPATIENT
Start: 2021-01-15 | End: 2021-01-15

## 2021-01-15 RX ORDER — FLUMAZENIL 0.1 MG/ML
0.2 INJECTION, SOLUTION INTRAVENOUS
Status: DISCONTINUED | OUTPATIENT
Start: 2021-01-15 | End: 2021-01-15 | Stop reason: HOSPADM

## 2021-01-15 RX ORDER — ONDANSETRON 2 MG/ML
4 INJECTION INTRAMUSCULAR; INTRAVENOUS
Status: DISCONTINUED | OUTPATIENT
Start: 2021-01-15 | End: 2021-01-15 | Stop reason: HOSPADM

## 2021-01-15 RX ORDER — ONDANSETRON 4 MG/1
4 TABLET, ORALLY DISINTEGRATING ORAL EVERY 6 HOURS PRN
Status: DISCONTINUED | OUTPATIENT
Start: 2021-01-15 | End: 2021-01-15 | Stop reason: HOSPADM

## 2021-01-15 RX ORDER — SODIUM CHLORIDE, SODIUM LACTATE, POTASSIUM CHLORIDE, CALCIUM CHLORIDE 600; 310; 30; 20 MG/100ML; MG/100ML; MG/100ML; MG/100ML
INJECTION, SOLUTION INTRAVENOUS CONTINUOUS
Status: DISCONTINUED | OUTPATIENT
Start: 2021-01-15 | End: 2021-01-15 | Stop reason: HOSPADM

## 2021-01-15 RX ORDER — NALOXONE HYDROCHLORIDE 0.4 MG/ML
0.2 INJECTION, SOLUTION INTRAMUSCULAR; INTRAVENOUS; SUBCUTANEOUS
Status: DISCONTINUED | OUTPATIENT
Start: 2021-01-15 | End: 2021-01-15 | Stop reason: HOSPADM

## 2021-01-15 RX ORDER — LIDOCAINE HYDROCHLORIDE 20 MG/ML
INJECTION, SOLUTION INFILTRATION; PERINEURAL PRN
Status: DISCONTINUED | OUTPATIENT
Start: 2021-01-15 | End: 2021-01-15

## 2021-01-15 RX ORDER — LIDOCAINE 40 MG/G
CREAM TOPICAL
Status: DISCONTINUED | OUTPATIENT
Start: 2021-01-15 | End: 2021-01-15 | Stop reason: HOSPADM

## 2021-01-15 RX ORDER — PROCHLORPERAZINE MALEATE 5 MG
5 TABLET ORAL EVERY 6 HOURS PRN
Status: DISCONTINUED | OUTPATIENT
Start: 2021-01-15 | End: 2021-01-15 | Stop reason: HOSPADM

## 2021-01-15 RX ORDER — PROPOFOL 10 MG/ML
INJECTION, EMULSION INTRAVENOUS CONTINUOUS PRN
Status: DISCONTINUED | OUTPATIENT
Start: 2021-01-15 | End: 2021-01-15

## 2021-01-15 RX ADMIN — LIDOCAINE HYDROCHLORIDE 40 MG: 20 INJECTION, SOLUTION INFILTRATION; PERINEURAL at 09:52

## 2021-01-15 RX ADMIN — PROPOFOL 140 MCG/KG/MIN: 10 INJECTION, EMULSION INTRAVENOUS at 09:52

## 2021-01-15 RX ADMIN — PROPOFOL 80 MG: 10 INJECTION, EMULSION INTRAVENOUS at 09:52

## 2021-01-15 RX ADMIN — SODIUM CHLORIDE, POTASSIUM CHLORIDE, SODIUM LACTATE AND CALCIUM CHLORIDE: 600; 310; 30; 20 INJECTION, SOLUTION INTRAVENOUS at 09:20

## 2021-01-15 NOTE — ANESTHESIA PREPROCEDURE EVALUATION
Anesthesia Pre-Procedure Evaluation    Patient: Rj Juarze   MRN: [id number] : 1955          Preoperative Diagnosis: History of colon polyps [Z86.010]  Chest pain [R07.9]    Procedure(s):  ESOPHAGOGASTRODUODENOSCOPY (EGD)  COLONOSCOPY    Past Medical History:   Diagnosis Date     Benign lipomatous neoplasm     2014     Calculus of kidney     possible     Carpal tunnel syndrome     Both hands     Closed fracture of patella     09,Sustained right superior pole patellar fracture which underwent conservative management     Diverticulosis of large intestine without perforation or abscess without bleeding     2014     Exertional chest pain 2019     Headache     No Comments Provided     Other specified forms of tremor     3/2/2011     Other urticaria (CODE)     3/2/2011     Pain in joint     No Comments Provided     Umbilical hernia without obstruction or gangrene     2018     Past Surgical History:   Procedure Laterality Date     COLONOSCOPY  2014,,F/U      COLONOSCOPY  2019    Serrated adenoma, follow up 1 year     ESOPHAGOSCOPY, GASTROSCOPY, DUODENOSCOPY (EGD), COMBINED      14,EGD     JOINT REPLACEMENT, HIP RT/LT Left      OTHER SURGICAL HISTORY      9/10/2014,50989.0,ID REPAIR ING HERNIA  >5 TRS BETTINA     OTHER SURGICAL HISTORY      2018,45767.0,ID REPAIR UMBILICAL NAZARIO  >5 TRS REDUC     RELEASE CARPAL TUNNEL      3,2004,Both hands     SIGMOIDOSCOPY FLEXIBLE      No Comments Provided       Anesthesia Evaluation     . Pt has had prior anesthetic.     No history of anesthetic complications          ROS/MED HX    ENT/Pulmonary:     (+)sleep apnea, doesn't use CPAP , . .    Neurologic:  - neg neurologic ROS     Cardiovascular:     (+) hypertension--CAD, --stent,2019  1 . Taking blood thinners Pt has received instructions: . . . :. .       METS/Exercise Tolerance:  >4 METS   Hematologic:  - neg hematologic  ROS       Musculoskeletal:    "(+) arthritis,  -       GI/Hepatic:     (+) GERD       Renal/Genitourinary:  - ROS Renal section negative       Endo:  - neg endo ROS       Psychiatric:  - neg psychiatric ROS       Infectious Disease:  - neg infectious disease ROS       Malignancy:      - no malignancy   Other:    - neg other ROS                      Physical Exam  Normal systems: cardiovascular, pulmonary and dental    Airway   Mallampati: II  TM distance: >3 FB  Neck ROM: full    Dental     Cardiovascular   Rhythm and rate: regular and normal      Pulmonary    breath sounds clear to auscultation            Lab Results   Component Value Date    WBC 5.2 12/24/2020    HGB 14.8 12/24/2020    HCT 45.5 12/24/2020     12/24/2020    SED 2 02/20/2017     12/24/2020    POTASSIUM 3.9 12/24/2020    CHLORIDE 107 12/24/2020    CO2 28 12/24/2020    BUN 13 12/24/2020    CR 0.94 12/24/2020     (H) 12/24/2020    COMFORT 9.3 12/24/2020    ALBUMIN 4.4 12/24/2020    PROTTOTAL 6.6 12/24/2020    ALT 12 12/24/2020    AST 11 (L) 12/24/2020    ALKPHOS 70 12/24/2020    BILITOTAL 0.6 12/24/2020    LIPASE 22 12/24/2020       Preop Vitals  BP Readings from Last 3 Encounters:   01/15/21 (!) 181/112   01/12/21 (!) 146/98   12/24/20 (!) 150/96    Pulse Readings from Last 3 Encounters:   01/15/21 90   01/12/21 80   12/24/20 85      Resp Readings from Last 3 Encounters:   01/15/21 18   12/24/20 18   10/03/20 18    SpO2 Readings from Last 3 Encounters:   01/15/21 99%   01/12/21 97%   12/24/20 98%      Temp Readings from Last 1 Encounters:   01/15/21 96.6  F (35.9  C) (Tympanic)    Ht Readings from Last 1 Encounters:   10/03/20 1.778 m (5' 10\")      Wt Readings from Last 1 Encounters:   01/15/21 103 kg (227 lb)    Estimated body mass index is 32.57 kg/m  as calculated from the following:    Height as of 10/3/20: 1.778 m (5' 10\").    Weight as of this encounter: 103 kg (227 lb).       Anesthesia Plan      History & Physical Review      ASA Status:  2 .    NPO Status:  " > 6 hours    Plan for MAC with Propofol induction.            Postoperative Care      Consents  Anesthetic plan, risks, benefits and alternatives discussed with:  Patient..                 TAE CADE CRNA

## 2021-01-15 NOTE — DISCHARGE INSTRUCTIONS
Procedure you had done: egd and colonoscopy  Your health care provider is:  Doug Porras  Your surgeon is Dr. Ly Frances.   Please call your health care provider or surgeon at [phone] if:    - you feel you are getting worse or having an increase in problems    - fever greater than 101 degrees  - increasing shortness of breath or chest pain  - any signs of infection (increasing redness, swelling, tenderness, warmth, change in appearance, or  increased drainage)  - blood in your urine or stool  - coughing or vomiting blood  - nausea (upset stomach) and vomiting and/or diarrhea that will not stop  - severe pain that is not relieved by medicine, rest or ice  You have had medications for sedation. Please be aware that this can cause drowsiness and impaired judgment for up to 24 hours after your procedure. Do not drive, operate power tools or drink alcohol for 24 hours.  If samples were taken-you will get a phone call and a letter with your results in the next 7-10 days. If you don't get results, please call and let us know!     [facility]  Adult Discharge Orders & Instructions    ________________________________________________________________          For 12 hours after surgery  1. Get plenty of rest.  A responsible adult must stay with you for at least 12 hours after you leave the hospital.   2. You may feel lightheaded.  IF so, sit for a few minutes before standing.  Have someone help you get up.   3. You may have a slight fever. Call the doctor if your fever is over 101 F (38.3 C) (taken under the tongue) or lasts longer than 24 hours.  4. You may have a dry mouth, a sore throat, muscle aches or trouble sleeping.  These should go away after 24 hours.  5. Do not make important or legal decisions.  6.   Do not drive or use heavy equipment.  If you have weakness or tingling, don't drive or use heavy equipment until this feeling goes away.    To contact a doctor, call    [phone]_______________________

## 2021-01-15 NOTE — INTERVAL H&P NOTE
The history and physical has been reviewed and the patient has been examined.  There are no changes to the patient's history or physical condition.  I discussed EGD and colonoscopy with the patient.

## 2021-01-15 NOTE — ANESTHESIA POSTPROCEDURE EVALUATION
Patient: Rj Juarez    Procedure(s):  ESOPHAGOGASTRODUODENOSCOPY, WITH BIOPSY  COLONOSCOPY, WITH POLYPECTOMY AND BIOPSY    Diagnosis:History of colon polyps [Z86.010]  Chest pain [R07.9]  Diagnosis Additional Information: No value filed.    Anesthesia Type:  MAC    Note:  Anesthesia Post Evaluation    Patient location during evaluation: Phase 2  Patient participation: Able to fully participate in evaluation  Level of consciousness: awake and alert  Pain management: adequate  Airway patency: patent  Cardiovascular status: acceptable  Respiratory status: acceptable  Hydration status: acceptable  PONV: none             Last vitals:  Vitals:    01/15/21 0902   BP: (!) 181/112   Pulse: 90   Resp: 18   Temp: 96.6  F (35.9  C)   SpO2: 99%         Electronically Signed By: TAE DRIVER CRNA  January 15, 2021  10:40 AM

## 2021-01-15 NOTE — OP NOTE
PROCEDURE NOTE    SURGEON:Ly Frances MD    PRE-OP DIAGNOSIS:   Chest pain, reflux, history of colon polyps      POST-OP DIAGNOSIS: normal appearing stomach and esophagus, ascending colon polyp, sigmoid colon polyps, severe sigmoid diverticulosis    PROCEDURE PLANNED:   Diagnostic EGD      Screening Colonoscopy    PROCEDURE PERFORMED:  Flexible EGD with biopsies, colonoscopy with cold snare and cold biopsies    SPECIMEN:  Duodenum, antrum, GEJ biopsies, right colon polyp, sigmoid polyps    ANESTHESIA:  See anesthesia note    ESTIMATED BLOOD LOSS: none    COMPLICATIONS:  None    INDICATION FOR THE PROCEDURE: The patient is a 65 year old male. The patient presents with reflux and chest pain and a history of colon polyps. I explained to the patient the risks, benefits and alternatives to diagnostic EGD and colonoscopy for evaluating his concerns. We specifically discussed the risks of bleeding, infection, perforation, potential inability to reach the cecum and the risks of sedation. The patient's questions were answered and the patient wished to proceed. Informed consent paperwork was completed.    PROCEDURE: The patient was taken to the endoscopy suite. Appropriate monitors were attached. The patient was placed in the left lateral decubitus position. Biteblock was positioned. Timeout was performed confirming the patient's identity and procedure to be performed. After appropriate sedation was confirmed, the flexible endoscope was advanced into the oropharynx. The posteriororopharynx appeared grossly normal. The scope was advanced into the proximal esophagus. The esophagus was insufflated with air. The scope was advanced under direct visualization. No acute abnormalities of the esophagus were noted. The scope was advanced into the stomach. The scope was advanced through the pylorus into the duodenal bulb. The bulb and distal duodenum appeared grossly normal. Biopsies were taken. The scope was withdrawn back into the  stomach. Antral biopsy was obtained and sent to pathology. The scope was retroflexed and the GE junction inspected. No abnormalities were noted. The scope was returned to a neutral position and the stomach was decompressed. The scope was withdrawn to the GE junction and biopsy obtained. The mucosa of the esophagus was inspected while withdrawing the scope. No abnormalities were noted. The scope was withdrawn from the patient. The bite block was removed.    The patient was repositioned. After appropriate sedation, digital rectal exam was performed.  There was normal tone and no gross abnormality was noted.  The lubricated flexible colonoscope was introduced into the anus the colon was insufflated with air. The prep quality was adequate. Under direct visualization the scope was advanced to the cecum. The ileocecal valve was intubated and the terminal ileum inspected. No gross abnormality was noted. The scope was withdrawn back into the cecum. The mucosa of colon was inspected while withdrawing the scope. A tiny ascending colon polyp was noted and removed with cold forceps. Two polyps were noted in the sigmoid colon. One of the polyps was inside a diverticula. One polyp was removed with cold snare. The other polyp was biopsied with cold forceps due to it's position in a diverticula. Numerous other diverticula were noted. The scope was retroflexed in the rectum and the anorectal junction was inspected. No abnormalities were noted. The scope was returned to a neutral position and the colon was decompressed. The scope was removed. The patient tolerated the procedure with no immediately apparent complication. The patient was taken to recovery in stable condition.    FOLLOW UP:  RECOMMENDATIONS use the carafate and Pepcid. High fiber diet. Will call with pathology results.

## 2021-01-18 DIAGNOSIS — R97.20 ELEVATED PSA: Primary | ICD-10-CM

## 2021-01-18 NOTE — PROGRESS NOTES
"Per last office visit  3/4/19 with Nestor Toledo MD \"Plan  Follow up PSA in 2 years\"        Orders Placed    "

## 2021-01-21 NOTE — RESULT ENCOUNTER NOTE
Christianne/Neetu/Teresa/Kaci-Please call patient and let them know the colon polyp that was removed was a tubular adenoma. The other polyps were related to diverticula and not the kind that has anything to do with colon cancer. The tubular adenoma has a low risk of being associated with a colon cancer. Follow up colonoscopy is recommended in 5 years to check for new polyps. High fiber diet is recommended for colon health.   The EGD showed reflux but not any infection. He should see me in the office to discuss. Patient should call with questions or concerns. Thanks!

## 2021-03-04 ENCOUNTER — OFFICE VISIT (OUTPATIENT)
Dept: UROLOGY | Facility: OTHER | Age: 66
End: 2021-03-04
Attending: UROLOGY
Payer: COMMERCIAL

## 2021-03-04 VITALS
HEART RATE: 72 BPM | DIASTOLIC BLOOD PRESSURE: 100 MMHG | BODY MASS INDEX: 32.28 KG/M2 | WEIGHT: 225 LBS | SYSTOLIC BLOOD PRESSURE: 148 MMHG | RESPIRATION RATE: 16 BRPM

## 2021-03-04 DIAGNOSIS — N40.1 BENIGN PROSTATIC HYPERPLASIA WITH URINARY FREQUENCY: ICD-10-CM

## 2021-03-04 DIAGNOSIS — R35.0 BENIGN PROSTATIC HYPERPLASIA WITH URINARY FREQUENCY: ICD-10-CM

## 2021-03-04 DIAGNOSIS — R97.20 ELEVATED PSA: Primary | ICD-10-CM

## 2021-03-04 DIAGNOSIS — R97.20 ELEVATED PSA: ICD-10-CM

## 2021-03-04 DIAGNOSIS — N32.81 OVERACTIVE BLADDER: ICD-10-CM

## 2021-03-04 LAB — PSA SERPL-MCNC: 2.82 NG/ML

## 2021-03-04 PROCEDURE — 99214 OFFICE O/P EST MOD 30 MIN: CPT | Performed by: UROLOGY

## 2021-03-04 PROCEDURE — 84153 ASSAY OF PSA TOTAL: CPT | Mod: ZL | Performed by: UROLOGY

## 2021-03-04 PROCEDURE — 36415 COLL VENOUS BLD VENIPUNCTURE: CPT | Mod: ZL | Performed by: UROLOGY

## 2021-03-04 PROCEDURE — G0463 HOSPITAL OUTPT CLINIC VISIT: HCPCS

## 2021-03-04 RX ORDER — MIRABEGRON 25 MG/1
25 TABLET, FILM COATED, EXTENDED RELEASE ORAL DAILY
Qty: 90 TABLET | Refills: 3 | Status: SHIPPED | OUTPATIENT
Start: 2021-03-04 | End: 2022-03-10

## 2021-03-04 RX ORDER — TAMSULOSIN HYDROCHLORIDE 0.4 MG/1
0.4 CAPSULE ORAL EVERY EVENING
Qty: 90 CAPSULE | Refills: 3 | Status: SHIPPED | OUTPATIENT
Start: 2021-03-04 | End: 2022-06-23

## 2021-03-04 ASSESSMENT — PAIN SCALES - GENERAL: PAINLEVEL: MILD PAIN (2)

## 2021-03-04 NOTE — NURSING NOTE
Pt presents to clinic for a 2 year elevated PSA follow up    Review of Systems:    Weight loss:    No     Recent fever/chills:  No   Night sweats:   Yes  Current skin rash:  No   Recent hair loss:  No  Heat intolerance:  No   Cold intolerance:  Yes  Chest pain:   Yes   Palpitations:   No  Shortness of breath:  Yes   Wheezing:   No  Constipation:    No   Diarrhea:   Yes   Nausea:   No   Vomiting:   No   Kidney/side pain:  Yes   Back pain:   Yes  Frequent headaches:  No   Dizziness:     Yes  Leg swelling:   Yes   Calf pain:    Yes

## 2021-03-04 NOTE — PROGRESS NOTES
Type of Visit  EST    Chief Complaint  Elevated PSA  Urinary urgency    HPI  Mr. Juarez is a 65 year old male with slightly elevated PSA who follows up with repeat PSA as well as a new complaint of irritative urinary symptoms.  Baseline the patient does have obstructive symptoms for which he previously was taking Flomax.  Patient is complaining of progressively worsening urinary urgency and frequency with occasional urge incontinence throughout the day.  He denies dysuria and gross hematuria.  He does not consume significant moderate caffeine throughout the day.    Regarding elevated PSA he has no family history of prostate cancer.  He has never undergone a biopsy.  Most recent PSAs have been within normal limits.    He unfortunately has developed significant medical problems since the last visit including undergoing cardiac stenting as well as orthopedic surgeries involving his back and left lower extremity.      Family History  Family History   Problem Relation Age of Onset     Hypertension Mother         Hypertension     Diabetes Mother         Diabetes     Other - See Comments Mother         Spinal stenosis     Other - See Comments Father         scleroderma which was quite severe     Cancer Father         Cancer, of adenocarcinoma of the lung.  He was a nonsmoker.     Diabetes Brother         Diabetes     Other - See Comments Brother         Pancreatitis     Substance Abuse Brother         Alcohol/Drug,Alcoholic, has been through treatment a number of times.       Review of Systems  I personally reviewed the ROS with the patient.    Nursing Notes:   Mariola Guillaume LPN  3/4/2021 11:37 AM  Sign at exiting of workspace  Pt presents to clinic for a 2 year elevated PSA follow up    Review of Systems:    Weight loss:    No     Recent fever/chills:  No   Night sweats:   Yes  Current skin rash:  No   Recent hair loss:  No  Heat intolerance:  No   Cold intolerance:  Yes  Chest  pain:   Yes   Palpitations:   No  Shortness of breath:  Yes   Wheezing:   No  Constipation:    No   Diarrhea:   Yes   Nausea:   No   Vomiting:   No   Kidney/side pain:  Yes   Back pain:   Yes  Frequent headaches:  No   Dizziness:     Yes  Leg swelling:   Yes   Calf pain:    Yes            Physical Exam  Vitals:    03/04/21 1139 03/04/21 1143   BP: (!) 180/100 (!) 148/100   BP Location: Left arm    Patient Position: Sitting    Cuff Size: Adult Regular    Pulse: 72    Resp: 16    Weight: 102.1 kg (225 lb)      Constitutional: No acute distress.  Alert and cooperative   Head: NCAT  Eyes: Conjunctivae normal  Cardiovascular: Regular rate.  Pulmonary/Chest: Respirations are even and non-labored bilaterally, no audible wheezing  Abdominal: Soft. No distension, tenderness, masses or guarding.   Neurological: A + O x 3.  Cranial Nerves II-XII grossly intact.  Extremities: FREEMAN x 4, Warm. No clubbing.  No cyanosis.    Skin: Pink, warm and dry.  No visible rashes noted.  Psychiatric:  Normal mood and affect  Back:  No left CVA tenderness.  No right CVA tenderness.  Genitourinary:  Nonpalpable bladder    Labs  Results for JEFE JACOB (MRN [id number]) as of 3/4/2021 11:48   3/4/2021 09:34   PSA 2.817     Results for JEFE JACOB (MRN [id number]) as of 3/4/2019 11:36   10/31/2018 12:23 3/4/2019 09:37   PSA 3.909 (H) 2.918     Imaging  CT Stone  3/29/2018  Impression: No evidence of abdominal wall hernia at this time.  No renal or ureteral calculi are present. There is no hydronephrosis.    Post-Void Residual  A post-void residual was measured by ultrasonic bladder scanner.  34 mL (previously recorded)  110 mL (previously recorded)  65 mL (previously recorded)    Assessment  Mr. Jacob is a 65 year old male who follows up with normalized PSA and progressively worsening mixed urinary symptoms.    We discussed the pathophysiology of overactive versus obstructive urinary symptoms.  He has symptoms from both  categories and I explained treatment approach for each.  I recommended restarting Flomax to reassess symptoms given it has the least amount of side effect potential.  If this is ineffective or he develops side effects I recommended transitioning to Myrbetriq.    Plan  Restart Flomax   -If lightheadedness develops or it is not effective he will discontinue after at least a 2-week trial   Then start Myrbetriq 25 mg once daily.   -Follow-up 1 month after starting the medication to check blood pressure        A total of 30 minutes was spent with the patient, reviewing records, tests, ordering medications, tests or procedures, counseling regarding the above described medical concern, recommendations regarding management and documenting clinical information in the EHR.

## 2021-05-19 ENCOUNTER — PATIENT OUTREACH (OUTPATIENT)
Dept: PEDIATRICS | Facility: OTHER | Age: 66
End: 2021-05-19

## 2021-05-19 NOTE — TELEPHONE ENCOUNTER
ACC Review   Please call Olive Rj CRENSHAW Jamesfrancie.     -- Schedule a nurse only BP check   -- If BP is greater than or equal to 140/90, schedule an office visit for blood pressure with Dr. Porras.     Signed, Doug Porras MD, FAAP, FACP  Internal Medicine & Pediatrics

## 2021-05-19 NOTE — LETTER
May 19, 2021      Rj Juarez  [address] [address]      Your healthcare team cares about your health. To provide you with the best care,   we have reviewed your chart and based on our findings, we see that you are due to:     - HYPERTENSION FOLLOW UP: Please scheule nurse only blood pressure check. If it is above 140/90 please schedule an office visit with Dr. Porras.    If you have already completed these items, please contact the clinic via phone or   [employer] so your care team can review and update your records. Thank you for   choosing [facility] for your healthcare needs. For any questions,   concerns, or to schedule an appointment please contact the clinic.       Healthy Regards,      Your [facility] Care Team          Enclosure: N/A

## 2021-05-25 NOTE — NURSING NOTE
Here for urinary complaints.  Post-Void Residual  A post-void residual was measured by ultrasonic bladder scanner.  110 mL  Review of Systems:    Weight loss:    No     Recent fever/chills:  Yes   Night sweats:   Yes  Current skin rash:  Yes   Recent hair loss:  No  Heat intolerance:  No   Cold intolerance:  No  Chest pain:   No   Palpitations:   No  Shortness of breath:  Yes   Wheezing:   No  Constipation:    No   Diarrhea:   Yes   Nausea:   No   Vomiting:   No   Kidney/side pain:  Yes   Back pain:   Yes  Frequent headaches:  Yes   Dizziness:     Yes  Leg swelling:   No   Calf pain:    No    Parents, brothers or sisters with history of kidney cancer:   No  Parents, brothers or sisters with history of bladder cancer: No  Father or brother with history of prostate cancer:  No  Vani Erwin LPN on 2/15/2018 at 9:37 AM     No

## 2021-10-04 ENCOUNTER — ALLIED HEALTH/NURSE VISIT (OUTPATIENT)
Dept: FAMILY MEDICINE | Facility: OTHER | Age: 66
End: 2021-10-04
Attending: FAMILY MEDICINE
Payer: MEDICARE

## 2021-10-04 DIAGNOSIS — Z20.822 ENCOUNTER FOR LABORATORY TESTING FOR COVID-19 VIRUS: Primary | ICD-10-CM

## 2021-10-04 PROCEDURE — C9803 HOPD COVID-19 SPEC COLLECT: HCPCS

## 2021-10-04 PROCEDURE — U0003 INFECTIOUS AGENT DETECTION BY NUCLEIC ACID (DNA OR RNA); SEVERE ACUTE RESPIRATORY SYNDROME CORONAVIRUS 2 (SARS-COV-2) (CORONAVIRUS DISEASE [COVID-19]), AMPLIFIED PROBE TECHNIQUE, MAKING USE OF HIGH THROUGHPUT TECHNOLOGIES AS DESCRIBED BY CMS-2020-01-R: HCPCS | Mod: ZL

## 2021-10-05 LAB — SARS-COV-2 RNA RESP QL NAA+PROBE: NEGATIVE

## 2021-10-09 ENCOUNTER — HEALTH MAINTENANCE LETTER (OUTPATIENT)
Age: 66
End: 2021-10-09

## 2021-10-12 ENCOUNTER — ALLIED HEALTH/NURSE VISIT (OUTPATIENT)
Dept: FAMILY MEDICINE | Facility: OTHER | Age: 66
End: 2021-10-12
Attending: FAMILY MEDICINE
Payer: MEDICARE

## 2021-10-12 DIAGNOSIS — Z20.822 COVID-19 RULED OUT: Primary | ICD-10-CM

## 2021-10-12 PROCEDURE — U0003 INFECTIOUS AGENT DETECTION BY NUCLEIC ACID (DNA OR RNA); SEVERE ACUTE RESPIRATORY SYNDROME CORONAVIRUS 2 (SARS-COV-2) (CORONAVIRUS DISEASE [COVID-19]), AMPLIFIED PROBE TECHNIQUE, MAKING USE OF HIGH THROUGHPUT TECHNOLOGIES AS DESCRIBED BY CMS-2020-01-R: HCPCS | Mod: ZL

## 2021-10-12 PROCEDURE — C9803 HOPD COVID-19 SPEC COLLECT: HCPCS

## 2021-10-13 LAB — SARS-COV-2 RNA RESP QL NAA+PROBE: NEGATIVE

## 2021-12-03 ENCOUNTER — ALLIED HEALTH/NURSE VISIT (OUTPATIENT)
Dept: FAMILY MEDICINE | Facility: OTHER | Age: 66
End: 2021-12-03
Attending: INTERNAL MEDICINE
Payer: MEDICARE

## 2021-12-03 DIAGNOSIS — Z23 NEED FOR PROPHYLACTIC VACCINATION AND INOCULATION AGAINST INFLUENZA: Primary | ICD-10-CM

## 2021-12-03 PROCEDURE — G0008 ADMIN INFLUENZA VIRUS VAC: HCPCS

## 2021-12-03 PROCEDURE — 90662 IIV NO PRSV INCREASED AG IM: CPT

## 2021-12-03 NOTE — PROGRESS NOTES
Fluzone 65+ administered per patient/parent request.    Neetu Dudley RN  ....................  12/3/2021   10:15 AM

## 2021-12-10 NOTE — TELEPHONE ENCOUNTER
Called the patient this morning to discuss his response to an antibiotic change.  He is still having right flank pain and dysuria, voiding frequently in small amounts, with some obstruction symptoms.  I recommended consultation with Dr. Toledo which will be arranged tomorrow.  He will have lab work first, to include CBC, BMP, urinalysis, and A1c.  In the meantime, continue forcing fluids and taking Bactrim.  FRANCISCO BARKER MD     Updated Rx for glasses and contacts.

## 2021-12-13 DIAGNOSIS — Z96.642 HISTORY OF LEFT HIP REPLACEMENT: Primary | ICD-10-CM

## 2021-12-13 DIAGNOSIS — M25.559 HIP PAIN: ICD-10-CM

## 2021-12-15 ENCOUNTER — OFFICE VISIT (OUTPATIENT)
Dept: ORTHOPEDICS | Facility: OTHER | Age: 66
End: 2021-12-15
Attending: ORTHOPAEDIC SURGERY
Payer: MEDICARE

## 2021-12-15 ENCOUNTER — HOSPITAL ENCOUNTER (OUTPATIENT)
Dept: GENERAL RADIOLOGY | Facility: OTHER | Age: 66
End: 2021-12-15
Attending: ORTHOPAEDIC SURGERY
Payer: MEDICARE

## 2021-12-15 DIAGNOSIS — Z96.642 HISTORY OF LEFT HIP REPLACEMENT: ICD-10-CM

## 2021-12-15 DIAGNOSIS — M25.559 HIP PAIN: ICD-10-CM

## 2021-12-15 DIAGNOSIS — M25.551 HIP PAIN, RIGHT: Primary | ICD-10-CM

## 2021-12-15 PROCEDURE — G0463 HOSPITAL OUTPT CLINIC VISIT: HCPCS | Mod: 25

## 2021-12-15 PROCEDURE — 99213 OFFICE O/P EST LOW 20 MIN: CPT | Performed by: ORTHOPAEDIC SURGERY

## 2021-12-15 PROCEDURE — 73523 X-RAY EXAM HIPS BI 5/> VIEWS: CPT

## 2021-12-15 PROCEDURE — G0463 HOSPITAL OUTPT CLINIC VISIT: HCPCS

## 2021-12-15 RX ORDER — GABAPENTIN 300 MG/1
300 CAPSULE ORAL AT BEDTIME
Qty: 30 CAPSULE | Refills: 1 | Status: SHIPPED | OUTPATIENT
Start: 2021-12-15 | End: 2022-01-11

## 2021-12-15 NOTE — PROGRESS NOTES
Visit Date: 12/15/2021    REASONS FOR EVALUATION:  1.  Right hip pain.  2.  Left hip replacement.    HISTORY OF PRESENT ILLNESS:  Hector comes back, well known to me.  He is about a year out from left hip replacement.  That is overall doing very well.  Had initial postoperative swelling and so forth, but that is doing much better.  His right hip has gotten tremendously more painful.  Certainly has a significant impact on his activities of functional living at this point in time.  He is here to look at his options moving forward.  He reports pain with activity, better with rest.    PHYSICAL EXAMINATION:  MUSCULOSKELETAL:  Examination of the right hip shows hip flexed to 90, internal rotation to 5, external rotation to 15, abduction to 30.  Pain with hip flex and internal rotation.  Crepitus with flexion and extension.  No pain with palpation across the bursa.  Left hip exam shows fluid range of motion, walking with no significant limp.  Incisional sites well healed here as well in addition to that.    X-rays reviewed, shows end-stage arthrosis, right hip.  Left total hip replacement is in good alignment and position.    IMPRESSION:  1.  Right hip arthrosis, end-stage.  2.  Left total hip replacement, doing well.    PLAN:  At this time, I agree with moving forward with surgical intervention, we will look at [facility] for that, potentially look at January if possible.  Aspirin for DVT prophylaxis.  I will have Pat reach out to him for scheduling at this time in addition to that.  He did have a recent stress test here done in the spring and that all checked out very well for him.  Plan for 1-2 day stay depending on how he does here post-surgery.    Wade Ochoa MD        D: 12/15/2021   T: 12/15/2021   MT: [id number]    Name:     JEFE JACOB  MRN:      [id number]        Account:    [id number]   :      1955           Visit Date: 12/15/2021     Document: [id number]

## 2021-12-15 NOTE — PROGRESS NOTES
Patient is here for follow up on his right total hip.  Roya Richardson LPN .....................12/15/2021 8:52 AM

## 2022-01-11 ENCOUNTER — OFFICE VISIT (OUTPATIENT)
Dept: PEDIATRICS | Facility: OTHER | Age: 67
End: 2022-01-11
Attending: INTERNAL MEDICINE
Payer: COMMERCIAL

## 2022-01-11 VITALS
SYSTOLIC BLOOD PRESSURE: 158 MMHG | BODY MASS INDEX: 32.93 KG/M2 | HEART RATE: 76 BPM | WEIGHT: 229.5 LBS | DIASTOLIC BLOOD PRESSURE: 98 MMHG | TEMPERATURE: 96.2 F | OXYGEN SATURATION: 99 % | RESPIRATION RATE: 18 BRPM

## 2022-01-11 DIAGNOSIS — Z01.818 PREOPERATIVE EXAMINATION: Primary | ICD-10-CM

## 2022-01-11 DIAGNOSIS — M16.11 PRIMARY OSTEOARTHRITIS OF RIGHT HIP: ICD-10-CM

## 2022-01-11 DIAGNOSIS — E11.9 DIABETES MELLITUS TYPE 2, DIET-CONTROLLED (H): ICD-10-CM

## 2022-01-11 DIAGNOSIS — I10 WHITE COAT SYNDROME WITH DIAGNOSIS OF HYPERTENSION: ICD-10-CM

## 2022-01-11 DIAGNOSIS — T23.161A SUPERFICIAL BURN OF BACK OF RIGHT HAND, INITIAL ENCOUNTER: ICD-10-CM

## 2022-01-11 DIAGNOSIS — R93.1 HIGH CORONARY ARTERY CALCIUM SCORE: ICD-10-CM

## 2022-01-11 DIAGNOSIS — R73.03 PRE-DIABETES: Chronic | ICD-10-CM

## 2022-01-11 DIAGNOSIS — M25.551 HIP PAIN, RIGHT: ICD-10-CM

## 2022-01-11 DIAGNOSIS — Z98.890 STATUS POST CARDIAC CATHETERIZATION: Chronic | ICD-10-CM

## 2022-01-11 DIAGNOSIS — E78.2 MIXED HYPERLIPIDEMIA: ICD-10-CM

## 2022-01-11 DIAGNOSIS — Z98.890 HISTORY OF CARDIAC CATH: ICD-10-CM

## 2022-01-11 LAB
ANION GAP SERPL CALCULATED.3IONS-SCNC: 6 MMOL/L (ref 3–14)
BUN SERPL-MCNC: 19 MG/DL (ref 7–25)
CALCIUM SERPL-MCNC: 9.7 MG/DL (ref 8.6–10.3)
CHLORIDE BLD-SCNC: 103 MMOL/L (ref 98–107)
CHOLEST SERPL-MCNC: 106 MG/DL
CO2 SERPL-SCNC: 28 MMOL/L (ref 21–31)
CREAT SERPL-MCNC: 0.94 MG/DL (ref 0.7–1.3)
ERYTHROCYTE [DISTWIDTH] IN BLOOD BY AUTOMATED COUNT: 12.7 % (ref 10–15)
FASTING STATUS PATIENT QL REPORTED: YES
GFR SERPL CREATININE-BSD FRML MDRD: 88 ML/MIN/1.73M2
GLUCOSE BLD-MCNC: 147 MG/DL (ref 70–105)
HBA1C MFR BLD: 6.8 % (ref 4–6.2)
HCT VFR BLD AUTO: 46.2 % (ref 40–53)
HDLC SERPL-MCNC: 36 MG/DL (ref 23–92)
HGB BLD-MCNC: 15.4 G/DL (ref 13.3–17.7)
LDLC SERPL CALC-MCNC: 43 MG/DL
MCH RBC QN AUTO: 30.5 PG (ref 26.5–33)
MCHC RBC AUTO-ENTMCNC: 33.3 G/DL (ref 31.5–36.5)
MCV RBC AUTO: 92 FL (ref 78–100)
NONHDLC SERPL-MCNC: 70 MG/DL
PLATELET # BLD AUTO: 231 10E3/UL (ref 150–450)
POTASSIUM BLD-SCNC: 4.2 MMOL/L (ref 3.5–5.1)
RBC # BLD AUTO: 5.05 10E6/UL (ref 4.4–5.9)
SODIUM SERPL-SCNC: 137 MMOL/L (ref 134–144)
TRIGL SERPL-MCNC: 135 MG/DL
WBC # BLD AUTO: 6 10E3/UL (ref 4–11)

## 2022-01-11 PROCEDURE — 85014 HEMATOCRIT: CPT | Mod: ZL | Performed by: INTERNAL MEDICINE

## 2022-01-11 PROCEDURE — 99214 OFFICE O/P EST MOD 30 MIN: CPT | Performed by: INTERNAL MEDICINE

## 2022-01-11 PROCEDURE — G0463 HOSPITAL OUTPT CLINIC VISIT: HCPCS

## 2022-01-11 PROCEDURE — 80048 BASIC METABOLIC PNL TOTAL CA: CPT | Mod: ZL | Performed by: INTERNAL MEDICINE

## 2022-01-11 PROCEDURE — 36415 COLL VENOUS BLD VENIPUNCTURE: CPT | Mod: ZL | Performed by: INTERNAL MEDICINE

## 2022-01-11 PROCEDURE — 93010 ELECTROCARDIOGRAM REPORT: CPT | Performed by: INTERNAL MEDICINE

## 2022-01-11 PROCEDURE — 93005 ELECTROCARDIOGRAM TRACING: CPT | Performed by: INTERNAL MEDICINE

## 2022-01-11 PROCEDURE — 83036 HEMOGLOBIN GLYCOSYLATED A1C: CPT | Mod: ZL | Performed by: INTERNAL MEDICINE

## 2022-01-11 PROCEDURE — 80061 LIPID PANEL: CPT | Mod: ZL | Performed by: INTERNAL MEDICINE

## 2022-01-11 RX ORDER — SILVER SULFADIAZINE 10 MG/G
CREAM TOPICAL DAILY
Qty: 85 G | Refills: 1 | Status: SHIPPED | OUTPATIENT
Start: 2022-01-11

## 2022-01-11 RX ORDER — TRAMADOL HYDROCHLORIDE 50 MG/1
50 TABLET ORAL 2 TIMES DAILY PRN
Qty: 60 TABLET | Refills: 0 | Status: SHIPPED | OUTPATIENT
Start: 2022-01-11 | End: 2022-06-23

## 2022-01-11 RX ORDER — GABAPENTIN 300 MG/1
300 CAPSULE ORAL AT BEDTIME
Qty: 30 CAPSULE | Refills: 1 | Status: SHIPPED | OUTPATIENT
Start: 2022-01-11 | End: 2022-06-23

## 2022-01-11 ASSESSMENT — PAIN SCALES - GENERAL: PAINLEVEL: EXTREME PAIN (9)

## 2022-01-11 NOTE — PATIENT INSTRUCTIONS
-- Hold aspirin for 5-7 days before surgery   -- Hold ibuprofen/Advil for 2-3 days before surgery   -- Hold naproxen/Aleve for 5-7 days before surgery   -- Acetaminophen (Tylenol) is okay   -- Hold vitamins and herbal remedies for 7 days before surgery    Check your Blood Pressure   -- Sit in a chair, feet flat on the floor for 5 minutes   -- Avoid caffeine, exercise and smoking for 30 minutes before checking   -- Have your arm at the level of your heart   -- Make sure you have the correct size cuff   -- Write them down, bring your log book in to your appointment     -- Goal blood pressure 120/70   -- Learn about DASH Diet for dietary ways to reduce blood pressure  1. Google: [facility] DASH diet  2. NIH site: https://www.nhlbi.nih.gov/health-topics/dash-eating-plan  3. PDF from [facility]: https://www.nhlbi.nih.gov/files/docs/public/heart/new_dash.pdf   -- Work on 5% weight loss   -- Daily physical exercise (eg. 30 min brisk walk)

## 2022-01-11 NOTE — PROGRESS NOTES
[facility]  [address]  [place] [address]  Phone: [phone]  Fax: [phone]  Primary Provider: Doug Porras        PREOPERATIVE EVALUATION:  Today's date: 1/11/2022    Rj Juarez is a 66 year old male who presents for a preoperative evaluation.    Surgical Information:  Surgery/Procedure: Right Hip replacement  Surgery Location: [facility]  Surgeon: Gabriela  Surgery Date: 1/28/2022  Time of Surgery: 8am  Where patient plans to recover: At home with family  Fax number for surgical facility: Note does not need to be faxed, will be available electronically in Epic.    PREOPERATIVE HISTORY & PHYSICAL  Date of Exam: 1/11/2022  Chief Complaint   Patient presents with     Pre-Op Exam       There are no exam notes on file for this visit.    HPI:  I was asked to see Mr. Rj Juarez by Dr. Ochoa for preoperative management.    Rj Juarez is a 66 year old male with a history of CAD here for preoperative examination.  The most physically active he has been over the past 2 weeks was: walk in snow, no chest pain.    Patient Active Problem List    Diagnosis Date Noted     Chondromalacia, left hip 07/17/2020     Priority: Medium     Hx of decompressive lumbar laminectomy 07/10/2020     Priority: Medium     Lumbar stenosis with neurogenic claudication 05/19/2020     Priority: Medium     Status post cardiac catheterization on 12/6/2019 through [facility] with a stenting to his distal circumflex 12/24/2019     Priority: Medium     History of cardiac cath on 12/6/2019 12/24/2019     Priority: Medium     Chronic midline low back pain with sciatica, sciatica laterality unspecified 12/24/2019     Priority: Medium     ASCVD (arteriosclerotic cardiovascular disease) 12/03/2019     Priority: Medium     High coronary artery calcium score at 1118.2 on 11/21/2019 11/27/2019     Priority: Medium     Added automatically from request for surgery [id number]       CAD, multiple  vessel 11/27/2019     Priority: Medium     Added automatically from request for surgery [id number]       GERD 11/19/2019     Priority: Medium     FARNAZ on CPAP 08/22/2019     Priority: Medium     White coat syndrome with diagnosis of hypertension 08/22/2019     Priority: Medium     Diabetes mellitus type 2, diet-controlled (H) 08/22/2019     Priority: Medium     NSAID long-term use 08/22/2019     Priority: Medium     Primary osteoarthritis of right knee  04/09/2019     Priority: Medium     Effusion of right knee 04/09/2019     Priority: Medium     Posterior knee pain, right 04/09/2019     Priority: Medium     Elevated prostate specific antigen (PSA) 11/29/2018     Priority: Medium     Lumbar radiculopathy 02/20/2017     Priority: Medium     Benign essential tremor 03/18/2016     Priority: Medium     Irritable bowel syndrome with diarrhea 03/18/2016     Priority: Medium     H/O adenomatous polyp of colon 12/17/2009     Priority: Medium     Past Medical History:   Diagnosis Date     Benign lipomatous neoplasm     1/20/2014     Calculus of kidney     possible     Carpal tunnel syndrome     Both hands     Closed fracture of patella     05/06/09,Sustained right superior pole patellar fracture which underwent conservative management     Diverticulosis of large intestine without perforation or abscess without bleeding     1/28/2014     Exertional chest pain 11/19/2019     Headache     No Comments Provided     Other specified forms of tremor     3/2/2011     Other urticaria (CODE)     3/2/2011     Pain in joint     No Comments Provided     Umbilical hernia without obstruction or gangrene     1/26/2018     Past Surgical History:   Procedure Laterality Date     COLONOSCOPY  01/28/2014 2009,2014,F/U 2019     COLONOSCOPY  03/01/2019    Serrated adenoma, follow up 1 year     COLONOSCOPY N/A 1/15/2021    Procedure: COLONOSCOPY, WITH POLYPECTOMY AND BIOPSY;  Surgeon: Ly Frances MD;  Location:  OR     ESOPHAGOSCOPY, GASTROSCOPY,  DUODENOSCOPY (EGD), COMBINED      1/28/14,EGD     ESOPHAGOSCOPY, GASTROSCOPY, DUODENOSCOPY (EGD), COMBINED N/A 1/15/2021    Procedure: ESOPHAGOGASTRODUODENOSCOPY, WITH BIOPSY;  Surgeon: Ly Frances MD;  Location: [facility] OR     JOINT REPLACEMENT, HIP RT/LT Left      OTHER SURGICAL HISTORY      9/10/2014,18977.0,MA REPAIR ING HERNIA  >5 TRS BETTINA     OTHER SURGICAL HISTORY      1/26/2018,09659.0,MA REPAIR UMBILICAL NAZARIO  >5 TRS REDUC     RELEASE CARPAL TUNNEL      3,12/2004,Both hands     SIGMOIDOSCOPY FLEXIBLE      No Comments Provided     Current Outpatient Medications   Medication Sig Dispense Refill     aspirin 81 MG EC tablet Take 81 mg by mouth daily       famotidine (PEPCID) 20 MG tablet Take 1 tablet (20 mg) by mouth 2 times daily as needed (gerd) 180 tablet 3     gabapentin (NEURONTIN) 300 MG capsule Take 1 capsule (300 mg) by mouth At Bedtime 30 capsule 1     ibuprofen (ADVIL/MOTRIN) 400 MG tablet TAKE 1 TABLET BY MOUTH EVERY 6 HOURS AS NEEDED FOR PAIN.       metoprolol succinate ER (TOPROL-XL) 25 MG 24 hr tablet Take 1 tablet (25 mg) by mouth daily 90 tablet 3     mirabegron (MYRBETRIQ) 25 MG 24 hr tablet Take 1 tablet (25 mg) by mouth daily 90 tablet 3     Multiple Vitamins-Minerals (EYE VITAMINS PO)        nitroGLYcerin (NITROSTAT) 0.4 MG sublingual tablet For chest pain place 1 tablet under the tongue every 5 minutes for 3 doses. If symptoms persist 5 minutes after 1st dose call 911. 25 tablet 3     order for DME Walker, 4ww with seat. Spinal stenosis. 99. 1 each 11     rosuvastatin (CRESTOR) 20 MG tablet Take 1 tablet (20 mg) by mouth daily 90 tablet 3     silver sulfADIAZINE (SILVADENE) 1 % external cream Apply topically daily 85 g 1     sucralfate (CARAFATE) 1 GM tablet Take 1 tablet (1 g) by mouth 4 times daily 300 tablet 3     tamsulosin (FLOMAX) 0.4 MG capsule Take 1 capsule (0.4 mg) by mouth every evening 90 capsule 3     traMADol (ULTRAM) 50 MG tablet Take 1 tablet (50 mg) by mouth 2 times daily as  needed for severe pain 60 tablet 0     triamcinolone (KENALOG) 0.1 % cream Apply 1 Film topically 3 times daily       Allergies   Allergen Reactions     Ciprofloxacin Other (See Comments)     Tendon issue , and IBS     Midazolam      Other reaction(s): Other - Describe In Comment Field  Difficult to wake up     Family History   Problem Relation Age of Onset     Hypertension Mother         Hypertension     Diabetes Mother         Diabetes     Other - See Comments Mother         Spinal stenosis     Other - See Comments Father         scleroderma which was quite severe     Cancer Father         Cancer, of adenocarcinoma of the lung.  He was a nonsmoker.     Diabetes Brother         Diabetes     Other - See Comments Brother         Pancreatitis     Substance Abuse Brother         Alcohol/Drug,Alcoholic, has been through treatment a number of times.     Social History     Socioeconomic History     Marital status:      Spouse name: None     Number of children: None     Years of education: None     Highest education level: None   Occupational History     None   Tobacco Use     Smoking status: Never Smoker     Smokeless tobacco: Never Used   Substance and Sexual Activity     Alcohol use: No     Drug use: No     Sexual activity: Not Currently   Other Topics Concern     Parent/sibling w/ CABG, MI or angioplasty before 65F 55M? Not Asked   Social History Narrative    .      2 children.  Both children grown and living elsewhere.      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     Social Determinants of Health     Financial Resource Strain: Not on file   Food Insecurity: Not on file   Transportation Needs: Not on file   Physical Activity: Not on file   Stress: Not on file   Social Connections: Not on file   Intimate Partner Violence: Not on file   Housing Stability: Not on file       REVIEW OF SYSTEMS:  Review of Systems:  Skin: He recently was working on his  snowmobile when he touched the exhaust and sustained a small blister on the dorsum of the thenar eminence of the hand.  The blister popped and now he keeps it covered.  Eyes: Negative  Ears/Nose/Throat: Negative  Respiratory: Negative  Cardiovascular: Negative  Gastrointestinal: Negative  Genitourinary: Negative  Musculoskeletal: See HPI  Neurologic: Negative  Psychiatric: Negative  Hematologic/Lymphatic/Immunologic: Negative  Endocrine: Negative      EXAM:   Vitals: reviewed in EMR.  BP (!) 158/98   Pulse 76   Temp (!) 96.2  F (35.7  C) (Tympanic)   Resp 18   Wt 104.1 kg (229 lb 8 oz)   SpO2 99%   BMI 32.93 kg/m      Gen: Pleasant male, NAD.  HEENT: MMM, no OP erythema.   Neck: Supple, no JVD, no bruits.  CV: RRR no m/r/g.   Pulm: CTAB no w/r/r  GI: Soft, NT, ND. No HSM. No masses. Bowel sounds present.  Neuro: Grossly intact  Msk: No lower extremity edema.  Skin: Small denuded blister on the hand  Psychiatric: Normal affect and insight. Does not appear anxious or depressed.    DIAGNOSTICS:   Results for orders placed or performed in visit on 01/11/22 (from the past 48 hour(s))   Lipid Profile   Result Value Ref Range    Cholesterol 106 <200 mg/dL    Triglycerides 135 <150 mg/dL    Direct Measure HDL 36 23 - 92 mg/dL    LDL Cholesterol Calculated 43 <=100 mg/dL    Non HDL Cholesterol 70 <130 mg/dL    Patient Fasting > 8hrs? Yes     Narrative    Cholesterol  Desirable:  <200 mg/dL    Triglycerides  Normal:  Less than 150 mg/dL  Borderline High:  150-199 mg/dL  High:  200-499 mg/dL  Very High:  Greater than or equal to 500 mg/dL    Direct Measure HDL  Female:  Greater than or equal to 50 mg/dL   Male:  Greater than or equal to 40 mg/dL    LDL Cholesterol  Desirable:  <100mg/dL  Above Desirable:  100-129 mg/dL   Borderline High:  130-159 mg/dL   High:  160-189 mg/dL   Very High:  >= 190 mg/dL    Non HDL Cholesterol  Desirable:  130 mg/dL  Above Desirable:  130-159 mg/dL  Borderline High:  160-189 mg/dL  High:   190-219 mg/dL  Very High:  Greater than or equal to 220 mg/dL   CBC with platelets   Result Value Ref Range    WBC Count 6.0 4.0 - 11.0 10e3/uL    RBC Count 5.05 4.40 - 5.90 10e6/uL    Hemoglobin 15.4 13.3 - 17.7 g/dL    Hematocrit 46.2 40.0 - 53.0 %    MCV 92 78 - 100 fL    MCH 30.5 26.5 - 33.0 pg    MCHC 33.3 31.5 - 36.5 g/dL    RDW 12.7 10.0 - 15.0 %    Platelet Count 231 150 - 450 10e3/uL   Hemoglobin A1c   Result Value Ref Range    Hemoglobin A1C 6.8 (H) 4.0 - 6.2 %   Basic metabolic panel   Result Value Ref Range    Sodium 137 134 - 144 mmol/L    Potassium 4.2 3.5 - 5.1 mmol/L    Chloride 103 98 - 107 mmol/L    Carbon Dioxide (CO2) 28 21 - 31 mmol/L    Anion Gap 6 3 - 14 mmol/L    Urea Nitrogen 19 7 - 25 mg/dL    Creatinine 0.94 0.70 - 1.30 mg/dL    Calcium 9.7 8.6 - 10.3 mg/dL    Glucose 147 (H) 70 - 105 mg/dL    GFR Estimate 88 >60 mL/min/1.73m2             IMPRESSION:     ICD-10-CM    1. Preoperative examination  Z01.818 EKG 12-lead, tracing only     Basic metabolic panel     CBC with platelets     CBC with platelets     Basic metabolic panel   2. Primary osteoarthritis of right hip  M16.11 gabapentin (NEURONTIN) 300 MG capsule     traMADol (ULTRAM) 50 MG tablet   3. Hip pain, right  M25.551 gabapentin (NEURONTIN) 300 MG capsule   4. White coat syndrome with diagnosis of hypertension  I10    5. Pre-diabetes  R73.03 Hemoglobin A1c     Hemoglobin A1c   6. Superficial burn of back of right hand, initial encounter  T23.161A silver sulfADIAZINE (SILVADENE) 1 % external cream   7. Mixed hyperlipidemia  E78.2 Lipid Profile     Lipid Profile   8. High coronary artery calcium score at 1118.2 on 11/21/2019  R93.1    9. History of cardiac cath on 12/6/2019  Z98.890    10. Status post cardiac catheterization on 12/6/2019 through [facility] with a stenting to his distal circumflex  Z98.890    11. Diabetes mellitus type 2, diet-controlled (H)  E11.9        For above listed surgery and anesthesia:   Patient is at  increased risk for perioperative complications based on history of coronary artery disease, diabetes mellitus type 2, age.    RECOMMENDATIONS:   APPROVAL GIVEN to proceed with proposed procedure, without further diagnostic evaluation    Patient is on chronic pain medications: No  Patient is on antiplatelet/anticoagulation: Yes  Other medications that need adjustment perioperatively: No  Patient Instructions    -- Hold aspirin for 5-7 days before surgery   -- Hold ibuprofen/Advil for 2-3 days before surgery   -- Hold naproxen/Aleve for 5-7 days before surgery   -- Acetaminophen (Tylenol) is okay   -- Hold vitamins and herbal remedies for 7 days before surgery    Check your Blood Pressure   -- Sit in a chair, feet flat on the floor for 5 minutes   -- Avoid caffeine, exercise and smoking for 30 minutes before checking   -- Have your arm at the level of your heart   -- Make sure you have the correct size cuff   -- Write them down, bring your log book in to your appointment     -- Goal blood pressure 120/70   -- Learn about DASH Diet for dietary ways to reduce blood pressure  1. Google: [facility] DASH diet  2. NIH site: https://www.nhlbi.nih.gov/health-topics/dash-eating-plan  3. PDF from [facility]: https://www.nhlbi.nih.gov/files/docs/public/heart/new_dash.pdf   -- Work on 5% weight loss   -- Daily physical exercise (eg. 30 min brisk walk)         Doug Curiel MD, FAAP, FACP  Internal Medicine & Pediatrics    CC Dr. Ochoa

## 2022-01-12 LAB
ATRIAL RATE - MUSE: 69 BPM
DIASTOLIC BLOOD PRESSURE - MUSE: NORMAL MMHG
INTERPRETATION ECG - MUSE: NORMAL
P AXIS - MUSE: 24 DEGREES
PR INTERVAL - MUSE: 158 MS
QRS DURATION - MUSE: 108 MS
QT - MUSE: 374 MS
QTC - MUSE: 400 MS
R AXIS - MUSE: 21 DEGREES
SYSTOLIC BLOOD PRESSURE - MUSE: NORMAL MMHG
T AXIS - MUSE: 20 DEGREES
VENTRICULAR RATE- MUSE: 69 BPM

## 2022-01-28 ENCOUNTER — TRANSFERRED RECORDS (OUTPATIENT)
Dept: HEALTH INFORMATION MANAGEMENT | Facility: OTHER | Age: 67
End: 2022-01-28
Payer: COMMERCIAL

## 2022-01-29 ENCOUNTER — HEALTH MAINTENANCE LETTER (OUTPATIENT)
Age: 67
End: 2022-01-29

## 2022-02-03 ENCOUNTER — TRANSFERRED RECORDS (OUTPATIENT)
Dept: HEALTH INFORMATION MANAGEMENT | Facility: OTHER | Age: 67
End: 2022-02-03
Payer: COMMERCIAL

## 2022-02-08 ENCOUNTER — OFFICE VISIT (OUTPATIENT)
Dept: ORTHOPEDICS | Facility: OTHER | Age: 67
End: 2022-02-08
Attending: ORTHOPAEDIC SURGERY
Payer: MEDICARE

## 2022-02-08 DIAGNOSIS — Z96.641 HISTORY OF RIGHT HIP REPLACEMENT: Primary | ICD-10-CM

## 2022-02-08 PROCEDURE — 99024 POSTOP FOLLOW-UP VISIT: CPT | Performed by: ORTHOPAEDIC SURGERY

## 2022-02-08 PROCEDURE — G0463 HOSPITAL OUTPT CLINIC VISIT: HCPCS

## 2022-02-08 RX ORDER — OXYCODONE HYDROCHLORIDE 5 MG/1
5 TABLET ORAL EVERY 6 HOURS PRN
Qty: 20 TABLET | Refills: 0 | Status: SHIPPED | OUTPATIENT
Start: 2022-02-08 | End: 2022-02-11

## 2022-02-08 NOTE — PROGRESS NOTES
Visit Date: 2022    REASON FOR EVALUATION:  Right hip replacement.  Date of surgery 2022.    HISTORY:  Hector comes back 11 days out from hip replacement.  Overall, doing pretty well, is having some swelling and discomfort here and was concerned of this.  Is getting some nerve related symptoms secondary to that.  Medications do seem to be helpful at least in terms of reducing at this point in time.  He is here for reassessment and examination.    PHYSICAL EXAMINATION:  Examination of his hip shows incisional site to be healing properly, no signs or symptoms of infection present.  Neurovascular examination otherwise intact at this point.  does have some swelling in the anterior thigh.  I do not see any signs or symptoms of infection present at this time.    IMPRESSION:  Total hip replacement, right side.    PLAN:  Continue through the therapy process.  I will revisit with patient here otherwise in a month.  X-rays, 2 views of the hip at that point in time.    Wade Ochoa MD        D: 2022   T: 2022   MT: [id number]    Name:     JEFE JACOB  MRN:      [id number]        Account:    [id number]   :      1955           Visit Date: 2022     Document: [id number]

## 2022-02-08 NOTE — PROGRESS NOTES
Patient is here for follow up on his right total hip.  Roya Richardson LPN .....................2/8/2022 11:23 AM

## 2022-02-20 DIAGNOSIS — I25.10 CORONARY ARTERY DISEASE INVOLVING NATIVE CORONARY ARTERY OF NATIVE HEART WITHOUT ANGINA PECTORIS: ICD-10-CM

## 2022-02-21 RX ORDER — ROSUVASTATIN CALCIUM 20 MG/1
20 TABLET, COATED ORAL DAILY
Qty: 90 TABLET | Refills: 3 | Status: SHIPPED | OUTPATIENT
Start: 2022-02-21 | End: 2022-05-23

## 2022-02-21 NOTE — TELEPHONE ENCOUNTER
"Requested Prescriptions   Pending Prescriptions Disp Refills     rosuvastatin (CRESTOR) 20 MG tablet [Pharmacy Med Name: rosuvastatin 20 mg tablet] 90 tablet 3     Sig: Take 1 tablet (20 mg) by mouth daily       Statins Protocol Passed - 2/20/2022  8:00 AM        Passed - LDL on file in past 12 months     Recent Labs   Lab Test 01/11/22  0910   LDL 43             Passed - No abnormal creatine kinase in past 12 months     Recent Labs   Lab Test 03/18/16  1120   CKT 64                Passed - Recent (12 mo) or future (30 days) visit within the authorizing provider's specialty     Patient has had an office visit with the authorizing provider or a provider within the authorizing providers department within the previous 12 mos or has a future within next 30 days. See \"Patient Info\" tab in inbasket, or \"Choose Columns\" in Meds & Orders section of the refill encounter.              Passed - Medication is active on med list        Passed - Patient is age 18 or older     Last Written Prescription Date:  12/24/20  Last Fill Quantity: 90,   # refills: 3  Last Office Visit: 1/11/22 Porras  Future Office visit:    Next 5 appointments (look out 90 days)    Mar 08, 2022 11:00 AM  Return Visit with Wade Ochoa MD  [facility] ([facility] [address]  [place] [address]  [phone]   Mar 10, 2022 10:30 AM  Return Visit with Nestor Toledo MD  [facility] ([facility] [address]  [place] [address]  [phone]         Routing refill request to provider for review/approval because:  Unable to fill prescription refills per RN Medicine Refill Policy.  Brenda J. Goodell, RN on 2/21/2022 at 1:57 PM      "

## 2022-03-02 NOTE — PROGRESS NOTES
Hello,  Pt has lab appt 3/8/22 but no lab orders are available. Please enter orders at earliest convenience. Thank you :)

## 2022-03-03 ENCOUNTER — TELEPHONE (OUTPATIENT)
Dept: LAB | Facility: OTHER | Age: 67
End: 2022-03-03
Payer: COMMERCIAL

## 2022-03-03 DIAGNOSIS — R97.20 ELEVATED PSA: Primary | ICD-10-CM

## 2022-03-03 NOTE — TELEPHONE ENCOUNTER
"Per last office visit  03/04/19 with Nestor Toledo MD \"Plan  Follow up PSA in 2 years\"        Orders Placed  Antonietta العلي LPN on 3/3/2022 at 2:59 PM    "

## 2022-03-07 DIAGNOSIS — Z96.641 HISTORY OF RIGHT HIP REPLACEMENT: Primary | ICD-10-CM

## 2022-03-07 NOTE — PROGRESS NOTES
I don't need lab. Looks like Tre ordered a PSA.    Signed, Doug Porras MD, FAAP, FACP  Internal Medicine & Pediatrics

## 2022-03-08 ENCOUNTER — OFFICE VISIT (OUTPATIENT)
Dept: ORTHOPEDICS | Facility: OTHER | Age: 67
End: 2022-03-08
Attending: ORTHOPAEDIC SURGERY
Payer: MEDICARE

## 2022-03-08 ENCOUNTER — HOSPITAL ENCOUNTER (OUTPATIENT)
Dept: GENERAL RADIOLOGY | Facility: OTHER | Age: 67
End: 2022-03-08
Attending: ORTHOPAEDIC SURGERY
Payer: MEDICARE

## 2022-03-08 DIAGNOSIS — Z96.641 HISTORY OF RIGHT HIP REPLACEMENT: Primary | ICD-10-CM

## 2022-03-08 DIAGNOSIS — Z96.641 HISTORY OF RIGHT HIP REPLACEMENT: ICD-10-CM

## 2022-03-08 PROCEDURE — 73502 X-RAY EXAM HIP UNI 2-3 VIEWS: CPT

## 2022-03-08 PROCEDURE — G0463 HOSPITAL OUTPT CLINIC VISIT: HCPCS

## 2022-03-08 PROCEDURE — 99024 POSTOP FOLLOW-UP VISIT: CPT | Performed by: ORTHOPAEDIC SURGERY

## 2022-03-08 NOTE — PROGRESS NOTES
Visit Date: 2022    REASON FOR EVALUATION:  Right hip replacement.    HISTORY OF PRESENT ILLNESS:  Hector comes back, right hip replacement.  He is doing well overall.  He is having a little bit of pain and discomfort around the incisional site, as well as side.  He is here for x-rays and examination at this point.  The patient voices no other concerns.  He has had no falls in the interim or other issues taking place as far as that is concerned.    PHYSICAL EXAMINATION:  Examination of his hip shows full range of motion, mild swelling here, but certainly getting better here.  Incisional sites well healed.  Neurovascular examination intact.    IMAGING:  X-rays reviewed, 2 views of right hip.  The patient's components are in stable alignment and position at this current time.    IMPRESSION:  Total hip replacement, doing well.    PLAN:  Continue to work on his recovery process here with range of motion and strengthening.  Increase activities as tolerable.  We will plan to reassess with him at the 1-year checkup point moving forward.    Wade Ochoa MD        D: 2022   T: 2022   MT: [email or web address]    Name:     JEFE JACOB  MRN:      [id number]        Account:    [id number]   :      1955           Visit Date: 2022     Document: [id number]

## 2022-03-08 NOTE — PROGRESS NOTES
Patient is here for follow up on his right total hip.  Roya Richardson LPN .....................3/8/2022 11:13 AM

## 2022-03-10 ENCOUNTER — LAB (OUTPATIENT)
Dept: LAB | Facility: OTHER | Age: 67
End: 2022-03-10
Attending: UROLOGY
Payer: COMMERCIAL

## 2022-03-10 ENCOUNTER — OFFICE VISIT (OUTPATIENT)
Dept: UROLOGY | Facility: OTHER | Age: 67
End: 2022-03-10
Attending: UROLOGY
Payer: MEDICARE

## 2022-03-10 VITALS
OXYGEN SATURATION: 96 % | BODY MASS INDEX: 32.86 KG/M2 | HEART RATE: 80 BPM | RESPIRATION RATE: 16 BRPM | WEIGHT: 229 LBS | DIASTOLIC BLOOD PRESSURE: 80 MMHG | SYSTOLIC BLOOD PRESSURE: 124 MMHG

## 2022-03-10 DIAGNOSIS — R97.20 ELEVATED PSA: ICD-10-CM

## 2022-03-10 DIAGNOSIS — N32.81 OVERACTIVE BLADDER: ICD-10-CM

## 2022-03-10 DIAGNOSIS — R35.0 BENIGN PROSTATIC HYPERPLASIA WITH URINARY FREQUENCY: Primary | ICD-10-CM

## 2022-03-10 DIAGNOSIS — N40.1 BENIGN PROSTATIC HYPERPLASIA WITH URINARY FREQUENCY: Primary | ICD-10-CM

## 2022-03-10 LAB — PSA SERPL-MCNC: 2.33 UG/L (ref 0–4)

## 2022-03-10 PROCEDURE — G0463 HOSPITAL OUTPT CLINIC VISIT: HCPCS

## 2022-03-10 PROCEDURE — 36415 COLL VENOUS BLD VENIPUNCTURE: CPT | Mod: ZL

## 2022-03-10 PROCEDURE — 84153 ASSAY OF PSA TOTAL: CPT | Mod: ZL

## 2022-03-10 PROCEDURE — 99214 OFFICE O/P EST MOD 30 MIN: CPT | Performed by: UROLOGY

## 2022-03-10 RX ORDER — MIRABEGRON 25 MG/1
25 TABLET, FILM COATED, EXTENDED RELEASE ORAL DAILY
Qty: 90 TABLET | Refills: 3 | Status: SHIPPED | OUTPATIENT
Start: 2022-03-10 | End: 2022-06-23

## 2022-03-10 RX ORDER — TAMSULOSIN HYDROCHLORIDE 0.4 MG/1
0.4 CAPSULE ORAL EVERY EVENING
Qty: 90 CAPSULE | Refills: 3 | Status: SHIPPED | OUTPATIENT
Start: 2022-03-10 | End: 2022-04-11

## 2022-03-10 ASSESSMENT — PAIN SCALES - GENERAL: PAINLEVEL: NO PAIN (1)

## 2022-03-10 NOTE — PROGRESS NOTES
Type of Visit  EST    Chief Complaint  Elevated PSA  BPH with urinary obstruction  Urinary urgency    HPI  Mr. Juarez is a 66 year old male with slightly elevated PSA who follows up with repeat PSA, BPH with obstruction and irritative urinary symptoms.  Baseline the patient does have obstructive symptoms for which he previously was taking Flomax.  He has experienced multiple health issues over the past year which has prompted him to discontinue his urinary medications.  Today he reports he would like to restart the medications as his symptoms continue to progressively worsen.  He has mixed symptoms of obstruction as well as overactive bladder.  He would like to restart Flomax.  He would also like to try Myrbetriq for overactive bladder symptoms, primarily nocturia every hour.  Today he denies dysuria and gross hematuria.    Regarding elevated PSA he has no family history of prostate cancer.  He has never undergone a biopsy.  Most recent PSAs have been within normal limits.      Review of Systems  I personally reviewed the ROS with the patient.    Nursing Notes:   Antonietta العلي LPN  3/10/2022 10:33 AM  Addendum  Chief Complaint   Patient presents with     Follow Up     1 year follow up elevated PSA    Patient presents to the clinic today for a 1 year follow up for an elevated PSA    Review of Systems:    Weight loss:    No     Recent fever/chills:  No   Night sweats:   Yes  Current skin rash:  Yes   Recent hair loss:  No  Heat intolerance:  No   Cold intolerance:  Yes  Chest pain:   No   Palpitations:   No  Shortness of breath:  No   Wheezing:   No  Constipation:    No   Diarrhea:   Yes   Nausea:   No   Vomiting:   No   Kidney/side pain:  No   Back pain:   Yes  Frequent headaches:  No   Dizziness:     Yes  Leg swelling:   No   Calf pain:    Yes           Medication Reconciliation: completed   Antonietta العلي LPN  3/10/2022 10:15 AM       Physical Exam  Vitals:    03/10/22 1031   BP: 124/80   BP Location: Right arm    Patient Position: Sitting   Cuff Size: Adult Large   Pulse: 80   Resp: 16   SpO2: 96%   Weight: 103.9 kg (229 lb)     Constitutional: No acute distress.  Alert and cooperative   Head: NCAT  Eyes: Conjunctivae normal  Cardiovascular: Regular rate.  Pulmonary/Chest: Respirations are even and non-labored bilaterally, no audible wheezing  Abdominal: Soft. No distension, tenderness, masses or guarding.   Neurological: A + O x 3.  Cranial Nerves II-XII grossly intact.  Extremities: FREEMAN x 4, Warm. No clubbing.  No cyanosis.    Skin: Pink, warm and dry.  No visible rashes noted.  Psychiatric:  Normal mood and affect  Back:  No left CVA tenderness.  No right CVA tenderness.  Genitourinary:  Nonpalpable bladder    Labs  Results for JEFE JACOB (MRN [id number]) as of 3/10/2022 11:03   3/10/2022 09:41   PSA 2.33     Results for JEFE JACOB (MRN [id number]) as of 3/4/2021 11:48   3/4/2021 09:34   PSA 2.817     Results for JEFE JACOB (MRN [id number]) as of 3/4/2019 11:36   10/31/2018 12:23 3/4/2019 09:37   PSA 3.909 (H) 2.918     Imaging  CT Stone  3/29/2018  Impression: No evidence of abdominal wall hernia at this time.  No renal or ureteral calculi are present. There is no hydronephrosis.    Post-Void Residual  A post-void residual was measured by ultrasonic bladder scanner.  34 mL (previously recorded)  110 mL (previously recorded)  65 mL (previously recorded)    Assessment  Mr. Jacob is a 66 year old male who follows up with normalized PSA and progressively worsening mixed urinary symptoms.  PSA is stable and normal.    We again discussed the pathophysiology of overactive versus obstructive urinary symptoms.  He has symptoms from both categories and I explained treatment approach for each.  I again recommended restarting Flomax to reassess symptoms given it has the least amount of side effect potential.  If this is ineffective or he develops side effects I again recommended transitioning to  Myrbetriq.    Plan  Restart Flomax   -If lightheadedness develops or it is not effective he will discontinue after at least a 2-week trial   Then start Myrbetriq 25 mg once daily.   -Follow-up 1 month after starting the medication to check blood pressure        A total of 35 minutes was spent with the patient, reviewing records, tests, ordering medications, tests or procedures, counseling regarding the above described medical concern, recommendations regarding management and documenting clinical information in the EHR.

## 2022-03-10 NOTE — NURSING NOTE
Chief Complaint   Patient presents with     Follow Up     1 year follow up elevated PSA    Patient presents to the clinic today for a 1 year follow up for an elevated PSA    Review of Systems:    Weight loss:    No     Recent fever/chills:  No   Night sweats:   Yes  Current skin rash:  Yes   Recent hair loss:  No  Heat intolerance:  No   Cold intolerance:  Yes  Chest pain:   No   Palpitations:   No  Shortness of breath:  No   Wheezing:   No  Constipation:    No   Diarrhea:   Yes   Nausea:   No   Vomiting:   No   Kidney/side pain:  No   Back pain:   Yes  Frequent headaches:  No   Dizziness:     Yes  Leg swelling:   No   Calf pain:    Yes           Medication Reconciliation: completed   Antonietta العلي LPN  3/10/2022 10:15 AM

## 2022-04-09 ENCOUNTER — HOSPITAL ENCOUNTER (EMERGENCY)
Facility: OTHER | Age: 67
Discharge: HOME | End: 2022-04-10
Attending: EMERGENCY MEDICINE | Admitting: EMERGENCY MEDICINE
Payer: MEDICARE

## 2022-04-09 DIAGNOSIS — R07.9 CHEST PAIN, UNSPECIFIED TYPE: ICD-10-CM

## 2022-04-09 PROCEDURE — 99284 EMERGENCY DEPT VISIT MOD MDM: CPT | Performed by: EMERGENCY MEDICINE

## 2022-04-09 PROCEDURE — 93010 ELECTROCARDIOGRAM REPORT: CPT | Performed by: INTERNAL MEDICINE

## 2022-04-09 PROCEDURE — 99285 EMERGENCY DEPT VISIT HI MDM: CPT | Mod: 25 | Performed by: EMERGENCY MEDICINE

## 2022-04-09 PROCEDURE — 93005 ELECTROCARDIOGRAM TRACING: CPT | Performed by: EMERGENCY MEDICINE

## 2022-04-09 RX ORDER — PROCHLORPERAZINE MALEATE 5 MG
TABLET ORAL
COMMUNITY
Start: 2022-01-29 | End: 2023-09-12

## 2022-04-10 ENCOUNTER — APPOINTMENT (OUTPATIENT)
Dept: GENERAL RADIOLOGY | Facility: OTHER | Age: 67
End: 2022-04-10
Attending: EMERGENCY MEDICINE
Payer: MEDICARE

## 2022-04-10 VITALS
BODY MASS INDEX: 31.57 KG/M2 | WEIGHT: 220 LBS | RESPIRATION RATE: 11 BRPM | TEMPERATURE: 97.9 F | HEART RATE: 70 BPM | SYSTOLIC BLOOD PRESSURE: 161 MMHG | DIASTOLIC BLOOD PRESSURE: 97 MMHG | OXYGEN SATURATION: 97 %

## 2022-04-10 LAB
ALBUMIN SERPL-MCNC: 4.2 G/DL (ref 3.5–5.7)
ALP SERPL-CCNC: 64 U/L (ref 34–104)
ALT SERPL W P-5'-P-CCNC: 13 U/L (ref 7–52)
ANION GAP SERPL CALCULATED.3IONS-SCNC: 7 MMOL/L (ref 3–14)
AST SERPL W P-5'-P-CCNC: 11 U/L (ref 13–39)
BASOPHILS # BLD AUTO: 0.1 10E3/UL (ref 0–0.2)
BASOPHILS NFR BLD AUTO: 1 %
BILIRUB SERPL-MCNC: 0.4 MG/DL (ref 0.3–1)
BUN SERPL-MCNC: 13 MG/DL (ref 7–25)
CALCIUM SERPL-MCNC: 9.8 MG/DL (ref 8.6–10.3)
CHLORIDE BLD-SCNC: 105 MMOL/L (ref 98–107)
CO2 SERPL-SCNC: 26 MMOL/L (ref 21–31)
CREAT SERPL-MCNC: 0.9 MG/DL (ref 0.7–1.3)
EOSINOPHIL # BLD AUTO: 0.2 10E3/UL (ref 0–0.7)
EOSINOPHIL NFR BLD AUTO: 2 %
ERYTHROCYTE [DISTWIDTH] IN BLOOD BY AUTOMATED COUNT: 12.7 % (ref 10–15)
FLUAV RNA SPEC QL NAA+PROBE: NEGATIVE
FLUBV RNA RESP QL NAA+PROBE: NEGATIVE
GFR SERPL CREATININE-BSD FRML MDRD: >90 ML/MIN/1.73M2
GLUCOSE BLD-MCNC: 177 MG/DL (ref 70–105)
HCT VFR BLD AUTO: 41.9 % (ref 40–53)
HGB BLD-MCNC: 14.2 G/DL (ref 13.3–17.7)
HOLD SPECIMEN: NORMAL
HOLD SPECIMEN: NORMAL
IMM GRANULOCYTES # BLD: 0 10E3/UL
IMM GRANULOCYTES NFR BLD: 0 %
LYMPHOCYTES # BLD AUTO: 2 10E3/UL (ref 0.8–5.3)
LYMPHOCYTES NFR BLD AUTO: 31 %
MCH RBC QN AUTO: 30.3 PG (ref 26.5–33)
MCHC RBC AUTO-ENTMCNC: 33.9 G/DL (ref 31.5–36.5)
MCV RBC AUTO: 89 FL (ref 78–100)
MONOCYTES # BLD AUTO: 0.7 10E3/UL (ref 0–1.3)
MONOCYTES NFR BLD AUTO: 11 %
NEUTROPHILS # BLD AUTO: 3.6 10E3/UL (ref 1.6–8.3)
NEUTROPHILS NFR BLD AUTO: 55 %
NRBC # BLD AUTO: 0 10E3/UL
NRBC BLD AUTO-RTO: 0 /100
PLATELET # BLD AUTO: 236 10E3/UL (ref 150–450)
POTASSIUM BLD-SCNC: 3.4 MMOL/L (ref 3.5–5.1)
PROT SERPL-MCNC: 6.4 G/DL (ref 6.4–8.9)
RBC # BLD AUTO: 4.69 10E6/UL (ref 4.4–5.9)
RSV RNA SPEC NAA+PROBE: NEGATIVE
SARS-COV-2 RNA RESP QL NAA+PROBE: NEGATIVE
SODIUM SERPL-SCNC: 138 MMOL/L (ref 134–144)
TROPONIN I SERPL-MCNC: 5.8 PG/ML (ref 0–34)
TROPONIN I SERPL-MCNC: 7.5 PG/ML (ref 0–34)
WBC # BLD AUTO: 6.6 10E3/UL (ref 4–11)

## 2022-04-10 PROCEDURE — 250N000009 HC RX 250: Performed by: EMERGENCY MEDICINE

## 2022-04-10 PROCEDURE — C9803 HOPD COVID-19 SPEC COLLECT: HCPCS | Performed by: EMERGENCY MEDICINE

## 2022-04-10 PROCEDURE — 84484 ASSAY OF TROPONIN QUANT: CPT | Performed by: EMERGENCY MEDICINE

## 2022-04-10 PROCEDURE — 80053 COMPREHEN METABOLIC PANEL: CPT | Performed by: EMERGENCY MEDICINE

## 2022-04-10 PROCEDURE — 87637 SARSCOV2&INF A&B&RSV AMP PRB: CPT | Performed by: EMERGENCY MEDICINE

## 2022-04-10 PROCEDURE — 71045 X-RAY EXAM CHEST 1 VIEW: CPT | Mod: TC

## 2022-04-10 PROCEDURE — 36415 COLL VENOUS BLD VENIPUNCTURE: CPT | Performed by: EMERGENCY MEDICINE

## 2022-04-10 PROCEDURE — 93005 ELECTROCARDIOGRAM TRACING: CPT | Performed by: EMERGENCY MEDICINE

## 2022-04-10 PROCEDURE — 93010 ELECTROCARDIOGRAM REPORT: CPT | Performed by: INTERNAL MEDICINE

## 2022-04-10 PROCEDURE — 250N000013 HC RX MED GY IP 250 OP 250 PS 637: Performed by: EMERGENCY MEDICINE

## 2022-04-10 PROCEDURE — 85025 COMPLETE CBC W/AUTO DIFF WBC: CPT | Performed by: EMERGENCY MEDICINE

## 2022-04-10 RX ORDER — LIDOCAINE HYDROCHLORIDE 20 MG/ML
5 SOLUTION OROPHARYNGEAL ONCE
Status: COMPLETED | OUTPATIENT
Start: 2022-04-10 | End: 2022-04-10

## 2022-04-10 RX ORDER — NITROGLYCERIN 0.4 MG/1
0.4 TABLET SUBLINGUAL EVERY 5 MIN PRN
Status: DISCONTINUED | OUTPATIENT
Start: 2022-04-10 | End: 2022-04-10 | Stop reason: HOSPADM

## 2022-04-10 RX ORDER — ASPIRIN 81 MG/1
324 TABLET, CHEWABLE ORAL ONCE
Status: COMPLETED | OUTPATIENT
Start: 2022-04-10 | End: 2022-04-10

## 2022-04-10 RX ORDER — HYDROXYZINE PAMOATE 25 MG/1
25 CAPSULE ORAL ONCE
Status: COMPLETED | OUTPATIENT
Start: 2022-04-10 | End: 2022-04-10

## 2022-04-10 RX ORDER — MAGNESIUM HYDROXIDE/ALUMINUM HYDROXICE/SIMETHICONE 120; 1200; 1200 MG/30ML; MG/30ML; MG/30ML
30 SUSPENSION ORAL ONCE
Status: COMPLETED | OUTPATIENT
Start: 2022-04-10 | End: 2022-04-10

## 2022-04-10 RX ADMIN — NITROGLYCERIN 0.4 MG: 0.4 TABLET SUBLINGUAL at 02:06

## 2022-04-10 RX ADMIN — MAGNESIUM HYDROXIDE/ALUMINUM HYDROXICE/SIMETHICONE 30 ML: 120; 1200; 1200 SUSPENSION ORAL at 01:27

## 2022-04-10 RX ADMIN — ASPIRIN 81 MG 324 MG: 81 TABLET ORAL at 00:20

## 2022-04-10 RX ADMIN — HYDROXYZINE PAMOATE 25 MG: 25 CAPSULE ORAL at 04:45

## 2022-04-10 RX ADMIN — LIDOCAINE HYDROCHLORIDE 5 ML: 20 SOLUTION ORAL; TOPICAL at 01:27

## 2022-04-10 NOTE — ED PROVIDER NOTES
History     Chief Complaint   Patient presents with     Chest Pain     HPI  Rj Juarez is a 66 year old male who presents with chest pain. Has had intermittent chest pain for 3 weeks with walking. Has been worse since he started resuming activity following hip replacement on 1/28. Tonight pain started about 7 hours prior to arrival, was walking outside on slight incline when it started. Got worse over the course of the night.  Had pain radiating to the arm. Took acetaminophen which did not help. Has been taking famotidine and tums without relief recently. Also had some elevated BP measurements at home. Reports dyspnea on exertion over the last few weeks, also had tonight. No recent fever, cough, did have some aches following recent covid booster. No nausea/vomiting, no leg swelling. No history of thromboembolic disease. Had stent placed in 2019. Had stress test Yemi 15 2021, normal.    Has appointment with Dr Porras in 2 days for these ongoing symptoms  No smoking, no alcohol, no drugs    Allergies:  Allergies   Allergen Reactions     Ciprofloxacin Other (See Comments)     Tendon issue , and IBS     Midazolam      Other reaction(s): Other - Describe In Comment Field  Difficult to wake up       Problem List:    Patient Active Problem List    Diagnosis Date Noted     Chondromalacia, left hip 07/17/2020     Priority: Medium     Hx of decompressive lumbar laminectomy 07/10/2020     Priority: Medium     Lumbar stenosis with neurogenic claudication 05/19/2020     Priority: Medium     Status post cardiac catheterization on 12/6/2019 through [facility] with a stenting to his distal circumflex 12/24/2019     Priority: Medium     History of cardiac cath on 12/6/2019 12/24/2019     Priority: Medium     Chronic midline low back pain with sciatica, sciatica laterality unspecified 12/24/2019     Priority: Medium     ASCVD (arteriosclerotic cardiovascular disease) 12/03/2019     Priority: Medium     High coronary artery  calcium score at 1118.2 on 11/21/2019 11/27/2019     Priority: Medium     Added automatically from request for surgery [id number]       CAD, multiple vessel 11/27/2019     Priority: Medium     Added automatically from request for surgery [id number]       GERD 11/19/2019     Priority: Medium     FARNAZ on CPAP 08/22/2019     Priority: Medium     White coat syndrome with diagnosis of hypertension 08/22/2019     Priority: Medium     Diabetes mellitus type 2, diet-controlled (H) 08/22/2019     Priority: Medium     NSAID long-term use 08/22/2019     Priority: Medium     Primary osteoarthritis of right knee  04/09/2019     Priority: Medium     Effusion of right knee 04/09/2019     Priority: Medium     Posterior knee pain, right 04/09/2019     Priority: Medium     Elevated prostate specific antigen (PSA) 11/29/2018     Priority: Medium     Lumbar radiculopathy 02/20/2017     Priority: Medium     Benign essential tremor 03/18/2016     Priority: Medium     Irritable bowel syndrome with diarrhea 03/18/2016     Priority: Medium     H/O adenomatous polyp of colon 12/17/2009     Priority: Medium        Past Medical History:    Past Medical History:   Diagnosis Date     Benign lipomatous neoplasm      Calculus of kidney      Carpal tunnel syndrome      Closed fracture of patella      Diverticulosis of large intestine without perforation or abscess without bleeding      Exertional chest pain 11/19/2019     Headache      Other specified forms of tremor      Other urticaria (CODE)      Pain in joint      Umbilical hernia without obstruction or gangrene        Past Surgical History:    Past Surgical History:   Procedure Laterality Date     COLONOSCOPY  01/28/2014 2009,2014,F/U 2019     COLONOSCOPY  03/01/2019    Serrated adenoma, follow up 1 year     COLONOSCOPY N/A 1/15/2021    Procedure: COLONOSCOPY, WITH POLYPECTOMY AND BIOPSY;  Surgeon: Ly Frances MD;  Location:  OR     ESOPHAGOSCOPY, GASTROSCOPY, DUODENOSCOPY (EGD), COMBINED       14,EGD     ESOPHAGOSCOPY, GASTROSCOPY, DUODENOSCOPY (EGD), COMBINED N/A 1/15/2021    Procedure: ESOPHAGOGASTRODUODENOSCOPY, WITH BIOPSY;  Surgeon: Ly Frances MD;  Location: [facility] OR     JOINT REPLACEMENT, HIP RT/LT Left      OTHER SURGICAL HISTORY      9/10/2014,79277.0,OR REPAIR ING HERNIA  >5 TRS BETTINA     OTHER SURGICAL HISTORY      2018,63069.0,OR REPAIR UMBILICAL NAZARIO  >5 TRS REDUC     RELEASE CARPAL TUNNEL      3,2004,Both hands     SIGMOIDOSCOPY FLEXIBLE      No Comments Provided       Family History:    Family History   Problem Relation Age of Onset     Hypertension Mother         Hypertension     Diabetes Mother         Diabetes     Other - See Comments Mother         Spinal stenosis     Other - See Comments Father         scleroderma which was quite severe     Cancer Father         Cancer, of adenocarcinoma of the lung.  He was a nonsmoker.     Diabetes Brother         Diabetes     Other - See Comments Brother         Pancreatitis     Substance Abuse Brother         Alcohol/Drug,Alcoholic, has been through treatment a number of times.       Social History:  Marital Status:   [2]  Social History     Tobacco Use     Smoking status: Never Smoker     Smokeless tobacco: Never Used   Substance Use Topics     Alcohol use: No     Drug use: No        Medications:    aspirin 81 MG EC tablet  famotidine (PEPCID) 20 MG tablet  gabapentin (NEURONTIN) 300 MG capsule  ibuprofen (ADVIL/MOTRIN) 400 MG tablet  metoprolol succinate ER (TOPROL-XL) 25 MG 24 hr tablet  mirabegron (MYRBETRIQ) 25 MG 24 hr tablet  Multiple Vitamins-Minerals (EYE VITAMINS PO)  nitroGLYcerin (NITROSTAT) 0.4 MG sublingual tablet  order for DME  prochlorperazine (COMPAZINE) 5 MG tablet  rosuvastatin (CRESTOR) 20 MG tablet  silver sulfADIAZINE (SILVADENE) 1 % external cream  sucralfate (CARAFATE) 1 GM tablet  tamsulosin (FLOMAX) 0.4 MG capsule  tamsulosin (FLOMAX) 0.4 MG capsule  traMADol (ULTRAM) 50 MG tablet  triamcinolone  (KENALOG) 0.1 % cream          Review of Systems  Please seen HPI for pertinent positives and negatives. All other systems reviewed and found to be negative.   Physical Exam   BP: (!) 192/114  Pulse: 97  Temp: 97.9  F (36.6  C)  Resp: 19  Weight: 99.8 kg (220 lb)  SpO2: 96 %      Physical Exam  Constitutional:       General: He is not in acute distress.     Appearance: He is not ill-appearing.   HENT:      Head: Normocephalic and atraumatic.   Eyes:      Extraocular Movements: Extraocular movements intact.      Pupils: Pupils are equal, round, and reactive to light.   Cardiovascular:      Rate and Rhythm: Normal rate and regular rhythm.      Pulses:           Carotid pulses are 2+ on the right side and 2+ on the left side.  Pulmonary:      Effort: Pulmonary effort is normal.      Breath sounds: Normal breath sounds.   Abdominal:      Palpations: Abdomen is soft.      Tenderness: There is no abdominal tenderness.   Musculoskeletal:      Cervical back: Normal range of motion.      Right lower leg: No edema.      Left lower leg: No edema.   Skin:     General: Skin is warm and dry.   Neurological:      Mental Status: He is alert and oriented to person, place, and time.         ED Course              ED Course as of 04/10/22 0607   Sun Apr 10, 2022   0056 Troponin: 5.8   0137 Patient still with pain though less severe than on arrival, midsternal, no longer radiating to the arm.  Will give GI cocktail and reassess.  Will repeat troponin.  Symptoms are somewhat concerning for unstable angina, discussed admission/transfer after second troponin, though if pain resolves he does have good follow-up on Monday.   0154 Patient reports that he had a negative stress test prior to his previous stent.   0154 CXR appears clear   0342 Extended discussion about potential treatment options, risks and benefits.  Patient does have follow-up on Monday so could potentially have stress and scheduled shortly.  However there is possibility that  he has unstable angina/ACS at this time which cannot be ruled out by labs and testing that we have available, and I informed the patient of this.  He does have persistent 1 out of 10 chest discomfort.  He has also had recent exertional symptoms which are somewhat concerning.  He was offered the option to be transferred to [place] however he is concerned that last time he did not have an angiogram performed and feels that if this happens this time it will be a waste of time.  He was also offered admission to [facility] however as this would only be observation and serial troponins he is not sure that it would be worth it.  I spoke with cardiologist over at [facility] will offer the options of either transfer or office visit early this coming week with scheduling of either stress test or angiogram.  I discussed this with the patient.  He ultimately determined that he preferred to be transferred to [place].  Awaiting callback from hospitalist.   0429 [facility] unable to accept at this time, to check back in AM.    0604 After informing the patient that no beds are available at [facility] at this time he became anxious and felt like he wanted to go home.  I discussed with him that this does not necessarily change our plan or recommendations, only delays his admission.   He is unsure that his chest pain is cardiac or if it is GI or anxiety related.  I told him we are unable to determine that at this time and that these are diagnoses of exclusion.  He feels that he may be better off going home and resting, being seen on Monday by his primary doctor, and seeing cardiology early this week.  This is a reasonable plan and he expressed understanding that he should come back for any new or worsening symptoms.  Emergency referral to [facility] cardiology was placed.     Procedures              EKG Interpretation:      Interpreted by Fannie Cortes MD  Time reviewed: 2354  Symptoms at time of EKG: chest pain   Rhythm: normal  sinus   Rate: Normal  Axis: Normal  Ectopy: none  Conduction: unspecified intraventricular conduction delay  ST Segments/ T Waves: No ST-T wave changes  Q Waves: III and aVr  Comparison to prior: RSR' in V1 otherwise unchanged. nons    Clinical Impression: No acute ischemic changes                Critical Care time:  none               Results for orders placed or performed during the hospital encounter of 04/09/22 (from the past 24 hour(s))   CBC with platelets differential    Narrative    The following orders were created for panel order CBC with platelets differential.  Procedure                               Abnormality         Status                     ---------                               -----------         ------                     CBC with platelets and d...[[id number]]                      Final result                 Please view results for these tests on the individual orders.   Troponin I   Result Value Ref Range    Troponin I 5.8 0.0 - 34.0 pg/mL   Comprehensive metabolic panel   Result Value Ref Range    Sodium 138 134 - 144 mmol/L    Potassium 3.4 (L) 3.5 - 5.1 mmol/L    Chloride 105 98 - 107 mmol/L    Carbon Dioxide (CO2) 26 21 - 31 mmol/L    Anion Gap 7 3 - 14 mmol/L    Urea Nitrogen 13 7 - 25 mg/dL    Creatinine 0.90 0.70 - 1.30 mg/dL    Calcium 9.8 8.6 - 10.3 mg/dL    Glucose 177 (H) 70 - 105 mg/dL    Alkaline Phosphatase 64 34 - 104 U/L    AST 11 (L) 13 - 39 U/L    ALT 13 7 - 52 U/L    Protein Total 6.4 6.4 - 8.9 g/dL    Albumin 4.2 3.5 - 5.7 g/dL    Bilirubin Total 0.4 0.3 - 1.0 mg/dL    GFR Estimate >90 >60 mL/min/1.73m2   Extra Tube    Narrative    The following orders were created for panel order Extra Tube.  Procedure                               Abnormality         Status                     ---------                               -----------         ------                     Extra Blue Top Tube[[id number]]                              Final result               Extra Serum Separator  Tu...[[id number]]                      Final result                 Please view results for these tests on the individual orders.   Extra Blue Top Tube   Result Value Ref Range    Hold Specimen JIC    Extra Serum Separator Tube (SST)   Result Value Ref Range    Hold Specimen JIC    CBC with platelets and differential   Result Value Ref Range    WBC Count 6.6 4.0 - 11.0 10e3/uL    RBC Count 4.69 4.40 - 5.90 10e6/uL    Hemoglobin 14.2 13.3 - 17.7 g/dL    Hematocrit 41.9 40.0 - 53.0 %    MCV 89 78 - 100 fL    MCH 30.3 26.5 - 33.0 pg    MCHC 33.9 31.5 - 36.5 g/dL    RDW 12.7 10.0 - 15.0 %    Platelet Count 236 150 - 450 10e3/uL    % Neutrophils 55 %    % Lymphocytes 31 %    % Monocytes 11 %    % Eosinophils 2 %    % Basophils 1 %    % Immature Granulocytes 0 %    NRBCs per 100 WBC 0 <1 /100    Absolute Neutrophils 3.6 1.6 - 8.3 10e3/uL    Absolute Lymphocytes 2.0 0.8 - 5.3 10e3/uL    Absolute Monocytes 0.7 0.0 - 1.3 10e3/uL    Absolute Eosinophils 0.2 0.0 - 0.7 10e3/uL    Absolute Basophils 0.1 0.0 - 0.2 10e3/uL    Absolute Immature Granulocytes 0.0 <=0.4 10e3/uL    Absolute NRBCs 0.0 10e3/uL   XR Chest Port 1 View    Narrative    PROCEDURE INFORMATION:   Exam: XR Chest   Exam date and time: 4/10/2022 12:19 AM   Age: 66 years old   Clinical indication: Other: Chest pain     TECHNIQUE:   Imaging protocol: XR of the chest.   Views: 1 view.     COMPARISON:   CR XR CHEST PORT 1 VW 9/20/2020 2:18 PM     FINDINGS:   Lungs: Unremarkable. No consolidation.   Pleural spaces: Unremarkable. No pleural effusion. No pneumothorax.   Heart/Mediastinum: There is mild cardiomegaly.   Bones/joints: Unremarkable.       Impression    IMPRESSION:   Mild cardiomegaly.     THIS DOCUMENT HAS BEEN ELECTRONICALLY SIGNED BY MARY PINTO DO   Troponin I (now)   Result Value Ref Range    Troponin I 7.5 0.0 - 34.0 pg/mL   Asymptomatic Influenza A/B & SARS-CoV2 (COVID-19) Virus PCR Multiplex Nose    Specimen: Nose; Swab   Result Value Ref Range     Influenza A PCR Negative Negative    Influenza B PCR Negative Negative    RSV PCR Negative Negative    SARS CoV2 PCR Negative Negative    Narrative    Testing was performed using the Xpert Xpress CoV2/Flu/RSV Assay on the [employer] GeneXpert Instrument. This test should be ordered for the detection of SARS-CoV-2 and influenza viruses in individuals who meet clinical and/or epidemiological criteria. Test performance is unknown in asymptomatic patients. This test is for in vitro diagnostic use under the FDA EUA for laboratories certified under CLIA to perform high or moderate complexity testing. This test has not been FDA cleared or approved. A negative result does not rule out the presence of PCR inhibitors in the specimen or target RNA in concentration below the limit of detection for the assay. If only one viral target is positive but coinfection with multiple targets is suspected, the sample should be re-tested with another FDA cleared, approved, or authorized test, if coinfection would change clinical management. This test was validated by the [facility]. These laboratories are certified under the Clinical  Laboratory Improvement Amendments of 1988 (CLIA-88) as qualified to perform high complexity laboratory testing.       Medications   nitroGLYcerin (NITROSTAT) sublingual tablet 0.4 mg (0.4 mg Sublingual Given 4/10/22 0206)   aspirin (ASA) chewable tablet 324 mg (324 mg Oral Given 4/10/22 0020)   alum & mag hydroxide-simethicone (MAALOX) suspension 30 mL (30 mLs Oral Given 4/10/22 0127)   lidocaine (viscous) (XYLOCAINE) 2 % solution 5 mL (5 mLs Mouth/Throat Given 4/10/22 0127)   hydrOXYzine (VISTARIL) capsule 25 mg (25 mg Oral Given 4/10/22 0445)       Assessments & Plan (with Medical Decision Making)     I have reviewed the nursing notes.    I have reviewed the findings, diagnosis, plan and need for follow up with the patient.   Mr Juarez is a 65 yo man who presents with chest pain radiating  to the L arm. He has had dyspnea and chest discomfort on exertion for the past several days. Symptoms concerning for ACS. Also consider PE, pneumothorax, aortic emergency, pneumonia, though chronicity and absence of vital sign abnormalities or infectious symptoms make these less likely. Also consider GI cause such as GERD, PUD, and musculoskeletal pain, deconditioning after surgery. Will obtain labs including troponin, CXR. Will give aspirin. Reassess.     New Prescriptions    No medications on file       Final diagnoses:   Chest pain, unspecified type       4/9/2022   [facility] [place]     Fannie Cortes MD  04/10/22 0607

## 2022-04-10 NOTE — ED TRIAGE NOTES
ED Nursing Triage Note (General)   ________________________________    Rj Juarez is a 66 year old Male that presents to triage private car  With history of  Pt started having mid sternal chest pain at approx. 4-5 hours ago.  Pt states that he did have some chest pressure the days prior to this but pt states this is different.   Pt states that he had stent placement approx. 2 years ago.  Pt states his pan is constant at a 5/0 and reports hypertension with this.  The pain is constant reported by patient.  Pt reports lightheadness  and SOB when active.  Significant symptoms had onset 4-5  hour(s) ago.    Patient appears alert , in no acute distress., and cooperative behavior.    GCS Eye Opening = 4=Spontaneous  Airway: intact  Breathing noted as Normal  Circulation Normal  Skin:  Normal  Action taken:  Triage to critical care immediately      PRE HOSPITAL PRIOR LIVING SITUATION Spouse

## 2022-04-10 NOTE — DISCHARGE INSTRUCTIONS
Continue your medications as prescribed.  Monitor your blood pressure once a day first thing in the morning.  If it is consistently elevated (>140/90) then discuss increasing your blood pressure medications with your primary doctor.  Follow-up with your doctor on Monday as planned.  He may recommend scheduling you for a repeat stress test.  Follow up with cardiology as soon as possible. Emergency consult has been ordered and you should hear back by Monday. If you do not then call your cardiologist office by Monday.   Seek medical attention right away for worsening pain, vomiting, shortness of breath, leg swelling, loss of consciousness, or if you have any new or changing symptoms or concerns.

## 2022-04-11 ENCOUNTER — OFFICE VISIT (OUTPATIENT)
Dept: PEDIATRICS | Facility: OTHER | Age: 67
End: 2022-04-11
Attending: INTERNAL MEDICINE
Payer: COMMERCIAL

## 2022-04-11 VITALS
SYSTOLIC BLOOD PRESSURE: 160 MMHG | DIASTOLIC BLOOD PRESSURE: 80 MMHG | TEMPERATURE: 97.8 F | RESPIRATION RATE: 13 BRPM | WEIGHT: 230.2 LBS | OXYGEN SATURATION: 98 % | HEART RATE: 75 BPM | BODY MASS INDEX: 33.03 KG/M2

## 2022-04-11 DIAGNOSIS — I10 WHITE COAT SYNDROME WITH DIAGNOSIS OF HYPERTENSION: Chronic | ICD-10-CM

## 2022-04-11 DIAGNOSIS — K21.00 GASTROESOPHAGEAL REFLUX DISEASE WITH ESOPHAGITIS, UNSPECIFIED WHETHER HEMORRHAGE: ICD-10-CM

## 2022-04-11 DIAGNOSIS — E66.09 NON MORBID OBESITY DUE TO EXCESS CALORIES: ICD-10-CM

## 2022-04-11 DIAGNOSIS — R07.9 CHEST PAIN, EXERTIONAL: Primary | ICD-10-CM

## 2022-04-11 DIAGNOSIS — I25.10 ASCVD (ARTERIOSCLEROTIC CARDIOVASCULAR DISEASE): Chronic | ICD-10-CM

## 2022-04-11 DIAGNOSIS — I25.10 CAD, MULTIPLE VESSEL: Chronic | ICD-10-CM

## 2022-04-11 DIAGNOSIS — E11.9 DIABETES MELLITUS TYPE 2, DIET-CONTROLLED (H): ICD-10-CM

## 2022-04-11 PROCEDURE — 99214 OFFICE O/P EST MOD 30 MIN: CPT | Performed by: INTERNAL MEDICINE

## 2022-04-11 PROCEDURE — G0463 HOSPITAL OUTPT CLINIC VISIT: HCPCS

## 2022-04-11 ASSESSMENT — PAIN SCALES - GENERAL: PAINLEVEL: NO PAIN (1)

## 2022-04-11 NOTE — PATIENT INSTRUCTIONS
-- Schedule stress echo   -- Take pepcid scheduled for now    Check your Blood Pressure   -- Sit in a chair, feet flat on the floor for 5 minutes   -- Avoid caffeine, exercise and smoking for 30 minutes before checking   -- Have your arm at the level of your heart   -- Make sure you have the correct size cuff   -- Write them down, bring your log book in to your appointment     -- Goal blood pressure 120/70   -- Learn about DASH Diet for dietary ways to reduce blood pressure  Google: NIH DASH diet  NIH site: https://www.nhlbi.nih.gov/health-topics/dash-eating-plan  PDF from [facility]: https://www.nhlbi.nih.gov/files/docs/public/heart/new_dash.pdf   -- Work on 5% weight loss   -- Daily physical exercise (eg. 30 min brisk walk)

## 2022-04-11 NOTE — NURSING NOTE
Chief Complaint   Patient presents with     Diabetes     Heart Problem     ER Visit 4/2/22 for Chest Pain          Medication Reconciliation: zoya Shields

## 2022-04-11 NOTE — PROGRESS NOTES
Assessment & Plan   1. Chest pain, exertional  2. ASCVD (arteriosclerotic cardiovascular disease)  3. CAD, multiple vessel  Given his history of atherosclerotic cardiovascular disease now with exertion based shortness of breath and chest discomfort I recommend a stress study.  We plan on exercise echocardiogram.  I have encouraged him to avoid exertion that triggers his symptoms for now.  However light duty should be okay.  Warning signs discussed return if concerns.  - Echo Stress Echocardiogram; Future    4. White coat syndrome with diagnosis of hypertension  Blood pressure elevated in the office however closer to 120s and 130s systolic at home.  Suspect significant contributor Tatian from whitecoat syndrome as well as physical deconditioning.  Continue monitoring at home and consider adding amlodipine for dual duty with Raynaud's phenomenon.    5. Diabetes mellitus type 2, diet-controlled (H)  Focus on lifestyle modification at this time    6. Gastroesophageal reflux disease with esophagitis, unspecified whether hemorrhage  Recommend use of famotidine scheduled for the short-term at least to rule out that variable    7. Non morbid obesity due to excess calories  Recommend weight loss            Patient Instructions    -- Schedule stress echo   -- Take pepcid scheduled for now    Check your Blood Pressure   -- Sit in a chair, feet flat on the floor for 5 minutes   -- Avoid caffeine, exercise and smoking for 30 minutes before checking   -- Have your arm at the level of your heart   -- Make sure you have the correct size cuff   -- Write them down, bring your log book in to your appointment     -- Goal blood pressure 120/70   -- Learn about DASH Diet for dietary ways to reduce blood pressure  1. Google: nivio DASH diet  2. NIH site: https://www.nhlbi.nih.gov/health-topics/dash-eating-plan  3. PDF from NIH: https://www.nhlbi.nih.gov/files/docs/public/heart/new_dash.pdf   -- Work on 5% weight loss   -- Daily physical  exercise (eg. 30 min brisk walk)         Return in about 3 months (around 7/11/2022), or if symptoms worsen or fail to improve, for med management.    Signed, Doug Porras MD, FAAP, FACP  Internal Medicine & Pediatrics    Subjective   Rj Juarez is a 66 year old male who presents for cardiac issues, diabetic issues.  He has been trying to be more active lately.  His hip is healing and is allowing this.  He has a lot of chores to do including trying to remove chicken feces.  He has noticed that when he exerts himself he gets lightheaded and short of breath.  Its been fine on the treadmill but he does not push himself very hard there.  On Saturday he thought he was developing GERD that would go away.  It felt like a pain in the middle of his chest worse with bending over to  chicken feed.  His blood pressure escalated to 190/110 and he decided to come to the ER.  His testing was normal.  They debated whether he needed to stay in the hospital.  Ultimately he was discharged.  He wants to know when he should be worried about his blood sugar.  He is having a hard time losing weight because of inactivity.    Objective   Vitals: BP (!) 160/80   Pulse 75   Temp 97.8  F (36.6  C) (Tympanic)   Resp 13   Wt 104.4 kg (230 lb 3.2 oz)   SpO2 98%   BMI 33.03 kg/m        Review and Analysis of Data   I personally reviewed the following:  External notes: No  Results: Yes Laboratory data, EKGs from ER visit reviewed with patient  Use of an independent historian: Yes I spoke with his wife  Independent review of a test performed by another physician: Yes I reviewed the EKGs  Discussion of management with another physician: No  Moderate risk of morbidity from additional diagnostic testing and/or treatment.    Total time spent in the preparation, care care of this patient, coordination of care, documentation: 30-39 minutes.

## 2022-04-12 ENCOUNTER — MYC MEDICAL ADVICE (OUTPATIENT)
Dept: PEDIATRICS | Facility: OTHER | Age: 67
End: 2022-04-12
Payer: COMMERCIAL

## 2022-04-12 LAB
ATRIAL RATE - MUSE: 59 BPM
ATRIAL RATE - MUSE: 92 BPM
DIASTOLIC BLOOD PRESSURE - MUSE: NORMAL MMHG
DIASTOLIC BLOOD PRESSURE - MUSE: NORMAL MMHG
INTERPRETATION ECG - MUSE: NORMAL
INTERPRETATION ECG - MUSE: NORMAL
P AXIS - MUSE: 25 DEGREES
P AXIS - MUSE: 38 DEGREES
PR INTERVAL - MUSE: 164 MS
PR INTERVAL - MUSE: 172 MS
QRS DURATION - MUSE: 108 MS
QRS DURATION - MUSE: 112 MS
QT - MUSE: 356 MS
QT - MUSE: 398 MS
QTC - MUSE: 394 MS
QTC - MUSE: 440 MS
R AXIS - MUSE: 10 DEGREES
R AXIS - MUSE: 21 DEGREES
SYSTOLIC BLOOD PRESSURE - MUSE: NORMAL MMHG
SYSTOLIC BLOOD PRESSURE - MUSE: NORMAL MMHG
T AXIS - MUSE: 22 DEGREES
T AXIS - MUSE: 22 DEGREES
VENTRICULAR RATE- MUSE: 59 BPM
VENTRICULAR RATE- MUSE: 92 BPM

## 2022-04-26 ENCOUNTER — TRANSFERRED RECORDS (OUTPATIENT)
Dept: HEALTH INFORMATION MANAGEMENT | Facility: OTHER | Age: 67
End: 2022-04-26
Payer: COMMERCIAL

## 2022-05-21 ENCOUNTER — HEALTH MAINTENANCE LETTER (OUTPATIENT)
Age: 67
End: 2022-05-21

## 2022-05-21 DIAGNOSIS — I25.10 ASCVD (ARTERIOSCLEROTIC CARDIOVASCULAR DISEASE): Chronic | ICD-10-CM

## 2022-05-21 DIAGNOSIS — Z98.890 HISTORY OF CARDIAC CATH: ICD-10-CM

## 2022-05-21 DIAGNOSIS — I10 ESSENTIAL HYPERTENSION: Chronic | ICD-10-CM

## 2022-05-21 DIAGNOSIS — Z98.890 STATUS POST CARDIAC CATHETERIZATION: ICD-10-CM

## 2022-05-21 DIAGNOSIS — I25.10 CORONARY ARTERY DISEASE INVOLVING NATIVE CORONARY ARTERY OF NATIVE HEART WITHOUT ANGINA PECTORIS: ICD-10-CM

## 2022-05-21 DIAGNOSIS — I25.10 CAD, MULTIPLE VESSEL: ICD-10-CM

## 2022-05-23 ENCOUNTER — HOSPITAL ENCOUNTER (OUTPATIENT)
Dept: GENERAL RADIOLOGY | Facility: OTHER | Age: 67
Discharge: HOME | End: 2022-05-23
Attending: STUDENT IN AN ORGANIZED HEALTH CARE EDUCATION/TRAINING PROGRAM
Payer: MEDICARE

## 2022-05-23 ENCOUNTER — OFFICE VISIT (OUTPATIENT)
Dept: INTERNAL MEDICINE | Facility: OTHER | Age: 67
End: 2022-05-23
Attending: STUDENT IN AN ORGANIZED HEALTH CARE EDUCATION/TRAINING PROGRAM
Payer: MEDICARE

## 2022-05-23 ENCOUNTER — HOSPITAL ENCOUNTER (OUTPATIENT)
Dept: ULTRASOUND IMAGING | Facility: OTHER | Age: 67
Discharge: HOME | End: 2022-05-23
Attending: STUDENT IN AN ORGANIZED HEALTH CARE EDUCATION/TRAINING PROGRAM
Payer: MEDICARE

## 2022-05-23 VITALS
HEART RATE: 85 BPM | SYSTOLIC BLOOD PRESSURE: 164 MMHG | TEMPERATURE: 97.6 F | BODY MASS INDEX: 31.53 KG/M2 | WEIGHT: 225.2 LBS | RESPIRATION RATE: 16 BRPM | DIASTOLIC BLOOD PRESSURE: 96 MMHG | OXYGEN SATURATION: 98 % | HEIGHT: 71 IN

## 2022-05-23 DIAGNOSIS — M54.40 CHRONIC MIDLINE LOW BACK PAIN WITH SCIATICA, SCIATICA LATERALITY UNSPECIFIED: ICD-10-CM

## 2022-05-23 DIAGNOSIS — M79.661 PAIN OF RIGHT LOWER LEG: ICD-10-CM

## 2022-05-23 DIAGNOSIS — G89.29 CHRONIC MIDLINE LOW BACK PAIN WITH SCIATICA, SCIATICA LATERALITY UNSPECIFIED: ICD-10-CM

## 2022-05-23 DIAGNOSIS — Z98.890 HISTORY OF CARDIAC CATH: ICD-10-CM

## 2022-05-23 DIAGNOSIS — Z98.890 STATUS POST CARDIAC CATHETERIZATION: ICD-10-CM

## 2022-05-23 DIAGNOSIS — R20.0 NUMBNESS OF RIGHT FOOT: ICD-10-CM

## 2022-05-23 DIAGNOSIS — I25.10 ASCVD (ARTERIOSCLEROTIC CARDIOVASCULAR DISEASE): Chronic | ICD-10-CM

## 2022-05-23 DIAGNOSIS — I25.10 CAD, MULTIPLE VESSEL: ICD-10-CM

## 2022-05-23 DIAGNOSIS — M79.661 PAIN OF RIGHT LOWER LEG: Primary | ICD-10-CM

## 2022-05-23 DIAGNOSIS — I25.10 CORONARY ARTERY DISEASE INVOLVING NATIVE CORONARY ARTERY OF NATIVE HEART WITHOUT ANGINA PECTORIS: ICD-10-CM

## 2022-05-23 DIAGNOSIS — I10 ESSENTIAL HYPERTENSION: Chronic | ICD-10-CM

## 2022-05-23 PROCEDURE — G0463 HOSPITAL OUTPT CLINIC VISIT: HCPCS | Mod: 25

## 2022-05-23 PROCEDURE — 99215 OFFICE O/P EST HI 40 MIN: CPT | Performed by: STUDENT IN AN ORGANIZED HEALTH CARE EDUCATION/TRAINING PROGRAM

## 2022-05-23 PROCEDURE — 72100 X-RAY EXAM L-S SPINE 2/3 VWS: CPT

## 2022-05-23 PROCEDURE — 93971 EXTREMITY STUDY: CPT | Mod: RT

## 2022-05-23 RX ORDER — METOPROLOL SUCCINATE 25 MG/1
25 TABLET, EXTENDED RELEASE ORAL DAILY
Qty: 90 TABLET | Refills: 0 | OUTPATIENT
Start: 2022-05-23

## 2022-05-23 RX ORDER — METOPROLOL SUCCINATE 25 MG/1
25 TABLET, EXTENDED RELEASE ORAL DAILY
Qty: 90 TABLET | Refills: 3 | Status: SHIPPED | OUTPATIENT
Start: 2022-05-23 | End: 2022-06-23

## 2022-05-23 RX ORDER — ROSUVASTATIN CALCIUM 20 MG/1
20 TABLET, COATED ORAL DAILY
Qty: 90 TABLET | Refills: 3 | Status: SHIPPED | OUTPATIENT
Start: 2022-05-23 | End: 2022-06-23

## 2022-05-23 ASSESSMENT — PAIN SCALES - GENERAL: PAINLEVEL: MILD PAIN (3)

## 2022-05-23 ASSESSMENT — ENCOUNTER SYMPTOMS: LEG PAIN: 1

## 2022-05-23 NOTE — PROGRESS NOTES
"  Nirmal De La Rosa is a 66 year old who presents for the following health issues     Leg Pain    History of Present Illness       Reason for visit:  Dvt/. Nurapathy/pain/loss of leg function    He eats 4 or more servings of fruits and vegetables daily.He consumes 2 sweetened beverage(s) daily.He exercises with enough effort to increase his heart rate 30 to 60 minutes per day.  He exercises with enough effort to increase his heart rate 7 days per week.   He is taking medications regularly.     HTN:   White coat but also increases with exercise. Wants to discuss with Dr. Porras.     Pain just below the knee in back.   Thinks that he may have pulled something in his calf.   This started the day before opener.   Taking ibuprofen.   Toes went numb. Now up to the heel. Feels like he is wearing a wet sock in foot.   No new swelling.   Wore compression socks. Didn't make a difference in pain.   Doesn't improve with lying or sitting down.   No injury to knee.   Walking then started hurting.   Can't go foot over foot on stairs due to pain and weakness.   Right leg.   Right hip was replaced this winter.     Recent GI illness, fevers last Wednesday and Thursday.   Bedridden for Wednesday.     Needs refills of metoprolol and rosuvastatin.           Review of Systems         Objective    BP (!) 164/96 (BP Location: Right arm, Patient Position: Sitting, Cuff Size: Adult Regular)   Pulse 85   Temp 97.6  F (36.4  C) (Temporal)   Resp 16   Ht 1.803 m (5' 11\")   Wt 102.2 kg (225 lb 3.2 oz)   SpO2 98%   BMI 31.41 kg/m    Body mass index is 31.41 kg/m .  Physical Exam   General Pleasant 66-year-old man sitting clinic in no acute distress  CV: Regular no murmur or peripheral edema appreciated  Pulmonary: Clear to auscultation bilaterally  MSK: No joint line pain on the right knee, no swelling or effusion appreciated.  Limited hair on the lower shins bilaterally    Reviewed his x-ray and venous ultrasound imaging        "   Assessment & Plan       ICD-10-CM    1. Pain of right lower leg  M79.661 US Lower Extremity Venous Duplex Right   2. Numbness of right foot  R20.0 XR Lumbar Spine 2/3 Views   3. Chronic midline low back pain with sciatica, sciatica laterality unspecified  M54.40 XR Lumbar Spine 2/3 Views    G89.29    4. Coronary artery disease involving native coronary artery of native heart without angina pectoris  I25.10 rosuvastatin (CRESTOR) 20 MG tablet     metoprolol succinate ER (TOPROL XL) 25 MG 24 hr tablet   5. Status post cardiac catheterization on 12/6/2019 through [facility] with a stenting to his distal circumflex  Z98.890 metoprolol succinate ER (TOPROL XL) 25 MG 24 hr tablet   6. History of cardiac cath on 12/6/2019  Z98.890 metoprolol succinate ER (TOPROL XL) 25 MG 24 hr tablet   7. CAD, multiple vessel  I25.10 metoprolol succinate ER (TOPROL XL) 25 MG 24 hr tablet   8. Essential hypertension  I10 metoprolol succinate ER (TOPROL XL) 25 MG 24 hr tablet   9. ASCVD (arteriosclerotic cardiovascular disease)  I25.10 metoprolol succinate ER (TOPROL XL) 25 MG 24 hr tablet       Pain right lower leg: Prior hip surgery and no bony pain or effusion in the knee.  Think that this may be related to his spine as there is some radicular aspect of it.  DVT ultrasound was negative for DVT.  X-ray of his spine showed marked multilevel degenerative disc disease and narrowing particularly in the L5-S1 area which explains some of his symptoms.  No compression fracture.  Ordered MRI and recommend that he follow-up with his spine surgeon for further evaluation/treatment.    Coronary artery disease, ASCVD: History of catheterization 12-6/2019, refill his metoprolol and his rosuvastatin.    Hypertension: Blood pressure 164/96 today claims he has whitecoat hypertension and would like to follow-up with his PCP for further evaluation, refill his metoprolol today.     Total time spent on this visit 55 minutes including precharting, review of  imaging as well as discussion and counseling with patient, physical exam and documentation.    Follow-up with spine surgery after MRI      Terry Guerrero MD  [facility] AND [facility]

## 2022-05-23 NOTE — NURSING NOTE
"Chief Complaint   Patient presents with     Leg Pain     Pain in right calf and numbness   started 5-         Medication reconciliation completed.    FOOD SECURITY SCREENING QUESTIONS:    The next two questions are to help us understand your food security.  If you are feeling you need any assistance in this area, we have resources available to support you today.    Hunger Vital Signs:  Within the past 12 months we worried whether our food would run out before we got money to buy more. Never  Within the past 12 months the food we bought just didn't last and we didn't have money to get more. Never    Initial BP (!) 164/96 (BP Location: Right arm, Patient Position: Sitting, Cuff Size: Adult Regular)   Pulse 85   Temp 97.6  F (36.4  C) (Temporal)   Resp 16   Ht 1.803 m (5' 11\")   Wt 102.2 kg (225 lb 3.2 oz)   SpO2 98%   BMI 31.41 kg/m   Estimated body mass index is 31.41 kg/m  as calculated from the following:    Height as of this encounter: 1.803 m (5' 11\").    Weight as of this encounter: 102.2 kg (225 lb 3.2 oz).       Sandy Dee LPN .......  5/23/2022  9:39 AM  "

## 2022-05-23 NOTE — TELEPHONE ENCOUNTER
metoprolol succinate ER (TOPROL XL) 25 MG 24 hr tablet 90 tablet 3 5/23/2022  No   Sig - Route: Take 1 tablet (25 mg) by mouth daily - Oral     To JASMIN Douglas RN on 5/23/2022 at 4:13 PM

## 2022-05-24 DIAGNOSIS — M48.062 SPINAL STENOSIS OF LUMBAR REGION WITH NEUROGENIC CLAUDICATION: Primary | ICD-10-CM

## 2022-05-24 RX ORDER — METHYLPREDNISOLONE 4 MG
TABLET, DOSE PACK ORAL
Qty: 21 TABLET | Refills: 0 | Status: SHIPPED | OUTPATIENT
Start: 2022-05-24 | End: 2022-06-23

## 2022-05-27 ENCOUNTER — MYC MEDICAL ADVICE (OUTPATIENT)
Dept: INTERNAL MEDICINE | Facility: OTHER | Age: 67
End: 2022-05-27
Payer: COMMERCIAL

## 2022-05-27 DIAGNOSIS — F40.240 CLAUSTROPHOBIA: Primary | ICD-10-CM

## 2022-05-27 NOTE — DISCHARGE INSTRUCTIONS
Take Augmentin twice daily until gone.    Push oral hydration, prevent dehydration.    Ibuprofen 200 mg tablet - take with food -- 3 or 4 times daily. Up to 3 tablets per dose - maximum 12 per day.    --- Or ---     Naproxen 220 to 440 mg tablet(s) - take 220 to 440 mg with food Twice daily as needed for pain.      ------- in addition to -------    Tylenol 500 mgtablet - 1 or 2 every 4 to 6 hours as needed for pain - maximum 8 per day.     --- Or ---     Tylenol Arthritis 650 mg tablet  -- up to every 4 times daily.    --- Maximum total Tylenol in 24 hours -- is 4,000 mg.      Lloyd

## 2022-05-31 RX ORDER — LORAZEPAM 0.5 MG/1
.5-1 TABLET ORAL SEE ADMIN INSTRUCTIONS
Qty: 2 TABLET | Refills: 0 | Status: SHIPPED | OUTPATIENT
Start: 2022-05-31 | End: 2023-02-12

## 2022-06-06 ENCOUNTER — HOSPITAL ENCOUNTER (OUTPATIENT)
Dept: MRI IMAGING | Facility: OTHER | Age: 67
Discharge: HOME | End: 2022-06-06
Attending: STUDENT IN AN ORGANIZED HEALTH CARE EDUCATION/TRAINING PROGRAM | Admitting: STUDENT IN AN ORGANIZED HEALTH CARE EDUCATION/TRAINING PROGRAM
Payer: MEDICARE

## 2022-06-06 DIAGNOSIS — G89.29 CHRONIC MIDLINE LOW BACK PAIN WITH SCIATICA, SCIATICA LATERALITY UNSPECIFIED: ICD-10-CM

## 2022-06-06 DIAGNOSIS — M54.40 CHRONIC MIDLINE LOW BACK PAIN WITH SCIATICA, SCIATICA LATERALITY UNSPECIFIED: ICD-10-CM

## 2022-06-06 PROCEDURE — A9575 INJ GADOTERATE MEGLUMI 0.1ML: HCPCS | Performed by: STUDENT IN AN ORGANIZED HEALTH CARE EDUCATION/TRAINING PROGRAM

## 2022-06-06 PROCEDURE — 255N000002 HC RX 255 OP 636: Performed by: STUDENT IN AN ORGANIZED HEALTH CARE EDUCATION/TRAINING PROGRAM

## 2022-06-06 PROCEDURE — 72158 MRI LUMBAR SPINE W/O & W/DYE: CPT | Mod: MG

## 2022-06-06 RX ORDER — GADOTERATE MEGLUMINE 376.9 MG/ML
20 INJECTION INTRAVENOUS ONCE
Status: COMPLETED | OUTPATIENT
Start: 2022-06-06 | End: 2022-06-06

## 2022-06-06 RX ADMIN — GADOTERATE MEGLUMINE 20 ML: 376.9 INJECTION INTRAVENOUS at 15:33

## 2022-06-15 ENCOUNTER — TRANSFERRED RECORDS (OUTPATIENT)
Dept: HEALTH INFORMATION MANAGEMENT | Facility: OTHER | Age: 67
End: 2022-06-15
Payer: COMMERCIAL

## 2022-06-20 ASSESSMENT — ENCOUNTER SYMPTOMS
HEMATURIA: 0
NERVOUS/ANXIOUS: 1
DYSURIA: 0
CONSTIPATION: 0
ARTHRALGIAS: 1
PALPITATIONS: 0
HEARTBURN: 0
WEAKNESS: 1
ABDOMINAL PAIN: 0
SORE THROAT: 0
NAUSEA: 0
EYE PAIN: 0
HEADACHES: 0
FREQUENCY: 1
FEVER: 0
MYALGIAS: 1
HEMATOCHEZIA: 0
COUGH: 0
PARESTHESIAS: 0
CHILLS: 0
DIARRHEA: 1
DIZZINESS: 0
JOINT SWELLING: 0

## 2022-06-20 ASSESSMENT — ACTIVITIES OF DAILY LIVING (ADL): CURRENT_FUNCTION: NO ASSISTANCE NEEDED

## 2022-06-23 ENCOUNTER — OFFICE VISIT (OUTPATIENT)
Dept: PEDIATRICS | Facility: OTHER | Age: 67
End: 2022-06-23
Attending: INTERNAL MEDICINE
Payer: COMMERCIAL

## 2022-06-23 VITALS
HEART RATE: 72 BPM | SYSTOLIC BLOOD PRESSURE: 134 MMHG | HEIGHT: 71 IN | BODY MASS INDEX: 30.95 KG/M2 | TEMPERATURE: 98.1 F | RESPIRATION RATE: 20 BRPM | DIASTOLIC BLOOD PRESSURE: 80 MMHG | WEIGHT: 221.1 LBS | OXYGEN SATURATION: 99 %

## 2022-06-23 DIAGNOSIS — M47.26 OSTEOARTHRITIS OF SPINE WITH RADICULOPATHY, LUMBAR REGION: ICD-10-CM

## 2022-06-23 DIAGNOSIS — R35.0 BENIGN PROSTATIC HYPERPLASIA WITH URINARY FREQUENCY: ICD-10-CM

## 2022-06-23 DIAGNOSIS — N40.1 BENIGN PROSTATIC HYPERPLASIA WITH URINARY FREQUENCY: ICD-10-CM

## 2022-06-23 DIAGNOSIS — E78.2 MIXED HYPERLIPIDEMIA: ICD-10-CM

## 2022-06-23 DIAGNOSIS — K21.00 GASTROESOPHAGEAL REFLUX DISEASE WITH ESOPHAGITIS, UNSPECIFIED WHETHER HEMORRHAGE: ICD-10-CM

## 2022-06-23 DIAGNOSIS — I25.10 ASCVD (ARTERIOSCLEROTIC CARDIOVASCULAR DISEASE): Chronic | ICD-10-CM

## 2022-06-23 DIAGNOSIS — Z00.00 ENCOUNTER FOR MEDICARE ANNUAL WELLNESS EXAM: Primary | ICD-10-CM

## 2022-06-23 DIAGNOSIS — I10 ESSENTIAL HYPERTENSION: Chronic | ICD-10-CM

## 2022-06-23 DIAGNOSIS — E11.9 DIABETES MELLITUS TYPE 2, DIET-CONTROLLED (H): ICD-10-CM

## 2022-06-23 DIAGNOSIS — Z98.890 STATUS POST CARDIAC CATHETERIZATION: ICD-10-CM

## 2022-06-23 DIAGNOSIS — Z98.890 HISTORY OF CARDIAC CATH: ICD-10-CM

## 2022-06-23 DIAGNOSIS — I25.10 CAD, MULTIPLE VESSEL: ICD-10-CM

## 2022-06-23 DIAGNOSIS — I25.10 CORONARY ARTERY DISEASE INVOLVING NATIVE CORONARY ARTERY OF NATIVE HEART WITHOUT ANGINA PECTORIS: ICD-10-CM

## 2022-06-23 DIAGNOSIS — N32.81 OVERACTIVE BLADDER: ICD-10-CM

## 2022-06-23 DIAGNOSIS — M16.11 PRIMARY OSTEOARTHRITIS OF RIGHT HIP: ICD-10-CM

## 2022-06-23 DIAGNOSIS — M48.061 SPINAL STENOSIS OF LUMBAR REGION, UNSPECIFIED WHETHER NEUROGENIC CLAUDICATION PRESENT: ICD-10-CM

## 2022-06-23 DIAGNOSIS — M25.551 HIP PAIN, RIGHT: ICD-10-CM

## 2022-06-23 DIAGNOSIS — Z13.0 SCREENING, ANEMIA, DEFICIENCY, IRON: ICD-10-CM

## 2022-06-23 LAB
ANION GAP SERPL CALCULATED.3IONS-SCNC: 5 MMOL/L (ref 3–14)
BUN SERPL-MCNC: 17 MG/DL (ref 7–25)
CALCIUM SERPL-MCNC: 9.6 MG/DL (ref 8.6–10.3)
CHLORIDE BLD-SCNC: 104 MMOL/L (ref 98–107)
CHOLEST SERPL-MCNC: 93 MG/DL
CO2 SERPL-SCNC: 30 MMOL/L (ref 21–31)
CREAT SERPL-MCNC: 0.97 MG/DL (ref 0.7–1.3)
ERYTHROCYTE [DISTWIDTH] IN BLOOD BY AUTOMATED COUNT: 13.2 % (ref 10–15)
FASTING STATUS PATIENT QL REPORTED: YES
GFR SERPL CREATININE-BSD FRML MDRD: 85 ML/MIN/1.73M2
GLUCOSE BLD-MCNC: 133 MG/DL (ref 70–105)
HBA1C MFR BLD: 7.1 % (ref 4–6.2)
HCT VFR BLD AUTO: 45.8 % (ref 40–53)
HDLC SERPL-MCNC: 43 MG/DL (ref 23–92)
HGB BLD-MCNC: 15.2 G/DL (ref 13.3–17.7)
LDLC SERPL CALC-MCNC: 39 MG/DL
MCH RBC QN AUTO: 29.7 PG (ref 26.5–33)
MCHC RBC AUTO-ENTMCNC: 33.2 G/DL (ref 31.5–36.5)
MCV RBC AUTO: 90 FL (ref 78–100)
NONHDLC SERPL-MCNC: 50 MG/DL
PLATELET # BLD AUTO: 209 10E3/UL (ref 150–450)
POTASSIUM BLD-SCNC: 4.4 MMOL/L (ref 3.5–5.1)
RBC # BLD AUTO: 5.12 10E6/UL (ref 4.4–5.9)
SODIUM SERPL-SCNC: 139 MMOL/L (ref 134–144)
TRIGL SERPL-MCNC: 53 MG/DL
WBC # BLD AUTO: 4.6 10E3/UL (ref 4–11)

## 2022-06-23 PROCEDURE — 80048 BASIC METABOLIC PNL TOTAL CA: CPT | Mod: ZL | Performed by: INTERNAL MEDICINE

## 2022-06-23 PROCEDURE — 36415 COLL VENOUS BLD VENIPUNCTURE: CPT | Mod: ZL | Performed by: INTERNAL MEDICINE

## 2022-06-23 PROCEDURE — 80061 LIPID PANEL: CPT | Mod: ZL | Performed by: INTERNAL MEDICINE

## 2022-06-23 PROCEDURE — 83036 HEMOGLOBIN GLYCOSYLATED A1C: CPT | Mod: ZL | Performed by: INTERNAL MEDICINE

## 2022-06-23 PROCEDURE — G0439 PPPS, SUBSEQ VISIT: HCPCS | Performed by: INTERNAL MEDICINE

## 2022-06-23 PROCEDURE — 85027 COMPLETE CBC AUTOMATED: CPT | Mod: ZL | Performed by: INTERNAL MEDICINE

## 2022-06-23 RX ORDER — METOPROLOL SUCCINATE 25 MG/1
25 TABLET, EXTENDED RELEASE ORAL DAILY
Qty: 90 TABLET | Refills: 3 | Status: SHIPPED | OUTPATIENT
Start: 2022-06-23 | End: 2023-09-01

## 2022-06-23 RX ORDER — FAMOTIDINE 20 MG/1
20 TABLET, FILM COATED ORAL 2 TIMES DAILY PRN
Qty: 180 TABLET | Refills: 3 | Status: SHIPPED | OUTPATIENT
Start: 2022-06-23 | End: 2023-09-12

## 2022-06-23 RX ORDER — MIRABEGRON 25 MG/1
25 TABLET, FILM COATED, EXTENDED RELEASE ORAL DAILY
Qty: 90 TABLET | Refills: 3 | Status: SHIPPED | OUTPATIENT
Start: 2022-06-23 | End: 2023-02-12

## 2022-06-23 RX ORDER — SUCRALFATE 1 G/1
1 TABLET ORAL 4 TIMES DAILY
Qty: 300 TABLET | Refills: 3 | Status: SHIPPED | OUTPATIENT
Start: 2022-06-23 | End: 2023-09-12

## 2022-06-23 RX ORDER — ROSUVASTATIN CALCIUM 20 MG/1
20 TABLET, COATED ORAL DAILY
Qty: 90 TABLET | Refills: 3 | Status: SHIPPED | OUTPATIENT
Start: 2022-06-23 | End: 2023-09-01

## 2022-06-23 ASSESSMENT — ANXIETY QUESTIONNAIRES
5. BEING SO RESTLESS THAT IT IS HARD TO SIT STILL: NOT AT ALL
7. FEELING AFRAID AS IF SOMETHING AWFUL MIGHT HAPPEN: SEVERAL DAYS
6. BECOMING EASILY ANNOYED OR IRRITABLE: SEVERAL DAYS
2. NOT BEING ABLE TO STOP OR CONTROL WORRYING: NOT AT ALL
1. FEELING NERVOUS, ANXIOUS, OR ON EDGE: SEVERAL DAYS
3. WORRYING TOO MUCH ABOUT DIFFERENT THINGS: SEVERAL DAYS
GAD7 TOTAL SCORE: 5
GAD7 TOTAL SCORE: 5
IF YOU CHECKED OFF ANY PROBLEMS ON THIS QUESTIONNAIRE, HOW DIFFICULT HAVE THESE PROBLEMS MADE IT FOR YOU TO DO YOUR WORK, TAKE CARE OF THINGS AT HOME, OR GET ALONG WITH OTHER PEOPLE: SOMEWHAT DIFFICULT

## 2022-06-23 ASSESSMENT — PAIN SCALES - GENERAL: PAINLEVEL: MILD PAIN (3)

## 2022-06-23 ASSESSMENT — PATIENT HEALTH QUESTIONNAIRE - PHQ9
SUM OF ALL RESPONSES TO PHQ QUESTIONS 1-9: 9
5. POOR APPETITE OR OVEREATING: SEVERAL DAYS

## 2022-06-23 NOTE — COMMUNITY RESOURCES LIST (ENGLISH)
06/23/2022   [facility]  Jenna Molina  For questions about this resource list or additional care needs, please contact your primary care clinic or care manager.  Phone: [phone]   Email: N/A   Address: [address] [place] [id number]   Hours: N/A        Mental Health       Individual counseling  1  [facility] Distance: 1.72 miles      COVID-19 Status: Regular Operations, COVID-19 Status: Phone/Virtual   [address] [address]  Language: English  Hours: Mon - Fri 8:30 AM - 4:00 PM  Fees: Insurance, Self Pay, Sliding Fee   Phone: [phone] Website: [email or web address]     2  [facility] Distance: 1.93 miles      COVID-19 Status: Regular Operations   [address] [place] [address]  Language: English  Hours: Mon - Thu 8:00 AM - 5:00 PM  Fees: Insurance, Self Pay   Phone: [phone] [phone] Email: [email or web address] Website: [email or web address]     Mental health crisis care  3  [facility] Distance: 1.72 miles      COVID-19 Status: Regular Operations, COVID-19 Status: Phone/Virtual   [address] [address]  Language: English  Hours: Mon - Fri 8:30 AM - 4:00 PM  Fees: Insurance, Self Pay, Sliding Fee   Phone: [phone] [phone] Website: [email or web address]          Important Numbers & Websites       Emergency Services   911  [facility] Services   311  Poison Control   (667) 397-8245  Suicide Prevention Lifeline   (763) 442-7963 (TALK)  Child Abuse Hotline   (881) 787-5909 (4-A-Child)  Sexual Assault Hotline   (829) 518-4975 (HOPE)  National Runaway Safeline   (487) 402-1002 (RUNAWAY)  All-Options Talkline   (809) 778-5415  Substance Abuse Referral   (142) 960-4053 (HELP)

## 2022-06-23 NOTE — NURSING NOTE
Patient here for wellness visit with many concerns. Blood pressure, diabetes, follow up stress test and follow up MRI. Medication Reconciliation: complete.    Jenna Molina LPN  6/23/2022 8:49 AM

## 2022-06-23 NOTE — PROGRESS NOTES
The patient's PHQ-9 score is consistent with mild depression. He was provided with information regarding depression and was advised to schedule a follow up appointment in  weeks to further address this issue.  He is at risk for falling and has been provided with information to reduce the risk of falling at home.

## 2022-06-23 NOTE — PROGRESS NOTES
"SUBJECTIVE:   Rj Juarez is a 66 year old male who presents for Preventive Visit.      Patient has been advised of split billing requirements and indicates understanding: Yes  Are you in the first 12 months of your Medicare Part B coverage?  No    Physical Health:    In general, how would you rate your overall physical health? good    Outside of work, how many days during the week do you exercise? 6-7 days/week    Outside of work, approximately how many minutes a day do you exercise?greater than 60 minutes    If you drink alcohol do you typically have >3 drinks per day or >7 drinks per week? No    Do you usually eat at least 4 servings of fruit and vegetables a day, include whole grains & fiber and avoid regularly eating high fat or \"junk\" foods? Yes    Do you have any problems taking medications regularly?  No    Do you have any side effects from medications? none    Needs assistance for the following daily activities: no assistance needed    Which of the following safety concerns are present in your home?  none identified     Hearing impairment: Yes,  Hearing aide     In the past 6 months, have you been bothered by leaking of urine? yes    Mental Health:    In general, how would you rate your overall mental or emotional health? good  PHQ-2 Score: (!) 3    Do you feel safe in your environment?     Have you ever done Advance Care Planning? (For example, a Health Directive, POLST, or a discussion with a medical provider or your loved ones about your wishes):     Additional concerns to address?      Fall risk:      Cognitive Screening:                 Reviewed and updated as needed this visit by clinical staff   Tobacco  Allergies  Meds  Problems               Reviewed and updated as needed this visit by Provider    Allergies  Meds  Problems              Social History     Tobacco Use     Smoking status: Never Smoker     Smokeless tobacco: Never Used   Substance Use Topics     Alcohol use: No            " "               Current providers sharing in care for this patient include:   Patient Care Team:  Doug Porras MD as PCP - General (Pediatrics)  Doug Porras MD as Assigned PCP  Wade Ochoa MD as Assigned Musculoskeletal Provider  Nestor Toledo MD as Assigned Surgical Provider    The following health maintenance items are reviewed in Epic and correct as of today:  Health Maintenance   Topic Date Due     MICROALBUMIN  Never done     DIABETIC FOOT EXAM  Never done     URINE DRUG SCREEN  Never done     ADVANCE CARE PLANNING  Never done     EYE EXAM  Never done     ZOSTER IMMUNIZATION (1 of 2) Never done     COLORECTAL CANCER SCREENING  01/15/2022     A1C  09/23/2022     MEDICARE ANNUAL WELLNESS VISIT  06/23/2023     BMP  06/23/2023     LIPID  06/23/2023     FALL RISK ASSESSMENT  06/23/2023     DTAP/TDAP/TD IMMUNIZATION (3 - Td or Tdap) 10/31/2028     HEPATITIS C SCREENING  Completed     PHQ-2 (once per calendar year)  Completed     INFLUENZA VACCINE  Completed     Pneumococcal Vaccine: 65+ Years  Completed     AORTIC ANEURYSM SCREENING (SYSTEM ASSIGNED)  Completed     COVID-19 Vaccine  Completed     IPV IMMUNIZATION  Aged Out     MENINGITIS IMMUNIZATION  Aged Out     Labs reviewed in EPIC  Immunizations are reviewed    ROS:  Constitutional, HEENT, cardiovascular, pulmonary, gi and gu systems are negative, except as otherwise noted.    OBJECTIVE:   /80   Pulse 72   Temp 98.1  F (36.7  C)   Resp 20   Ht 1.791 m (5' 10.5\")   Wt 100.3 kg (221 lb 1.6 oz)   SpO2 99%   BMI 31.28 kg/m   Estimated body mass index is 31.28 kg/m  as calculated from the following:    Height as of this encounter: 1.791 m (5' 10.5\").    Weight as of this encounter: 100.3 kg (221 lb 1.6 oz).  EXAM:   HEENT: No carotid bruits  Cardiovascular regular, no murmur  Pulmonary: Clear    Diagnostic Test Results:  Labs reviewed in Epic    ASSESSMENT / PLAN:       ICD-10-CM    1. Encounter for Medicare annual wellness " "exam  Z00.00    2. Gastroesophageal reflux disease with esophagitis, unspecified whether hemorrhage  K21.00 famotidine (PEPCID) 20 MG tablet     sucralfate (CARAFATE) 1 GM tablet   3. Primary osteoarthritis of right hip  M16.11    4. Hip pain, right  M25.551    5. Coronary artery disease involving native coronary artery of native heart without angina pectoris  I25.10 metoprolol succinate ER (TOPROL XL) 25 MG 24 hr tablet     rosuvastatin (CRESTOR) 20 MG tablet   6. Status post cardiac catheterization on 12/6/2019 through [facility] with a stenting to his distal circumflex  Z98.890 metoprolol succinate ER (TOPROL XL) 25 MG 24 hr tablet   7. History of cardiac cath on 12/6/2019  Z98.890 metoprolol succinate ER (TOPROL XL) 25 MG 24 hr tablet   8. CAD, multiple vessel  I25.10 metoprolol succinate ER (TOPROL XL) 25 MG 24 hr tablet   9. Essential hypertension  I10 metoprolol succinate ER (TOPROL XL) 25 MG 24 hr tablet     Basic metabolic panel     Basic metabolic panel   10. ASCVD (arteriosclerotic cardiovascular disease)  I25.10 metoprolol succinate ER (TOPROL XL) 25 MG 24 hr tablet   11. Overactive bladder  N32.81 mirabegron (MYRBETRIQ) 25 MG 24 hr tablet   12. Benign prostatic hyperplasia with urinary frequency  N40.1     R35.0    13. Spinal stenosis of lumbar region, unspecified whether neurogenic claudication present  M48.061    14. Osteoarthritis of spine with radiculopathy, lumbar region  M47.26    15. Diabetes mellitus type 2, diet-controlled (H)  E11.9 Hemoglobin A1c     Hemoglobin A1c   16. Screening, anemia, deficiency, iron  Z13.0 CBC with platelets     CBC with platelets   17. Mixed hyperlipidemia  E78.2 Lipid Profile     Lipid Profile           COUNSELING:  Reviewed preventive health counseling, as reflected in patient instructions    Estimated body mass index is 31.28 kg/m  as calculated from the following:    Height as of this encounter: 1.791 m (5' 10.5\").    Weight as of this encounter: 100.3 kg (221 lb " "1.6 oz).    Weight management plan: Discussed healthy diet and exercise guidelines    He reports that he has never smoked. He has never used smokeless tobacco.    Appropriate preventive services were discussed with this patient, including applicable screening as appropriate for cardiovascular disease, diabetes, osteopenia/osteoporosis, and glaucoma.  As appropriate for age/gender, discussed screening for colorectal cancer, prostate cancer, breast cancer, and cervical cancer. Checklist reviewing preventive services available has been given to the patient.    Reviewed patients plan of care and provided an AVS. The Basic Care Plan (routine screening as documented in Health Maintenance) for Rj meets the Care Plan requirement. This Care Plan has been established and reviewed with the Patient.    Counseling Resources:  ATP IV Guidelines  Pooled Cohorts Equation Calculator  Breast Cancer Risk Calculator  BRCA-Related Cancer Risk Assessment: FHS-7 Tool  FRAX Risk Assessment  ICSI Preventive Guidelines  Dietary Guidelines for Americans, 2010  [employer]'s MyPlate  ASA Prophylaxis  Lung CA Screening    Doug Porras MD  [facility]  Answers for HPI/ROS submitted by the patient on 6/20/2022  In general, how would you rate your overall physical health?: good  Frequency of exercise:: 2-3 days/week  Do you usually eat at least 4 servings of fruit and vegetables a day, include whole grains & fiber, and avoid regularly eating high fat or \"junk\" foods? : Yes  Taking medications regularly:: Yes  Medication side effects:: Other  Activities of Daily Living: no assistance needed  Home safety: no safety concerns identified  Hearing Impairment:: difficulty following a conversation in a noisy restaurant or crowded room, feel that people are mumbling or not speaking clearly, difficulty following dialogue in the theater, difficult to understand a speaker at a public meeting or Holiness service, need to ask people " to speak up or repeat themselves, difficulty understanding soft or whispered speech, difficulty understanding speech on the telephone  In the past 6 months, have you been bothered by leaking of urine?: Yes  abdominal pain: No  Blood in stool: No  Blood in urine: No  chest pain: No  chills: No  congestion: No  constipation: No  cough: No  diarrhea: Yes  dizziness: No  ear pain: No  eye pain: No  nervous/anxious: Yes  fever: No  frequency: Yes  genital sores: No  headaches: No  hearing loss: Yes  heartburn: No  arthralgias: Yes  joint swelling: No  peripheral edema: No  mood changes: No  myalgias: Yes  nausea: No  dysuria: No  palpitations: No  Skin sensation changes: No  sore throat: No  urgency: Yes  rash: No  visual disturbance: Yes  weakness: Yes  impotence: No  penile discharge: No  In general, how would you rate your overall mental or emotional health?: good  Additional concerns today:: Yes  Duration of exercise:: 15-30 minutes        The patient's PHQ-9 score is consistent with mild depression. He was provided with information regarding depression and was advised to schedule a follow up appointment in  weeks to further address this issue.  He is at risk for falling and has been provided with information to reduce the risk of falling at home.

## 2022-06-23 NOTE — PATIENT INSTRUCTIONS
"  Patient Education   Personalized Prevention Plan  You are due for the preventive services outlined below.  Your care team is available to assist you in scheduling these services.  If you have already completed any of these items, please share that information with your care team to update in your medical record.  Health Maintenance Due   Topic Date Due     Kidney Microalbumin Urine Test  Never done     Diabetic Foot Exam  Never done     URINE DRUG SCREEN  Never done     Discuss Advance Care Planning  Never done     Eye Exam  Never done     Zoster (Shingles) Vaccine (1 of 2) Never done     Colorectal Cancer Screening  01/15/2022     A1C Lab  04/11/2022       Depression and Suicide in Older Adults    Nearly 2 million older Americans have some type of depression. Some of them even take their own lives. Yet depression among older adults is often ignored. Learn the warning signs. You may help spare a loved one needless pain. You may also save a life.   What is depression?  Depression is a common and serious illness that affects the way you think and feel. It is not a normal part of aging, nor is it a sign of weakness, a character flaw, or something you can snap out of. Most people with depression need treatment to get better. The most common symptom is a feeling of deep sadness. People who are depressed also may seem tired and listless. And nothing seems to give them pleasure. It s normal to grieve or be sad sometimes. But sadness lessens or passes with time. Depression rarely goes away or improves on its own. A person with clinical depression can't \"snap out of it.\" Other symptoms of depression are:     Sleeping more or less than normal    Eating more or less than normal    Having headaches, stomachaches, or other pains that don t go away    Feeling nervous,  empty,  or worthless    Crying a great deal    Thinking or talking about suicide or death    Loss of interest in activities previously enjoyed    Social " isolation    Feeling confused or forgetful  What causes it?  The causes of depression aren t fully known. But it is thought to result from a complex blend of these factors:     Biochemistry. Certain chemicals in the brain play a role.    Genes. Depression does run in families.    Life stress. Life stresses can also trigger depression in some people. Older adults often face many stressors, such as death of friends or a spouse, health problems, and financial concerns.    Chronic conditions. This includes conditions such as diabetes, heart disease, or cancer. These can cause symptoms of depression. Medicine side effects can cause changes in thoughts and behaviors.  How you can help  Often, depressed people may not want to ask for help. When they do, they may be ignored. Or, they may receive the wrong treatment. You can help by showing parents and older friends love and support. If they seem depressed, don t lecture the person, ignore the symptoms, or discount the symptoms as a  normal  part of aging -which they are not. Get involved, listen, and show interest and support.   Help them understand that depression is a treatable illness. Tell them you can help them find the right treatment. Offer to go to their healthcare provider's appointment with them for support when the symptoms are discussed. With their approval, contact a local mental health center, social service agency, or hospital about services.   You can be an advocate for him or her at healthcare appointments. Many older adults have chronic illnesses that can cause symptoms of depression. Medicine side effects can change thoughts and behaviors. You can help make sure that the healthcare provider looks at all of these factors. He or she should refer your family member or friend to a mental healthcare provider when needed. in some cases, untreated depression can lead to a misdiagnosis. A person may be diagnosed with a brain disorder such as dementia. If the  healthcare provider does not take the issue of depression seriously, help your family member or friend to find another provider.   Don't be afraid to ask  If you think an older person you care about could be suicidal, ask,  Have you thought about suicide?  Most people will tell you the truth. If they say  yes,  they may already have a plan for how and when they will attempt it. Find out as much as you can. The more detailed the plan, and the easier it is to carry out, the more danger the person is in right now. Tell the person you are there for them and do not want them to harm him or herself. Don't wait to get help for the person. Call the person's healthcare provider, local hospital, or emergency services.   To learn more    National Suicide Prevention Lifeline (crisis hotline) 531-572-DJOB (872-035-8205)    National [place] of Mental Ngkxko369-031-7205ebk.[facility].nih.gov    National [place] on Mental Cggppmm243-392-9516aky.ralph.org    Mental Health Wpkeqfh517-627-5699fwf.[facility].org    National Suicide Lekhypa967-EKMYJOL (231-643-8947)    Call 911  Never leave the person alone. A person who is actively suicidal needs psychiatric care right away. They will need constant supervision. Never leave the person out of sight. Call 911 or the national 24-hour suicide crisis hotline at 214-588-SQUQ (078-774-9190). You can also take the person to the closest emergency room.   [employer] last reviewed this educational content on 5/1/2020 2000-2021 [employer]. All rights reserved. This information is not intended as a substitute for professional medical care. Always follow your healthcare professional's instructions.          Preventing Falls at Home  A person can fall for many reasons. Older adults may fall because reaction time slows down as we age. Your muscles and joints may get stiff, weak, or less flexible because of illness, medicines, or a physical condition.   Other health problems that make falls more  likely include:     Arthritis    Dizziness or lightheadedness when you stand up (orthostatic hypotension)    History of a stroke    Dizziness    Anemia    Certain medicines taken for mental illness or to control blood pressure.    Problems with balance or gait    Bladder or urinary problems    History of falling    Changes in vision (vision impairment)    Changes in thinking skills and memory (cognitive impairment)  Injuries from a fall can include serious injuries such as broken bones, dislocated joints, internal bleeding and cuts. Injuries like these can limit your independence.   Prevention tips  To help prevent falls and fall-related injuries, follow the tips below.    Floors  To make floors safer:     Put nonskid pads under area rugs.    Remove small rugs.    Replace worn floor coverings.    Tack carpets firmly to each step on carpeted stairs. Put nonskid strips on the edges of uncarpeted stairs.    Keep floors and stairs free of clutter and cords.    Arrange furniture so there are clear pathways.    Clean up any spills right away.  Bathrooms    To make bathrooms safer:     Install grab bars in the tub or shower.    Apply nonskid strips or put a nonskid rubber mat in the tub or shower.    Sit on a bath chair to bathe.    Use bathmats with nonskid backing.  Lighting  To improve visibility in your home:      Keep a flashlight in each room. Or put a lamp next to the bed within easy reach.    Put nightlights in the bedrooms, hallways, kitchen, and bathrooms.    Make sure all stairways have good lighting.    Take your time when going up and down stairs.    Put handrails on both sides of stairs and in walkways for more support. To prevent injury to your wrist or arm, don t use handrails to pull yourself up.    Install grab bars to pull yourself up.    Move or rearrange items that you use often. This will make them easier to find or reach.    Look at your home to find any safety hazards. Especially look at doorways,  walkways, and the driveway. Remove or repair any safety problems that you find.  Other changes to make    Look around to find any safety hazards. Look closely at doorways, walkways, and the driveway. Remove or repair any safety problems that you find.    Wear shoes that fit well.    Take your time when going up and down stairs.    Put handrails on both sides of stairs and in walkways for more support. To prevent injury to your wrist or arm, don t use handrails to pull yourself up.    Install grab bars wherever needed to pull yourself up.    Arrange items that you use often. This will make them easier to find or reach.    [employer] last reviewed this educational content on 3/1/2020    1330-4810 [employer]. All rights reserved. This information is not intended as a substitute for professional medical care. Always follow your healthcare professional's instructions.           Patient Education   Personalized Prevention Plan  You are due for the preventive services outlined below.  Your care team is available to assist you in scheduling these services.  If you have already completed any of these items, please share that information with your care team to update in your medical record.  Health Maintenance Due   Topic Date Due     Kidney Microalbumin Urine Test  Never done     Diabetic Foot Exam  Never done     URINE DRUG SCREEN  Never done     Discuss Advance Care Planning  Never done     Eye Exam  Never done     Zoster (Shingles) Vaccine (1 of 2) Never done     Colorectal Cancer Screening  01/15/2022     A1C Lab  04/11/2022       Depression and Suicide in Older Adults    Nearly 2 million older Americans have some type of depression. Some of them even take their own lives. Yet depression among older adults is often ignored. Learn the warning signs. You may help spare a loved one needless pain. You may also save a life.   What is depression?  Depression is a common and serious illness that affects the way you  "think and feel. It is not a normal part of aging, nor is it a sign of weakness, a character flaw, or something you can snap out of. Most people with depression need treatment to get better. The most common symptom is a feeling of deep sadness. People who are depressed also may seem tired and listless. And nothing seems to give them pleasure. It s normal to grieve or be sad sometimes. But sadness lessens or passes with time. Depression rarely goes away or improves on its own. A person with clinical depression can't \"snap out of it.\" Other symptoms of depression are:     Sleeping more or less than normal    Eating more or less than normal    Having headaches, stomachaches, or other pains that don t go away    Feeling nervous,  empty,  or worthless    Crying a great deal    Thinking or talking about suicide or death    Loss of interest in activities previously enjoyed    Social isolation    Feeling confused or forgetful  What causes it?  The causes of depression aren t fully known. But it is thought to result from a complex blend of these factors:     Biochemistry. Certain chemicals in the brain play a role.    Genes. Depression does run in families.    Life stress. Life stresses can also trigger depression in some people. Older adults often face many stressors, such as death of friends or a spouse, health problems, and financial concerns.    Chronic conditions. This includes conditions such as diabetes, heart disease, or cancer. These can cause symptoms of depression. Medicine side effects can cause changes in thoughts and behaviors.  How you can help  Often, depressed people may not want to ask for help. When they do, they may be ignored. Or, they may receive the wrong treatment. You can help by showing parents and older friends love and support. If they seem depressed, don t lecture the person, ignore the symptoms, or discount the symptoms as a  normal  part of aging -which they are not. Get involved, listen, and " show interest and support.   Help them understand that depression is a treatable illness. Tell them you can help them find the right treatment. Offer to go to their healthcare provider's appointment with them for support when the symptoms are discussed. With their approval, contact a local mental health center, social service agency, or hospital about services.   You can be an advocate for him or her at healthcare appointments. Many older adults have chronic illnesses that can cause symptoms of depression. Medicine side effects can change thoughts and behaviors. You can help make sure that the healthcare provider looks at all of these factors. He or she should refer your family member or friend to a mental healthcare provider when needed. in some cases, untreated depression can lead to a misdiagnosis. A person may be diagnosed with a brain disorder such as dementia. If the healthcare provider does not take the issue of depression seriously, help your family member or friend to find another provider.   Don't be afraid to ask  If you think an older person you care about could be suicidal, ask,  Have you thought about suicide?  Most people will tell you the truth. If they say  yes,  they may already have a plan for how and when they will attempt it. Find out as much as you can. The more detailed the plan, and the easier it is to carry out, the more danger the person is in right now. Tell the person you are there for them and do not want them to harm him or herself. Don't wait to get help for the person. Call the person's healthcare provider, local hospital, or emergency services.   To learn more    National Suicide Prevention Lifeline (crisis hotline) 326-442-ZIYP (102-254-7219)    National [place] of Mental Pmxxaw857-920-6307rov.nimh.nih.gov    National [place] on Mental Yqrcodk199-175-5433kha.ralph.org    Mental Health Ypyizfl247-992-0392ckm.nmha.org    National Suicide Rstyieo057-KGEGWXF (984-815-9400)    Call  911  Never leave the person alone. A person who is actively suicidal needs psychiatric care right away. They will need constant supervision. Never leave the person out of sight. Call 911 or the national 24-hour suicide crisis hotline at 228-153-LDGI (677-373-0895). You can also take the person to the closest emergency room.   [employer] last reviewed this educational content on 5/1/2020 2000-2021 [employer]. All rights reserved. This information is not intended as a substitute for professional medical care. Always follow your healthcare professional's instructions.          Preventing Falls at Home  A person can fall for many reasons. Older adults may fall because reaction time slows down as we age. Your muscles and joints may get stiff, weak, or less flexible because of illness, medicines, or a physical condition.   Other health problems that make falls more likely include:     Arthritis    Dizziness or lightheadedness when you stand up (orthostatic hypotension)    History of a stroke    Dizziness    Anemia    Certain medicines taken for mental illness or to control blood pressure.    Problems with balance or gait    Bladder or urinary problems    History of falling    Changes in vision (vision impairment)    Changes in thinking skills and memory (cognitive impairment)  Injuries from a fall can include serious injuries such as broken bones, dislocated joints, internal bleeding and cuts. Injuries like these can limit your independence.   Prevention tips  To help prevent falls and fall-related injuries, follow the tips below.    Floors  To make floors safer:     Put nonskid pads under area rugs.    Remove small rugs.    Replace worn floor coverings.    Tack carpets firmly to each step on carpeted stairs. Put nonskid strips on the edges of uncarpeted stairs.    Keep floors and stairs free of clutter and cords.    Arrange furniture so there are clear pathways.    Clean up any spills right  away.  Bathrooms    To make bathrooms safer:     Install grab bars in the tub or shower.    Apply nonskid strips or put a nonskid rubber mat in the tub or shower.    Sit on a bath chair to bathe.    Use bathmats with nonskid backing.  Lighting  To improve visibility in your home:      Keep a flashlight in each room. Or put a lamp next to the bed within easy reach.    Put nightlights in the bedrooms, hallways, kitchen, and bathrooms.    Make sure all stairways have good lighting.    Take your time when going up and down stairs.    Put handrails on both sides of stairs and in walkways for more support. To prevent injury to your wrist or arm, don t use handrails to pull yourself up.    Install grab bars to pull yourself up.    Move or rearrange items that you use often. This will make them easier to find or reach.    Look at your home to find any safety hazards. Especially look at doorways, walkways, and the driveway. Remove or repair any safety problems that you find.  Other changes to make    Look around to find any safety hazards. Look closely at doorways, walkways, and the driveway. Remove or repair any safety problems that you find.    Wear shoes that fit well.    Take your time when going up and down stairs.    Put handrails on both sides of stairs and in walkways for more support. To prevent injury to your wrist or arm, don t use handrails to pull yourself up.    Install grab bars wherever needed to pull yourself up.    Arrange items that you use often. This will make them easier to find or reach.    [employer] last reviewed this educational content on 3/1/2020    1304-6476 [employer]. All rights reserved. This information is not intended as a substitute for professional medical care. Always follow your healthcare professional's instructions.

## 2022-06-30 ENCOUNTER — NURSE TRIAGE (OUTPATIENT)
Dept: PEDIATRICS | Facility: OTHER | Age: 67
End: 2022-06-30

## 2022-06-30 ENCOUNTER — VIRTUAL VISIT (OUTPATIENT)
Dept: FAMILY MEDICINE | Facility: OTHER | Age: 67
End: 2022-06-30
Attending: PHYSICIAN ASSISTANT
Payer: COMMERCIAL

## 2022-06-30 ENCOUNTER — MYC MEDICAL ADVICE (OUTPATIENT)
Dept: PEDIATRICS | Facility: OTHER | Age: 67
End: 2022-06-30

## 2022-06-30 VITALS
WEIGHT: 221 LBS | BODY MASS INDEX: 30.94 KG/M2 | HEART RATE: 62 BPM | SYSTOLIC BLOOD PRESSURE: 127 MMHG | HEIGHT: 71 IN | TEMPERATURE: 98.1 F | DIASTOLIC BLOOD PRESSURE: 80 MMHG
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
## 2022-06-30 PROCEDURE — G0463 HOSPITAL OUTPT CLINIC VISIT: HCPCS | Mod: TEL,95

## 2022-06-30 PROCEDURE — 99212 OFFICE O/P EST SF 10 MIN: CPT | Mod: 95 | Performed by: PHYSICIAN ASSISTANT

## 2022-06-30 RX ORDER — DOXYCYCLINE 100 MG/1
100 CAPSULE ORAL 2 TIMES DAILY
Qty: 28 CAPSULE | Refills: 0 | Status: CANCELLED | OUTPATIENT
Start: 2022-06-30 | End: 2022-07-14

## 2022-06-30 RX ORDER — DOXYCYCLINE 100 MG/1
200 CAPSULE ORAL ONCE
Qty: 2 CAPSULE | Refills: 0 | Status: SHIPPED | OUTPATIENT
Start: 2022-06-30 | End: 2022-06-30

## 2022-06-30 NOTE — PROGRESS NOTES
"ASSESSMENT/PLAN:    Differential Diagnoses: ***    I have reviewed the nursing notes.  I have reviewed the findings, diagnosis, plan and need for follow up with the patient.    {Diag Picklist:199312}    Discussed warning signs/symptoms indicative of need to f/u    Follow up if symptoms persist or worsen or concerns    I explained my diagnostic considerations and recommendations to the patient, who voiced understanding and agreement with the treatment plan. All questions were answered. We discussed potential side effects of any prescribed or recommended therapies, as well as expectations for response to treatments.    Olivia Morales PA-C  6/30/2022  5:10 PM    HPI:    Rj Juarez is a 66 year old male  who presents to [facility] today for concerns of tick bite.    Symptoms: {RN TICK BITE Sx:023697}  Location: {BODY PART:124280}    Type of Tick: ***  Length of attachment: ***  Rash: ***  Fever, Chills: ***  Muscle Aches: ***  Stiffness of Neck: ***  Lymphadenopathy Present: {YES CAPS/NO SMALL:210434::\"YES\",\"No\"}   Facial Nerve Palsy Present: ***  Treatments Tried: ***  Prior Tick Bites: ***    Last Tetanus Shot: ***    {ROS NORMAL:237660::\"C: NEGATIVE for fever, chills, change in weight\",\"I: NEGATIVE for worrisome rashes, moles or lesions\",\"HEME/ALLERGY/IMMUNE:  No symptoms of anaphylaxis or significant allergic reaction\"}    Past Medical History:   Diagnosis Date     Benign lipomatous neoplasm     1/20/2014     Calculus of kidney     possible     Carpal tunnel syndrome     Both hands     Closed fracture of patella     05/06/09,Sustained right superior pole patellar fracture which underwent conservative management     Diverticulosis of large intestine without perforation or abscess without bleeding     1/28/2014     Exertional chest pain 11/19/2019     Headache     No Comments Provided     Other specified forms of tremor     3/2/2011     Other urticaria (CODE)     3/2/2011     Pain in joint     No Comments " Provided     Umbilical hernia without obstruction or gangrene     1/26/2018     Past Surgical History:   Procedure Laterality Date     COLONOSCOPY  01/28/2014 2009,2014,F/U 2019     COLONOSCOPY  03/01/2019    Serrated adenoma, follow up 1 year     COLONOSCOPY N/A 1/15/2021    Procedure: COLONOSCOPY, WITH POLYPECTOMY AND BIOPSY;  Surgeon: Ly Frances MD;  Location: [facility] OR     ESOPHAGOSCOPY, GASTROSCOPY, DUODENOSCOPY (EGD), COMBINED      1/28/14,EGD     ESOPHAGOSCOPY, GASTROSCOPY, DUODENOSCOPY (EGD), COMBINED N/A 1/15/2021    Procedure: ESOPHAGOGASTRODUODENOSCOPY, WITH BIOPSY;  Surgeon: Ly Frances MD;  Location: [facility] OR     JOINT REPLACEMENT, HIP RT/LT Left      OTHER SURGICAL HISTORY      9/10/2014,59881.0,DE REPAIR ING HERNIA  >5 TRS BETTINA     OTHER SURGICAL HISTORY      1/26/2018,14123.0,DE REPAIR UMBILICAL NAZARIO  >5 TRS REDUC     RELEASE CARPAL TUNNEL      3,12/2004,Both hands     SIGMOIDOSCOPY FLEXIBLE      No Comments Provided     Social History     Tobacco Use     Smoking status: Never Smoker     Smokeless tobacco: Never Used   Substance Use Topics     Alcohol use: No     Current Outpatient Medications   Medication Sig Dispense Refill     aspirin 81 MG EC tablet Take 81 mg by mouth daily       famotidine (PEPCID) 20 MG tablet Take 1 tablet (20 mg) by mouth 2 times daily as needed (gerd) 180 tablet 3     ibuprofen (ADVIL/MOTRIN) 400 MG tablet TAKE 1 TABLET BY MOUTH EVERY 6 HOURS AS NEEDED FOR PAIN.       LORazepam (ATIVAN) 0.5 MG tablet Take 1-2 tablets (0.5-1 mg) by mouth See Admin Instructions Take 1 tablet 30min prior to MRI, repeat second tablet if necessary 2 tablet 0     metoprolol succinate ER (TOPROL XL) 25 MG 24 hr tablet Take 1 tablet (25 mg) by mouth daily 90 tablet 3     mirabegron (MYRBETRIQ) 25 MG 24 hr tablet Take 1 tablet (25 mg) by mouth daily 90 tablet 3     Multiple Vitamins-Minerals (EYE VITAMINS PO)        nitroGLYcerin (NITROSTAT) 0.4 MG sublingual tablet For chest pain place 1 tablet  under the tongue every 5 minutes for 3 doses. If symptoms persist 5 minutes after 1st dose call 911. 25 tablet 3     order for DME Walker, 4ww with seat. Spinal stenosis. 99. 1 each 11     prochlorperazine (COMPAZINE) 5 MG tablet TAKE 1 TABLET BY MOUTH THREE TIMES DAILY AS NEEDED FOR NAUSEA OR VOMITING       rosuvastatin (CRESTOR) 20 MG tablet Take 1 tablet (20 mg) by mouth daily 90 tablet 3     silver sulfADIAZINE (SILVADENE) 1 % external cream Apply topically daily 85 g 1     sucralfate (CARAFATE) 1 GM tablet Take 1 tablet (1 g) by mouth 4 times daily 300 tablet 3     triamcinolone (KENALOG) 0.1 % cream Apply 1 Film topically 3 times daily       Allergies   Allergen Reactions     Ciprofloxacin Other (See Comments)     Tendon issue , and IBS     Midazolam      Other reaction(s): Other - Describe In Comment Field  Difficult to wake up     Tamsulosin Other (See Comments)     Chest pain     Past medical history, past surgical history, current medications and allergies reviewed and accurate to the best of my knowledge.      ROS:  Refer to HPI    There were no vitals taken for this visit. ***    EXAM:  General Appearance: Well appearing 66 year old {Desc; male/female:77188}, appropriate appearance for age. No acute distress   Ears: Left TM intact, translucent with bony landmarks appreciated, no erythema, no effusion, no bulging, no purulence.  Right TM intact, translucent with bony landmarks appreciated, no erythema, no effusion, no bulging, no purulence.  Left auditory canal clear.  Right auditory canal clear.  Normal external ears, non tender.  Eyes: conjunctivae normal without erythema or irritation, corneas clear, no drainage or crusting, no eyelid swelling, pupils equal   Oropharynx: moist mucous membranes, posterior pharynx {w-w/o:5700} erythema, tonsils {ENT TONSILS:298445}, no erythema, no exudates or petechiae, no post nasal drip seen, no trismus, voice clear.    Sinuses:  No sinus tenderness upon palpation of  the frontal or maxillary sinuses  Nose:  Bilateral nares: no erythema, no edema, no drainage or congestion   Neck: supple without adenopathy  Respiratory: normal chest wall and respirations.  Normal effort.  Clear to auscultation bilaterally, no wheezing, crackles or rhonchi.  No increased work of breathing.  No cough appreciated.  Cardiac: RRR with no murmurs  Dermatological: no rashes noted of exposed skin  Psychological: normal affect, alert, oriented, and pleasant.     Labs:  ***    Xray:  ***

## 2022-06-30 NOTE — NURSING NOTE
"Chief Complaint   Patient presents with     Tick Bite     Patient would like to talk about a tick bite that was found just above his armpit on Tuesday night. Patient states the area is itchy.     Initial /80   Pulse 62   Temp 98.1  F (36.7  C) (Oral)   Ht 1.791 m (5' 10.5\")   Wt 100.2 kg (221 lb)   BMI 31.26 kg/m   Estimated body mass index is 31.26 kg/m  as calculated from the following:    Height as of this encounter: 1.791 m (5' 10.5\").    Weight as of this encounter: 100.2 kg (221 lb).  Medication Reconciliation: complete    Ly Glaser LPN  "

## 2022-06-30 NOTE — TELEPHONE ENCOUNTER
Sounds like it is too late for the prophylaxis doxycycline.   Recommend evaluation.   Consider rapid clinic today.     Electronically signed by:  Dawood Bearden MD  on June 30, 2022 5:02 PM

## 2022-06-30 NOTE — TELEPHONE ENCOUNTER
"Patient requesting antibiotic prophylaxis, specifically a single dose of doxycycline. He states he does not want to take a longer course of antibiotics as he has developed GI side effects from antibiotics in the past and would like to avoid that.   [facility]  Helga Lo RN on 6/30/2022 at 4:36 PM    Reason for Disposition    Red ring or bull's-eye rash occurs around a deer tick bite    Additional Information    Negative: Not a tick bite    Negative: Patient sounds very sick or weak to the triager    Negative: Widespread rash occurs, 2 to 14 days following the bite    Negative: Fever or severe headache occurs, 2 to 14 days following the bite    Negative: Can't remove live tick (after using Care Advice)    Negative: Can't remove tick's head that was broken off in the skin (after using Care Advice)    Negative: Fever and area is red    Negative: Fever and area is very tender to touch    Negative: Red streak or red line and length > 2 inches (5 cm)    Answer Assessment - Initial Assessment Questions  1. TYPE of TICK: \"Is it a wood tick or a deer tick?\" If unsure, ask: \"What size was the tick?\" \"Did it look more like a watermelon seed or a poppy seed?\"       Deer tick  2. LOCATION: \"Where is the tick bite located?\"       Just above armpit  3. ONSET: \"How long do you think the tick was attached before you removed it?\" (Hours or days)       Unsure but already had a 1/4\" red welt with a black dot in the center. The next morning it was 1.5\" in diameter with white in center and looking like a bullseye rash now  4. TETANUS: \"When was the last tetanus booster?\"       Up to date  5. PREGNANCY: \"Is there any chance you are pregnant?\" \"When was your last menstrual period?\"      no    Protocols used: TICK BITE-A-OH      "

## 2022-06-30 NOTE — PROGRESS NOTES
Rj is a 66 year old who is being evaluated via a billable telephone visit.      What phone number would you like to be contacted at? [phone]  How would you like to obtain your AVS? [facility]    Assessment & Plan     1. Tick bite, unspecified site, initial encounter  - doxycycline hyclate (VIBRAMYCIN) 100 MG capsule; Take 2 capsules (200 mg) by mouth once for 1 dose  Dispense: 2 capsule; Refill: 0  - Vital signs stable. PE notable for tick bite, entirety of HPI/PE available for review below.  Prescription of Doxycycline 200 mg PO once (we opted for this after discussing indications for prophylaxis and we determined he does NOT have a erythema migrans (bullseye rash). Discussed to monitor for fevers, chills/flu like symptoms, stiffness of neck, swollen lymph nodes (lymphadenopathy), neurologic or cardiac changes, worsening joint/muscle aches, etc., if these symptoms occur, patient is agreeable to return for reevaluation. Patient is in agreement and understanding of the above treatment plan. All questions and concerns were addressed and answered to patient's satisfaction. AVS reviewed with patient.    Discussed use of prophylaxis - Doxycycline one time 200 mg PO dose if tick bite occurred via deer tick, attached > 36 hours and presenting within 72 hours of tick bite. Patient did meet criteria for Doxycyline.    Discussed lab work can have false negative if performed prior to 3-4 weeks. Discussed that lab work can test for acute infection (IgM) versus longer term (IgG detection). Discussed that an IgG reaction is similar to that that vaccines/immunizations use    No follow-ups on file.    Olivia Morales PA-C  [facility]    Subjective   Rj is a 66 year old male, presenting for the following health issues:  Tick Bite    Symptoms: redness around site, he notes there is no bullseye (central area of clearing) noted around the site, only surrounding redness/pink coloration around bite  "site.   Location: bicep - right    Type of Tick: deer tick  Pulled off x 2 days prior  Rash: bullseye  Fever, Chills: none  Muscle Aches: none  Stiffness of Neck: none  Lymphadenopathy Present: No   Facial Nerve Palsy Present: none  Treatments Tried: removal  Prior Tick Bites: yes, no prior tickborne illnesses    Last Tetanus Shot: up to date - 10/31/2018    C: NEGATIVE for fever, chills, change in weight  HEME/ALLERGY/IMMUNE:  No symptoms of anaphylaxis or significant allergic reaction    Review of Systems   Constitutional, HEENT, cardiovascular, pulmonary, gi and gu systems are negative, except as otherwise noted.      Objective         Vitals:  No vitals were obtained today due to virtual visit.  /80   Pulse 62   Temp 98.1  F (36.7  C) (Oral)   Ht 1.791 m (5' 10.5\")   Wt 100.2 kg (221 lb)   BMI 31.26 kg/m      Physical Exam   healthy, alert and no distress  PSYCH: Alert and oriented times 3; coherent speech, normal   rate and volume, able to articulate logical thoughts, able   to abstract reason, no tangential thoughts, no hallucinations   or delusions  His affect is normal  RESP: No cough, no audible wheezing, able to talk in full sentences  Remainder of exam unable to be completed due to telephone visits    Phone call duration: 13 minutes  "

## 2022-06-30 NOTE — TELEPHONE ENCOUNTER
Called and spoke to Patient after verifying last name and date of birth. Pt informed of Dr. Bearden' response. Pt states he removed the tick at 11 PM on Tuesday night (6/28/22), within the last 72 hours. Pt requesting re-consideration.    Dr. Bearden is now gone for the day.    Called and spoke with Ly in [facility] and explained the situation. She states a  provider had made one attempt to reach Pt and verbalized plan to have the provider reach out again.     Routing to [facility] pool, for review.    Sheyla Villalobos RN .............. 6/30/2022  5:22 PM

## 2022-06-30 NOTE — PATIENT INSTRUCTIONS
Please refer to your AVS for follow up and pain/symptoms management recommendations (I.e.: medications, helpful conservative treatment modalities, appropriate follow up if need to a specialist or family practice, etc.). Please return to either your primary care provider rapid/urgent care clinic if your symptoms change or worsen.     Discharge instructions:  -If you were prescribed a medication(s), please take this as prescribed/directed  -Monitor your symptoms, if changing/worsening, return to UC/ER or PCP for follow up    Tick Bite   -For some, labs are also drawn to check for Lyme disease - if you had labs drawn, please note that these labs can take 3-7 days to return, either your PCP or a member of the UC team should reach out to you with your results, if you do not receive these, please reach out to your PCP.   -Please take the course of antibiotics until completion (if prescribed).    -For prevention of further bites, we recommend long shirts/pants while outdoors, tick repellant with > 20% DEET, take a shower or bathe within 2 hours of being outdoors, check yourself for ticks (look in unusual locations such as hair, behind ears/knees, etc.).   -For pain control (if needed), if you are able to take Ibuprofen and Tylenol, we recommend alternating these (see note below). Do not wear a patch over your eye (unless directed to do so).    -Alternate every 4 hours as needed. I.e.: Ibuprofen at 8am, Tylenol 12pm, Ibuprofen 4pm    -Daily maximum of Tylenol is 4000mg (recommend staying under 3000mg)   -Daily maximum of Ibuprofen is 1200mg (take no more than six 200mg pills a day)    -Monitor for the below symptoms for 30 days and call your healthcare provider if you get any of the following:   ? Fatigue    ? Headache    ? Neck stiffness    ? Myalgias/muscle soreness   ? Arthralgias/joint pain   ? Regional lymphadenopathy/swollen lymph nodes   ? Fever    * Keep vigilant with tick checks.    Risk of Lyme disease is very low  with the tick has been attached for fewer than 36 hours.    Common locations to check for ticks in the future include entering around her ears, and and around the hair, inside the bellybutton, under your arms, around her waist, between your legs and the back sides of your knees.    Approach to prophylaxis -- per up to date guidelines, they agree with the Infectious Diseases Society of [place] (IDSA) guidelines that recommend antibiotic prophylaxis only in patients who meet all of the following criteria:  ?Attached tick identified as an adult or nymphal I. scapularis tick (deer tick).  ?Tick is estimated to have been attached for ?36 hours (by degree of engorgement or time of exposure).  ?Prophylaxis is begun within 72 hours of tick removal.  ?Local rate of infection of ticks with B. burgdorferi is ?20 percent (these rates of infection have been shown to occur in parts of [place], parts of the [place], and parts of [place] and [place]).

## 2022-07-20 ENCOUNTER — HOSPITAL ENCOUNTER (OUTPATIENT)
Dept: GENERAL RADIOLOGY | Facility: OTHER | Age: 67
Discharge: HOME | End: 2022-07-20
Attending: PHYSICIAN ASSISTANT | Admitting: PHYSICIAN ASSISTANT
Payer: MEDICARE

## 2022-07-20 DIAGNOSIS — M51.27 HERNIATED NUCLEUS PULPOSUS OF LUMBOSACRAL REGION: ICD-10-CM

## 2022-07-20 DIAGNOSIS — M48.062 SPINAL STENOSIS OF LUMBAR REGION WITH NEUROGENIC CLAUDICATION: ICD-10-CM

## 2022-07-20 PROCEDURE — 255N000002 HC RX 255 OP 636: Performed by: RADIOLOGY

## 2022-07-20 PROCEDURE — 250N000009 HC RX 250: Performed by: RADIOLOGY

## 2022-07-20 PROCEDURE — 64483 NJX AA&/STRD TFRM EPI L/S 1: CPT

## 2022-07-20 PROCEDURE — 250N000011 HC RX IP 250 OP 636: Performed by: RADIOLOGY

## 2022-07-20 RX ORDER — LIDOCAINE HYDROCHLORIDE 10 MG/ML
1 INJECTION, SOLUTION INFILTRATION; PERINEURAL ONCE
Status: COMPLETED | OUTPATIENT
Start: 2022-07-20 | End: 2022-07-20

## 2022-07-20 RX ORDER — LIDOCAINE HYDROCHLORIDE 10 MG/ML
2 INJECTION, SOLUTION EPIDURAL; INFILTRATION; INTRACAUDAL; PERINEURAL ONCE
Status: COMPLETED | OUTPATIENT
Start: 2022-07-20 | End: 2022-07-20

## 2022-07-20 RX ORDER — DEXAMETHASONE SODIUM PHOSPHATE 10 MG/ML
10 INJECTION, SOLUTION INTRAMUSCULAR; INTRAVENOUS ONCE
Status: COMPLETED | OUTPATIENT
Start: 2022-07-20 | End: 2022-07-20

## 2022-07-20 RX ADMIN — LIDOCAINE HYDROCHLORIDE 2 ML: 10 INJECTION, SOLUTION EPIDURAL; INFILTRATION; INTRACAUDAL; PERINEURAL at 11:25

## 2022-07-20 RX ADMIN — IOHEXOL 1 ML: 240 INJECTION, SOLUTION INTRATHECAL; INTRAVASCULAR; INTRAVENOUS; ORAL at 11:24

## 2022-07-20 RX ADMIN — DEXAMETHASONE SODIUM PHOSPHATE 10 MG: 10 INJECTION, SOLUTION INTRAMUSCULAR; INTRAVENOUS at 11:25

## 2022-07-20 RX ADMIN — LIDOCAINE HYDROCHLORIDE 1 ML: 10 INJECTION, SOLUTION INFILTRATION; PERINEURAL at 11:25

## 2022-09-02 ENCOUNTER — HOSPITAL ENCOUNTER (EMERGENCY)
Facility: OTHER | Age: 67
Discharge: HOME | End: 2022-09-02
Attending: FAMILY MEDICINE | Admitting: FAMILY MEDICINE
Payer: MEDICARE

## 2022-09-02 ENCOUNTER — APPOINTMENT (OUTPATIENT)
Dept: GENERAL RADIOLOGY | Facility: OTHER | Age: 67
End: 2022-09-02
Attending: FAMILY MEDICINE
Payer: MEDICARE

## 2022-09-02 VITALS
HEART RATE: 100 BPM | TEMPERATURE: 97.7 F | DIASTOLIC BLOOD PRESSURE: 106 MMHG | OXYGEN SATURATION: 98 % | RESPIRATION RATE: 18 BRPM | SYSTOLIC BLOOD PRESSURE: 186 MMHG

## 2022-09-02 DIAGNOSIS — M79.672 LEFT FOOT PAIN: ICD-10-CM

## 2022-09-02 PROCEDURE — 99283 EMERGENCY DEPT VISIT LOW MDM: CPT | Performed by: FAMILY MEDICINE

## 2022-09-02 PROCEDURE — 73630 X-RAY EXAM OF FOOT: CPT | Mod: TC,LT

## 2022-09-02 ASSESSMENT — ACTIVITIES OF DAILY LIVING (ADL): ADLS_ACUITY_SCORE: 35

## 2022-09-03 NOTE — ED NOTES
Pt ready for discharge. Writer brought pt to car per wheelchair as he drove himself to the hospital. Able to transfer self to car by self and with ease.

## 2022-09-03 NOTE — ED TRIAGE NOTES
Pt comes into the ER today ambulatory. He reports pain in his left foot. He was walking down the stairs tonight and his foot collapsed under him and his big toe hyperextended. He fell, did not hit his head, but his right shoulder hurts. This happened around 1500. Pain with moving. He hasn't taken anything for the pain.      Triage Assessment     Row Name 09/02/22 1753       Triage Assessment (Adult)    Airway WDL WDL       Respiratory WDL    Respiratory WDL WDL       Skin Circulation/Temperature WDL    Skin Circulation/Temperature WDL WDL       Cardiac WDL    Cardiac WDL WDL       Peripheral/Neurovascular WDL    Peripheral Neurovascular WDL WDL       Cognitive/Neuro/Behavioral WDL    Cognitive/Neuro/Behavioral WDL WDL

## 2022-09-03 NOTE — DISCHARGE INSTRUCTIONS
Rj    I think you have a bruise or a sprain. The walking shoe should help. I recommend ice, Tylenol and elevation as well.     Thank you for choosing our Emergency Department for your care.     You may receive a phone call or letter for a survey about your care in our ED.  Please complete this as this is how we improve care for our patients.     If you have any questions after leaving the ED you can call me in the ED at [phone]    Sincerely,    Dr Ryan Aviles M.D.

## 2022-09-03 NOTE — ED PROVIDER NOTES
History     Chief Complaint   Patient presents with     Foot Pain     The history is provided by the patient.     Rj Juarez is a 66 year old male here with left foot pain. He stepped off a step and injured his left foot at about 4 PM today. He tried ice and rest for the past six hours but it was not getting better. He has some swelling and persistent pain. No other injury.     Allergies:  Allergies   Allergen Reactions     Ciprofloxacin Other (See Comments)     Tendon issue , and IBS     Midazolam      Other reaction(s): Other - Describe In Comment Field  Difficult to wake up     Tamsulosin Other (See Comments)     Chest pain       Problem List:    Patient Active Problem List    Diagnosis Date Noted     Osteoarthritis of spine with radiculopathy, lumbar region 06/23/2022     Priority: Medium     Chondromalacia, left hip 07/17/2020     Priority: Medium     Hx of decompressive lumbar laminectomy 07/10/2020     Priority: Medium     Lumbar stenosis with neurogenic claudication 05/19/2020     Priority: Medium     Status post cardiac catheterization on 12/6/2019 through [facility] with a stenting to his distal circumflex 12/24/2019     Priority: Medium     History of cardiac cath on 12/6/2019 12/24/2019     Priority: Medium     Chronic midline low back pain with sciatica, sciatica laterality unspecified 12/24/2019     Priority: Medium     ASCVD (arteriosclerotic cardiovascular disease) 12/03/2019     Priority: Medium     High coronary artery calcium score at 1118.2 on 11/21/2019 11/27/2019     Priority: Medium     Added automatically from request for surgery [id number]       CAD, multiple vessel 11/27/2019     Priority: Medium     Added automatically from request for surgery [id number]       GERD 11/19/2019     Priority: Medium     FARNAZ on CPAP 08/22/2019     Priority: Medium     White coat syndrome with diagnosis of hypertension 08/22/2019     Priority: Medium     Diabetes mellitus type 2, diet-controlled (H)  08/22/2019     Priority: Medium     NSAID long-term use 08/22/2019     Priority: Medium     Primary osteoarthritis of right knee  04/09/2019     Priority: Medium     Effusion of right knee 04/09/2019     Priority: Medium     Posterior knee pain, right 04/09/2019     Priority: Medium     Elevated prostate specific antigen (PSA) 11/29/2018     Priority: Medium     Lumbar radiculopathy 02/20/2017     Priority: Medium     Benign essential tremor 03/18/2016     Priority: Medium     Irritable bowel syndrome with diarrhea 03/18/2016     Priority: Medium     H/O adenomatous polyp of colon 12/17/2009     Priority: Medium        Past Medical History:    Past Medical History:   Diagnosis Date     Benign lipomatous neoplasm      Calculus of kidney      Carpal tunnel syndrome      Closed fracture of patella      Diverticulosis of large intestine without perforation or abscess without bleeding      Exertional chest pain 11/19/2019     Headache      Other specified forms of tremor      Other urticaria (CODE)      Pain in joint      Umbilical hernia without obstruction or gangrene        Past Surgical History:    Past Surgical History:   Procedure Laterality Date     COLONOSCOPY  01/28/2014 2009,2014,F/U 2019     COLONOSCOPY  03/01/2019    Serrated adenoma, follow up 1 year     COLONOSCOPY N/A 1/15/2021    Procedure: COLONOSCOPY, WITH POLYPECTOMY AND BIOPSY;  Surgeon: Ly Frances MD;  Location:  OR     ESOPHAGOSCOPY, GASTROSCOPY, DUODENOSCOPY (EGD), COMBINED      1/28/14,EGD     ESOPHAGOSCOPY, GASTROSCOPY, DUODENOSCOPY (EGD), COMBINED N/A 1/15/2021    Procedure: ESOPHAGOGASTRODUODENOSCOPY, WITH BIOPSY;  Surgeon: Ly Frances MD;  Location: [facility] OR     JOINT REPLACEMENT, HIP RT/LT Left      OTHER SURGICAL HISTORY      9/10/2014,73000.0,MS REPAIR ING HERNIA  >5 TRS BETTINA     OTHER SURGICAL HISTORY      1/26/2018,42708.0,MS REPAIR UMBILICAL NAZARIO  >5 TRS REDUC     RELEASE CARPAL TUNNEL      3,12/2004,Both hands     SIGMOIDOSCOPY  FLEXIBLE      No Comments Provided       Family History:    Family History   Problem Relation Age of Onset     Hypertension Mother         Hypertension     Diabetes Mother         Diabetes     Other - See Comments Mother         Spinal stenosis     Other - See Comments Father         scleroderma which was quite severe     Cancer Father         Cancer, of adenocarcinoma of the lung.  He was a nonsmoker.     Diabetes Brother         Diabetes     Other - See Comments Brother         Pancreatitis     Substance Abuse Brother         Alcohol/Drug,Alcoholic, has been through treatment a number of times.       Social History:  Marital Status:   [2]  Social History     Tobacco Use     Smoking status: Never Smoker     Smokeless tobacco: Never Used   Vaping Use     Vaping Use: Never used   Substance Use Topics     Alcohol use: No     Drug use: No        Medications:    metoprolol succinate ER (TOPROL XL) 25 MG 24 hr tablet  aspirin 81 MG EC tablet  famotidine (PEPCID) 20 MG tablet  ibuprofen (ADVIL/MOTRIN) 400 MG tablet  LORazepam (ATIVAN) 0.5 MG tablet  mirabegron (MYRBETRIQ) 25 MG 24 hr tablet  Multiple Vitamins-Minerals (EYE VITAMINS PO)  nitroGLYcerin (NITROSTAT) 0.4 MG sublingual tablet  order for DME  prochlorperazine (COMPAZINE) 5 MG tablet  rosuvastatin (CRESTOR) 20 MG tablet  silver sulfADIAZINE (SILVADENE) 1 % external cream  sucralfate (CARAFATE) 1 GM tablet  triamcinolone (KENALOG) 0.1 % cream          Review of Systems   Musculoskeletal: Positive for gait problem.   All other systems reviewed and are negative.      Physical Exam   BP: (!) 186/106  Pulse: 100  Temp: 97.7  F (36.5  C)  Resp: 18  SpO2: 98 %      Physical Exam  Vitals and nursing note reviewed.   Constitutional:       General: He is not in acute distress.     Appearance: Normal appearance. He is not ill-appearing.   Cardiovascular:      Rate and Rhythm: Normal rate.      Pulses: Normal pulses.   Musculoskeletal:         General: Tenderness  present. No swelling or deformity.      Comments: He has tenderness over the midfoot, no swelling, no deformity. No pain of the ankle or the lower leg.   Skin:     General: Skin is warm and dry.      Comments: No skin abrasion, no open skin, no bleeding   Neurological:      General: No focal deficit present.      Mental Status: He is alert and oriented to person, place, and time.      Sensory: No sensory deficit.           Results for orders placed or performed during the hospital encounter of 09/02/22 (from the past 24 hour(s))   XR Foot Left G/E 3 Views    Narrative    PROCEDURE INFORMATION:   Exam: XR Left Foot   Exam date and time: 9/2/2022 9:22 PM   Age: 66 years old   Clinical indication: Other: Swelling, pain, injury to left foot     TECHNIQUE:   Imaging protocol: Radiologic exam of the Left foot.   Views: 3 or more views.     COMPARISON:   CR XR FOOT 3 VIEWS LEFT 4/30/2014 9:57 AM     FINDINGS:   Bones/joints: No acute fracture. No dislocation.  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  Soft tissues: Unremarkable.       Impression    IMPRESSION:   No acute fracture.     THIS DOCUMENT HAS BEEN ELECTRONICALLY SIGNED BY YEISON JIMENES MD         Assessments & Plan (with Medical Decision Making)  Rj Juarez is a 66 year old male here with left foot pain. He stepped off a step and injured his left foot at about 4 PM today. He tried ice and rest for the past six hours but it was not getting better. He has some swelling and persistent pain. No other injury.  VS in the ED BP (!) 186/106   Pulse 100   Temp 97.7  F (36.5  C) (Temporal)   Resp 18   SpO2 98%   Exam shows some tenderness, no deformity or swelling.  Xray negative for fracture. I recommend ice, Tylenol and ibuprofen.      I have reviewed the nursing notes.    I have reviewed the findings, diagnosis, plan and need for follow up with the patient.    Final diagnoses:   Left foot pain        9/2/2022   [facility], Santiago Yancey MD  09/03/22 0202

## 2022-09-17 ENCOUNTER — HEALTH MAINTENANCE LETTER (OUTPATIENT)
Age: 67
End: 2022-09-17

## 2022-10-25 ENCOUNTER — TRANSFERRED RECORDS (OUTPATIENT)
Dept: HEALTH INFORMATION MANAGEMENT | Facility: OTHER | Age: 67
End: 2022-10-25

## 2023-01-01 ENCOUNTER — TRANSFERRED RECORDS (OUTPATIENT)
Dept: MULTI SPECIALTY CLINIC | Facility: CLINIC | Age: 68
End: 2023-01-01

## 2023-01-01 LAB — RETINOPATHY: NORMAL

## 2023-01-25 DIAGNOSIS — R35.0 BENIGN PROSTATIC HYPERPLASIA WITH URINARY FREQUENCY: Primary | ICD-10-CM

## 2023-01-25 DIAGNOSIS — N40.1 BENIGN PROSTATIC HYPERPLASIA WITH URINARY FREQUENCY: Primary | ICD-10-CM

## 2023-01-25 NOTE — PROGRESS NOTES
"Per last office visit  3/10/22 with Nestor Toledo MD \"per yellow sheet\"        Orders Placed    "

## 2023-01-28 ENCOUNTER — HEALTH MAINTENANCE LETTER (OUTPATIENT)
Age: 68
End: 2023-01-28

## 2023-02-08 ENCOUNTER — HOSPITAL ENCOUNTER (EMERGENCY)
Facility: OTHER | Age: 68
Discharge: HOME | End: 2023-02-08
Attending: EMERGENCY MEDICINE | Admitting: EMERGENCY MEDICINE
Payer: MEDICARE

## 2023-02-08 ENCOUNTER — APPOINTMENT (OUTPATIENT)
Dept: GENERAL RADIOLOGY | Facility: OTHER | Age: 68
End: 2023-02-08
Attending: EMERGENCY MEDICINE
Payer: MEDICARE

## 2023-02-08 VITALS
HEART RATE: 74 BPM | RESPIRATION RATE: 11 BRPM | SYSTOLIC BLOOD PRESSURE: 138 MMHG | DIASTOLIC BLOOD PRESSURE: 84 MMHG | OXYGEN SATURATION: 95 %

## 2023-02-08 DIAGNOSIS — N39.0 URINARY TRACT INFECTION IN MALE: ICD-10-CM

## 2023-02-08 LAB
ALBUMIN SERPL BCG-MCNC: 4.1 G/DL (ref 3.5–5.2)
ALBUMIN UR-MCNC: NEGATIVE MG/DL
ALP SERPL-CCNC: 66 U/L (ref 40–129)
ALT SERPL W P-5'-P-CCNC: 18 U/L (ref 10–50)
ANION GAP SERPL CALCULATED.3IONS-SCNC: 9 MMOL/L (ref 7–15)
APPEARANCE UR: CLEAR
AST SERPL W P-5'-P-CCNC: 15 U/L (ref 10–50)
BASOPHILS # BLD AUTO: 0.1 10E3/UL (ref 0–0.2)
BASOPHILS NFR BLD AUTO: 1 %
BILIRUB SERPL-MCNC: 1.5 MG/DL
BILIRUB UR QL STRIP: NEGATIVE
BUN SERPL-MCNC: 9 MG/DL (ref 8–23)
CALCIUM SERPL-MCNC: 9.3 MG/DL (ref 8.8–10.2)
CHLORIDE SERPL-SCNC: 99 MMOL/L (ref 98–107)
COLOR UR AUTO: YELLOW
CREAT SERPL-MCNC: 1.02 MG/DL (ref 0.67–1.17)
DEPRECATED HCO3 PLAS-SCNC: 26 MMOL/L (ref 22–29)
EOSINOPHIL # BLD AUTO: 0.1 10E3/UL (ref 0–0.7)
EOSINOPHIL NFR BLD AUTO: 1 %
ERYTHROCYTE [DISTWIDTH] IN BLOOD BY AUTOMATED COUNT: 13.1 % (ref 10–15)
FLUAV RNA SPEC QL NAA+PROBE: NEGATIVE
FLUBV RNA RESP QL NAA+PROBE: NEGATIVE
GFR SERPL CREATININE-BSD FRML MDRD: 80 ML/MIN/1.73M2
GLUCOSE SERPL-MCNC: 167 MG/DL (ref 70–99)
GLUCOSE UR STRIP-MCNC: NEGATIVE MG/DL
HCT VFR BLD AUTO: 45.4 % (ref 40–53)
HGB BLD-MCNC: 15.5 G/DL (ref 13.3–17.7)
HGB UR QL STRIP: ABNORMAL
HOLD SPECIMEN: NORMAL
IMM GRANULOCYTES # BLD: 0 10E3/UL
IMM GRANULOCYTES NFR BLD: 0 %
KETONES UR STRIP-MCNC: NEGATIVE MG/DL
LACTATE SERPL-SCNC: 1.2 MMOL/L (ref 0.7–2)
LEUKOCYTE ESTERASE UR QL STRIP: ABNORMAL
LYMPHOCYTES # BLD AUTO: 1.7 10E3/UL (ref 0.8–5.3)
LYMPHOCYTES NFR BLD AUTO: 15 %
MCH RBC QN AUTO: 30.2 PG (ref 26.5–33)
MCHC RBC AUTO-ENTMCNC: 34.1 G/DL (ref 31.5–36.5)
MCV RBC AUTO: 89 FL (ref 78–100)
MONOCYTES # BLD AUTO: 1.5 10E3/UL (ref 0–1.3)
MONOCYTES NFR BLD AUTO: 13 %
NEUTROPHILS # BLD AUTO: 8.1 10E3/UL (ref 1.6–8.3)
NEUTROPHILS NFR BLD AUTO: 70 %
NITRATE UR QL: NEGATIVE
NRBC # BLD AUTO: 0 10E3/UL
NRBC BLD AUTO-RTO: 0 /100
NT-PROBNP SERPL-MCNC: 126 PG/ML (ref 0–900)
PH UR STRIP: 6.5 [PH] (ref 5–9)
PLATELET # BLD AUTO: 204 10E3/UL (ref 150–450)
POTASSIUM SERPL-SCNC: 3.8 MMOL/L (ref 3.4–5.3)
PROCALCITONIN SERPL IA-MCNC: 0.1 NG/ML
PROT SERPL-MCNC: 6.9 G/DL (ref 6.4–8.3)
RBC # BLD AUTO: 5.13 10E6/UL (ref 4.4–5.9)
RBC URINE: 1 /HPF
RSV RNA SPEC NAA+PROBE: NEGATIVE
SARS-COV-2 RNA RESP QL NAA+PROBE: NEGATIVE
SODIUM SERPL-SCNC: 134 MMOL/L (ref 136–145)
SP GR UR STRIP: 1 (ref 1–1.03)
TROPONIN T SERPL HS-MCNC: 16 NG/L
UROBILINOGEN UR STRIP-MCNC: NORMAL MG/DL
WBC # BLD AUTO: 11.5 10E3/UL (ref 4–11)
WBC URINE: 15 /HPF

## 2023-02-08 PROCEDURE — 84484 ASSAY OF TROPONIN QUANT: CPT | Performed by: EMERGENCY MEDICINE

## 2023-02-08 PROCEDURE — 87040 BLOOD CULTURE FOR BACTERIA: CPT | Performed by: EMERGENCY MEDICINE

## 2023-02-08 PROCEDURE — 36415 COLL VENOUS BLD VENIPUNCTURE: CPT | Performed by: EMERGENCY MEDICINE

## 2023-02-08 PROCEDURE — 99285 EMERGENCY DEPT VISIT HI MDM: CPT | Mod: 25,CS | Performed by: EMERGENCY MEDICINE

## 2023-02-08 PROCEDURE — 83605 ASSAY OF LACTIC ACID: CPT | Performed by: EMERGENCY MEDICINE

## 2023-02-08 PROCEDURE — 99284 EMERGENCY DEPT VISIT MOD MDM: CPT | Mod: CS | Performed by: EMERGENCY MEDICINE

## 2023-02-08 PROCEDURE — 84145 PROCALCITONIN (PCT): CPT | Performed by: EMERGENCY MEDICINE

## 2023-02-08 PROCEDURE — 96365 THER/PROPH/DIAG IV INF INIT: CPT | Performed by: EMERGENCY MEDICINE

## 2023-02-08 PROCEDURE — 85025 COMPLETE CBC W/AUTO DIFF WBC: CPT | Performed by: EMERGENCY MEDICINE

## 2023-02-08 PROCEDURE — 80053 COMPREHEN METABOLIC PANEL: CPT | Performed by: EMERGENCY MEDICINE

## 2023-02-08 PROCEDURE — 87637 SARSCOV2&INF A&B&RSV AMP PRB: CPT | Performed by: EMERGENCY MEDICINE

## 2023-02-08 PROCEDURE — C9803 HOPD COVID-19 SPEC COLLECT: HCPCS | Performed by: EMERGENCY MEDICINE

## 2023-02-08 PROCEDURE — 250N000011 HC RX IP 250 OP 636: Performed by: EMERGENCY MEDICINE

## 2023-02-08 PROCEDURE — 83880 ASSAY OF NATRIURETIC PEPTIDE: CPT | Performed by: EMERGENCY MEDICINE

## 2023-02-08 PROCEDURE — 81003 URINALYSIS AUTO W/O SCOPE: CPT | Performed by: EMERGENCY MEDICINE

## 2023-02-08 PROCEDURE — 93010 ELECTROCARDIOGRAM REPORT: CPT | Performed by: INTERNAL MEDICINE

## 2023-02-08 PROCEDURE — 71046 X-RAY EXAM CHEST 2 VIEWS: CPT

## 2023-02-08 PROCEDURE — 93005 ELECTROCARDIOGRAM TRACING: CPT | Performed by: EMERGENCY MEDICINE

## 2023-02-08 RX ORDER — CEFTRIAXONE SODIUM 1 G/50ML
1 INJECTION, SOLUTION INTRAVENOUS ONCE
Status: COMPLETED | OUTPATIENT
Start: 2023-02-08 | End: 2023-02-08

## 2023-02-08 RX ORDER — CEFDINIR 300 MG/1
300 CAPSULE ORAL 2 TIMES DAILY
Qty: 14 CAPSULE | Refills: 0 | Status: SHIPPED | OUTPATIENT
Start: 2023-02-08 | End: 2023-02-15

## 2023-02-08 RX ADMIN — CEFTRIAXONE SODIUM 1 G: 1 INJECTION, SOLUTION INTRAVENOUS at 14:47

## 2023-02-08 ASSESSMENT — ACTIVITIES OF DAILY LIVING (ADL): ADLS_ACUITY_SCORE: 35

## 2023-02-08 NOTE — ED PROVIDER NOTES
[facility]  Emergency Department Visit Note    Chest Pain and Urinary Frequency      History of Present Illness     HPI:  Rj Juarez is a 67 year old male presenting to the Emergency Department today for evaluation of  frequency, with leftflank and nausea without vomiting.  He has not had a history of frequent urinary tract infections in the past. Symptoms began 2 days. He complains of fevers but denies  chills, chest pain, abdominal pain, shortness of breath, hematuria, and abnormal vaginal discharge.    Medications:  Prior to Admission medications    Medication Sig Last Dose Taking? Auth Provider Long Term End Date   cefdinir (OMNICEF) 300 MG capsule Take 1 capsule (300 mg) by mouth 2 times daily for 7 days  Yes Martha Fernandes MD  2/15/23   aspirin 81 MG EC tablet Take 81 mg by mouth daily   Reported, Patient     famotidine (PEPCID) 20 MG tablet Take 1 tablet (20 mg) by mouth 2 times daily as needed (gerd)   Doug Porras MD     ibuprofen (ADVIL/MOTRIN) 400 MG tablet TAKE 1 TABLET BY MOUTH EVERY 6 HOURS AS NEEDED FOR PAIN.  Patient not taking: Reported on 6/30/2022   Reported, Patient     LORazepam (ATIVAN) 0.5 MG tablet Take 1-2 tablets (0.5-1 mg) by mouth See Admin Instructions Take 1 tablet 30min prior to MRI, repeat second tablet if necessary  Patient not taking: Reported on 6/30/2022   Terry Guerrero MD     metoprolol succinate ER (TOPROL XL) 25 MG 24 hr tablet Take 1 tablet (25 mg) by mouth daily   Doug Porras MD Yes    mirabegron (MYRBETRIQ) 25 MG 24 hr tablet Take 1 tablet (25 mg) by mouth daily  Patient not taking: Reported on 6/30/2022   Doug Porras MD     Multiple Vitamins-Minerals (EYE VITAMINS PO)    Reported, Patient     nitroGLYcerin (NITROSTAT) 0.4 MG sublingual tablet For chest pain place 1 tablet under the tongue every 5 minutes for 3 doses. If symptoms persist 5 minutes after 1st dose call 911.  Patient not taking: Reported on  6/30/2022   Raheel eMndez, DO Yes    order for DME Walker, 4ww with seat. Spinal stenosis. 99.   Doug Porras MD     prochlorperazine (COMPAZINE) 5 MG tablet TAKE 1 TABLET BY MOUTH THREE TIMES DAILY AS NEEDED FOR NAUSEA OR VOMITING   Reported, Patient     rosuvastatin (CRESTOR) 20 MG tablet Take 1 tablet (20 mg) by mouth daily   Doug Porras MD Yes    silver sulfADIAZINE (SILVADENE) 1 % external cream Apply topically daily   Doug Porras MD     sucralfate (CARAFATE) 1 GM tablet Take 1 tablet (1 g) by mouth 4 times daily   Doug Porras MD     triamcinolone (KENALOG) 0.1 % cream Apply 1 Film topically 3 times daily   Reported, Patient         Allergies:  Allergies   Allergen Reactions     Ciprofloxacin Other (See Comments)     Tendon issue , and IBS     Midazolam      Other reaction(s): Other - Describe In Comment Field  Difficult to wake up     Tamsulosin Other (See Comments)     Chest pain       Problem List:  Patient Active Problem List   Diagnosis     Benign essential tremor     H/O adenomatous polyp of colon     Irritable bowel syndrome with diarrhea     Lumbar radiculopathy     Elevated prostate specific antigen (PSA)     Primary osteoarthritis of right knee      Effusion of right knee     Posterior knee pain, right     FARNAZ on CPAP     White coat syndrome with diagnosis of hypertension     Diabetes mellitus type 2, diet-controlled (H)     NSAID long-term use     GERD     High coronary artery calcium score at 1118.2 on 11/21/2019     CAD, multiple vessel     ASCVD (arteriosclerotic cardiovascular disease)     Status post cardiac catheterization on 12/6/2019 through [facility] with a stenting to his distal circumflex     History of cardiac cath on 12/6/2019     Chronic midline low back pain with sciatica, sciatica laterality unspecified     Lumbar stenosis with neurogenic claudication     Hx of decompressive lumbar laminectomy     Chondromalacia, left hip      Osteoarthritis of spine with radiculopathy, lumbar region       Past Medical History:  Past Medical History:   Diagnosis Date     Benign lipomatous neoplasm     1/20/2014     Calculus of kidney     possible     Carpal tunnel syndrome     Both hands     Closed fracture of patella     05/06/09,Sustained right superior pole patellar fracture which underwent conservative management     Diverticulosis of large intestine without perforation or abscess without bleeding     1/28/2014     Exertional chest pain 11/19/2019     Headache     No Comments Provided     Other specified forms of tremor     3/2/2011     Other urticaria (CODE)     3/2/2011     Pain in joint     No Comments Provided     Umbilical hernia without obstruction or gangrene     1/26/2018       Past Surgical History:  Past Surgical History:   Procedure Laterality Date     COLONOSCOPY  01/28/2014 2009,2014,F/U 2019     COLONOSCOPY  03/01/2019    Serrated adenoma, follow up 1 year     COLONOSCOPY N/A 1/15/2021    Procedure: COLONOSCOPY, WITH POLYPECTOMY AND BIOPSY;  Surgeon: Ly Frances MD;  Location:  OR     ESOPHAGOSCOPY, GASTROSCOPY, DUODENOSCOPY (EGD), COMBINED      1/28/14,EGD     ESOPHAGOSCOPY, GASTROSCOPY, DUODENOSCOPY (EGD), COMBINED N/A 1/15/2021    Procedure: ESOPHAGOGASTRODUODENOSCOPY, WITH BIOPSY;  Surgeon: Ly Frances MD;  Location: [facility] OR     JOINT REPLACEMENT, HIP RT/LT Left      OTHER SURGICAL HISTORY      9/10/2014,99785.0,TN REPAIR ING HERNIA  >5 TRS BETTINA     OTHER SURGICAL HISTORY      1/26/2018,48457.0,TN REPAIR UMBILICAL NAZARIO  >5 TRS REDUC     RELEASE CARPAL TUNNEL      3,12/2004,Both hands     SIGMOIDOSCOPY FLEXIBLE      No Comments Provided       Social History:  Social History     Tobacco Use     Smoking status: Never     Smokeless tobacco: Never   Vaping Use     Vaping Use: Never used   Substance Use Topics     Alcohol use: No     Drug use: No       Review of Systems:  Complete review of systems obtained and pertinent positive  and negative findings noted in HPI. Review of systems otherwise negative.      Physical Exam     Vital signs: /84   Pulse 74   Resp 11   SpO2 95%     Physical Exam:    General: awake, alert, uncomfortable  HEENT: no scleral injection, no nasal discharge, neck supple  Chest: non labored respirations, symmetric chest rise, no accessory muscle use  Cardiovascular: regular rate, regular rhythm, distally capillary refill intact  Abdomen: soft, tender left lateral adbomen, no rebound or guarding, nondistended  Back: no left flank pain  Extremities: no deformities, edema, or cyanosis  Skin: warm, dry, no rashes  Neuro: alert, moving extremities x 4, ambulates without difficulty      Medical Decision Making & ED Course     Patient presents to the Emergency Department for evaluation of urinary symptoms with back pain and nausea. Give hsitory of chest tightness and HTN and ACS work up was also initiated and negative. Differential includes urinary tract infection, hemorrhagic cystitis, pyelonephritis, urolithiasis, infected urolithiasis, pelvic inflammatory disease, sexually transmitted disease. Urinalysis suggests infection in the urinary tract which, with associated symptoms, suggests pyelonephritis. Will treat with cephalasporin antibiotics He is stable for further outpatient management. Patient given instructions on follow-up and warning signs for which to return to emergency department, including worsening symptoms, intolerance to oral intake, or other concerns. All questions were answered and the patient is comfortable with plan for discharge. The patient was discharged in stable condition.     I have reviewed the patients ECG, laboratory studies, imaging, and medical records.  .    Diagnosis & Disposition     Diagnosis:  1. Urinary tract infection in male        Disposition:  Home    MD Dom Chavez Theresa M, MD  02/08/23 7499

## 2023-02-08 NOTE — ED TRIAGE NOTES
Pt [facility], pt states that he has been having increased urinary frequency and chills for past 24 hours, [facility] and was sent here due to chest tightness, pt has a hx of stents, VSS, pt into [facility]     Triage Assessment     Row Name 02/08/23 1308       Triage Assessment (Adult)    Airway WDL WDL       Respiratory WDL    Respiratory WDL WDL       Skin Circulation/Temperature WDL    Skin Circulation/Temperature WDL WDL       Cardiac WDL    Cardiac WDL WDL       Peripheral/Neurovascular WDL    Peripheral Neurovascular WDL WDL       Cognitive/Neuro/Behavioral WDL    Cognitive/Neuro/Behavioral WDL WDL

## 2023-02-09 LAB
ATRIAL RATE - MUSE: 93 BPM
DIASTOLIC BLOOD PRESSURE - MUSE: NORMAL MMHG
INTERPRETATION ECG - MUSE: NORMAL
P AXIS - MUSE: 59 DEGREES
PR INTERVAL - MUSE: 144 MS
QRS DURATION - MUSE: 152 MS
QT - MUSE: 384 MS
QTC - MUSE: 477 MS
R AXIS - MUSE: 47 DEGREES
SYSTOLIC BLOOD PRESSURE - MUSE: NORMAL MMHG
T AXIS - MUSE: 27 DEGREES
VENTRICULAR RATE- MUSE: 93 BPM

## 2023-02-11 ENCOUNTER — NURSE TRIAGE (OUTPATIENT)
Dept: NURSING | Facility: CLINIC | Age: 68
End: 2023-02-11
Payer: COMMERCIAL

## 2023-02-12 ENCOUNTER — VIRTUAL VISIT (OUTPATIENT)
Dept: FAMILY MEDICINE | Facility: OTHER | Age: 68
End: 2023-02-12
Attending: INTERNAL MEDICINE
Payer: COMMERCIAL

## 2023-02-12 DIAGNOSIS — U07.1 INFECTION DUE TO 2019 NOVEL CORONAVIRUS: Primary | ICD-10-CM

## 2023-02-12 PROCEDURE — 99213 OFFICE O/P EST LOW 20 MIN: CPT | Mod: 95 | Performed by: FAMILY MEDICINE

## 2023-02-12 RX ORDER — NEOMYCIN SULFATE, POLYMYXIN B SULFATE AND DEXAMETHASONE 3.5; 10000; 1 MG/ML; [USP'U]/ML; MG/ML
SUSPENSION/ DROPS OPHTHALMIC
COMMUNITY
Start: 2022-12-22

## 2023-02-12 NOTE — TELEPHONE ENCOUNTER
"Was in 2-11-23 for UTI and was started on antibiotic.  Still running a fever and tested positive for Covid today.  Patient is very anxious to get started on Paxlovid \"tonight.\"      Patient says he has chest tightness but patient is denied the need to go to ED saying it is not cardiac or serious enough for ED, and says his wife got medication from the ED.    Disposition is to ER.  Patient insisted on speaking to someone in the ED.  Writer warm transferred patient to [facility] ER.    Namita Holguin RN  [facility]       Reason for Disposition    SEVERE or constant chest pain or pressure  (Exception: Mild central chest pain, present only when coughing.)    Additional Information    Negative: SEVERE difficulty breathing (e.g., struggling for each breath, speaks in single words)    Negative: Bluish (or gray) lips or face now    Negative: Difficult to awaken or acting confused (e.g., disoriented, slurred speech)    Negative: Shock suspected (e.g., cold/pale/clammy skin, too weak to stand, low BP, rapid pulse)    Negative: Sounds like a life-threatening emergency to the triager    Protocols used: CORONAVIRUS (COVID-19) DIAGNOSED OR [id number]      "

## 2023-02-12 NOTE — NURSING NOTE
"Chief Complaint   Patient presents with     Medication Request     Antiviral           Initial There were no vitals taken for this visit. Estimated body mass index is 31.26 kg/m  as calculated from the following:    Height as of 6/30/22: 1.791 m (5' 10.5\").    Weight as of 6/30/22: 100.2 kg (221 lb).         Norma J. Gosselin, LPN   " 55

## 2023-02-12 NOTE — Clinical Note
Please abstract the following data from this visit with this patient into the appropriate field in Epic:  Tests that can be patient reported without a hard copy:  Eye exam with ophthalmology on this date: January 2023- present Exam Location: [facility]- patient reported  Other Tests found in the patient's chart through Chart Review/Care Everywhere:  Eye exam with ophthalmology done by this group January 2023- present Exam Location: [facility]- patient reported  Note to Abstraction: If this section is blank, no results were found via Chart Review/Care Everywhere.

## 2023-02-12 NOTE — PROGRESS NOTES
"Rj is a 67 year old who is being evaluated via a billable telephone visit.      What phone number would you like to be contacted at? [phone]  How would you like to obtain your AVS? Liza  Distant Location (provider location):  On-site    Assessment & Plan     Infection due to 2019 novel coronavirus  Prescription for Paxlovid given.  Recommend holding rosuvastatin and flomax while on treatment.  Discussed worrisome symptoms to follow up for emergently.  - nirmatrelvir and ritonavir (PAXLOVID) therapy pack; Take 3 tablets by mouth 2 times daily for 5 days (Take 2 Nirmatrelvir tablets and 1 Ritonavir tablet twice daily for 5 days)      27 minutes spent on the date of the encounter doing chart review, history and exam, documentation and further activities per the note       BMI:   Estimated body mass index is 31.26 kg/m  as calculated from the following:    Height as of 6/30/22: 1.791 m (5' 10.5\").    Weight as of 6/30/22: 100.2 kg (221 lb).           No follow-ups on file.    Silvia Brownlee MD  [facility]    Subjective   Rj is a 67 year old, presenting for the following health issues:  Medication Request (Antiviral/)    Son in law and daughter and wife are all positive.  He was in 4 days ago for a urinary tract infection.  He is on antibiotics for urinary tract infection.  He is on omnicef.  His urinary symptoms are not getting better.  6 days ago started with rigors.  He has a history of BPH and an elevated PSA.  He usually gets up every 1-2 hours at night, but more recently needed to get up every 30 minutes.  5 days ago, developed a fever and felt like he was run over by a truck.  His pulse and blood pressure were high, [facility]  Son in law tested Wednesday. His wife tested positive 2 days ago.  She started on paxlovid.  He had all the symptoms, but tested negative repeatedly.  When he was in Emergency Department on 2/8/23, he had a negative test for " "covid.  He has been testing daily since then and was negative.  Started coughing and had a sore throat yesterday.  He finally tested positive last night.  Has had night sweats for several days.  Last night, he called the [place] nurse line and was told to get a prescription for paxlovid.  Not feeling shortness of breath.  Oxygen sats are around 94-96%.  Pulse is usually 60s-70s, but lately has been over 100.  He started taking flomax 3 nights ago to help with the urinary frequency.  Didn't take it last night.  Last dose of crestor was yesterday.    HPI       COVID-19 Symptom Review  How many days ago did these symptoms start? 2/11/23  Body aches, chest hurts when coughing, stuffy nose, dry cough.  Are any of the following symptoms significant for you?    New or worsening difficulty breathing? No    Worsening cough? Yes, it's a dry cough.     Fever or chills? Yes, the highest temperature was 100.1    Headache: YES    Sore throat: YES    Chest pain: YES    Diarrhea: YES    Body aches? YES    What treatments has patient tried? Acetaminophen   Does patient live in a nursing home, group home, or shelter? No  Does patient have a way to get food/medications during quarantined? Yes, I have a friend or family member who can help me.            Review of Systems   Constitutional, HEENT, cardiovascular, pulmonary, GI, , musculoskeletal, neuro, skin, endocrine and psych systems are negative, except as otherwise noted.      Objective    Vitals - Patient Reported  Systolic (Patient Reported): (!) 170  Diastolic (Patient Reported): (!) 104  Height (Patient Reported): 179.1 cm (5' 10.5\")  SpO2 (Patient Reported): 95  Pulse (Patient Reported): 90  Pain Score: Mild Pain (2)  Pain Loc: Low Back        Physical Exam   healthy, alert and no distress  PSYCH: Alert and oriented times 3; coherent speech, normal   rate and volume, able to articulate logical thoughts, able   to abstract reason, no tangential thoughts, no hallucinations "   or delusions  His affect is normal  RESP: No cough, no audible wheezing, able to talk in full sentences  Remainder of exam unable to be completed due to telephone visits                Phone call duration: 19 minutes

## 2023-02-13 LAB
BACTERIA BLD CULT: NO GROWTH
BACTERIA BLD CULT: NO GROWTH

## 2023-03-13 ENCOUNTER — TELEPHONE (OUTPATIENT)
Dept: PEDIATRICS | Facility: OTHER | Age: 68
End: 2023-03-13
Payer: COMMERCIAL

## 2023-03-13 NOTE — TELEPHONE ENCOUNTER
Next available should be fine.    Signed, Doug Porras MD, FAAP, FACP  Internal Medicine & Pediatrics

## 2023-03-13 NOTE — TELEPHONE ENCOUNTER
You have a stress test spot open tomorrow. Can he take that?  Norma J. Gosselin, LPN .......  3/13/2023  4:38 PM

## 2023-03-13 NOTE — TELEPHONE ENCOUNTER
Reason for call: Patient wanting a work in appointment.    Is the appointment for a Hospital Follow up?  No     (If yes - Unable to find an appointment with any provider during the time frame needed. Nurse/Provider - Can this patient be worked into a schedule with PCP or team member?)    Patient is having the following symptoms:  PSA follow up, lots of issues. Was supposed to follow up with Dr. Toledo 3/20/23.     The patient is requesting an appointment with  [facility]    Was an appointment offered for this call? Yes    If Yes, what is the date of the appointment?  3/30/23     Preferred method for responding to this message: Telephone Call    Phone number patient can be reached at? Home number on file [phone] (home)    If we can't reach you directly, may we leave a detailed response at the number you provided? Yes    Can this message wait until your PCP/provider returns if unavailable today? Yes     Krystle Barney on 3/13/2023 at 3:14 PM

## 2023-03-14 NOTE — TELEPHONE ENCOUNTER
Left a message with patient's spouse. Patient will call back to schedule appointment.    Aury Santoro RN on 3/14/2023 at 9:07 AM

## 2023-03-20 ENCOUNTER — OFFICE VISIT (OUTPATIENT)
Dept: PEDIATRICS | Facility: OTHER | Age: 68
End: 2023-03-20
Attending: INTERNAL MEDICINE
Payer: MEDICARE

## 2023-03-20 ENCOUNTER — LAB (OUTPATIENT)
Dept: LAB | Facility: OTHER | Age: 68
End: 2023-03-20
Attending: UROLOGY
Payer: COMMERCIAL

## 2023-03-20 VITALS
OXYGEN SATURATION: 99 % | DIASTOLIC BLOOD PRESSURE: 102 MMHG | HEART RATE: 76 BPM | TEMPERATURE: 97.7 F | RESPIRATION RATE: 20 BRPM | BODY MASS INDEX: 31.95 KG/M2 | HEIGHT: 71 IN | SYSTOLIC BLOOD PRESSURE: 144 MMHG | WEIGHT: 228.2 LBS

## 2023-03-20 DIAGNOSIS — E11.9 DIABETES MELLITUS TYPE 2, DIET-CONTROLLED (H): ICD-10-CM

## 2023-03-20 DIAGNOSIS — N40.1 BENIGN PROSTATIC HYPERPLASIA WITH URINARY FREQUENCY: ICD-10-CM

## 2023-03-20 DIAGNOSIS — R35.0 BENIGN PROSTATIC HYPERPLASIA WITH URINARY FREQUENCY: Primary | ICD-10-CM

## 2023-03-20 DIAGNOSIS — N40.1 BENIGN PROSTATIC HYPERPLASIA WITH URINARY FREQUENCY: Primary | ICD-10-CM

## 2023-03-20 DIAGNOSIS — R35.0 BENIGN PROSTATIC HYPERPLASIA WITH URINARY FREQUENCY: ICD-10-CM

## 2023-03-20 DIAGNOSIS — U07.1 INFECTION DUE TO 2019 NOVEL CORONAVIRUS: Primary | ICD-10-CM

## 2023-03-20 LAB
CREAT UR-MCNC: 108.4 MG/DL
MICROALBUMIN UR-MCNC: <12 MG/L
MICROALBUMIN/CREAT UR: NORMAL MG/G{CREAT}
PSA SERPL DL<=0.01 NG/ML-MCNC: 4.45 NG/ML (ref 0–4.5)

## 2023-03-20 PROCEDURE — G0463 HOSPITAL OUTPT CLINIC VISIT: HCPCS

## 2023-03-20 PROCEDURE — 36415 COLL VENOUS BLD VENIPUNCTURE: CPT | Mod: ZL

## 2023-03-20 PROCEDURE — 84153 ASSAY OF PSA TOTAL: CPT | Mod: ZL

## 2023-03-20 PROCEDURE — 82570 ASSAY OF URINE CREATININE: CPT | Mod: ZL | Performed by: INTERNAL MEDICINE

## 2023-03-20 PROCEDURE — G0463 HOSPITAL OUTPT CLINIC VISIT: HCPCS | Mod: 25

## 2023-03-20 PROCEDURE — 99214 OFFICE O/P EST MOD 30 MIN: CPT | Performed by: INTERNAL MEDICINE

## 2023-03-20 RX ORDER — TAMSULOSIN HYDROCHLORIDE 0.4 MG/1
0.4 CAPSULE ORAL DAILY
Qty: 90 CAPSULE | Refills: 3 | Status: SHIPPED | OUTPATIENT
Start: 2023-03-20 | End: 2023-09-12

## 2023-03-20 ASSESSMENT — PAIN SCALES - GENERAL: PAINLEVEL: NO PAIN (0)

## 2023-03-20 NOTE — PROGRESS NOTES
Assessment & Plan   1. Infection due to 2019 novel coronavirus  Looking back his illness did not appear to be consistent with a urinary tract infection.  Very few white blood cells and no red blood cells were present.  It did take several days of home testing but ultimately he did have a positive test.    2. Benign prostatic hyperplasia with urinary frequency  He does have benign prostate enlargement with rising PSA.  I believe this is the cause however cannot exclude malignancy.  Recommend referral to urology for additional testing which may include imaging and cystoscopy.  He may be a candidate for transurethral resection of prostate.  - Adult Urology  Referral; Future  - tamsulosin (FLOMAX) 0.4 MG capsule; Take 1 capsule (0.4 mg) by mouth daily  Dispense: 90 capsule; Refill: 3    3. Diabetes mellitus type 2, diet-controlled (H)  - Albumin Random Urine Quantitative with Creat Ratio; Future  - Albumin Random Urine Quantitative with Creat Ratio      Signed, Doug Porras MD, FAAP, FACP  Internal Medicine & Pediatrics    Subjective   Rj Juarez is a 67 year old male who presents for gamaliel multiple concerns.  Recently he developed suprapubic pain as well as bilateral costovertebral angle pain and fever with rigors.  He was seen in the ER and diagnosed with a urinary tract infection.  Started on cephalosporin.  When he returned home his sister-in-law developed COVID-19 and tested positive.  His wife very quickly tested positive.  He took multiple home tests ultimately having a positive test.  He felt fairly sick for 10 days however never developed significant hypoxia and never returned for hospitalization.  He did do a telephone call and received and completed a course of Paxlovid.  He continues to have urinary symptoms which are chronic in nature.  He wonders if he should see a urologist.  He has questions about his statin and whether it may be causing some muscle cramping.  His symptoms were better  "while he was off of it for the Paxlovid.    Objective   Vitals: BP (!) 144/102   Pulse 76   Temp 97.7  F (36.5  C) (Tympanic)   Resp 20   Ht 1.791 m (5' 10.5\")   Wt 103.5 kg (228 lb 3.2 oz)   SpO2 99%   BMI 32.28 kg/m      Postvoid residual obtained today, 120 mL    Review and Analysis of Data   I personally reviewed the following:  External notes: No  Results: Yes Most recent lab work reviewed with patient today.  Use of an independent historian: No  Independent review of a test performed by another physician: No  Discussion of management with another physician: No  Moderate risk of morbidity from additional diagnostic testing and/or treatment.     Total time spent in the preparation, care care of this patient, coordination of care, documentation: 33 min.    "

## 2023-03-20 NOTE — NURSING NOTE
"Chief Complaint   Patient presents with     RECHECK     PSA and ER visit     Post void residual 120 mL    Initial BP (!) 144/102   Pulse 76   Temp 97.7  F (36.5  C) (Tympanic)   Resp 20   Ht 1.791 m (5' 10.5\")   Wt 103.5 kg (228 lb 3.2 oz)   SpO2 99%   BMI 32.28 kg/m   Estimated body mass index is 32.28 kg/m  as calculated from the following:    Height as of this encounter: 1.791 m (5' 10.5\").    Weight as of this encounter: 103.5 kg (228 lb 3.2 oz).         Norma J. Gosselin, ALYX   "

## 2023-03-21 ENCOUNTER — MYC MEDICAL ADVICE (OUTPATIENT)
Dept: PEDIATRICS | Facility: OTHER | Age: 68
End: 2023-03-21
Payer: COMMERCIAL

## 2023-03-21 ENCOUNTER — TRANSCRIBE ORDERS (OUTPATIENT)
Dept: OTHER | Age: 68
End: 2023-03-21

## 2023-03-21 DIAGNOSIS — R35.0 BENIGN PROSTATIC HYPERPLASIA WITH URINARY FREQUENCY: Primary | ICD-10-CM

## 2023-03-21 DIAGNOSIS — R97.20 ELEVATED PROSTATE SPECIFIC ANTIGEN (PSA): Primary | ICD-10-CM

## 2023-03-21 DIAGNOSIS — N40.1 BENIGN PROSTATIC HYPERPLASIA WITH URINARY FREQUENCY: Primary | ICD-10-CM

## 2023-03-21 NOTE — TELEPHONE ENCOUNTER
My chart message sent: Rj: Dr. Porras sent a order for MRI for your prostate to our radiology department. Give them until next week to call and schedule you for this. They will run your insurance first then contact you for scheduling. If you have any farther questions please feel free to contact us. Thank you, Nicolasa REINOSO RN

## 2023-03-23 NOTE — TELEPHONE ENCOUNTER
MEDICAL RECORDS REQUEST   [facility]  Urology Clinic  [address] [place] [address]  PHONE: [phone]  Fax: [phone]        FUTURE VISIT INFORMATION                                                   Rj CRENSHAW Larry, : 1955 scheduled for future visit at [facility]    APPOINTMENT INFORMATION:    Date: 2023    Provider:  Eliu Arredondo MD    Reason for Visit/Diagnosis: Benign prostatic hyperplasia with urinary frequency    REFERRAL INFORMATION:    Referring provider:  Doug Porras MD in [facility]    RECORDS REQUESTED FOR VISIT                                                     NOTES  STATUS/DETAILS   OFFICE NOTE from referring provider  yes, 2023 -- Doug Porras MD in [facility]   OFFICE NOTE from other specialist  yes, 03/10/2022, 2021, 2019, 2018 -- Nestor Toledo MD in [facility]    MEDICATION LIST  yes   LABS     URINALYSIS (UA)  yes   PSA  yes     PRE-VISIT CHECKLIST      Record collection complete Yes   Appointment appropriately scheduled           (right time/right provider) Yes   Joint diagnostic appointment coordinated correctly          (ensure right order & amount of time) Yes   MyChart activation Yes   Questionnaire complete If no, please explain PENDING

## 2023-03-28 ENCOUNTER — TELEPHONE (OUTPATIENT)
Dept: PEDIATRICS | Facility: OTHER | Age: 68
End: 2023-03-28
Payer: COMMERCIAL

## 2023-03-28 DIAGNOSIS — F40.240 CLAUSTROPHOBIA: Primary | ICD-10-CM

## 2023-03-28 RX ORDER — LORAZEPAM 0.5 MG/1
0.5 TABLET ORAL
Qty: 2 TABLET | Refills: 0 | Status: SHIPPED | OUTPATIENT
Start: 2023-03-28 | End: 2023-09-12

## 2023-04-10 ENCOUNTER — HOSPITAL ENCOUNTER (OUTPATIENT)
Dept: MRI IMAGING | Facility: OTHER | Age: 68
Discharge: HOME | End: 2023-04-10
Attending: INTERNAL MEDICINE | Admitting: INTERNAL MEDICINE
Payer: MEDICARE

## 2023-04-10 DIAGNOSIS — R97.20 ELEVATED PROSTATE SPECIFIC ANTIGEN (PSA): ICD-10-CM

## 2023-04-10 PROCEDURE — 255N000002 HC RX 255 OP 636: Performed by: INTERNAL MEDICINE

## 2023-04-10 PROCEDURE — G1010 CDSM STANSON: HCPCS

## 2023-04-10 PROCEDURE — A9575 INJ GADOTERATE MEGLUMI 0.1ML: HCPCS | Performed by: INTERNAL MEDICINE

## 2023-04-10 RX ADMIN — GADOTERATE MEGLUMINE 20 ML: 376.9 INJECTION INTRAVENOUS at 08:56

## 2023-04-14 ENCOUNTER — VIRTUAL VISIT (OUTPATIENT)
Dept: UROLOGY | Facility: CLINIC | Age: 68
End: 2023-04-14
Attending: INTERNAL MEDICINE
Payer: COMMERCIAL

## 2023-04-14 ENCOUNTER — PRE VISIT (OUTPATIENT)
Dept: UROLOGY | Facility: CLINIC | Age: 68
End: 2023-04-14

## 2023-04-14 DIAGNOSIS — Z12.5 SCREENING FOR PROSTATE CANCER: ICD-10-CM

## 2023-04-14 DIAGNOSIS — R35.0 BENIGN PROSTATIC HYPERPLASIA WITH URINARY FREQUENCY: ICD-10-CM

## 2023-04-14 DIAGNOSIS — R10.9 LEFT FLANK PAIN: ICD-10-CM

## 2023-04-14 DIAGNOSIS — N40.1 BENIGN PROSTATIC HYPERPLASIA WITH URINARY FREQUENCY: ICD-10-CM

## 2023-04-14 DIAGNOSIS — R97.20 ELEVATED PSA: Primary | ICD-10-CM

## 2023-04-14 PROCEDURE — 99205 OFFICE O/P NEW HI 60 MIN: CPT | Mod: GT | Performed by: UROLOGY

## 2023-04-14 NOTE — PATIENT INSTRUCTIONS
For prostate artery embolization (PAE), this works by blocking blood flow to prostate to allow it to soften and shrink slightly to allow urine to pass easier.  This has approximately an 85% success rate after the procedure and a 15-20% retreatment rate in 5 years.  This has a very low rate of urinary incontinence.  There is no catheter or hospital stay associated with this procedure.  There is a small chance of developing retrograde ejaculation (no semen coming out of the end of the penis).    For holmium laser enucleation of the prostate (HoLEP), this uses a laser to help physically remove the inner prostate tissue from the shell.  This has approximately an 99% success rate after the procedure and a 1-2% retreatment rate in 5 years.  There is a high chance of short term incontinence for 4-6 weeks that may require pads or diapers.  At 3 months, approximately 5% of men are still having leakage and at 6 months and beyond it is 1-2%.  This procedure will take 1-3 hours under general anesthesia depending on the size of the prostate.  Patients can possibly go home the same day without a catheter or stay the night in the hospital if they prefer. Patients should expect to have retrograde ejaculation after surgery (no semen coming out of the end of the penis).    Regarding HoLEP, we can remove just the middle portion of the prostate instead of the whole gland.  This takes about 30 minutes under general anesthesia there is a very low rate of incontinence or retrograde ejaculation after this procedure.  Long-term studies have not been performed strictly with this modality, however retreatment rates are assumed to be similar to steam or prostate artery embolization.

## 2023-04-14 NOTE — PROGRESS NOTES
Name of Medication(s) Requested:  Flomax, Primidone, pantoprazole, Duoneb and Pulmicort. Pharmacy Requested:   [facility]    Medication(s) pended? [x] Yes  [] No    Last Appointment:  8/19/2020    Future appts:  Future Appointments   Date Time Provider Liza العراقي   4/14/2021  3:15 PM MD MORAIMA Flynn [facility]        Does patient need call back?   [] Yes  [x] No Name: Rj Juarez   MRN: [id number]  YOB: 1955    Assessment and Plan:  67 year old male with enlarged prostate and lower urinary tract symptoms.     1. Benign prostatic hyperplasia with urinary frequency  2. Elevated PSA  3. Abnormal prostate MRI   4. Left Flank Pain    Discussed BPH treatment options in general are, including medications   Minimally invasive surgical techniques, PAE and HoLEP.  With each of these, discussed benefits, side effects, treatment/retreatment rates with regard to patients prostate symptoms and size.  For him specifically, he would be a candidate for HoLEP, median lobe only HoLEP or PAE.     Will repeat PSA and UA to reassess elevated PSA.  If elevated still, will consider fusion biopsy.      Will consider options and follow-up plan after PSA    Regarding left flank pain and history of kidney stone, pain could be related to kidney stones.  Will get CT abd/pelvis without contrast to assess for nephrolithiasis.      60 minutes spent on day of encounter with patient, counseling, documenation, orders and coordination of care.      Eliu Arredondo MD  April 14, 2023            Chief Complaint: suprapubic pain    History of Present Illness:  Rj Juarez is a 67 year old male seen in consultation from Dr. Porras regarding BPH and LUTS.      Patient has had symptoms for a few years.  Has urinary leakage with activity, nocturia.  Has some dysuria if holds urine.  Currently, they are voiding spontaneously. They are currently taking an alpha blocker, not taking 5-alpha reductase inhibitor, not taking bladder spasm medication, not taking PDE5 inhibitor for their urination.  Patient is not anticoagulated.    Prostate size 111 ml on MRI from 4/10/2023.  This was PIRADS 3    Was seeing a urologist locally who is no longer there.      BPH related history:  + Urinary tract infections  -- Urinary tract stones  -- Gross hematuria  + Elevated PSA  -- History of Urinary  retention  -- Chronic kidney disease  -- Prior BPH procedure  -- Prior Prostate biopsy   -- History of prostate cancer  + Family history of BPH (father, son)    I reviewed internal records which in summarized above.    I reviewed internal labs, of which pertinent ones include: Hgb   Hemoglobin   Date Value Ref Range Status   02/08/2023 15.5 13.3 - 17.7 g/dL Final   12/24/2020 14.8 13.3 - 17.7 g/dL Final   , Cr   Creatinine   Date Value Ref Range Status   02/08/2023 1.02 0.67 - 1.17 mg/dL Final   12/24/2020 0.94 0.70 - 1.30 mg/dL Final   , PSA   PSA Tumor Marker   Date Value Ref Range Status   03/20/2023 4.45 0.00 - 4.50 ng/mL Final   03/10/2022 2.33 0.00 - 4.00 ug/L Final   , No results found for: UA, No components found for: UC         Past Medical History:  Past Medical History:   Diagnosis Date     Benign lipomatous neoplasm     1/20/2014     Calculus of kidney     possible     Carpal tunnel syndrome     Both hands     Closed fracture of patella     05/06/09,Sustained right superior pole patellar fracture which underwent conservative management     Diverticulosis of large intestine without perforation or abscess without bleeding     1/28/2014     Exertional chest pain 11/19/2019     Headache     No Comments Provided     Other specified forms of tremor     3/2/2011     Other urticaria (CODE)     3/2/2011     Pain in joint     No Comments Provided     Umbilical hernia without obstruction or gangrene     1/26/2018            Past Surgical History:  Past Surgical History:   Procedure Laterality Date     COLONOSCOPY  01/28/2014 2009,2014,F/U 2019     COLONOSCOPY  03/01/2019    Serrated adenoma, follow up 1 year     COLONOSCOPY N/A 1/15/2021    Procedure: COLONOSCOPY, WITH POLYPECTOMY AND BIOPSY;  Surgeon: Ly Frances MD;  Location:  OR     ESOPHAGOSCOPY, GASTROSCOPY, DUODENOSCOPY (EGD), COMBINED      1/28/14,EGD     ESOPHAGOSCOPY, GASTROSCOPY, DUODENOSCOPY (EGD), COMBINED N/A 1/15/2021    Procedure:  ESOPHAGOGASTRODUODENOSCOPY, WITH BIOPSY;  Surgeon: Ly Frances MD;  Location: [facility] OR     JOINT REPLACEMENT, HIP RT/LT Left      OTHER SURGICAL HISTORY      9/10/2014,61510.0,RI REPAIR ING HERNIA  >5 TRS BETTINA     OTHER SURGICAL HISTORY      2018,79722.0,RI REPAIR UMBILICAL NAZARIO  >5 TRS REDUC     RELEASE CARPAL TUNNEL      3,2004,Both hands     SIGMOIDOSCOPY FLEXIBLE      No Comments Provided            Social History:  Social History     Tobacco Use     Smoking status: Never     Smokeless tobacco: Never   Vaping Use     Vaping status: Never Used   Substance Use Topics     Alcohol use: No     Drug use: No            Family History:  Family History   Problem Relation Age of Onset     Hypertension Mother         Hypertension     Diabetes Mother         Diabetes     Other - See Comments Mother         Spinal stenosis     Other - See Comments Father         scleroderma which was quite severe     Cancer Father         Cancer, of adenocarcinoma of the lung.  He was a nonsmoker.     Diabetes Brother         Diabetes     Other - See Comments Brother         Pancreatitis     Substance Abuse Brother         Alcohol/Drug,Alcoholic, has been through treatment a number of times.            Allergies:  Allergies   Allergen Reactions     Ciprofloxacin Other (See Comments)     Tendon issue , and IBS     Midazolam      Other reaction(s): Other - Describe In Comment Field  Difficult to wake up     Tamsulosin Other (See Comments)     Chest pain            Medications:  Current Outpatient Medications   Medication Sig     aspirin 81 MG EC tablet Take 81 mg by mouth daily     famotidine (PEPCID) 20 MG tablet Take 1 tablet (20 mg) by mouth 2 times daily as needed (gerd)     ibuprofen (ADVIL/MOTRIN) 400 MG tablet      LORazepam (ATIVAN) 0.5 MG tablet Take 1 tablet (0.5 mg) by mouth once as needed for anxiety (MRI claustrophobia) Take first dose 15 min prior to MRI.  Okay to repeat x 1 at time of MRI if anxiety     metoprolol  "succinate ER (TOPROL XL) 25 MG 24 hr tablet Take 1 tablet (25 mg) by mouth daily     neomycin-polymyxin-dexamethasone (MAXITROL) 3.5-61777-5.1 SUSP ophthalmic susp INSTILL 1 DROP INTO EACH EYE 1-2 TIMES PER DAY AS NEEDED FOR ITCHY EYES     nitroGLYcerin (NITROSTAT) 0.4 MG sublingual tablet For chest pain place 1 tablet under the tongue every 5 minutes for 3 doses. If symptoms persist 5 minutes after 1st dose call 911.     order for DME Walker, 4ww with seat. Spinal stenosis. 99.     prochlorperazine (COMPAZINE) 5 MG tablet TAKE 1 TABLET BY MOUTH THREE TIMES DAILY AS NEEDED FOR NAUSEA OR VOMITING     rosuvastatin (CRESTOR) 20 MG tablet Take 1 tablet (20 mg) by mouth daily     silver sulfADIAZINE (SILVADENE) 1 % external cream Apply topically daily     sucralfate (CARAFATE) 1 GM tablet Take 1 tablet (1 g) by mouth 4 times daily     tamsulosin (FLOMAX) 0.4 MG capsule Take 1 capsule (0.4 mg) by mouth daily     triamcinolone (KENALOG) 0.1 % cream Apply 1 Film topically 3 times daily     No current facility-administered medications for this visit.       Review of Systems:   ROS: 10 point ROS neg other than the symptoms noted above in the HPI.    Physical Exam:  B/P: Data Unavailable, T: Data Unavailable, P: Data Unavailable, R: Data Unavailable  Estimated body mass index is 32.28 kg/m  as calculated from the following:    Height as of 3/20/23: 1.791 m (5' 10.5\").    Weight as of 3/20/23: 103.5 kg (228 lb 3.2 oz).  General: age-appropriate appearing male in NAD.    Outside records:   I spent 0 minutes reviewing outside records.        Virtual Visit Details    Type of service:  Video Visit   Video Start Time: 9:05 AM  Video End Time:9:56 AM    Originating Location (pt. Location): Home    Distant Location (provider location):  On-site  Platform used for Video Visit: Mann"

## 2023-04-14 NOTE — NURSING NOTE
Is the patient currently in the state of [place]? YES    Visit mode:VIDEO    If the visit is dropped, the patient can be reconnected by: VIDEO VISIT: Text to cell phone: [phone]    Will anyone else be joining the visit? NO      How would you like to obtain your AVS? MyChart    Are changes needed to the allergy or medication list? NO    Reason for visit: Video Visit (New BPH)

## 2023-04-14 NOTE — LETTER
4/14/2023       RE: Rj Juarez  [address] [address]     Dear Colleague,    Thank you for referring your patient, Rj Juarez, to the [facility] [place] [facility]. Please see a copy of my visit note below.    Name: Rj Juarez   MRN: [id number]  YOB: 1955    Assessment and Plan:  67 year old male with enlarged prostate and lower urinary tract symptoms.     1. Benign prostatic hyperplasia with urinary frequency  2. Elevated PSA  3. Abnormal prostate MRI   4. Left Flank Pain    Discussed BPH treatment options in general are, including medications   Minimally invasive surgical techniques, PAE and HoLEP.  With each of these, discussed benefits, side effects, treatment/retreatment rates with regard to patients prostate symptoms and size.  For him specifically, he would be a candidate for HoLEP, median lobe only HoLEP or PAE.     Will repeat PSA and UA to reassess elevated PSA.  If elevated still, will consider fusion biopsy.      Will consider options and follow-up plan after PSA    Regarding left flank pain and history of kidney stone, pain could be related to kidney stones.  Will get CT abd/pelvis without contrast to assess for nephrolithiasis.      60 minutes spent on day of encounter with patient, counseling, documenation, orders and coordination of care.      Eliu Arredondo MD  April 14, 2023            Chief Complaint: suprapubic pain    History of Present Illness:  Rj Juarez is a 67 year old male seen in consultation from Dr. Porras regarding BPH and LUTS.      Patient has had symptoms for a few years.  Has urinary leakage with activity, nocturia.  Has some dysuria if holds urine.  Currently, they are voiding spontaneously. They are currently taking an alpha blocker, not taking 5-alpha reductase inhibitor, not taking bladder spasm medication, not taking PDE5 inhibitor  for their urination.  Patient is not anticoagulated.    Prostate size 111 ml on MRI from 4/10/2023.  This was PIRADS 3    Was seeing a urologist locally who is no longer there.      BPH related history:  + Urinary tract infections  -- Urinary tract stones  -- Gross hematuria  + Elevated PSA  -- History of Urinary retention  -- Chronic kidney disease  -- Prior BPH procedure  -- Prior Prostate biopsy   -- History of prostate cancer  + Family history of BPH (father, son)    I reviewed internal records which in summarized above.    I reviewed internal labs, of which pertinent ones include: Hgb   Hemoglobin   Date Value Ref Range Status   02/08/2023 15.5 13.3 - 17.7 g/dL Final   12/24/2020 14.8 13.3 - 17.7 g/dL Final   , Cr   Creatinine   Date Value Ref Range Status   02/08/2023 1.02 0.67 - 1.17 mg/dL Final   12/24/2020 0.94 0.70 - 1.30 mg/dL Final   , PSA   PSA Tumor Marker   Date Value Ref Range Status   03/20/2023 4.45 0.00 - 4.50 ng/mL Final   03/10/2022 2.33 0.00 - 4.00 ug/L Final   , No results found for: UA, No components found for: UC         Past Medical History:  Past Medical History:   Diagnosis Date    Benign lipomatous neoplasm     1/20/2014    Calculus of kidney     possible    Carpal tunnel syndrome     Both hands    Closed fracture of patella     05/06/09,Sustained right superior pole patellar fracture which underwent conservative management    Diverticulosis of large intestine without perforation or abscess without bleeding     1/28/2014    Exertional chest pain 11/19/2019    Headache     No Comments Provided    Other specified forms of tremor     3/2/2011    Other urticaria (CODE)     3/2/2011    Pain in joint     No Comments Provided    Umbilical hernia without obstruction or gangrene     1/26/2018            Past Surgical History:  Past Surgical History:   Procedure Laterality Date    COLONOSCOPY  01/28/2014 2009,2014,F/U 2019    COLONOSCOPY  03/01/2019    Serrated adenoma, follow up 1 year     COLONOSCOPY N/A 1/15/2021    Procedure: COLONOSCOPY, WITH POLYPECTOMY AND BIOPSY;  Surgeon: Ly Frances MD;  Location: [facility] OR    ESOPHAGOSCOPY, GASTROSCOPY, DUODENOSCOPY (EGD), COMBINED      14,EGD    ESOPHAGOSCOPY, GASTROSCOPY, DUODENOSCOPY (EGD), COMBINED N/A 1/15/2021    Procedure: ESOPHAGOGASTRODUODENOSCOPY, WITH BIOPSY;  Surgeon: Ly Frances MD;  Location: [facility] OR    JOINT REPLACEMENT, HIP RT/LT Left     OTHER SURGICAL HISTORY      9/10/2014,91045.0,DE REPAIR ING HERNIA  >5 TRS BETTINA    OTHER SURGICAL HISTORY      2018,52222.0,DE REPAIR UMBILICAL NAZARIO  >5 TRS REDUC    RELEASE CARPAL TUNNEL      3,2004,Both hands    SIGMOIDOSCOPY FLEXIBLE      No Comments Provided            Social History:  Social History     Tobacco Use    Smoking status: Never    Smokeless tobacco: Never   Vaping Use    Vaping status: Never Used   Substance Use Topics    Alcohol use: No    Drug use: No            Family History:  Family History   Problem Relation Age of Onset    Hypertension Mother         Hypertension    Diabetes Mother         Diabetes    Other - See Comments Mother         Spinal stenosis    Other - See Comments Father         scleroderma which was quite severe    Cancer Father         Cancer, of adenocarcinoma of the lung.  He was a nonsmoker.    Diabetes Brother         Diabetes    Other - See Comments Brother         Pancreatitis    Substance Abuse Brother         Alcohol/Drug,Alcoholic, has been through treatment a number of times.            Allergies:  Allergies   Allergen Reactions    Ciprofloxacin Other (See Comments)     Tendon issue , and IBS    Midazolam      Other reaction(s): Other - Describe In Comment Field  Difficult to wake up    Tamsulosin Other (See Comments)     Chest pain            Medications:  Current Outpatient Medications   Medication Sig    aspirin 81 MG EC tablet Take 81 mg by mouth daily    famotidine (PEPCID) 20 MG tablet Take 1 tablet (20 mg) by mouth 2 times daily as needed  "(gerd)    ibuprofen (ADVIL/MOTRIN) 400 MG tablet     LORazepam (ATIVAN) 0.5 MG tablet Take 1 tablet (0.5 mg) by mouth once as needed for anxiety (MRI claustrophobia) Take first dose 15 min prior to MRI.  Okay to repeat x 1 at time of MRI if anxiety    metoprolol succinate ER (TOPROL XL) 25 MG 24 hr tablet Take 1 tablet (25 mg) by mouth daily    neomycin-polymyxin-dexamethasone (MAXITROL) 3.5-00727-8.1 SUSP ophthalmic susp INSTILL 1 DROP INTO EACH EYE 1-2 TIMES PER DAY AS NEEDED FOR ITCHY EYES    nitroGLYcerin (NITROSTAT) 0.4 MG sublingual tablet For chest pain place 1 tablet under the tongue every 5 minutes for 3 doses. If symptoms persist 5 minutes after 1st dose call 911.    order for DME Walker, 4ww with seat. Spinal stenosis. 99.    prochlorperazine (COMPAZINE) 5 MG tablet TAKE 1 TABLET BY MOUTH THREE TIMES DAILY AS NEEDED FOR NAUSEA OR VOMITING    rosuvastatin (CRESTOR) 20 MG tablet Take 1 tablet (20 mg) by mouth daily    silver sulfADIAZINE (SILVADENE) 1 % external cream Apply topically daily    sucralfate (CARAFATE) 1 GM tablet Take 1 tablet (1 g) by mouth 4 times daily    tamsulosin (FLOMAX) 0.4 MG capsule Take 1 capsule (0.4 mg) by mouth daily    triamcinolone (KENALOG) 0.1 % cream Apply 1 Film topically 3 times daily     No current facility-administered medications for this visit.       Review of Systems:   ROS: 10 point ROS neg other than the symptoms noted above in the HPI.    Physical Exam:  B/P: Data Unavailable, T: Data Unavailable, P: Data Unavailable, R: Data Unavailable  Estimated body mass index is 32.28 kg/m  as calculated from the following:    Height as of 3/20/23: 1.791 m (5' 10.5\").    Weight as of 3/20/23: 103.5 kg (228 lb 3.2 oz).  General: age-appropriate appearing male in NAD.    Outside records:   I spent 0 minutes reviewing outside records.        Virtual Visit Details    Type of service:  Video Visit   Video Start Time: 9:05 AM  Video End Time:9:56 AM    Originating Location (pt. " Location): Home    Distant Location (provider location):  On-site  Platform used for Video Visit: Mann

## 2023-04-14 NOTE — Clinical Note
Thanks for referring Mr. Juarez.  I am going to repeat his PSA and UA to see if PSA is truly elevated.  Going to get Ct to size his prostate and see if he has any stones (has history).  He is considering a procedure and I provided him a few options to consider.  Thanks, Eliu

## 2023-04-19 ENCOUNTER — TELEPHONE (OUTPATIENT)
Dept: UROLOGY | Facility: CLINIC | Age: 68
End: 2023-04-19
Payer: COMMERCIAL

## 2023-04-21 ENCOUNTER — LAB (OUTPATIENT)
Dept: LAB | Facility: OTHER | Age: 68
End: 2023-04-21
Attending: UROLOGY
Payer: MEDICARE

## 2023-04-21 DIAGNOSIS — Z12.5 SCREENING FOR PROSTATE CANCER: ICD-10-CM

## 2023-04-21 DIAGNOSIS — R97.20 ELEVATED PSA: ICD-10-CM

## 2023-04-21 LAB
ALBUMIN UR-MCNC: NEGATIVE MG/DL
APPEARANCE UR: CLEAR
BILIRUB UR QL STRIP: NEGATIVE
COLOR UR AUTO: YELLOW
GLUCOSE UR STRIP-MCNC: NEGATIVE MG/DL
HGB UR QL STRIP: NEGATIVE
KETONES UR STRIP-MCNC: NEGATIVE MG/DL
LEUKOCYTE ESTERASE UR QL STRIP: NEGATIVE
NITRATE UR QL: NEGATIVE
PH UR STRIP: 6 [PH] (ref 5–9)
PSA SERPL DL<=0.01 NG/ML-MCNC: 3.84 NG/ML (ref 0–4.5)
RBC URINE: 0 /HPF
SP GR UR STRIP: 1 (ref 1–1.03)
UROBILINOGEN UR STRIP-MCNC: NORMAL MG/DL
WBC URINE: <1 /HPF

## 2023-04-21 PROCEDURE — 81001 URINALYSIS AUTO W/SCOPE: CPT | Mod: ZL

## 2023-04-21 PROCEDURE — G0103 PSA SCREENING: HCPCS | Mod: ZL

## 2023-04-21 PROCEDURE — 36415 COLL VENOUS BLD VENIPUNCTURE: CPT | Mod: ZL

## 2023-04-25 ENCOUNTER — OFFICE VISIT (OUTPATIENT)
Dept: PEDIATRICS | Facility: OTHER | Age: 68
End: 2023-04-25
Attending: INTERNAL MEDICINE
Payer: COMMERCIAL

## 2023-04-25 VITALS
TEMPERATURE: 96.8 F | DIASTOLIC BLOOD PRESSURE: 86 MMHG | WEIGHT: 231.6 LBS | BODY MASS INDEX: 32.76 KG/M2 | HEART RATE: 82 BPM | RESPIRATION RATE: 16 BRPM | SYSTOLIC BLOOD PRESSURE: 138 MMHG | OXYGEN SATURATION: 97 %

## 2023-04-25 DIAGNOSIS — R35.0 BENIGN PROSTATIC HYPERPLASIA WITH URINARY FREQUENCY: ICD-10-CM

## 2023-04-25 DIAGNOSIS — N40.1 BENIGN PROSTATIC HYPERPLASIA WITH URINARY FREQUENCY: ICD-10-CM

## 2023-04-25 DIAGNOSIS — M46.1 SACROILIITIS (H): Primary | ICD-10-CM

## 2023-04-25 DIAGNOSIS — E11.9 DIABETES MELLITUS TYPE 2, DIET-CONTROLLED (H): ICD-10-CM

## 2023-04-25 DIAGNOSIS — Z23 NEED FOR SHINGLES VACCINE: ICD-10-CM

## 2023-04-25 LAB
CHOLEST SERPL-MCNC: 101 MG/DL
HBA1C MFR BLD: 6.8 % (ref 4–6.2)
HDLC SERPL-MCNC: 40 MG/DL
LDLC SERPL CALC-MCNC: 26 MG/DL
NONHDLC SERPL-MCNC: 61 MG/DL
TRIGL SERPL-MCNC: 175 MG/DL

## 2023-04-25 PROCEDURE — 83036 HEMOGLOBIN GLYCOSYLATED A1C: CPT | Mod: ZL | Performed by: INTERNAL MEDICINE

## 2023-04-25 PROCEDURE — 99214 OFFICE O/P EST MOD 30 MIN: CPT | Performed by: INTERNAL MEDICINE

## 2023-04-25 PROCEDURE — G0463 HOSPITAL OUTPT CLINIC VISIT: HCPCS

## 2023-04-25 PROCEDURE — 36415 COLL VENOUS BLD VENIPUNCTURE: CPT | Mod: ZL | Performed by: INTERNAL MEDICINE

## 2023-04-25 PROCEDURE — 80061 LIPID PANEL: CPT | Mod: ZL | Performed by: INTERNAL MEDICINE

## 2023-04-25 RX ORDER — LISINOPRIL 2.5 MG/1
2.5 TABLET ORAL DAILY
Qty: 90 TABLET | Refills: 3 | Status: SHIPPED | OUTPATIENT
Start: 2023-04-25 | End: 2023-09-12

## 2023-04-25 RX ORDER — PREDNISONE 20 MG/1
40 TABLET ORAL DAILY
Qty: 10 TABLET | Refills: 0 | Status: SHIPPED | OUTPATIENT
Start: 2023-04-25 | End: 2023-04-30

## 2023-04-25 RX ORDER — MIRABEGRON 25 MG/1
25 TABLET, FILM COATED, EXTENDED RELEASE ORAL DAILY
Qty: 90 TABLET | Refills: 3 | Status: SHIPPED | OUTPATIENT
Start: 2023-04-25 | End: 2023-09-12

## 2023-04-25 ASSESSMENT — ANXIETY QUESTIONNAIRES
GAD7 TOTAL SCORE: 5
IF YOU CHECKED OFF ANY PROBLEMS ON THIS QUESTIONNAIRE, HOW DIFFICULT HAVE THESE PROBLEMS MADE IT FOR YOU TO DO YOUR WORK, TAKE CARE OF THINGS AT HOME, OR GET ALONG WITH OTHER PEOPLE: SOMEWHAT DIFFICULT
7. FEELING AFRAID AS IF SOMETHING AWFUL MIGHT HAPPEN: NOT AT ALL
5. BEING SO RESTLESS THAT IT IS HARD TO SIT STILL: NOT AT ALL
GAD7 TOTAL SCORE: 5
GAD7 TOTAL SCORE: 5
3. WORRYING TOO MUCH ABOUT DIFFERENT THINGS: SEVERAL DAYS
7. FEELING AFRAID AS IF SOMETHING AWFUL MIGHT HAPPEN: NOT AT ALL
6. BECOMING EASILY ANNOYED OR IRRITABLE: SEVERAL DAYS
8. IF YOU CHECKED OFF ANY PROBLEMS, HOW DIFFICULT HAVE THESE MADE IT FOR YOU TO DO YOUR WORK, TAKE CARE OF THINGS AT HOME, OR GET ALONG WITH OTHER PEOPLE?: SOMEWHAT DIFFICULT
2. NOT BEING ABLE TO STOP OR CONTROL WORRYING: SEVERAL DAYS
4. TROUBLE RELAXING: SEVERAL DAYS
1. FEELING NERVOUS, ANXIOUS, OR ON EDGE: SEVERAL DAYS

## 2023-04-25 ASSESSMENT — PAIN SCALES - GENERAL: PAINLEVEL: SEVERE PAIN (6)

## 2023-04-25 ASSESSMENT — PATIENT HEALTH QUESTIONNAIRE - PHQ9
SUM OF ALL RESPONSES TO PHQ QUESTIONS 1-9: 3
10. IF YOU CHECKED OFF ANY PROBLEMS, HOW DIFFICULT HAVE THESE PROBLEMS MADE IT FOR YOU TO DO YOUR WORK, TAKE CARE OF THINGS AT HOME, OR GET ALONG WITH OTHER PEOPLE: SOMEWHAT DIFFICULT
SUM OF ALL RESPONSES TO PHQ QUESTIONS 1-9: 3

## 2023-04-25 NOTE — NURSING NOTE
"Chief Complaint   Patient presents with     Back Pain     Diabetes         Initial /86   Pulse 82   Temp 96.8  F (36  C) (Tympanic)   Resp 16   Wt 105.1 kg (231 lb 9.6 oz)   SpO2 97%   BMI 32.76 kg/m   Estimated body mass index is 32.76 kg/m  as calculated from the following:    Height as of 3/20/23: 1.791 m (5' 10.5\").    Weight as of this encounter: 105.1 kg (231 lb 9.6 oz).         Norma J. Gosselin, LPN   "

## 2023-04-25 NOTE — PATIENT INSTRUCTIONS
-- Try Voltaren gel   -- Short prednisone burst   -- PT consult   -- Consider SI joint injection if not improving   -- Consider consult to Dr. Porras    Aspects of Diabetes we can improve:  Hemoglobin A1c Lab Results   Component Value Date    A1C 7.1 06/23/2022    A1C 6.8 01/11/2022    A1C 6.2 12/24/2020    A1C 6.1 09/02/2020    A1C 6.3 05/19/2020    A1C 6.4 08/22/2019    A1C 5.8 10/31/2018    Goal range is under 8. Best is 6.5 to 7   Blood Pressure 138/86 Goal to keep less than 140/90   Tobacco  reports that he has never smoked. He has never used smokeless tobacco. Goal to abstain from tobacco   Aspirin yes Aspirin reduces risk of heart disease and stroke   ACE/ARB start These medications reduce risk of kidney disease   Cholesterol yes Statins reduce risk of heart disease and stroke   Eye Exam schedule Annual diabetic eye exam   Healthy weight Body mass index is 32.76 kg/m . Goal BMI under 30, best is under 25.      -- I'm trying to exercise daily (goal at least 20 min/day) with moderate aerobic activity   -- Eat healthy (resources from ADA at http://www.diabetes.org/)   -- I'm taking good care of my feet. Consider seeing the Podiatrist   -- Check blood sugars as directed, record in log book and bring to every appointment   -- Goal sugar before breakfast: under 140   -- Goal sugar 2 hours after supper: under 170   -- Next diabetes lab draw: 6 months   -- Next diabetes office visit: 6 months     Patient/Caregiver provided printed discharge information.

## 2023-04-25 NOTE — PROGRESS NOTES
Assessment & Plan   1. Sacroiliitis (H)  He points exactly at the left sacroiliac joint.  Symptoms consistent with sacroiliitis.  Discussed options and decided on physical therapy referral.  Patient wondered if prednisone burst may be helpful and we decided it was worth a try.  Adverse effects were discussed.  Conservative measures discussed.  - Physical Therapy Referral; Future  - predniSONE (DELTASONE) 20 MG tablet; Take 2 tablets (40 mg) by mouth daily for 5 days  Dispense: 10 tablet; Refill: 0    2. Diabetes mellitus type 2, diet-controlled (H)  Diabetes has been at goal.  Foot exam within normal limits today.  He is not on an ACE inhibitor and I recommend starting this.  Consider oral diabetic medication if A1c is rising.  Discussed lifestyle modification.  - FOOT EXAM  - HEMOGLOBIN A1C; Future  - Lipid Profile; Future  - lisinopril (ZESTRIL) 2.5 MG tablet; Take 1 tablet (2.5 mg) by mouth daily  Dispense: 90 tablet; Refill: 3  - Albumin Random Urine Quantitative with Creat Ratio; Future  - HEMOGLOBIN A1C  - Lipid Profile    3. Need for shingles vaccine      4. Benign prostatic hyperplasia with urinary frequency  Appreciate urology assistance  - mirabegron (MYRBETRIQ) 25 MG 24 hr tablet; Take 1 tablet (25 mg) by mouth daily  Dispense: 90 tablet; Refill: 3      Patient Instructions      -- Try Voltaren gel   -- Short prednisone burst   -- PT consult   -- Consider SI joint injection if not improving   -- Consider consult to Dr. Porras    Aspects of Diabetes we can improve:  Hemoglobin A1c Lab Results   Component Value Date    A1C 7.1 06/23/2022    A1C 6.8 01/11/2022    A1C 6.2 12/24/2020    A1C 6.1 09/02/2020    A1C 6.3 05/19/2020    A1C 6.4 08/22/2019    A1C 5.8 10/31/2018    Goal range is under 8. Best is 6.5 to 7   Blood Pressure 138/86 Goal to keep less than 140/90   Tobacco  reports that he has never smoked. He has never used smokeless tobacco. Goal to abstain from tobacco   Aspirin yes Aspirin reduces risk  of heart disease and stroke   ACE/ARB start These medications reduce risk of kidney disease   Cholesterol yes Statins reduce risk of heart disease and stroke   Eye Exam schedule Annual diabetic eye exam   Healthy weight Body mass index is 32.76 kg/m . Goal BMI under 30, best is under 25.      -- I'm trying to exercise daily (goal at least 20 min/day) with moderate aerobic activity   -- Eat healthy (resources from ADA at http://www.diabetes.org/)   -- I'm taking good care of my feet. Consider seeing the Podiatrist   -- Check blood sugars as directed, record in log book and bring to every appointment   -- Goal sugar before breakfast: under 140   -- Goal sugar 2 hours after supper: under 170   -- Next diabetes lab draw: 6 months   -- Next diabetes office visit: 6 months        Return in about 6 months (around 10/25/2023), or if symptoms worsen or fail to improve, for med management.    Signed, Doug Porras MD, FAAP, FACP  Internal Medicine & Pediatrics    Subjective   Rj Juarez is a 67 year old male who presents for discuss several concerns.  He has been having some pain on the left side of his low back.  It did not point area.  It hurts with waking up in the morning or rolling over in bed.  If he gets moving it starts to feel better and then if he rests it hurts again.  No known injury or trauma.  He has been trying to avoid heavy lifting.  He would like to see his physical therapist across the road but unfortunately he is booked out about a month.  He is having a lot of problems with his prostate.  He is seeing a urologist.  He may be having a surgical procedure he is not sure which one.  He wants to know if more diabetes medicine may be needed.    Objective   Vitals: /86   Pulse 82   Temp 96.8  F (36  C) (Tympanic)   Resp 16   Wt 105.1 kg (231 lb 9.6 oz)   SpO2 97%   BMI 32.76 kg/m      Foot Exam:  4/25/2023  Intact to monofilament bilaterally.  Skin intact without erythema.      Review and  Analysis of Data   I personally reviewed the following:  External notes: Yes Urology note reviewed  Results: Yes Diabetic labs reviewed, prostate MRI images reviewed with patient  Use of an independent historian: No  Independent review of a test performed by another physician: No  Discussion of management with another physician: No  Moderate risk of morbidity from additional diagnostic testing and/or treatment.

## 2023-05-15 ENCOUNTER — HOSPITAL ENCOUNTER (OUTPATIENT)
Dept: CT IMAGING | Facility: OTHER | Age: 68
Discharge: HOME | End: 2023-05-15
Attending: UROLOGY | Admitting: UROLOGY
Payer: MEDICARE

## 2023-05-15 DIAGNOSIS — R10.9 LEFT FLANK PAIN: ICD-10-CM

## 2023-05-15 PROCEDURE — G1010 CDSM STANSON: HCPCS

## 2023-05-19 ENCOUNTER — TRANSFERRED RECORDS (OUTPATIENT)
Dept: HEALTH INFORMATION MANAGEMENT | Facility: OTHER | Age: 68
End: 2023-05-19
Payer: COMMERCIAL

## 2023-06-12 DIAGNOSIS — Z96.641 HISTORY OF RIGHT HIP REPLACEMENT: Primary | ICD-10-CM

## 2023-06-14 ENCOUNTER — OFFICE VISIT (OUTPATIENT)
Dept: ORTHOPEDICS | Facility: OTHER | Age: 68
End: 2023-06-14
Attending: ORTHOPAEDIC SURGERY
Payer: MEDICARE

## 2023-06-14 ENCOUNTER — HOSPITAL ENCOUNTER (OUTPATIENT)
Dept: GENERAL RADIOLOGY | Facility: OTHER | Age: 68
Discharge: HOME | End: 2023-06-14
Attending: ORTHOPAEDIC SURGERY
Payer: MEDICARE

## 2023-06-14 DIAGNOSIS — Z96.641 HISTORY OF RIGHT HIP REPLACEMENT: ICD-10-CM

## 2023-06-14 DIAGNOSIS — M53.3 SI (SACROILIAC) JOINT DYSFUNCTION: Primary | ICD-10-CM

## 2023-06-14 PROCEDURE — G0463 HOSPITAL OUTPT CLINIC VISIT: HCPCS

## 2023-06-14 PROCEDURE — 99212 OFFICE O/P EST SF 10 MIN: CPT | Performed by: ORTHOPAEDIC SURGERY

## 2023-06-14 PROCEDURE — 73502 X-RAY EXAM HIP UNI 2-3 VIEWS: CPT

## 2023-06-14 NOTE — PROGRESS NOTES
Patient is here for follow up on his right total hip.  Roya Richardson LPN .....................6/14/2023 10:00 AM

## 2023-06-14 NOTE — PROGRESS NOTES
Visit Date: 2023    REASON FOR EVALUATION:  Right hip replacement.    HISTORY:  Hector comes in with regards to his right hip.  He had a fall here off the ladder and then since that time, has had increased pain and discomfort at this point in time.  He is here to do x-rays and ensure that things are doing well.    PHYSICAL EXAMINATION:  Examination of his hip shows fluid range of motion.  Tenderness across the adductor here.  Neurovascular examination intact.  Does tolerate range of motion fairly well at this point in time.  Incisional site is well healed.  Does not appear to have any profound weakness on clinical examination.    IMAGING:  X-rays have been reviewed here today.  The patient's components are in stable alignment and position at this current time.    IMPRESSION:  Right total hip replacement with a postop fall and muscular strain.    PLAN:  Reassurances.  Certainly, symptoms will get better here with time.  We will plan to revisit with patient here otherwise as the symptoms dictate and warrant in the future.  Anti-inflammatories, give it time, as well as therapies are the alberto for him.    Wade Ochoa MD        D: 2023   T: 2023   MT: [id number]    Name:     JEFE JACOB  MRN:      [id number]        Account:    [id number]   :      1955           Visit Date: 2023     Document: [id number]

## 2023-06-19 DIAGNOSIS — F40.240 CLAUSTROPHOBIA: ICD-10-CM

## 2023-06-20 RX ORDER — LORAZEPAM 0.5 MG/1
0.5 TABLET ORAL
Qty: 2 TABLET | Refills: 0 | OUTPATIENT
Start: 2023-06-20

## 2023-06-20 NOTE — TELEPHONE ENCOUNTER
[facility] sent Rx request for the following:      Requested Prescriptions   Pending Prescriptions Disp Refills     LORazepam (ATIVAN) 0.5 MG tablet [Pharmacy Med Name: lorazepam 0.5 mg tablet] 2 tablet 0     Sig: Take 1 tablet (0.5 mg) by mouth once as needed for anxiety (MRI claustrophobia) Take first dose 15 min prior to MRI. Okay to repeat x 1 at time of MRI if anxiety   Last Prescription Date:   3/28/23  Last Fill Qty/Refills:         2, R-0    Last Office Visit:              4/28/23   Future Office visit:           None    Sheyla Villalobos RN .............. 6/20/2023  4:12 PM

## 2023-06-20 NOTE — TELEPHONE ENCOUNTER
I have not ordered an MRI.    Signed, Doug Porras MD, FAAP, FACP  Internal Medicine & Pediatrics

## 2023-06-21 NOTE — TELEPHONE ENCOUNTER
Note to pharmacy with denial by provider:    Request refused: Patient needs appointment    Sheyla Villalobos RN .............. 6/21/2023  8:27 AM

## 2023-08-04 DIAGNOSIS — R97.20 ELEVATED PROSTATE SPECIFIC ANTIGEN (PSA): ICD-10-CM

## 2023-08-04 DIAGNOSIS — R35.0 BENIGN PROSTATIC HYPERPLASIA WITH URINARY FREQUENCY: ICD-10-CM

## 2023-08-04 DIAGNOSIS — N40.1 BENIGN PROSTATIC HYPERPLASIA WITH URINARY FREQUENCY: ICD-10-CM

## 2023-08-11 ENCOUNTER — LAB (OUTPATIENT)
Dept: LAB | Facility: OTHER | Age: 68
End: 2023-08-11
Attending: UROLOGY
Payer: MEDICARE

## 2023-08-11 DIAGNOSIS — R35.0 BENIGN PROSTATIC HYPERPLASIA WITH URINARY FREQUENCY: ICD-10-CM

## 2023-08-11 DIAGNOSIS — N40.1 BENIGN PROSTATIC HYPERPLASIA WITH URINARY FREQUENCY: ICD-10-CM

## 2023-08-11 LAB
ALBUMIN UR-MCNC: NEGATIVE MG/DL
APPEARANCE UR: CLEAR
BILIRUB UR QL STRIP: NEGATIVE
COLOR UR AUTO: ABNORMAL
GLUCOSE UR STRIP-MCNC: 50 MG/DL
HGB UR QL STRIP: NEGATIVE
KETONES UR STRIP-MCNC: NEGATIVE MG/DL
LEUKOCYTE ESTERASE UR QL STRIP: NEGATIVE
NITRATE UR QL: NEGATIVE
PH UR STRIP: 6 [PH] (ref 5–9)
PSA FREE MFR SERPL: 18.81 %
PSA FREE SERPL-MCNC: 0.6 NG/ML
PSA SERPL DL<=0.01 NG/ML-MCNC: 3.19 NG/ML (ref 0–4.5)
SP GR UR STRIP: 1.02 (ref 1–1.03)
UROBILINOGEN UR STRIP-MCNC: NORMAL MG/DL

## 2023-08-11 PROCEDURE — 84153 ASSAY OF PSA TOTAL: CPT

## 2023-08-11 PROCEDURE — 81003 URINALYSIS AUTO W/O SCOPE: CPT | Mod: QW,ZL

## 2023-08-11 PROCEDURE — 84154 ASSAY OF PSA FREE: CPT

## 2023-08-11 PROCEDURE — 36415 COLL VENOUS BLD VENIPUNCTURE: CPT

## 2023-08-26 DIAGNOSIS — I25.10 CORONARY ARTERY DISEASE INVOLVING NATIVE CORONARY ARTERY OF NATIVE HEART WITHOUT ANGINA PECTORIS: ICD-10-CM

## 2023-08-26 DIAGNOSIS — I10 ESSENTIAL HYPERTENSION: Chronic | ICD-10-CM

## 2023-08-26 DIAGNOSIS — I25.10 CAD, MULTIPLE VESSEL: ICD-10-CM

## 2023-08-26 DIAGNOSIS — Z98.890 STATUS POST CARDIAC CATHETERIZATION: ICD-10-CM

## 2023-08-26 DIAGNOSIS — I25.10 ASCVD (ARTERIOSCLEROTIC CARDIOVASCULAR DISEASE): Chronic | ICD-10-CM

## 2023-08-26 DIAGNOSIS — Z98.890 HISTORY OF CARDIAC CATH: ICD-10-CM

## 2023-09-01 ENCOUNTER — MYC MEDICAL ADVICE (OUTPATIENT)
Dept: PEDIATRICS | Facility: OTHER | Age: 68
End: 2023-09-01
Payer: COMMERCIAL

## 2023-09-01 RX ORDER — ROSUVASTATIN CALCIUM 20 MG/1
20 TABLET, COATED ORAL DAILY
Qty: 90 TABLET | Refills: 0 | Status: SHIPPED | OUTPATIENT
Start: 2023-09-01 | End: 2023-09-12

## 2023-09-01 RX ORDER — METOPROLOL SUCCINATE 25 MG/1
25 TABLET, EXTENDED RELEASE ORAL DAILY
Qty: 90 TABLET | Refills: 0 | Status: SHIPPED | OUTPATIENT
Start: 2023-09-01 | End: 2023-09-12

## 2023-09-01 NOTE — TELEPHONE ENCOUNTER
Reason for call: Medication or medication refill    Name of medication requested: Metoprolol, rosuvastatin    How many days of medication do you have left? Three days; please call patient to let him know if will be able to fill today.    What pharmacy do you use? [facility]    Preferred method for responding to this message: Telephone Call    Phone number patient can be reached at: Cell number on file:    Telephone Information:   Home [phone]       If we cannot reach you directly, may we leave a detailed response at the number you provided? Yes     Krystle Barney on 9/1/2023 at 2:06 PM

## 2023-09-01 NOTE — TELEPHONE ENCOUNTER
[facility] sent Rx request for the following:      Requested Prescriptions   Pending Prescriptions Disp Refills    metoprolol succinate ER (TOPROL XL) 25 MG 24 hr tablet [Pharmacy Med Name: metoprolol succinate ER 25 mg tablet,extended release 24 hr] 90 tablet 3     Sig: TAKE 1 TABLET BY MOUTH DAILY   Last Prescription Date:   6/23/22  Last Fill Qty/Refills:         90, R-3        rosuvastatin (CRESTOR) 20 MG tablet [Pharmacy Med Name: rosuvastatin 20 mg tablet] 90 tablet 3     Sig: Take 1 tablet (20 mg) by mouth daily   Last Prescription Date:   6/23/22  Last Fill Qty/Refills:         90, R-3      Last Office Visit:              4/25/23   Future Office visit:             Next 5 appointments (look out 90 days)      Sep 12, 2023  8:00 AM  PHYSICAL with Doug Porras MD  [facility] ([facility] ) [address]  [place] [address]  [phone]          Per LOV note:  Return in about 6 months (around 10/25/2023), or if symptoms worsen or fail to improve, for med management.     Routing to covering provider for refill consideration, as PCP/provider is out of clinic >48 hours or Pt is completely out of medication and provider is out of the clinic today.    Sheyla Villalobos RN .............. 9/1/2023  2:03 PM

## 2023-09-01 NOTE — TELEPHONE ENCOUNTER
See other note from today, patient is out and needs this before the end of the day,  Olivia Matias RN on 9/1/2023 at 2:19 PM

## 2023-09-12 ENCOUNTER — OFFICE VISIT (OUTPATIENT)
Dept: PEDIATRICS | Facility: OTHER | Age: 68
End: 2023-09-12
Attending: INTERNAL MEDICINE
Payer: COMMERCIAL

## 2023-09-12 VITALS
HEIGHT: 70 IN | SYSTOLIC BLOOD PRESSURE: 138 MMHG | TEMPERATURE: 96.4 F | HEART RATE: 74 BPM | RESPIRATION RATE: 16 BRPM | WEIGHT: 229.8 LBS | DIASTOLIC BLOOD PRESSURE: 86 MMHG | BODY MASS INDEX: 32.9 KG/M2 | OXYGEN SATURATION: 98 %

## 2023-09-12 DIAGNOSIS — R93.1 HIGH CORONARY ARTERY CALCIUM SCORE: ICD-10-CM

## 2023-09-12 DIAGNOSIS — Z98.890 HISTORY OF CARDIAC CATH: ICD-10-CM

## 2023-09-12 DIAGNOSIS — I25.10 CORONARY ARTERY DISEASE INVOLVING NATIVE CORONARY ARTERY OF NATIVE HEART WITHOUT ANGINA PECTORIS: ICD-10-CM

## 2023-09-12 DIAGNOSIS — Z98.890 STATUS POST CARDIAC CATHETERIZATION: ICD-10-CM

## 2023-09-12 DIAGNOSIS — E78.2 MIXED HYPERLIPIDEMIA: ICD-10-CM

## 2023-09-12 DIAGNOSIS — I25.10 ASCVD (ARTERIOSCLEROTIC CARDIOVASCULAR DISEASE): Chronic | ICD-10-CM

## 2023-09-12 DIAGNOSIS — R07.9 CHEST PAIN, EXERTIONAL: ICD-10-CM

## 2023-09-12 DIAGNOSIS — I25.10 CAD, MULTIPLE VESSEL: ICD-10-CM

## 2023-09-12 DIAGNOSIS — I25.10 STENOSIS OF LEFT ANTERIOR DESCENDING (LAD) ARTERY: ICD-10-CM

## 2023-09-12 DIAGNOSIS — E11.9 DIABETES MELLITUS TYPE 2, DIET-CONTROLLED (H): ICD-10-CM

## 2023-09-12 DIAGNOSIS — Z00.00 ENCOUNTER FOR MEDICARE ANNUAL WELLNESS EXAM: Primary | ICD-10-CM

## 2023-09-12 DIAGNOSIS — N40.1 BENIGN PROSTATIC HYPERPLASIA WITH URINARY FREQUENCY: ICD-10-CM

## 2023-09-12 DIAGNOSIS — I10 ESSENTIAL HYPERTENSION: Chronic | ICD-10-CM

## 2023-09-12 DIAGNOSIS — I25.10 STENOSIS OF RIGHT CORONARY ARTERY: ICD-10-CM

## 2023-09-12 DIAGNOSIS — R35.0 BENIGN PROSTATIC HYPERPLASIA WITH URINARY FREQUENCY: ICD-10-CM

## 2023-09-12 DIAGNOSIS — K21.00 GASTROESOPHAGEAL REFLUX DISEASE WITH ESOPHAGITIS, UNSPECIFIED WHETHER HEMORRHAGE: ICD-10-CM

## 2023-09-12 LAB
ANION GAP SERPL CALCULATED.3IONS-SCNC: 10 MMOL/L (ref 7–15)
BUN SERPL-MCNC: 14.5 MG/DL (ref 8–23)
CALCIUM SERPL-MCNC: 9.5 MG/DL (ref 8.8–10.2)
CHLORIDE SERPL-SCNC: 104 MMOL/L (ref 98–107)
CHOLEST SERPL-MCNC: 89 MG/DL
CREAT SERPL-MCNC: 0.85 MG/DL (ref 0.67–1.17)
CREAT UR-MCNC: 135.3 MG/DL
DEPRECATED HCO3 PLAS-SCNC: 24 MMOL/L (ref 22–29)
EGFRCR SERPLBLD CKD-EPI 2021: >90 ML/MIN/1.73M2
ERYTHROCYTE [DISTWIDTH] IN BLOOD BY AUTOMATED COUNT: 12.6 % (ref 10–15)
GLUCOSE SERPL-MCNC: 157 MG/DL (ref 70–99)
HBA1C MFR BLD: 6.9 % (ref 4–6.2)
HCT VFR BLD AUTO: 46.4 % (ref 40–53)
HDLC SERPL-MCNC: 40 MG/DL
HGB BLD-MCNC: 16.1 G/DL (ref 13.3–17.7)
LDLC SERPL CALC-MCNC: 38 MG/DL
MCH RBC QN AUTO: 30.9 PG (ref 26.5–33)
MCHC RBC AUTO-ENTMCNC: 34.7 G/DL (ref 31.5–36.5)
MCV RBC AUTO: 89 FL (ref 78–100)
MICROALBUMIN UR-MCNC: <12 MG/L
MICROALBUMIN/CREAT UR: NORMAL MG/G{CREAT}
NONHDLC SERPL-MCNC: 49 MG/DL
PLATELET # BLD AUTO: 206 10E3/UL (ref 150–450)
POTASSIUM SERPL-SCNC: 4.3 MMOL/L (ref 3.4–5.3)
RBC # BLD AUTO: 5.21 10E6/UL (ref 4.4–5.9)
SODIUM SERPL-SCNC: 138 MMOL/L (ref 136–145)
TRIGL SERPL-MCNC: 55 MG/DL
WBC # BLD AUTO: 5.4 10E3/UL (ref 4–11)

## 2023-09-12 PROCEDURE — 83036 HEMOGLOBIN GLYCOSYLATED A1C: CPT | Mod: ZL | Performed by: INTERNAL MEDICINE

## 2023-09-12 PROCEDURE — 36415 COLL VENOUS BLD VENIPUNCTURE: CPT | Mod: ZL | Performed by: INTERNAL MEDICINE

## 2023-09-12 PROCEDURE — 90662 IIV NO PRSV INCREASED AG IM: CPT

## 2023-09-12 PROCEDURE — 85027 COMPLETE CBC AUTOMATED: CPT | Mod: ZL | Performed by: INTERNAL MEDICINE

## 2023-09-12 PROCEDURE — 82570 ASSAY OF URINE CREATININE: CPT | Mod: ZL | Performed by: INTERNAL MEDICINE

## 2023-09-12 PROCEDURE — 80048 BASIC METABOLIC PNL TOTAL CA: CPT | Mod: ZL | Performed by: INTERNAL MEDICINE

## 2023-09-12 PROCEDURE — G0439 PPPS, SUBSEQ VISIT: HCPCS | Performed by: INTERNAL MEDICINE

## 2023-09-12 PROCEDURE — 80061 LIPID PANEL: CPT | Mod: ZL | Performed by: INTERNAL MEDICINE

## 2023-09-12 RX ORDER — NITROGLYCERIN 0.4 MG/1
TABLET SUBLINGUAL
Qty: 25 TABLET | Refills: 3 | Status: SHIPPED | OUTPATIENT
Start: 2023-09-12

## 2023-09-12 RX ORDER — LISINOPRIL 2.5 MG/1
2.5 TABLET ORAL DAILY
Qty: 90 TABLET | Refills: 3 | Status: SHIPPED | OUTPATIENT
Start: 2023-09-12 | End: 2024-04-02

## 2023-09-12 RX ORDER — METOPROLOL SUCCINATE 25 MG/1
25 TABLET, EXTENDED RELEASE ORAL DAILY
Qty: 90 TABLET | Refills: 3 | Status: SHIPPED | OUTPATIENT
Start: 2023-09-12

## 2023-09-12 RX ORDER — ROSUVASTATIN CALCIUM 20 MG/1
20 TABLET, COATED ORAL DAILY
Qty: 90 TABLET | Refills: 3 | Status: SHIPPED | OUTPATIENT
Start: 2023-09-12

## 2023-09-12 RX ORDER — TAMSULOSIN HYDROCHLORIDE 0.4 MG/1
0.4 CAPSULE ORAL DAILY
Qty: 90 CAPSULE | Refills: 3 | Status: SHIPPED | OUTPATIENT
Start: 2023-09-12

## 2023-09-12 RX ORDER — FAMOTIDINE 20 MG/1
20 TABLET, FILM COATED ORAL 2 TIMES DAILY PRN
Qty: 180 TABLET | Refills: 3 | Status: SHIPPED | OUTPATIENT
Start: 2023-09-12

## 2023-09-12 RX ORDER — MIRABEGRON 25 MG/1
25 TABLET, FILM COATED, EXTENDED RELEASE ORAL DAILY
Qty: 90 TABLET | Refills: 3 | Status: SHIPPED | OUTPATIENT
Start: 2023-09-12 | End: 2024-04-02

## 2023-09-12 ASSESSMENT — ENCOUNTER SYMPTOMS
CONSTIPATION: 0
EYE PAIN: 1
JOINT SWELLING: 1
FREQUENCY: 1
NERVOUS/ANXIOUS: 1
DIZZINESS: 1
COUGH: 1
WEAKNESS: 1
FEVER: 1
ARTHRALGIAS: 1
HEMATURIA: 0
HEMATOCHEZIA: 0
MYALGIAS: 1
DIARRHEA: 1
CHILLS: 1
HEADACHES: 0

## 2023-09-12 ASSESSMENT — PAIN SCALES - GENERAL: PAINLEVEL: NO PAIN (0)

## 2023-09-12 ASSESSMENT — ACTIVITIES OF DAILY LIVING (ADL): CURRENT_FUNCTION: NO ASSISTANCE NEEDED

## 2023-09-12 NOTE — NURSING NOTE
"Chief Complaint   Patient presents with    Medicare Visit     annual         Initial /86   Pulse 74   Temp (!) 96.4  F (35.8  C) (Tympanic)   Resp 16   Ht 1.778 m (5' 10\")   Wt 104.2 kg (229 lb 12.8 oz)   SpO2 98%   BMI 32.97 kg/m   Estimated body mass index is 32.97 kg/m  as calculated from the following:    Height as of this encounter: 1.778 m (5' 10\").    Weight as of this encounter: 104.2 kg (229 lb 12.8 oz).         Norma J. Gosselin, LPN     "

## 2023-09-12 NOTE — PATIENT INSTRUCTIONS
Aspects of Diabetes we can improve:  Hemoglobin A1c Lab Results   Component Value Date    A1C 6.8 04/25/2023    A1C 7.1 06/23/2022    A1C 6.8 01/11/2022    A1C 6.2 12/24/2020    A1C 6.1 09/02/2020    A1C 6.3 05/19/2020    A1C 6.4 08/22/2019    A1C 5.8 10/31/2018    Goal range is under 8. Best is 6.5 to 7   Blood Pressure 138/86 Goal to keep less than 140/90   Tobacco  reports that he has never smoked. He has never used smokeless tobacco. Goal to abstain from tobacco   Aspirin yes Aspirin reduces risk of heart disease and stroke   ACE/ARB lisinopril These medications reduce risk of kidney disease   Cholesterol Crestor Statins reduce risk of heart disease and stroke   Eye Exam annual Annual diabetic eye exam   Healthy weight Body mass index is 32.97 kg/m . Goal BMI under 30, best is under 25.      -- I'm trying to exercise daily (goal at least 20 min/day) with moderate aerobic activity   -- Eat healthy (resources from ADA at http://www.diabetes.org/)   -- I'm taking good care of my feet. Consider seeing the Podiatrist   -- Check blood sugars as directed, record in log book and bring to every appointment   -- Goal sugar before breakfast: under 140   -- Goal sugar 2 hours after supper: under 170   -- Next diabetes lab draw: 6-12 months   -- Next diabetes office visit: 6-12 months     -- Flu shot today   -- Get the new shingles vaccine series from a nurse-only visit, pharmacy or [facility] ([facility] RN).      Check your Blood Pressure   -- Sit in a chair, feet flat on the floor for 5 minutes   -- Avoid caffeine, exercise and smoking for 30 minutes before checking   -- Have your arm at the level of your heart   -- Make sure you have the correct size cuff   -- Write them down, bring your log book in to your appointment   -- Goal blood pressure 120/70     -- Learn about DASH Diet for dietary ways to reduce blood pressure  Google: NIH DASH diet  [facility] site:  https://www.nhlbi.nih.gov/health-topics/dash-eating-plan  PDF from [facility]: https://www.nhlbi.nih.gov/files/docs/public/heart/new_dash.pdf    What should I do?   -- Reduce salt intake (box, can, package, restaurant, salt shaker)   -- Reduce caffeine   -- Reduce alcohol   -- Work on 5% weight loss   -- Daily physical exercise (American Heart recommends 30-45 minutes of brisk walking 5 days per week)    Overactive Bladder: foods to consider avoiding/reducing  Certain foods and beverages might irritate your bladder, including:    Coffee, tea and carbonated drinks, even without caffeine  Alcohol  Certain acidic fruits -- oranges, grapefruits, blaise and limes -- and fruit juices  Spicy foods  Tomato-based products  Carbonated drinks  Chocolate    Consider avoiding these possible bladder irritants for about a week to see if your symptoms improve. Then gradually -- every one to two days -- add one back into your diet, noting any changes in urinary urgency, frequency or incontinence.    (Source [facility]: https://www.[facility].org/diseases-conditions/urinary-incontinence/in-depth/bladder-control-problem/ART-53855249?p=1)        Patient Education   Personalized Prevention Plan  You are due for the preventive services outlined below.  Your care team is available to assist you in scheduling these services.  If you have already completed any of these items, please share that information with your care team to update in your medical record.  Health Maintenance Due   Topic Date Due    Zoster (Shingles) Vaccine (1 of 2) Never done    Colorectal Cancer Screening  01/15/2022    COVID-19 Vaccine (6 - Pfizer series) 01/16/2023    A1C Lab  07/25/2023    Flu Vaccine (1) 09/01/2023     Preventing Falls: Care Instructions    Talk to your doctor about the medicines you take. Ask if any of them increase the risk of falls and whether they can be changed or stopped.   Try to exercise regularly. It can help improve your strength and balance.  "This can help lower your risk of falling.     Practice fall safety and prevention.    Wear low-heeled shoes that fit well and give your feet good support. Talk to your doctor if you have foot problems that make this hard.  Carry a cellphone or wear a medical alert device that you can use to call for help.  Use stepladders instead of chairs to reach high objects. Don't climb if you're at risk for falls. Ask for help, if needed.  Wear the correct eyeglasses, if you need them.    Make your home safer.    Remove rugs, cords, clutter, and furniture from walkways.  Keep your house well lit. Use night-lights in hallways and bathrooms.  Install and use sturdy handrails on stairways.  Wear nonskid footwear, even inside. Don't walk barefoot or in socks without shoes.    Be safe outside.    Use handrails, curb cuts, and ramps whenever possible.  Keep your hands free by using a shoulder bag or backpack.  Try to walk in well-lit areas. Watch out for uneven ground, changes in pavement, and debris.  Be careful in the winter. Walk on the grass or gravel when sidewalks are slippery. Use de-icer on steps and walkways. Add non-slip devices to shoes.    Put grab bars and nonskid mats in your shower or tub and near the toilet. Try to use a shower chair or bath bench when bathing.   Get into a tub or shower by putting in your weaker leg first. Get out with your strong side first. Have a phone or medical alert device in the bathroom with you.   Where can you learn more?  Go to https://www.[employer].net/patiented  Enter G117 in the search box to learn more about \"Preventing Falls: Care Instructions.\"  Current as of: November 9, 2022               Content Version: 13.7    0820-5021 [employer].   Care instructions adapted under license by your healthcare professional. If you have questions about a medical condition or this instruction, always ask your healthcare professional. [employer] disclaims any warranty or " liability for your use of this information.      How to Get Up Safely After a Fall: Care Instructions  Overview     If you have injuries, health problems, or other reasons that may make it easy for you to fall at home, it is a good idea to learn how to get up safely after a fall. Learning how to get up correctly can help you avoid making an injury worse.  Also, knowing what to do if you cannot get up can help you stay safe until help arrives.  Follow-up care is a key part of your treatment and safety. Be sure to make and go to all appointments, and call your doctor if you are having problems. It's also a good idea to know your test results and keep a list of the medicines you take.  How can you care for yourself after a fall?  If you think you can get up  First lie still for a few minutes and think about how you feel. If your body feels okay and you think you can get up safely, follow the rest of the steps below:  Look for a chair or other piece of furniture that is close to you.  Roll onto your side and rest. Roll by turning your head in the direction you want to roll, move your shoulder and arm, then hip and leg in the same direction.  Lie still for a moment to let your blood pressure adjust.  Slowly push your upper body up, lift your head, and take a moment to rest.  Slowly get up on your hands and knees, and crawl to the chair or other stable piece of furniture.  Put your hands on the chair.  Move one foot forward, and place it flat on the floor. Your other leg should be bent with the knee on the floor.  Rise slowly, turn your body, and sit in the chair. Stay seated for a bit and think about how you feel. Call for help. Even if you feel okay, let someone know what happened to you. You might not know that you have a serious injury.  If you cannot get up  If you think you are injured after a fall or you cannot get up, try not to panic.  Call out for help.  If you have a phone within reach or you have an emergency  "call device, use it to call for help.  If you do not have a phone within reach, try to slide yourself toward it. If you cannot get to the phone, try to slide toward a door or window or a place where you think you can be heard.  [place] or use an object to make noise so someone might hear you.  If you can reach something that you can use for a pillow, place it under your head. Try to stay warm by covering yourself with a blanket or clothing while you wait for help.  When should you call for help?   Call 911 anytime you think you may need emergency care. For example, call if:    You passed out (lost consciousness).     You cannot get up after a fall.     You have severe pain.   Call your doctor now or seek immediate medical care if:    You have new or worse pain.     You are dizzy or lightheaded.     You hit your head.   Watch closely for changes in your health, and be sure to contact your doctor if:    You do not get better as expected.   Where can you learn more?  Go to https://www.[employer].net/patiented  Enter G513 in the search box to learn more about \"How to Get Up Safely After a Fall: Care Instructions.\"  Current as of: November 14, 2022               Content Version: 13.7    5515-4841 [employer].   Care instructions adapted under license by your healthcare professional. If you have questions about a medical condition or this instruction, always ask your healthcare professional. [employer] disclaims any warranty or liability for your use of this information.         "

## 2023-09-12 NOTE — PROGRESS NOTES
SUBJECTIVE:   Rj is a 67 year old who presents for Preventive Visit.      Are you in the first 12 months of your Medicare coverage?  No    HPI      Have you ever done Advance Care Planning? (For example, a Health Directive, POLST, or a discussion with a medical provider or your loved ones about your wishes): Yes, patient states has an Advance Care Planning document and will bring a copy to the clinic.       Fall risk  Fallen 2 or more times in the past year?: Yes  Any fall with injury in the past year?: Yes    Cognitive Screening   1) Repeat 3 items (Leader, Season, Table)    2) Clock draw: NORMAL  3) 3 item recall: Recalls NO objects   Results: 0 items recalled: PROBABLE COGNITIVE IMPAIRMENT, **INFORM PROVIDER**    Mini-CogTM Copyright VINAY Mcnally. Licensed by the author for use in [facility]; reprinted with permission (eliecer@[facility]). All rights reserved.      Do you have sleep apnea, excessive snoring or daytime drowsiness? : yes    Reviewed and updated as needed this visit by clinical staff   Tobacco  Allergies  Meds  Problems  Med Hx  Surg Hx  Fam Hx  Soc   Hx        Reviewed and updated as needed this visit by Provider    Allergies  Meds  Problems   Surg Hx          Social History     Tobacco Use    Smoking status: Never    Smokeless tobacco: Never   Substance Use Topics    Alcohol use: No             9/12/2023     8:04 AM   Alcohol Use   Prescreen: >3 drinks/day or >7 drinks/week? No          No data to display              Do you have a current opioid prescription? No  Do you use any other controlled substances or medications that are not prescribed by a provider? None              Current providers sharing in care for this patient include:   Patient Care Team:  Doug Porras MD as PCP - General (Pediatrics)  Doug Porras MD as Assigned PCP  Wade Ochoa MD as Assigned Musculoskeletal Provider  Eliu Arredondo MD as MD (Urology)  Eliu Arredondo MD as  "Assigned Surgical Provider    The following health maintenance items are reviewed in Epic and correct as of today:  Health Maintenance   Topic Date Due    ZOSTER IMMUNIZATION (1 of 2) Never done    COVID-19 Vaccine (6 - Pfizer series) 01/16/2023    COLORECTAL CANCER SCREENING  01/01/2026 (Originally 1955)    A1C  12/12/2023    EYE EXAM  01/01/2024    DIABETIC FOOT EXAM  04/25/2024    MEDICARE ANNUAL WELLNESS VISIT  09/12/2024    BMP  09/12/2024    LIPID  09/12/2024    MICROALBUMIN  09/12/2024    FALL RISK ASSESSMENT  09/12/2024    ADVANCE CARE PLANNING  09/12/2028    DTAP/TDAP/TD IMMUNIZATION (3 - Td or Tdap) 10/31/2028    HEPATITIS C SCREENING  Completed    PHQ-2 (once per calendar year)  Completed    INFLUENZA VACCINE  Completed    Pneumococcal Vaccine: 65+ Years  Completed    AORTIC ANEURYSM SCREENING (SYSTEM ASSIGNED)  Completed    IPV IMMUNIZATION  Aged Out    HPV IMMUNIZATION  Aged Out    MENINGITIS IMMUNIZATION  Aged Out    URINE DRUG SCREEN  Discontinued     Lab work is in process  Labs reviewed in EPIC  Imm reviewed        Review of Systems  Constitutional, HEENT, cardiovascular, pulmonary, gi and gu systems are negative, except as otherwise noted.    OBJECTIVE:   /86   Pulse 74   Temp (!) 96.4  F (35.8  C) (Tympanic)   Resp 16   Ht 1.778 m (5' 10\")   Wt 104.2 kg (229 lb 12.8 oz)   SpO2 98%   BMI 32.97 kg/m   Estimated body mass index is 32.97 kg/m  as calculated from the following:    Height as of this encounter: 1.778 m (5' 10\").    Weight as of this encounter: 104.2 kg (229 lb 12.8 oz).  Physical Exam  Gen: Alert, NAD.  Neck: No carotid bruits  CV: RRR no m/r/g  Pulm: CTAB, no w/r/r. No increased work of breathing  Msk: No LE edema  Neuro: Grossly intact  Skin: No concerning lesions.  Psychiatric: Normal affect and insight. Does not appear anxious or depressed.      Diagnostic Test Results:  Labs reviewed in Epic    ASSESSMENT / PLAN:       ICD-10-CM    1. Encounter for Medicare annual " wellness exam  Z00.00       2. Coronary artery disease involving native coronary artery of native heart without angina pectoris  I25.10 metoprolol succinate ER (TOPROL XL) 25 MG 24 hr tablet     rosuvastatin (CRESTOR) 20 MG tablet      3. Status post cardiac catheterization on 12/6/2019 [facility] with a stenting to his distal circumflex  Z98.890 metoprolol succinate ER (TOPROL XL) 25 MG 24 hr tablet      4. History of cardiac cath on 12/6/2019  Z98.890 metoprolol succinate ER (TOPROL XL) 25 MG 24 hr tablet      5. CAD, multiple vessel  I25.10 metoprolol succinate ER (TOPROL XL) 25 MG 24 hr tablet     nitroGLYcerin (NITROSTAT) 0.4 MG sublingual tablet      6. Essential hypertension  I10 metoprolol succinate ER (TOPROL XL) 25 MG 24 hr tablet     Basic metabolic panel     Basic metabolic panel      7. ASCVD (arteriosclerotic cardiovascular disease)  I25.10 metoprolol succinate ER (TOPROL XL) 25 MG 24 hr tablet     nitroGLYcerin (NITROSTAT) 0.4 MG sublingual tablet     CBC with platelets     CBC with platelets      8. Benign prostatic hyperplasia with urinary frequency  N40.1 tamsulosin (FLOMAX) 0.4 MG capsule    R35.0 mirabegron (MYRBETRIQ) 25 MG 24 hr tablet      9. Chest pain, exertional  R07.9 nitroGLYcerin (NITROSTAT) 0.4 MG sublingual tablet      10. High coronary artery calcium score at 1118.2 on 11/21/2019  R93.1 nitroGLYcerin (NITROSTAT) 0.4 MG sublingual tablet      11. Stenosis of right coronary artery  I25.10 nitroGLYcerin (NITROSTAT) 0.4 MG sublingual tablet      12. Stenosis of left anterior descending (LAD) artery  I25.10 nitroGLYcerin (NITROSTAT) 0.4 MG sublingual tablet      13. High coronary artery calcium score  R93.1 nitroGLYcerin (NITROSTAT) 0.4 MG sublingual tablet      14. Diabetes mellitus type 2, diet-controlled (H)  E11.9 lisinopril (ZESTRIL) 2.5 MG tablet     Hemoglobin A1c     Hemoglobin A1c     Albumin Random Urine Quantitative with Creat Ratio      15. Gastroesophageal reflux  disease with esophagitis, unspecified whether hemorrhage  K21.00 famotidine (PEPCID) 20 MG tablet      16. Mixed hyperlipidemia  E78.2 Lipid Profile     Lipid Profile        Patient Instructions   Aspects of Diabetes we can improve:  Hemoglobin A1c Lab Results   Component Value Date    A1C 6.8 04/25/2023    A1C 7.1 06/23/2022    A1C 6.8 01/11/2022    A1C 6.2 12/24/2020    A1C 6.1 09/02/2020    A1C 6.3 05/19/2020    A1C 6.4 08/22/2019    A1C 5.8 10/31/2018    Goal range is under 8. Best is 6.5 to 7   Blood Pressure 138/86 Goal to keep less than 140/90   Tobacco  reports that he has never smoked. He has never used smokeless tobacco. Goal to abstain from tobacco   Aspirin yes Aspirin reduces risk of heart disease and stroke   ACE/ARB lisinopril These medications reduce risk of kidney disease   Cholesterol Crestor Statins reduce risk of heart disease and stroke   Eye Exam annual Annual diabetic eye exam   Healthy weight Body mass index is 32.97 kg/m . Goal BMI under 30, best is under 25.      -- I'm trying to exercise daily (goal at least 20 min/day) with moderate aerobic activity   -- Eat healthy (resources from ADA at http://www.diabetes.org/)   -- I'm taking good care of my feet. Consider seeing the Podiatrist   -- Check blood sugars as directed, record in log book and bring to every appointment   -- Goal sugar before breakfast: under 140   -- Goal sugar 2 hours after supper: under 170   -- Next diabetes lab draw: 6-12 months   -- Next diabetes office visit: 6-12 months     -- Flu shot today   -- Get the new shingles vaccine series from a nurse-only visit, pharmacy or [facility] ([facility] RN).      Check your Blood Pressure   -- Sit in a chair, feet flat on the floor for 5 minutes   -- Avoid caffeine, exercise and smoking for 30 minutes before checking   -- Have your arm at the level of your heart   -- Make sure you have the correct size cuff   -- Write them down, bring your log book in to your  "appointment   -- Goal blood pressure 120/70     -- Learn about DASH Diet for dietary ways to reduce blood pressure  Google: [facility] DASH diet  [facility] site: https://www.nhlbi.nih.gov/health-topics/dash-eating-plan  PDF from [facility]: https://www.nhlbi.nih.gov/files/docs/public/heart/new_dash.pdf    What should I do?   -- Reduce salt intake (box, can, package, restaurant, salt shaker)   -- Reduce caffeine   -- Reduce alcohol   -- Work on 5% weight loss   -- Daily physical exercise (American Heart recommends 30-45 minutes of brisk walking 5 days per week)    Overactive Bladder: foods to consider avoiding/reducing  Certain foods and beverages might irritate your bladder, including:    Coffee, tea and carbonated drinks, even without caffeine  Alcohol  Certain acidic fruits -- oranges, grapefruits, blaise and limes -- and fruit juices  Spicy foods  Tomato-based products  Carbonated drinks  Chocolate    Consider avoiding these possible bladder irritants for about a week to see if your symptoms improve. Then gradually -- every one to two days -- add one back into your diet, noting any changes in urinary urgency, frequency or incontinence.    (Source [facility]: https://www.[facility].org/diseases-conditions/urinary-incontinence/in-depth/bladder-control-problem/ART-66175425?p=1)        SignedDoug MD, FAAP, FACP  Internal Medicine & Pediatrics            COUNSELING:  Reviewed preventive health counseling, as reflected in patient instructions      BMI:   Estimated body mass index is 32.97 kg/m  as calculated from the following:    Height as of this encounter: 1.778 m (5' 10\").    Weight as of this encounter: 104.2 kg (229 lb 12.8 oz).   Weight management plan: Discussed healthy diet and exercise guidelines      He reports that he has never smoked. He has never used smokeless tobacco.      Appropriate preventive services were discussed with this patient, including applicable screening as appropriate for cardiovascular disease, " "diabetes, osteopenia/osteoporosis, and glaucoma.  As appropriate for age/gender, discussed screening for colorectal cancer, prostate cancer, breast cancer, and cervical cancer. Checklist reviewing preventive services available has been given to the patient.    Reviewed patients plan of care and provided an AVS. The Basic Care Plan (routine screening as documented in Health Maintenance) for Rj meets the Care Plan requirement. This Care Plan has been established and reviewed with the Patient.          Doug Porras MD  [facility]    Identified Health Risks:  I have reviewed Opioid Use Disorder and Substance Use Disorder risk factors and made any needed referrals. Answers submitted by the patient for this visit:  Annual Preventive Visit (Submitted on 9/12/2023)  Chief Complaint: Annual Exam:  In general, how would you rate your overall physical health?: good  Frequency of exercise:: 6-7 days/week  Do you usually eat at least 4 servings of fruit and vegetables a day, include whole grains & fiber, and avoid regularly eating high fat or \"junk\" foods? : Yes  Taking medications regularly:: Yes  Medication side effects:: Muscle aches  Activities of Daily Living: no assistance needed  Home safety: no safety concerns identified  Hearing Impairment:: difficulty following a conversation in a noisy restaurant or crowded room, feel that people are mumbling or not speaking clearly, difficulty following dialogue in the theater, difficult to understand a speaker at a public meeting or [employer] service, need to ask people to speak up or repeat themselves, difficulty understanding soft or whispered speech, difficulty understanding speech on the telephone  In the past 6 months, have you been bothered by leaking of urine?: Yes  Blood in stool: No  Blood in urine: No  chills: Yes  congestion: No  constipation: No  cough: Yes  diarrhea: Yes  dizziness: Yes  ear pain: Yes  eye pain: Yes  nervous/anxious: " Yes  fever: Yes  frequency: Yes  genital sores: No  headaches: No  hearing loss: Yes  arthralgias: Yes  joint swelling: Yes  myalgias: Yes  urgency: Yes  rash: Yes  visual disturbance: Yes  weakness: Yes  impotence: No  penile discharge: No  In general, how would you rate your overall mental or emotional health?: good  Additional concerns today:: No  Exercise outside of work (Submitted on 9/12/2023)  Chief Complaint: Annual Exam:  Duration of exercise:: Less than 15 minutes

## 2023-09-12 NOTE — PROGRESS NOTES
"He is at risk for falling and has been provided with information to reduce the risk of falling at home.Answers submitted by the patient for this visit:  Annual Preventive Visit (Submitted on 9/12/2023)  Chief Complaint: Annual Exam:  In general, how would you rate your overall physical health?: good  Frequency of exercise:: 6-7 days/week  Do you usually eat at least 4 servings of fruit and vegetables a day, include whole grains & fiber, and avoid regularly eating high fat or \"junk\" foods? : Yes  Taking medications regularly:: Yes  Medication side effects:: Muscle aches  Activities of Daily Living: no assistance needed  Home safety: no safety concerns identified  Hearing Impairment:: difficulty following a conversation in a noisy restaurant or crowded room, feel that people are mumbling or not speaking clearly, difficulty following dialogue in the theater, difficult to understand a speaker at a public meeting or [employer] service, need to ask people to speak up or repeat themselves, difficulty understanding soft or whispered speech, difficulty understanding speech on the telephone  In the past 6 months, have you been bothered by leaking of urine?: Yes  Blood in stool: No  Blood in urine: No  chills: Yes  congestion: No  constipation: No  cough: Yes  diarrhea: Yes  dizziness: Yes  ear pain: Yes  eye pain: Yes  nervous/anxious: Yes  fever: Yes  frequency: Yes  genital sores: No  headaches: No  hearing loss: Yes  arthralgias: Yes  joint swelling: Yes  myalgias: Yes  urgency: Yes  rash: Yes  visual disturbance: Yes  weakness: Yes  impotence: No  penile discharge: No  In general, how would you rate your overall mental or emotional health?: good  Additional concerns today:: No  Exercise outside of work (Submitted on 9/12/2023)  Chief Complaint: Annual Exam:  Duration of exercise:: Less than 15 minutes    "

## 2023-10-11 ENCOUNTER — TRANSFERRED RECORDS (OUTPATIENT)
Dept: HEALTH INFORMATION MANAGEMENT | Facility: OTHER | Age: 68
End: 2023-10-11
Payer: COMMERCIAL

## 2023-12-19 ENCOUNTER — PATIENT OUTREACH (OUTPATIENT)
Dept: GASTROENTEROLOGY | Facility: CLINIC | Age: 68
End: 2023-12-19
Payer: COMMERCIAL

## 2024-01-10 ENCOUNTER — TELEPHONE (OUTPATIENT)
Dept: PEDIATRICS | Facility: OTHER | Age: 69
End: 2024-01-10
Payer: COMMERCIAL

## 2024-01-10 DIAGNOSIS — R97.20 ELEVATED PROSTATE SPECIFIC ANTIGEN (PSA): Primary | ICD-10-CM

## 2024-01-10 NOTE — TELEPHONE ENCOUNTER
Patient is wanting to get a PSA lab done. He states he is overdue for this. Can you place orders for a PSA lab for patient?   Patient is scheduled with Dr Akins on 01/18/24 and would like it done prior to seeing him.     Thanks,    Melita Plata on 1/10/2024 at 11:08 AM

## 2024-01-11 DIAGNOSIS — Z85.51 PERSONAL HISTORY OF MALIGNANT NEOPLASM OF BLADDER: Primary | ICD-10-CM

## 2024-01-15 ENCOUNTER — TRANSFERRED RECORDS (OUTPATIENT)
Dept: MULTI SPECIALTY CLINIC | Facility: CLINIC | Age: 69
End: 2024-01-15

## 2024-01-15 LAB — RETINOPATHY: NORMAL

## 2024-01-18 ENCOUNTER — OFFICE VISIT (OUTPATIENT)
Dept: UROLOGY | Facility: OTHER | Age: 69
End: 2024-01-18
Attending: UROLOGY
Payer: MEDICARE

## 2024-01-18 ENCOUNTER — LAB (OUTPATIENT)
Dept: LAB | Facility: OTHER | Age: 69
End: 2024-01-18
Attending: UROLOGY
Payer: COMMERCIAL

## 2024-01-18 VITALS
BODY MASS INDEX: 33.66 KG/M2 | WEIGHT: 234.6 LBS | RESPIRATION RATE: 16 BRPM | SYSTOLIC BLOOD PRESSURE: 148 MMHG | OXYGEN SATURATION: 98 % | HEART RATE: 83 BPM | DIASTOLIC BLOOD PRESSURE: 96 MMHG | TEMPERATURE: 97.4 F

## 2024-01-18 DIAGNOSIS — R93.5 ABNORMAL MRI, PELVIS: ICD-10-CM

## 2024-01-18 DIAGNOSIS — Z87.898 HISTORY OF ELEVATED PSA: ICD-10-CM

## 2024-01-18 DIAGNOSIS — N40.1 BPH WITH OBSTRUCTION/LOWER URINARY TRACT SYMPTOMS: Primary | ICD-10-CM

## 2024-01-18 DIAGNOSIS — N13.8 BPH WITH OBSTRUCTION/LOWER URINARY TRACT SYMPTOMS: Primary | ICD-10-CM

## 2024-01-18 DIAGNOSIS — N32.89 BLADDER WALL THICKENING: ICD-10-CM

## 2024-01-18 DIAGNOSIS — R97.20 ELEVATED PROSTATE SPECIFIC ANTIGEN (PSA): ICD-10-CM

## 2024-01-18 DIAGNOSIS — Z85.51 PERSONAL HISTORY OF MALIGNANT NEOPLASM OF BLADDER: ICD-10-CM

## 2024-01-18 LAB
ALBUMIN UR-MCNC: NEGATIVE MG/DL
APPEARANCE UR: CLEAR
BILIRUB UR QL STRIP: NEGATIVE
COLOR UR AUTO: YELLOW
GLUCOSE UR STRIP-MCNC: NEGATIVE MG/DL
HGB UR QL STRIP: NEGATIVE
KETONES UR STRIP-MCNC: NEGATIVE MG/DL
LEUKOCYTE ESTERASE UR QL STRIP: NEGATIVE
NITRATE UR QL: NEGATIVE
PH UR STRIP: 6 [PH] (ref 5–9)
PSA SERPL DL<=0.01 NG/ML-MCNC: 4.25 NG/ML (ref 0–6.5)
SP GR UR STRIP: 1.01 (ref 1–1.03)
UROBILINOGEN UR STRIP-MCNC: NORMAL MG/DL

## 2024-01-18 PROCEDURE — 36415 COLL VENOUS BLD VENIPUNCTURE: CPT | Mod: ZL

## 2024-01-18 PROCEDURE — 99214 OFFICE O/P EST MOD 30 MIN: CPT | Performed by: UROLOGY

## 2024-01-18 PROCEDURE — G0463 HOSPITAL OUTPT CLINIC VISIT: HCPCS

## 2024-01-18 PROCEDURE — 81003 URINALYSIS AUTO W/O SCOPE: CPT | Mod: ZL

## 2024-01-18 PROCEDURE — 84153 ASSAY OF PSA TOTAL: CPT | Mod: ZL

## 2024-01-18 ASSESSMENT — PAIN SCALES - GENERAL: PAINLEVEL: MODERATE PAIN (5)

## 2024-01-18 NOTE — PROGRESS NOTES
Chief Complaint: Elevated PSA, BPH.    HPI: Mr. Rj Juarez is a 68 year old year old male presenting today January 18, 2024 in follow up for evaluation of elevated PSA and underlying BPH.    Previous patient Dr. Toledo seen last in 2022.  He is since seen Dr. Arredondo, an outlying urologist for evaluation of the same.    History includes BPH for which he is currently on tamsulosin.  He has been tried on Myrbetriq remotely in the past.    Previous MRI has demonstrated a prostate volume of 111 cc.  This was done 4/10/2023.  There was a PI-RADS 3 lesion but no biopsy was done.    His last visit with Dr. Arredondo in April 2023 discussed HoLEP versus PAD given his large prostate size.  It was mentioned that he should consider fusion biopsy if his PSA continues to rise.  PSA ended up improving to 3.19 in August 2023    Was experiencing left flank pain at that time for which a noncontrast CT was done on 5/15/2023.  That was negative for nephrolithiasis however did demonstrate findings consistent with bladder outlet obstruction with a thickened bladder wall.    Here today for follow-up evaluation.  No longer taking tamsulosin secondary to side effects .  Drinks tea throughout the day and struggles with urgency and urge dribbling.  Stream is weak overall.  Nocturia Q 1.5 hours.  No blood or UTI.     Past Medical History:   Diagnosis Date    Benign lipomatous neoplasm     1/20/2014    Calculus of kidney     possible    Carpal tunnel syndrome     Both hands    Closed fracture of patella     05/06/09,Sustained right superior pole patellar fracture which underwent conservative management    Diverticulosis of large intestine without perforation or abscess without bleeding     1/28/2014    Exertional chest pain 11/19/2019    Headache     No Comments Provided    Other specified forms of tremor     3/2/2011    Other urticaria (CODE)     3/2/2011    Pain in joint     No Comments Provided    Umbilical hernia without obstruction  or gangrene     1/26/2018       Past Surgical History:   Procedure Laterality Date    COLONOSCOPY  01/28/2014    2009,2014,F/U 2019    COLONOSCOPY  03/01/2019    Serrated adenoma, follow up 1 year    COLONOSCOPY N/A 01/15/2021    5 year, due 1/2026. Procedure: COLONOSCOPY, WITH POLYPECTOMY AND BIOPSY;  Surgeon: Ly Frances MD;  Location: [facility] OR    ESOPHAGOSCOPY, GASTROSCOPY, DUODENOSCOPY (EGD), COMBINED      1/28/14,EGD    ESOPHAGOSCOPY, GASTROSCOPY, DUODENOSCOPY (EGD), COMBINED N/A 01/15/2021    Procedure: ESOPHAGOGASTRODUODENOSCOPY, WITH BIOPSY;  Surgeon: Ly Frances MD;  Location: [facility] OR    JOINT REPLACEMENT, HIP RT/LT Left     OTHER SURGICAL HISTORY      9/10/2014,44678.0,CA REPAIR ING HERNIA  >5 TRS BETTINA    OTHER SURGICAL HISTORY      1/26/2018,51763.0,CA REPAIR UMBILICAL NAZARIO  >5 TRS REDUC    RELEASE CARPAL TUNNEL      3,12/2004,Both hands    SIGMOIDOSCOPY FLEXIBLE      No Comments Provided       Current Outpatient Medications   Medication Sig Dispense Refill    aspirin 81 MG EC tablet Take 81 mg by mouth daily      famotidine (PEPCID) 20 MG tablet Take 1 tablet (20 mg) by mouth 2 times daily as needed (gerd) 180 tablet 3    ibuprofen (ADVIL/MOTRIN) 400 MG tablet       lisinopril (ZESTRIL) 2.5 MG tablet Take 1 tablet (2.5 mg) by mouth daily 90 tablet 3    metoprolol succinate ER (TOPROL XL) 25 MG 24 hr tablet Take 1 tablet (25 mg) by mouth daily 90 tablet 3    mirabegron (MYRBETRIQ) 25 MG 24 hr tablet Take 1 tablet (25 mg) by mouth daily 90 tablet 3    neomycin-polymyxin-dexamethasone (MAXITROL) 3.5-28301-2.1 SUSP ophthalmic susp INSTILL 1 DROP INTO EACH EYE 1-2 TIMES PER DAY AS NEEDED FOR ITCHY EYES      nitroGLYcerin (NITROSTAT) 0.4 MG sublingual tablet For chest pain place 1 tablet under the tongue every 5 minutes for 3 doses. If symptoms persist 5 minutes after 1st dose call 911. 25 tablet 3    rosuvastatin (CRESTOR) 20 MG tablet Take 1 tablet (20 mg) by mouth daily 90 tablet 3    silver sulfADIAZINE  (SILVADENE) 1 % external cream Apply topically daily 85 g 1    tamsulosin (FLOMAX) 0.4 MG capsule Take 1 capsule (0.4 mg) by mouth daily 90 capsule 3    triamcinolone (KENALOG) 0.1 % cream Apply 1 Film topically 3 times daily      order for DME Walker, 4ww with seat. Spinal stenosis. 99. 1 each 11       ALLERGIES: Ciprofloxacin, Midazolam, and Tamsulosin      REVIEW OF SYSTEMS:  Skin: negative  Eyes: negative, contacts, contact intolerance  Ears/Nose/Throat: negative  Respiratory: cough, or hemoptysis and Shortness of breath-   Cardiovascular: stent placed in heart   Gastrointestinal: heartburn and diarrhea  Genitourinary: nocturia, frequency, urgency, decreased urinary stream, and incontinence  Musculoskeletal: back pain, arthritis, and joint swelling  Neurologic: negative  Psychiatric: negative  Hematologic/Lymphatic/Immunologic: negative  Endocrine: diabetes   Antonietta العلي LPN on 1/18/2024 at 2:44 PM     GENERAL PHYSICAL EXAM:   Vitals: BP (!) 148/96 (BP Location: Right arm, Patient Position: Sitting, Cuff Size: Adult Large)   Pulse 83   Temp 97.4  F (36.3  C) (Temporal)   Resp 16   Wt 106.4 kg (234 lb 9.6 oz)   SpO2 98%   BMI 33.66 kg/m    Body mass index is 33.66 kg/m .    GENERAL: Well groomed, well developed, well nourished male in NAD.  ENT:  ENT exam normal  CV:  Warm extremities   RESPIRATORY: Normal respiratory effort.   GI:  Soft, NT, ND,  MS: Moving all 4  NEURO: Alert and oriented x 3.  PSYCH: Normal mood and affect, pleasant and agreeable during interview and exam.    :    Prostate > 80gms smooth, benign , bulbous    PVR: Residual urine by ultrasound was 72 ml.      RADIOLOGY: The following tests were reviewed:   CT ABDOMEN PELVIS W/O CONTRAST     CLINICAL HISTORY: Male, age 67 years,  left flank pain and history of  kidney stones; low dose protocol if possible; Left flank pain;     Comparison:  CT scan abdomen and pelvis 3/29/2018     TECHNIQUE:  CT was performed of the abdomen and pelvis  without   contrast. Axial; sagittal and coronal reconstructed images were  reviewed.      FINDINGS:  The lung bases and visualized portions of the heart demonstrate no  acute abnormality. Dense coronary artery calcifications are again  seen, as are emphysematous changes in the lung bases.     Stomach and duodenum: Moderate volume of ingested material without  acute abnormality     Liver: Unremarkable.     Gallbladder: Unremarkable.     Spleen: Unremarkable.     Pancreas: Unremarkable.     Adrenal glands: Unremarkable     Kidneys: Unremarkable.     Ureters: Visualized portions of the left and right ureter are  unremarkable. Streak artifact arising from bilateral hip arthroplasty  devices limits evaluation of the pelvic structures.     Urinary bladder: Visualized portions demonstrate wall thickening.     Prostate enlargement is again seen, not well characterized on the  current examination.     Large and small bowel: Diverticulosis of the colon. No apparent  diverticulitis.     Appendix: Unremarkable.     Scattered atherosclerotic calcifications are seen throughout the  abdominal aorta and arterial structures of the abdomen/pelvis without  evidence of acute abnormality. There is no evidence of pathologic  lymph node enlargement.     Bony structures: Postoperative changes in the lower lumbar spine.  Bilateral hip arthroplasty devices are now appreciated.                                                                      IMPRESSION:   Bilateral hip arthroplasty devices limits evaluation of the pelvis.     Limited evaluation of the urinary bladder suggests nodular wall  thickening suggesting cystitis.     No apparent abnormality of the kidneys or visualized portions of the  ureters.      Additional nonacute findings as listed above.     This facility minimizes radiation dose by adjusting the mA and/or kV  according to each patient size.     This CT scan was performed using one or more the following dose  reduction  techniques:     -Automated exposure control,  -Adjustment of the mA and/or kV according to patient's size, and/or,  -Use of iterative reconstruction technique.     LALITHA SANTOS MD     LABS: The last test results for Mr. Rj Juarez were reviewed:  No results found for this or any previous visit (from the past 24 hour(s)).      PSA - 1/18/24 4.25 ng/ml   Lab Results   Component Value Date    PSA 3.19 08/11/2023    PSA 3.84 04/21/2023    PSA 4.45 03/20/2023    PSA 2.33 03/10/2022     BMP -   Recent Labs   Lab Test 09/12/23  0905 02/08/23  1320 06/23/22  0942    134* 139   POTASSIUM 4.3 3.8 4.4   CHLORIDE 104 99 104   CO2 24 26 30   BUN 14.5 9.0 17   CR 0.85 1.02 0.97   * 167* 133*   COMFORT 9.5 9.3 9.6       CBC -   Recent Labs   Lab Test 09/12/23  0905 02/08/23  1320 06/23/22  0942   WBC 5.4 11.5* 4.6   HGB 16.1 15.5 15.2    204 209       ASSESSMENT:   BPH with lower urinary tract symptoms on tamsulosin  Bladder wall thickening  Elevated PSA  Abnormal MRI of the prostate, PI-RADS 3    PLAN:   Overall stable although his PSA is up slightly.  However given his prostate size and very normal PSA density and very reassuring percent free PSA I think we are safe to watch him for now.    MRI was equivocal and we could argue that that does not deserve a biopsy either.    Exam is stable and not concerning whatsoever for cancer.  It is however very enlarged hence his moderate lower urinary tract symptoms.  These are exacerbated by his extreme and excessive caffeine use.    He is very interested in avoiding any type of biopsy and surgery.  Given that I explained that he should practice better behavior.  He was advised to limit his caffeine considerably and then to reevaluate.  If his symptoms persist then a trial again of tamsulosin off the caffeine and if that fails to relieve his symptoms then consideration of HoLEP or robotic assisted prostatectomy.  I think PAE is a poor choice.    Numerous  questions.  He required a lot of reassurance.  He left fully understanding his prostate situation and how best to manage it.    Follow-up in 1 year.    35 minutes spent on the date of this encounter doing chart review, history and exam, documentation and further activities as noted above.      Shaq Akins MD  [facility]

## 2024-01-18 NOTE — NURSING NOTE
"Chief Complaint   Patient presents with    Consult     Elevated PSA    Patient presents to the clinic today for a consult for elevated PSA     Post-Void Residual  A post-void residual was measured by ultrasonic bladder scanner.  72 mL  Antonietta العلي LPN  1/18/2024 3:16 PM      Review Of Systems  Skin: negative  Eyes: negative, contacts, contact intolerance  Ears/Nose/Throat: negative  Respiratory: cough, or hemoptysis and Shortness of breath-   Cardiovascular: stent placed in heart   Gastrointestinal: heartburn and diarrhea  Genitourinary: nocturia, frequency, urgency, decreased urinary stream, and incontinence  Musculoskeletal: back pain, arthritis, and joint swelling  Neurologic: negative  Psychiatric: negative  Hematologic/Lymphatic/Immunologic: negative  Endocrine: diabetes          Initial There were no vitals taken for this visit. Estimated body mass index is 32.97 kg/m  as calculated from the following:    Height as of 9/12/23: 1.778 m (5' 10\").    Weight as of 9/12/23: 104.2 kg (229 lb 12.8 oz).  Meds Reconciled: complete      Antonietta العلي LPN,LPN on 1/18/2024 at 2:44 PM  Ext. [phone]        Antonietta العلي LPN  "

## 2024-03-20 ENCOUNTER — TELEPHONE (OUTPATIENT)
Dept: PEDIATRICS | Facility: OTHER | Age: 69
End: 2024-03-20
Payer: COMMERCIAL

## 2024-03-20 DIAGNOSIS — E11.9 DIABETES MELLITUS TYPE 2, DIET-CONTROLLED (H): Primary | ICD-10-CM

## 2024-03-20 NOTE — TELEPHONE ENCOUNTER
Soonest available visit with Doug Porras MD on 5/14/2024. Visit scheduled.     Shu Turcios LPN on 3/20/2024 at 12:47 PM

## 2024-03-20 NOTE — TELEPHONE ENCOUNTER
This is something that requires a visit.  If he has new concerns he should be seen sooner.    he may need to see one of my partners.  If no one available, he may need to present to [facility].    Doug Curiel MD, FAAP, FACP  Internal Medicine & Pediatrics

## 2024-03-20 NOTE — TELEPHONE ENCOUNTER
Patient feels he is having side effects from the lisinopril. He has developed a terrible cough , it is not cold related. Also would like his A1C checked.         Angelia Hernandez on 3/20/2024 at 10:34 AM

## 2024-03-20 NOTE — TELEPHONE ENCOUNTER
Patient contacted and informed writer that when prescribed Lisinopril 6 months ago, he was not taking as ordered. His Cardiologist recommended that to protect his kidneys, he should start with the Lisinopril. He started and after taking for a week, he developed a terrible cough. He stopped and the cough went away. He restarted, and cough returned. He also wants to discuss his diabetes and start treatment for this as well. Informed him that for his concerns, he will need to be seen. Voiced understanding and transferred to the  to schedule. He is requesting a Hemoglobin A1C ordered and if any other labs to check his diabetes needs to be ordered. A1C pended. Please advise, thank you.     Shu Turcios LPN on 3/20/2024 at 11:50 AM

## 2024-03-20 NOTE — TELEPHONE ENCOUNTER
Would patient need to be seen for follow up in regards to his cough? Hemoglobin A1C lab is pended.     Shu Turcios LPN on 3/20/2024 at 11:28 AM

## 2024-03-20 NOTE — TELEPHONE ENCOUNTER
-- ordered A1c   -- Schedule OV, next available    Signed, Doug Porras MD, FAAP, FACP  Internal Medicine & Pediatrics

## 2024-04-02 ENCOUNTER — OFFICE VISIT (OUTPATIENT)
Dept: PEDIATRICS | Facility: OTHER | Age: 69
End: 2024-04-02
Attending: INTERNAL MEDICINE
Payer: MEDICARE

## 2024-04-02 ENCOUNTER — MYC MEDICAL ADVICE (OUTPATIENT)
Dept: PEDIATRICS | Facility: OTHER | Age: 69
End: 2024-04-02

## 2024-04-02 VITALS
WEIGHT: 239 LBS | BODY MASS INDEX: 33.46 KG/M2 | TEMPERATURE: 97.1 F | SYSTOLIC BLOOD PRESSURE: 146 MMHG | DIASTOLIC BLOOD PRESSURE: 98 MMHG | HEART RATE: 97 BPM | HEIGHT: 71 IN | RESPIRATION RATE: 16 BRPM | OXYGEN SATURATION: 98 %

## 2024-04-02 DIAGNOSIS — T46.4X5A COUGH DUE TO ACE INHIBITOR: Primary | ICD-10-CM

## 2024-04-02 DIAGNOSIS — R05.8 COUGH DUE TO ACE INHIBITOR: Primary | ICD-10-CM

## 2024-04-02 DIAGNOSIS — I10 ESSENTIAL HYPERTENSION: ICD-10-CM

## 2024-04-02 DIAGNOSIS — R10.9 RIGHT FLANK PAIN: ICD-10-CM

## 2024-04-02 DIAGNOSIS — E11.9 DIABETES MELLITUS TYPE 2, DIET-CONTROLLED (H): Chronic | ICD-10-CM

## 2024-04-02 DIAGNOSIS — E11.9 DIABETES MELLITUS TYPE 2, DIET-CONTROLLED (H): Primary | Chronic | ICD-10-CM

## 2024-04-02 DIAGNOSIS — N32.81 OAB (OVERACTIVE BLADDER): ICD-10-CM

## 2024-04-02 DIAGNOSIS — Z13.0 SCREENING, ANEMIA, DEFICIENCY, IRON: ICD-10-CM

## 2024-04-02 LAB
ANION GAP SERPL CALCULATED.3IONS-SCNC: 10 MMOL/L (ref 7–15)
BUN SERPL-MCNC: 14.6 MG/DL (ref 8–23)
CALCIUM SERPL-MCNC: 9.4 MG/DL (ref 8.8–10.2)
CHLORIDE SERPL-SCNC: 105 MMOL/L (ref 98–107)
CREAT SERPL-MCNC: 0.85 MG/DL (ref 0.67–1.17)
DEPRECATED HCO3 PLAS-SCNC: 24 MMOL/L (ref 22–29)
EGFRCR SERPLBLD CKD-EPI 2021: >90 ML/MIN/1.73M2
ERYTHROCYTE [DISTWIDTH] IN BLOOD BY AUTOMATED COUNT: 12.9 % (ref 10–15)
GLUCOSE SERPL-MCNC: 202 MG/DL (ref 70–99)
HBA1C MFR BLD: 7.4 % (ref 4–6.2)
HCT VFR BLD AUTO: 46.1 % (ref 40–53)
HGB BLD-MCNC: 15.4 G/DL (ref 13.3–17.7)
MCH RBC QN AUTO: 30.1 PG (ref 26.5–33)
MCHC RBC AUTO-ENTMCNC: 33.4 G/DL (ref 31.5–36.5)
MCV RBC AUTO: 90 FL (ref 78–100)
PLATELET # BLD AUTO: 227 10E3/UL (ref 150–450)
POTASSIUM SERPL-SCNC: 4.3 MMOL/L (ref 3.4–5.3)
RBC # BLD AUTO: 5.12 10E6/UL (ref 4.4–5.9)
SODIUM SERPL-SCNC: 139 MMOL/L (ref 135–145)
WBC # BLD AUTO: 5.4 10E3/UL (ref 4–11)

## 2024-04-02 PROCEDURE — G2211 COMPLEX E/M VISIT ADD ON: HCPCS | Performed by: INTERNAL MEDICINE

## 2024-04-02 PROCEDURE — G0463 HOSPITAL OUTPT CLINIC VISIT: HCPCS | Mod: 25

## 2024-04-02 PROCEDURE — 36415 COLL VENOUS BLD VENIPUNCTURE: CPT | Mod: ZL | Performed by: INTERNAL MEDICINE

## 2024-04-02 PROCEDURE — 83036 HEMOGLOBIN GLYCOSYLATED A1C: CPT | Mod: ZL | Performed by: INTERNAL MEDICINE

## 2024-04-02 PROCEDURE — G0463 HOSPITAL OUTPT CLINIC VISIT: HCPCS

## 2024-04-02 PROCEDURE — 99214 OFFICE O/P EST MOD 30 MIN: CPT | Performed by: INTERNAL MEDICINE

## 2024-04-02 PROCEDURE — 90480 ADMN SARSCOV2 VAC 1/ONLY CMP: CPT

## 2024-04-02 PROCEDURE — 85027 COMPLETE CBC AUTOMATED: CPT | Mod: ZL | Performed by: INTERNAL MEDICINE

## 2024-04-02 PROCEDURE — 80048 BASIC METABOLIC PNL TOTAL CA: CPT | Mod: ZL | Performed by: INTERNAL MEDICINE

## 2024-04-02 RX ORDER — LOSARTAN POTASSIUM 25 MG/1
25 TABLET ORAL DAILY
Qty: 90 TABLET | Refills: 1 | Status: SHIPPED | OUTPATIENT
Start: 2024-04-02 | End: 2024-05-14

## 2024-04-02 ASSESSMENT — ENCOUNTER SYMPTOMS: COUGH: 1

## 2024-04-02 ASSESSMENT — PAIN SCALES - GENERAL: PAINLEVEL: MILD PAIN (3)

## 2024-04-02 NOTE — PROGRESS NOTES
Assessment & Plan   1. Cough due to ACE inhibitor  Think it is reasonable that his cough came from the ACE inhibitor.  No other symptoms consistent with infection.  Recommend use of ARB for kidney protection with diabetes.  Discussed the possibility for cough.    2. Right flank pain  Differential diagnosis includes lumbar radiculopathy, thoracic radiculopathy, lumbar facet arthropathy, lumbar spinal stenosis, other musculoskeletal back pain including lower latissimus dorsi rhomboid, intercostal muscle sprain or strain, occult malignancy in the cecum, others.  We discussed this differential at length today.  We decided against further diagnostic workup.  He did have a recent urinalysis without blood.  Symptoms wax and wane which argue against malignancy.  Warning signs were discussed and prompt repeat evaluation recommended if symptoms persist or worsen.    3. OAB (overactive bladder)  His symptoms have been consistent with overactive bladder.  He is seeing urology.  He is finding relief from a diet in which he avoids trigger foods.  We discussed those trigger foods again today.    4. Diabetes mellitus type 2, diet-controlled (H)  Diabetes has been well-controlled.  However we had a lengthy discussion today with regards to medication options including the use of SGLT2's and GLP-1's.  We discussed the benefit regarding secondary kidney and heart protection with SGLT2's and regarding glycemic control and weight loss with GLP-1's.  I advised against initiating multiple new medications all at once.  He is unsure if any of these will be covered by his insurance.  He expresses a desire to ultimately be on both SGLT2's and GLP-1's.  Prescriptions for both first was sent to his pharmacy pending approval.  The potential for hypoglycemia was discussed.  I recommend testing home glucose.  Hypoglycemic symptoms were discussed.  - losartan (COZAAR) 25 MG tablet; Take 1 tablet (25 mg) by mouth daily  Dispense: 90 tablet; Refill:  1  - Hemoglobin A1c; Future  - Basic metabolic panel; Future  - semaglutide (OZEMPIC) 2 MG/3ML pen; Inject 0.25 mg Subcutaneous every 7 days  Dispense: 3 mL; Refill: 2  - canagliflozin (INVOKANA) 100 MG tablet; Take 1 tablet (100 mg) by mouth every morning (before breakfast)  Dispense: 90 tablet; Refill: 1  - blood glucose monitoring (NO BRAND SPECIFIED) meter device kit; Dispense option covered by insurance. Test blood sugar 2 times daily.  Dispense: 1 kit; Refill: 11  - blood glucose (NO BRAND SPECIFIED) test strip; Dispense item covered by insurance. Test blood sugar 2 times daily.  Dispense: 200 strip; Refill: 11  - blood glucose (NO BRAND SPECIFIED) lancets standard; Dispense item covered by insurance. Test blood sugar 2 times daily.  Dispense: 200 each; Refill: 11  - Basic metabolic panel  - Hemoglobin A1c    5. Essential hypertension  Blood pressure not at goal.  Recommend starting losartan as planned above and recheck.  - losartan (COZAAR) 25 MG tablet; Take 1 tablet (25 mg) by mouth daily  Dispense: 90 tablet; Refill: 1    6. Screening, anemia, deficiency, iron  - CBC with platelets; Future  - CBC with platelets      Patient Instructions    -- Stop lisinopril re: cough   -- Start losartan   -- If you cough with losartan, would stop and try amlodipine   -- If covered I would start canagliflozin first     -- Check glucose daily in AM before meals, and when you feel low (eg dizziness, blurry vision, etc)        Overactive Bladder: foods to consider avoiding/reducing  Certain foods and beverages might irritate your bladder, including:    Coffee, tea and carbonated drinks, even without caffeine  Alcohol  Certain acidic fruits -- oranges, grapefruits, blaise and limes -- and fruit juices  Spicy foods  Tomato-based products  Carbonated drinks  Chocolate    Consider avoiding these possible bladder irritants for about a week to see if your symptoms improve. Then gradually -- every one to two days -- add one back  "into your diet, noting any changes in urinary urgency, frequency or incontinence.    (Source [facility]: https://www.[email or web address]/diseases-conditions/urinary-incontinence/in-depth/bladder-control-problem/ART-78860455?p=1)        Return in about 6 weeks (around 5/14/2024), or if symptoms worsen or fail to improve, for diabetes.    Signed, Doug Porras MD, FAAP, FACP  Internal Medicine & Pediatrics    Subjective   Rj Juarez is a 68 year old male who presents for discuss multiple concerns.     Objective   Vitals: BP (!) 146/98   Pulse 97   Temp 97.1  F (36.2  C) (Tympanic)   Resp 16   Ht 1.791 m (5' 10.5\")   Wt 108.4 kg (239 lb)   SpO2 98%   BMI 33.81 kg/m      Musculoskeletal: There is discomfort throughout the right lateral lower lumbar area.  There is no CVA tenderness to palpation.  There is no palpable mass.  Skin: There is a healed midline lumbar surgical incision site    Review and Analysis of Data   I personally reviewed the following:  External notes: No  Results: Yes lab work reviewed including kidney panel, diabetes labs  Use of an independent historian: No  Independent review of a test performed by another physician: No  Discussion of management with another physician: No  Moderate risk of morbidity from additional diagnostic testing and/or treatment.    "

## 2024-04-02 NOTE — PATIENT INSTRUCTIONS
-- Stop lisinopril re: cough   -- Start losartan   -- If you cough with losartan, would stop and try amlodipine   -- If covered I would start canagliflozin first     -- Check glucose daily in AM before meals, and when you feel low (eg dizziness, blurry vision, etc)        Overactive Bladder: foods to consider avoiding/reducing  Certain foods and beverages might irritate your bladder, including:    Coffee, tea and carbonated drinks, even without caffeine  Alcohol  Certain acidic fruits -- oranges, grapefruits, blaise and limes -- and fruit juices  Spicy foods  Tomato-based products  Carbonated drinks  Chocolate    Consider avoiding these possible bladder irritants for about a week to see if your symptoms improve. Then gradually -- every one to two days -- add one back into your diet, noting any changes in urinary urgency, frequency or incontinence.    (Source [facility]: https://www.[facility].org/diseases-conditions/urinary-incontinence/in-depth/bladder-control-problem/ART-41508108?p=1)

## 2024-04-02 NOTE — NURSING NOTE
"Chief Complaint   Patient presents with    Cough       Initial There were no vitals taken for this visit. Estimated body mass index is 33.66 kg/m  as calculated from the following:    Height as of 9/12/23: 1.778 m (5' 10\").    Weight as of 1/18/24: 106.4 kg (234 lb 9.6 oz).  Medication Review: complete    The next two questions are to help us understand your food security.  If you are feeling you need any assistance in this area, we have resources available to support you today.           No data to display                  Health Care Directive:  Patient does not have a Health Care Directive or Living Will: Discussed advance care planning with patient; however, patient declined at this time.    Anna Ramos CNA      "

## 2024-04-04 NOTE — TELEPHONE ENCOUNTER
Ozempic ordered 4/2.  It is at [facility].     Patient update on MyChart.    Mariola Peetrs RN on 4/4/2024 at 12:05 PM

## 2024-04-17 ENCOUNTER — TELEPHONE (OUTPATIENT)
Dept: PEDIATRICS | Facility: OTHER | Age: 69
End: 2024-04-17
Payer: COMMERCIAL

## 2024-04-17 DIAGNOSIS — E11.9 DIABETES MELLITUS TYPE 2, DIET-CONTROLLED (H): Primary | Chronic | ICD-10-CM

## 2024-04-17 NOTE — TELEPHONE ENCOUNTER
[facility]- Received PA denial from [employer] for Invokana 100MG tablets.  Faxed the denial / appeal information to HIM.  Sent a telephone message to the provider.     Denied - You must meet criteria for this drug to be covered by the plan.  Must try and fail 2 other drugs for you condition covered by rx plan.  Or the provider must submit information that shows why the covered drugs are not right.  -Farxiga or -Jardiance.    Appeals- call [phone]

## 2024-04-17 NOTE — TELEPHONE ENCOUNTER
Got a call from patient on Ozempic PA.     Reached out to [employer].      Will follow up with patient when we know where we are at in PA process.      Mariola Peters RN on 4/17/2024 at 10:10 AM    Update from [facility]:  I did everything on 4-4 but forgot to hit submit once I attached records, its been submitted now.  Sorry    Patient update on [employer].    Mariola Peters RN on 4/17/2024 at 10:29 AM

## 2024-04-19 NOTE — TELEPHONE ENCOUNTER
Invokana was not covered, and an alternative was requested so we sent Jardiance.    Ozempic is a different class and he can take that when available.    Signed, Doug Porras MD, FAAP, FACP  Internal Medicine & Pediatrics

## 2024-04-22 NOTE — TELEPHONE ENCOUNTER
Reached out to [employer] about Ozempic PA.     Per [employer]:  this has been approved     [facility]    Mariola Peters RN on 4/22/2024 at 8:26 AM

## 2024-05-14 ENCOUNTER — OFFICE VISIT (OUTPATIENT)
Dept: PEDIATRICS | Facility: OTHER | Age: 69
End: 2024-05-14
Attending: INTERNAL MEDICINE
Payer: COMMERCIAL

## 2024-05-14 ENCOUNTER — TELEPHONE (OUTPATIENT)
Dept: UROLOGY | Facility: OTHER | Age: 69
End: 2024-05-14

## 2024-05-14 ENCOUNTER — MYC MEDICAL ADVICE (OUTPATIENT)
Dept: PEDIATRICS | Facility: OTHER | Age: 69
End: 2024-05-14

## 2024-05-14 VITALS
WEIGHT: 222.2 LBS | RESPIRATION RATE: 16 BRPM | BODY MASS INDEX: 31.11 KG/M2 | TEMPERATURE: 97.9 F | SYSTOLIC BLOOD PRESSURE: 142 MMHG | OXYGEN SATURATION: 97 % | HEIGHT: 71 IN | DIASTOLIC BLOOD PRESSURE: 70 MMHG | HEART RATE: 76 BPM

## 2024-05-14 DIAGNOSIS — R30.0 DYSURIA: ICD-10-CM

## 2024-05-14 DIAGNOSIS — M54.16 LUMBAR RADICULOPATHY: ICD-10-CM

## 2024-05-14 DIAGNOSIS — R35.0 BENIGN PROSTATIC HYPERPLASIA WITH URINARY FREQUENCY: ICD-10-CM

## 2024-05-14 DIAGNOSIS — M46.1 SACROILIITIS (H): ICD-10-CM

## 2024-05-14 DIAGNOSIS — N40.1 BENIGN PROSTATIC HYPERPLASIA WITH URINARY FREQUENCY: ICD-10-CM

## 2024-05-14 DIAGNOSIS — E11.9 CONTROLLED TYPE 2 DIABETES MELLITUS WITHOUT COMPLICATION, WITHOUT LONG-TERM CURRENT USE OF INSULIN (H): Primary | ICD-10-CM

## 2024-05-14 DIAGNOSIS — I10 ESSENTIAL HYPERTENSION: ICD-10-CM

## 2024-05-14 LAB
ALBUMIN UR-MCNC: NEGATIVE MG/DL
APPEARANCE UR: CLEAR
BILIRUB UR QL STRIP: NEGATIVE
COLOR UR AUTO: ABNORMAL
GLUCOSE UR STRIP-MCNC: >1000 MG/DL
HGB UR QL STRIP: NEGATIVE
KETONES UR STRIP-MCNC: 10 MG/DL
LEUKOCYTE ESTERASE UR QL STRIP: NEGATIVE
MUCOUS THREADS #/AREA URNS LPF: PRESENT /LPF
NITRATE UR QL: NEGATIVE
PH UR STRIP: 5.5 [PH] (ref 5–9)
RBC URINE: 1 /HPF
SP GR UR STRIP: 1.03 (ref 1–1.03)
UROBILINOGEN UR STRIP-MCNC: NORMAL MG/DL
WBC URINE: <1 /HPF

## 2024-05-14 PROCEDURE — 99214 OFFICE O/P EST MOD 30 MIN: CPT | Performed by: INTERNAL MEDICINE

## 2024-05-14 PROCEDURE — G2211 COMPLEX E/M VISIT ADD ON: HCPCS | Performed by: INTERNAL MEDICINE

## 2024-05-14 PROCEDURE — G0463 HOSPITAL OUTPT CLINIC VISIT: HCPCS

## 2024-05-14 PROCEDURE — 81001 URINALYSIS AUTO W/SCOPE: CPT | Mod: ZL | Performed by: INTERNAL MEDICINE

## 2024-05-14 RX ORDER — LOSARTAN POTASSIUM 25 MG/1
25 TABLET ORAL DAILY
Qty: 90 TABLET | Refills: 4 | Status: SHIPPED | OUTPATIENT
Start: 2024-05-14

## 2024-05-14 ASSESSMENT — PAIN SCALES - GENERAL: PAINLEVEL: MODERATE PAIN (4)

## 2024-05-14 NOTE — TELEPHONE ENCOUNTER
Patient saw Dr. Porras this am. He wanted Dr. Akins to know that he has been taking invokana since April and it has helped his A1C . However he is now urinating every 30-45 minutes at night.  He's still able to do this on his own. He continues to stay off caffeine , is eating low carb's .  Also had a ua done this am and no infection. He asked if he should have surgery earlier . Andreina Redman LPN ....................5/14/2024  12:02 PM

## 2024-05-14 NOTE — TELEPHONE ENCOUNTER
He's a little dehydrated.  Drink more water.    Signed, Doug Porras MD, FAAP, FACP  Internal Medicine & Pediatrics

## 2024-05-14 NOTE — TELEPHONE ENCOUNTER
Patient has questions regarding a medication. Please contact patient. He is scheduled for an appointment with Dr Akins 07/15/24.     Melita Plata on 5/14/2024 at 10:58 AM

## 2024-05-14 NOTE — PATIENT INSTRUCTIONS
-- Continue canagliflozin   -- Do not start semaglutide for now   -- Urine testing today   -- Return to Urology, consider TURP/similar    Aspects of Diabetes we can improve:  Hemoglobin A1c Lab Results   Component Value Date    A1C 7.4 04/02/2024    A1C 6.9 09/12/2023    A1C 6.8 04/25/2023    A1C 7.1 06/23/2022    A1C 6.8 01/11/2022    A1C 6.2 12/24/2020    A1C 6.1 09/02/2020    A1C 6.3 05/19/2020    A1C 6.4 08/22/2019    A1C 5.8 10/31/2018    Goal range is under 8. Best is 6.5 to 7   Blood Pressure 142/70 Goal to keep less than 140/90   Tobacco  reports that he has never smoked. He has never used smokeless tobacco. Goal to abstain from tobacco   Aspirin yes Aspirin reduces risk of heart disease and stroke   ACE/ARB losartan These medications reduce risk of kidney disease   Cholesterol atorvastatin Statins reduce risk of heart disease and stroke   Eye Exam annual Annual diabetic eye exam   Healthy weight Body mass index is 31.43 kg/m . Goal BMI under 30, best is under 25.      -- I'm trying to exercise daily (goal at least 20 min/day) with moderate aerobic activity   -- Eat healthy (resources from ADA at http://www.diabetes.org/)   -- I'm taking good care of my feet. Consider seeing the Podiatrist   -- Check blood sugars as directed, record in log book and bring to every appointment   -- Goal sugar before breakfast: under 140   -- Goal sugar 2 hours after supper: under 170   -- Next diabetes lab draw: 4 months   -- Next diabetes office visit: 4 months

## 2024-05-14 NOTE — NURSING NOTE
"Chief Complaint   Patient presents with    Diabetes    Recheck Medication       Initial BP (!) 142/70   Pulse 76   Temp 97.9  F (36.6  C) (Tympanic)   Resp 16   Ht 1.791 m (5' 10.5\")   Wt 100.8 kg (222 lb 3.2 oz)   SpO2 97%   BMI 31.43 kg/m   Estimated body mass index is 31.43 kg/m  as calculated from the following:    Height as of this encounter: 1.791 m (5' 10.5\").    Weight as of this encounter: 100.8 kg (222 lb 3.2 oz).  Medication Review: complete    The next two questions are to help us understand your food security.  If you are feeling you need any assistance in this area, we have resources available to support you today.          4/2/2024   SDOH- Food Insecurity   Within the past 12 months, did you worry that your food would run out before you got money to buy more? N    N   Within the past 12 months, did the food you bought just not last and you didn t have money to get more? N    N         Health Care Directive:  Patient does not have a Health Care Directive or Living Will: Discussed advance care planning with patient; however, patient declined at this time.    Norma J. Gosselin, LPN      "

## 2024-05-14 NOTE — PROGRESS NOTES
Assessment & Plan   1. Controlled type 2 diabetes mellitus without complication, without long-term current use of insulin (H)  Blood sugars are well-controlled.  I expect A1c to be in the low 7 range.  Had not yet received semaglutide and I recommend again starting it at this point in time.  Was able to receive canagliflozin with his insurance.  Due for A1c with next lab draw in September.  - canagliflozin (INVOKANA) 100 MG tablet; Take 1 tablet (100 mg) by mouth every morning (before breakfast)  Dispense: 90 tablet; Refill: 4    2. Essential hypertension  Blood pressure little high today but he thinks he has a cold.  Controlled based on home numbers.  - canagliflozin (INVOKANA) 100 MG tablet; Take 1 tablet (100 mg) by mouth every morning (before breakfast)  Dispense: 90 tablet; Refill: 4  - losartan (COZAAR) 25 MG tablet; Take 1 tablet (25 mg) by mouth daily  Dispense: 90 tablet; Refill: 4    3. Benign prostatic hyperplasia with urinary frequency  4. Dysuria  He has been diligent in avoiding trigger foods for overactive bladder.  He could certainly have a bladder infection given the recent Invokana start.  Recommend obtaining urinalysis.  I think he needs to return to the urologist for consideration of a procedure for his benign prostate hypertrophy.  - UA with Microscopic reflex to Culture  - Adult Urology  Referral; Future    5. Lumbar radiculopathy  6. Sacroiliitis (H24)  Considered physical therapy.  He is quite busy at this time of the year.  Warning signs discussed.      Patient Instructions    -- Continue canagliflozin   -- Do not start semaglutide for now   -- Urine testing today   -- Return to Urology, consider TURP/similar    Aspects of Diabetes we can improve:  Hemoglobin A1c Lab Results   Component Value Date    A1C 7.4 04/02/2024    A1C 6.9 09/12/2023    A1C 6.8 04/25/2023    A1C 7.1 06/23/2022    A1C 6.8 01/11/2022    A1C 6.2 12/24/2020    A1C 6.1 09/02/2020    A1C 6.3 05/19/2020    A1C 6.4  "08/22/2019    A1C 5.8 10/31/2018    Goal range is under 8. Best is 6.5 to 7   Blood Pressure 142/70 Goal to keep less than 140/90   Tobacco  reports that he has never smoked. He has never used smokeless tobacco. Goal to abstain from tobacco   Aspirin yes Aspirin reduces risk of heart disease and stroke   ACE/ARB losartan These medications reduce risk of kidney disease   Cholesterol atorvastatin Statins reduce risk of heart disease and stroke   Eye Exam annual Annual diabetic eye exam   Healthy weight Body mass index is 31.43 kg/m . Goal BMI under 30, best is under 25.      -- I'm trying to exercise daily (goal at least 20 min/day) with moderate aerobic activity   -- Eat healthy (resources from ADA at http://www.diabetes.org/)   -- I'm taking good care of my feet. Consider seeing the Podiatrist   -- Check blood sugars as directed, record in log book and bring to every appointment   -- Goal sugar before breakfast: under 140   -- Goal sugar 2 hours after supper: under 170   -- Next diabetes lab draw: 4 months   -- Next diabetes office visit: 4 months        Return in about 4 months (around 9/14/2024) for medicare wellness visit, med management, diabetes.    Signed, Doug Porras MD, FAAP, FACP  Internal Medicine & Pediatrics    Subjective   Rj Juarez is a 68 year old male who presents for Diabetes and Recheck Medication.  Home blood pressures 120-130/70-80.  Has gone as low as 100/55.  Blood sugars have been under 140 in the morning and under 170 at bedtime.  He has been having some episodes of pain on the left lateral thigh.  He was able to lose weight by walking 10 miles a day.  He is having an increase in urinary frequency with burning with urination.    Objective   Vitals: BP (!) 142/70   Pulse 76   Temp 97.9  F (36.6  C) (Tympanic)   Resp 16   Ht 1.791 m (5' 10.5\")   Wt 100.8 kg (222 lb 3.2 oz)   SpO2 97%   BMI 31.43 kg/m      Foot Exam:  5/14/2024  Intact to monofilament bilaterally.  Skin intact " without erythema.    Foot/Ankle Musculoskeletal Exam      Review and Analysis of Data   I personally reviewed the following:  External notes: No  Results: Yes diabetic lab  Use of an independent historian: No  Independent review of a test performed by another physician: No  Discussion of management with another physician: No  Moderate risk of morbidity from additional diagnostic testing and/or treatment.

## 2024-05-15 NOTE — TELEPHONE ENCOUNTER
Talked to patient and is aware that the [facility] will call him . Andreina Redman LPN ....................5/15/2024  9:32 AM

## 2024-05-21 ENCOUNTER — VIRTUAL VISIT (OUTPATIENT)
Dept: UROLOGY | Facility: CLINIC | Age: 69
End: 2024-05-21
Payer: COMMERCIAL

## 2024-05-21 DIAGNOSIS — N13.8 BPH WITH OBSTRUCTION/LOWER URINARY TRACT SYMPTOMS: Primary | ICD-10-CM

## 2024-05-21 DIAGNOSIS — N40.1 BPH WITH OBSTRUCTION/LOWER URINARY TRACT SYMPTOMS: Primary | ICD-10-CM

## 2024-05-21 PROCEDURE — 99214 OFFICE O/P EST MOD 30 MIN: CPT | Mod: 95 | Performed by: UROLOGY

## 2024-05-21 ASSESSMENT — PAIN SCALES - GENERAL: PAINLEVEL: NO PAIN (0)

## 2024-05-21 NOTE — PROGRESS NOTES
Virtual Visit Details    Type of service:  Video Visit   Video Start Time: 10:07 AM  Video End Time: 10:40 AM    Originating Location (pt. Location): Home    Distant Location (provider location):  On-site  Platform used for Video Visit: [facility]    Assessment & Plan   ASSESSMENT and PLAN  68 year old male here for reevaluation of BPH and LUTS with incontinence.    BPH with urinary frequency  Urinary incontinence  Diabetes mellitus    Discussed BPH treatment options in general are, including medications   Minimally invasive surgical techniques, PAE and HoLEP.  With each of these, discussed benefits, side effects, treatment/retreatment rates with regard to patients prostate symptoms and size.  For him specifically, he would be a candidate for tamsulosin, PAE or HoLEP.      He is interested in PAE if it is covered.  If not, I would likely hold off on treatment until later this year or early 2025.  I provided him with the CPT code for prostate artery embolization will check with his insurance.    Is likely that Invokana is worsening his urinary frequency due to the massive glucosuria induced by the drug.  If this can be avoided, may be of benefit.    Plan:  - Will check with interventional radiology if there is a way to see if he is covered for PAE before scheduling a visit with them  - Provided patient with information on other procedures    Time spent: 30 minutes spent on the date of the encounter doing chart review, history and exam, documentation and further activities as noted above.    Eliu Arredondo MD   Urology  [facility]         Subjective     CHIEF COMPLAINT   follow-up of BPH and urinary retention.      HPI   Rj Juarez is a very pleasant 68 year old male who presents with a history of BPH with 111 ml prostate based on MRI on 4/10/2023.    Started canaglifozin (invokana) on 4/20/2024 due to A1C and saw Dr. Porras due to urinary frequency and flank pain.  Had a CT which  demonstrated bladder wall thickening.      Has daytime incontinence (likely urge) that has not been helped by dietary modification, with exception of citrus.    Last two nights has been able to go 3 hours at a time without urinating.      PVR by US 72 ml on 1/18/2024

## 2024-05-21 NOTE — Clinical Note
I saw Mr. Juarez today regarding his urinary symptoms.  I have seen drugs like Invokana make frequency worse, so there is a way to have him switch to a different antidiabetic agent, it may be of benefit.  Patient is interested in prostate artery embolization, so I will will see if it is covered by his insurance and then set up a visit with an interventional radiologist as needed.  Otherwise, discussed HoLEP with him and he will likely wait till the end of the this year or early next year to proceed with the procedure.  Let me know if any questions, Eliu Arredondo

## 2024-05-21 NOTE — NURSING NOTE
Is the patient currently in the state of [place]? YES    Visit mode:VIDEO    If the visit is dropped, the patient can be reconnected by: VIDEO VISIT: Text to cell phone:   Telephone Information:   Mobile [phone]    and VIDEO VISIT: Send to e-mail at: [email or web address]    Will anyone else be joining the visit? NO  (If patient encounters technical issues they should call [phone]    How would you like to obtain your AVS? MyChart    Are changes needed to the allergy or medication list? No    Are refills needed on medications prescribed by this physician? NO    Reason for visit: RECHECK    Jessica MARAVILLA

## 2024-05-21 NOTE — PATIENT INSTRUCTIONS
For prostate artery embolization (PAE), this works by blocking blood flow to prostate to allow it to soften and shrink slightly to allow urine to pass easier.  This has approximately an 85% success rate after the procedure and a 15-20% retreatment rate in 5 years.  This has a very low rate of urinary incontinence.  There is no catheter or hospital stay associated with this procedure.  There is a small chance of developing retrograde ejaculation (no semen coming out of the end of the penis).    For holmium laser enucleation of the prostate (HoLEP), this uses a laser to help physically remove the inner prostate tissue from the shell.  This has approximately an 99% success rate after the procedure and a 1-2% retreatment rate in 5 years.  There is a high chance of short term incontinence for 4-6 weeks that may require pads or diapers.  At 3 months, approximately 5% of men are still having leakage and at 6 months and beyond it is 1-2%.  This procedure will take 1-3 hours under general anesthesia depending on the size of the prostate.  Patients can possibly go home the same day without a catheter or stay the night in the hospital if they prefer. Patients should expect to have retrograde ejaculation after surgery (no semen coming out of the end of the penis).    Regarding HoLEP, we can remove just a portion of the prostate instead of the whole gland.  This takes about 30 minutes under general anesthesia there is a very low rate of incontinence or retrograde ejaculation after this procedure.  Long-term studies have not been performed strictly with this modality, however retreatment rates are assumed to be similar to steam or prostate artery embolization.

## 2024-05-21 NOTE — LETTER
5/21/2024       RE: Rj Juarez  [address] [address]     Dear Colleague,    Thank you for referring your patient, Rj Juarez, to the [facility] at [facility]. Please see a copy of my visit note below.    Virtual Visit Details    Type of service:  Video Visit   Video Start Time: 10:07 AM  Video End Time: 10:40 AM    Originating Location (pt. Location): Home    Distant Location (provider location):  On-site  Platform used for Video Visit: [facility]    Assessment & Plan  ASSESSMENT and PLAN  68 year old male here for reevaluation of BPH and LUTS with incontinence.    BPH with urinary frequency  Urinary incontinence  Diabetes mellitus    Discussed BPH treatment options in general are, including medications   Minimally invasive surgical techniques, PAE and HoLEP.  With each of these, discussed benefits, side effects, treatment/retreatment rates with regard to patients prostate symptoms and size.  For him specifically, he would be a candidate for tamsulosin, PAE or HoLEP.      He is interested in PAE if it is covered.  If not, I would likely hold off on treatment until later this year or early 2025.  I provided him with the CPT code for prostate artery embolization will check with his insurance.    Is likely that Invokana is worsening his urinary frequency due to the massive glucosuria induced by the drug.  If this can be avoided, may be of benefit.    Plan:  - Will check with interventional radiology if there is a way to see if he is covered for PAE before scheduling a visit with them  - Provided patient with information on other procedures    Time spent: 30 minutes spent on the date of the encounter doing chart review, history and exam, documentation and further activities as noted above.    Eliu Arredondo MD   Urology  [facility]         Subjective    CHIEF COMPLAINT   follow-up of  BPH and urinary retention.      [facility]   Rj Juarez is a very pleasant 68 year old male who presents with a history of BPH with 111 ml prostate based on MRI on 4/10/2023.    Started canaglifozin (invokana) on 4/20/2024 due to A1C and saw Dr. Porras due to urinary frequency and flank pain.  Had a CT which demonstrated bladder wall thickening.      Has daytime incontinence (likely urge) that has not been helped by dietary modification, with exception of citrus.    Last two nights has been able to go 3 hours at a time without urinating.      PVR by US 72 ml on 1/18/2024

## 2024-07-13 ENCOUNTER — HEALTH MAINTENANCE LETTER (OUTPATIENT)
Age: 69
End: 2024-07-13

## 2024-07-15 ENCOUNTER — OFFICE VISIT (OUTPATIENT)
Dept: UROLOGY | Facility: OTHER | Age: 69
End: 2024-07-15
Attending: INTERNAL MEDICINE
Payer: MEDICARE

## 2024-07-15 ENCOUNTER — LAB (OUTPATIENT)
Dept: LAB | Facility: OTHER | Age: 69
End: 2024-07-15
Attending: UROLOGY
Payer: COMMERCIAL

## 2024-07-15 VITALS
OXYGEN SATURATION: 92 % | WEIGHT: 202 LBS | RESPIRATION RATE: 20 BRPM | HEART RATE: 80 BPM | HEIGHT: 71 IN | BODY MASS INDEX: 28.28 KG/M2

## 2024-07-15 DIAGNOSIS — N40.1 BPH WITH OBSTRUCTION/LOWER URINARY TRACT SYMPTOMS: Primary | ICD-10-CM

## 2024-07-15 DIAGNOSIS — R30.0 DYSURIA: ICD-10-CM

## 2024-07-15 DIAGNOSIS — R35.0 BENIGN PROSTATIC HYPERPLASIA WITH URINARY FREQUENCY: ICD-10-CM

## 2024-07-15 DIAGNOSIS — N40.1 BENIGN PROSTATIC HYPERPLASIA WITH URINARY FREQUENCY: ICD-10-CM

## 2024-07-15 DIAGNOSIS — N32.89 BLADDER WALL THICKENING: ICD-10-CM

## 2024-07-15 DIAGNOSIS — R97.20 ELEVATED PSA: ICD-10-CM

## 2024-07-15 DIAGNOSIS — N40.1 BPH WITH OBSTRUCTION/LOWER URINARY TRACT SYMPTOMS: ICD-10-CM

## 2024-07-15 DIAGNOSIS — N13.8 BPH WITH OBSTRUCTION/LOWER URINARY TRACT SYMPTOMS: ICD-10-CM

## 2024-07-15 DIAGNOSIS — N13.8 BPH WITH OBSTRUCTION/LOWER URINARY TRACT SYMPTOMS: Primary | ICD-10-CM

## 2024-07-15 LAB
ALBUMIN UR-MCNC: NEGATIVE MG/DL
APPEARANCE UR: CLEAR
BILIRUB UR QL STRIP: NEGATIVE
COLOR UR AUTO: ABNORMAL
GLUCOSE UR STRIP-MCNC: >1000 MG/DL
HGB UR QL STRIP: ABNORMAL
KETONES UR STRIP-MCNC: 10 MG/DL
LEUKOCYTE ESTERASE UR QL STRIP: NEGATIVE
NITRATE UR QL: NEGATIVE
PH UR STRIP: 5.5 [PH] (ref 5–9)
SP GR UR STRIP: 1.04 (ref 1–1.03)
UROBILINOGEN UR STRIP-MCNC: NORMAL MG/DL

## 2024-07-15 PROCEDURE — G0463 HOSPITAL OUTPT CLINIC VISIT: HCPCS | Mod: 25

## 2024-07-15 PROCEDURE — 81003 URINALYSIS AUTO W/O SCOPE: CPT | Mod: ZL,XU

## 2024-07-15 PROCEDURE — 99213 OFFICE O/P EST LOW 20 MIN: CPT | Performed by: UROLOGY

## 2024-07-15 PROCEDURE — 51798 US URINE CAPACITY MEASURE: CPT | Performed by: UROLOGY

## 2024-07-15 PROCEDURE — 81001 URINALYSIS AUTO W/SCOPE: CPT | Mod: ZL | Performed by: UROLOGY

## 2024-07-15 RX ORDER — LANCETS
1 EACH MISCELLANEOUS 2 TIMES DAILY
COMMUNITY
Start: 2024-05-22

## 2024-07-15 ASSESSMENT — PAIN SCALES - GENERAL: PAINLEVEL: MILD PAIN (2)

## 2024-07-15 NOTE — PROGRESS NOTES
Chief Complaint: Follow Up (BPH with urinary frequency /)  .  BPH, history of elevated PSA    HPI: Mr. Rj Juarez is a 68 year old year old male seen initially by me on January 18, 2024 in follow up for evaluation of elevated PSA and underlying BPH.     Previous patient Dr. Toledo seen last in 2022.  He is since seen Dr. Arredondo, an outlying urologist for evaluation of the same.  He had discussed HoLEP as well as PA E     History includes BPH for which he is currently on tamsulosin.  He has been tried on Myrbetriq remotely in the past.     Previous MRI has demonstrated a prostate volume of 111 cc.  This was done 4/10/2023.  There was a PI-RADS 3 lesion but no biopsy was done.     His last visit with Dr. Arredondo in April 2023 discussed HoLEP versus PAD given his large prostate size.  It was mentioned that he should consider fusion biopsy if his PSA continues to rise.  PSA ended up improving to 3.19 in August 2023     Was experiencing left flank pain at that time for which a noncontrast CT was done on 5/15/2023.  That was negative for nephrolithiasis however did demonstrate findings consistent with bladder outlet obstruction with a thickened bladder wall.    Patient elected to watch his symptoms for now.  Here today for follow-up.  PSA was 4.25 in January which was relatively stable and consistent with his baseline values.    He has spoken to Dr. Arredondo and done some research.  He is intent on proceeding with EDGAR KHAN.  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  Denies any change in his urinary symptoms    Past Medical History:   Diagnosis Date    Benign lipomatous neoplasm     1/20/2014    Calculus of kidney     possible    Carpal tunnel syndrome     Both hands    Closed fracture of patella     05/06/09,Sustained right superior pole patellar fracture which underwent conservative management    Diverticulosis of large intestine without perforation  or abscess without bleeding     1/28/2014    Exertional chest pain 11/19/2019    Headache     No Comments Provided    Other specified forms of tremor     3/2/2011    Other urticaria (CODE)     3/2/2011    Pain in joint     No Comments Provided    Umbilical hernia without obstruction or gangrene     1/26/2018       Past Surgical History:   Procedure Laterality Date    COLONOSCOPY  01/28/2014    2009,2014,F/U 2019    COLONOSCOPY  03/01/2019    Serrated adenoma, follow up 1 year    COLONOSCOPY N/A 01/15/2021    5 year, due 1/2026. Procedure: COLONOSCOPY, WITH POLYPECTOMY AND BIOPSY;  Surgeon: Ly Frances MD;  Location: [facility] OR    ESOPHAGOSCOPY, GASTROSCOPY, DUODENOSCOPY (EGD), COMBINED      1/28/14,EGD    ESOPHAGOSCOPY, GASTROSCOPY, DUODENOSCOPY (EGD), COMBINED N/A 01/15/2021    Procedure: ESOPHAGOGASTRODUODENOSCOPY, WITH BIOPSY;  Surgeon: Ly Frances MD;  Location: [facility] OR    JOINT REPLACEMENT, HIP RT/LT Left     OTHER SURGICAL HISTORY      9/10/2014,44847.0,HI REPAIR ING HERNIA  >5 TRS BETTINA    OTHER SURGICAL HISTORY      1/26/2018,85194.0,HI REPAIR UMBILICAL NAZARIO  >5 TRS REDUC    RELEASE CARPAL TUNNEL      3,12/2004,Both hands    SIGMOIDOSCOPY FLEXIBLE      No Comments Provided       Current Outpatient Medications   Medication Sig Dispense Refill    aspirin 81 MG EC tablet Take 81 mg by mouth daily      blood glucose (NO BRAND SPECIFIED) lancets standard Dispense item covered by insurance. Test blood sugar 2 times daily. 200 each 11    blood glucose (NO BRAND SPECIFIED) test strip Dispense item covered by insurance. Test blood sugar 2 times daily. 200 strip 11    blood glucose monitoring (NO BRAND SPECIFIED) meter device kit Dispense option covered by insurance. Test blood sugar 2 times daily. 1 kit 11    canagliflozin (INVOKANA) 100 MG tablet Take 1 tablet (100 mg) by mouth every morning (before breakfast) 90 tablet 4    famotidine (PEPCID) 20 MG tablet Take 1 tablet (20 mg) by mouth 2 times daily as needed (gerd)  "180 tablet 3    ibuprofen (ADVIL/MOTRIN) 400 MG tablet       losartan (COZAAR) 25 MG tablet Take 1 tablet (25 mg) by mouth daily 90 tablet 4    metoprolol succinate ER (TOPROL XL) 25 MG 24 hr tablet Take 1 tablet (25 mg) by mouth daily 90 tablet 3    Microlet Lancets MISC Inject 1 Lancet subcutaneously 2 times daily      neomycin-polymyxin-dexamethasone (MAXITROL) 3.5-31028-8.1 SUSP ophthalmic susp INSTILL 1 DROP INTO EACH EYE 1-2 TIMES PER DAY AS NEEDED FOR ITCHY EYES      nitroGLYcerin (NITROSTAT) 0.4 MG sublingual tablet For chest pain place 1 tablet under the tongue every 5 minutes for 3 doses. If symptoms persist 5 minutes after 1st dose call 911. 25 tablet 3    rosuvastatin (CRESTOR) 20 MG tablet Take 1 tablet (20 mg) by mouth daily 90 tablet 3    silver sulfADIAZINE (SILVADENE) 1 % external cream Apply topically daily 85 g 1    tamsulosin (FLOMAX) 0.4 MG capsule Take 1 capsule (0.4 mg) by mouth daily 90 capsule 3    triamcinolone (KENALOG) 0.1 % cream Apply 1 Film topically 3 times daily         ALLERGIES: Ciprofloxacin, Lisinopril, Midazolam, and Tamsulosin     GENERAL PHYSICAL EXAM:   Vitals: Pulse 80   Resp 20   Ht 1.791 m (5' 10.5\")   Wt 91.6 kg (202 lb)   SpO2 92%   BMI 28.57 kg/m    Body mass index is 28.57 kg/m .    GENERAL: Well groomed, well developed, well nourished male in NAD.  CV:  Warm extremities   RESPIRATORY: Normal respiratory effort.   MS: Moving all four  NEURO: Alert and oriented x 3.  PSYCH: Normal mood and affect, pleasant and agreeable during interview and exam.    PVR: Residual urine by ultrasound was 99 ml.       Aua- 31     RADIOLOGY: The following tests were reviewed: None    LABS: The last test results for Mr. Rj Juarez were reviewed:  Results for orders placed or performed in visit on 07/15/24 (from the past 24 hour(s))   Urinalysis Macroscopic   Result Value Ref Range    Color Urine Light Yellow Colorless, Straw, Light Yellow, Yellow    Appearance Urine Clear Clear "    Glucose Urine >1000 (A) Negative mg/dL    Bilirubin Urine Negative Negative    Ketones Urine 10 (A) Negative mg/dL    Specific Gravity Urine 1.039 (H) 1.000 - 1.030    Blood Urine Trace (A) Negative    pH Urine 5.5 5.0 - 9.0    Protein Albumin Urine Negative Negative mg/dL    Urobilinogen Urine Normal Normal, 2.0 mg/dL    Nitrite Urine Negative Negative    Leukocyte Esterase Urine Negative Negative       PSA -   Lab Results   Component Value Date    PSA 4.25 01/18/2024    PSA 3.19 08/11/2023    PSA 3.84 04/21/2023    PSA 4.45 03/20/2023    PSA 2.33 03/10/2022     BMP -   Recent Labs   Lab Test 04/02/24  1044 09/12/23  0905 02/08/23  1320    138 134*   POTASSIUM 4.3 4.3 3.8   CHLORIDE 105 104 99   CO2 24 24 26   BUN 14.6 14.5 9.0   CR 0.85 0.85 1.02   * 157* 167*   COMFORT 9.4 9.5 9.3       CBC -   Recent Labs   Lab Test 04/02/24  1044 09/12/23  0905 02/08/23  1320   WBC 5.4 5.4 11.5*   HGB 15.4 16.1 15.5    206 204       ASSESSMENT:   BPH with LUTS  Elevated PSA  History of prostate biopsy    PLAN:   Patient with underlying BPH with refractory symptoms.  He has been hesitant to proceed with surgical intervention and [facility]  He is going to let Dr. Arredondo know so that can be scheduled.    Aftercare will be dependent upon that.  I suspect he will need to be seen locally to evaluate for urinary retention and or assess his urinary difficulties if he has any.    Regarding his PSA that is stable.  We will continue to follow that.  I would suspect his PSA will come down considerably after PA or any treatment for his prostate.  He is aware that.  He understands that this does not exclude malignancy and he will need to be monitored.        13 minutes spent on the date of this encounter doing chart review, history and exam, documentation and further activities as noted above.      Shaq Akins MD  [facility]

## 2024-07-15 NOTE — NURSING NOTE
"Chief Complaint   Patient presents with    Follow Up     BPH with urinary frequency          Initial Pulse 80   Resp 20   Ht 1.791 m (5' 10.5\")   Wt 91.6 kg (202 lb)   SpO2 92%   BMI 28.57 kg/m   Estimated body mass index is 28.57 kg/m  as calculated from the following:    Height as of this encounter: 1.791 m (5' 10.5\").    Weight as of this encounter: 91.6 kg (202 lb).  Medication Reconciliation: complete    AUA=31  post void residual =99 ml    Andreina Redman LPN    "

## 2024-07-16 DIAGNOSIS — N13.8 BPH WITH OBSTRUCTION/LOWER URINARY TRACT SYMPTOMS: Primary | ICD-10-CM

## 2024-07-16 DIAGNOSIS — N40.1 BPH WITH OBSTRUCTION/LOWER URINARY TRACT SYMPTOMS: Primary | ICD-10-CM

## 2024-07-16 LAB
ALBUMIN UR-MCNC: NEGATIVE MG/DL
APPEARANCE UR: CLEAR
BILIRUB UR QL STRIP: NEGATIVE
CAOX CRY #/AREA URNS HPF: ABNORMAL /HPF
COLOR UR AUTO: ABNORMAL
GLUCOSE UR STRIP-MCNC: >1000 MG/DL
HGB UR QL STRIP: ABNORMAL
KETONES UR STRIP-MCNC: 10 MG/DL
LEUKOCYTE ESTERASE UR QL STRIP: NEGATIVE
MUCOUS THREADS #/AREA URNS LPF: PRESENT /LPF
NITRATE UR QL: NEGATIVE
PH UR STRIP: 5.5 [PH] (ref 5–9)
RBC URINE: 5 /HPF
SP GR UR STRIP: 1.04 (ref 1–1.03)
UROBILINOGEN UR STRIP-MCNC: NORMAL MG/DL
WBC URINE: 1 /HPF

## 2024-07-16 NOTE — RESULT ENCOUNTER NOTE
Dr. Arredondo, looks like Mr. Juarez is going to pursue PAE.     He has microscopic hematuria.  I was wondering if you have done cystoscopy on him or should I get him worked up before his PAE?    Would love your thoughts?  Thanks, Kaveh

## 2024-07-18 ENCOUNTER — OFFICE VISIT (OUTPATIENT)
Dept: FAMILY MEDICINE | Facility: OTHER | Age: 69
End: 2024-07-18
Attending: FAMILY MEDICINE
Payer: COMMERCIAL

## 2024-07-18 VITALS
OXYGEN SATURATION: 98 % | TEMPERATURE: 98.6 F | SYSTOLIC BLOOD PRESSURE: 138 MMHG | BODY MASS INDEX: 29.48 KG/M2 | RESPIRATION RATE: 16 BRPM | WEIGHT: 208.4 LBS | HEART RATE: 65 BPM | DIASTOLIC BLOOD PRESSURE: 84 MMHG

## 2024-07-18 DIAGNOSIS — M62.838 NECK MUSCLE SPASM: Primary | ICD-10-CM

## 2024-07-18 DIAGNOSIS — R31.21 ASYMPTOMATIC MICROSCOPIC HEMATURIA: Primary | ICD-10-CM

## 2024-07-18 PROCEDURE — 99214 OFFICE O/P EST MOD 30 MIN: CPT | Performed by: FAMILY MEDICINE

## 2024-07-18 PROCEDURE — G0463 HOSPITAL OUTPT CLINIC VISIT: HCPCS

## 2024-07-18 ASSESSMENT — PAIN SCALES - GENERAL: PAINLEVEL: NO PAIN (1)

## 2024-07-18 NOTE — PROGRESS NOTES
Sports Medicine Office Note    HPI:  68-year-old male coming in for evaluation of left-sided neck pain.  His pain has been present since 6/1.  He woke up 1 morning after laying on it different bed with a significantly stiff neck.  Overall his symptoms have improved.  He endorses left-sided posterior neck pain without radicular symptoms.  He rates the pain a 1/10.  He characterized the pain as dull and sharp.  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  He had 1 visit with a physical therapist and was given some exercises.  He does these only when he feels like his neck pain becomes more bothersome.      EXAM:  /84 (BP Location: Right arm, Patient Position: Sitting, Cuff Size: Adult Large)   Pulse 65   Temp 98.6  F (37  C) (Temporal)   Resp 16   Wt 94.5 kg (208 lb 6.4 oz)   SpO2 98%   BMI 29.48 kg/m    MUSCULOSKELETAL EXAM:  CERVICAL SPINE  Inspection:  -No gross deformity  -No bruising or swelling  -Scars:  None    Tenderness to palpation of the:  -Spinous processes:  Negative  -Paracervical musculature: Mild pain on the left  -Occipital nerves:  Negative  -Upper trapezius musculature:  Negative    Range of Motion:  -Active flexion:  40  -Active extension:  50  -Left lateral rotation:  45  -Right lateral rotation:  60  -Left lateral side bending:  10  -Right lateral side bending:  10    Strength:  -Deltoids:  5/5 left, 5/5 right  -Supraspinatus:  5/5 left, 5/5 right  -Infraspinatus:  5/5 left, 5/5 right  -Subscapularis:  5/5 left, 5/5 right  -Biceps: 5/5 left, 5/5 right  -Triceps: 5/5 left, 5/5 right  - strength: 5/5 left, 5/5 right  -Wrist extension: 5/5 left, 5/5 right  -Interosseous: 5/5 left, 5/5 right  -Thumb-index finger pull-through: 5/5 left, 5/5 right    Sensation:  -Intact sensation to light touch in all dermatomes of the bilateral upper extremities    Reflexes:  -C5  (biceps): Symmetric bilaterally  -C6 (brachioradialis): Symmetric bilaterally  -C7 (triceps): Symmetric bilaterally    Special Tests:  -Spurling maneuver: Negative bilaterally    Other:  -No signs of cyanosis. Normal skin temperature of the upper extremities.      IMAGING:  None      ASSESSMENT/PLAN:  Diagnoses and all orders for this visit:  Neck muscle spasm  -     Physical Therapy  Referral; Future    68-year-old male with spasming of the left-sided paraspinal neck musculature.  No radicular symptoms.  No indications for imaging.  Discussed treatment options to include heat, home exercises, muscle relaxers, and physical therapy.  At this time we will start with less invasive interventions and advance as symptoms necessitate  - Handout given with home exercises for neck spasms  - Referral placed to physical therapy  - Follow-up as needed      Rafael Porras MD  7/18/2024  7:48 AM    Total time spent with this patient was 31 minutes which included chart review, visualization and independent interpretation of images, time spent with the patient, and documentation.    Procedure time:  0 minute(s)

## 2024-07-18 NOTE — RESULT ENCOUNTER NOTE
Melita,  patient needs cystoscopy next available.  He is aware.  I also ordered a CT Urogram with BMP before.  I ordered it.  Can you help order this.    Kaveh

## 2024-07-24 ENCOUNTER — LAB (OUTPATIENT)
Dept: LAB | Facility: OTHER | Age: 69
End: 2024-07-24
Attending: UROLOGY
Payer: MEDICARE

## 2024-07-24 DIAGNOSIS — R31.21 ASYMPTOMATIC MICROSCOPIC HEMATURIA: ICD-10-CM

## 2024-07-24 LAB
ANION GAP SERPL CALCULATED.3IONS-SCNC: 8 MMOL/L (ref 7–15)
BUN SERPL-MCNC: 15.5 MG/DL (ref 8–23)
CALCIUM SERPL-MCNC: 9.1 MG/DL (ref 8.8–10.4)
CHLORIDE SERPL-SCNC: 107 MMOL/L (ref 98–107)
CREAT SERPL-MCNC: 0.86 MG/DL (ref 0.67–1.17)
EGFRCR SERPLBLD CKD-EPI 2021: >90 ML/MIN/1.73M2
GLUCOSE SERPL-MCNC: 184 MG/DL (ref 70–99)
HCO3 SERPL-SCNC: 25 MMOL/L (ref 22–29)
POTASSIUM SERPL-SCNC: 4.2 MMOL/L (ref 3.4–5.3)
SODIUM SERPL-SCNC: 140 MMOL/L (ref 135–145)

## 2024-07-24 PROCEDURE — 80048 BASIC METABOLIC PNL TOTAL CA: CPT | Mod: ZL

## 2024-07-24 PROCEDURE — 36415 COLL VENOUS BLD VENIPUNCTURE: CPT | Mod: ZL

## 2024-07-25 ENCOUNTER — HOSPITAL ENCOUNTER (OUTPATIENT)
Dept: CT IMAGING | Facility: OTHER | Age: 69
Discharge: HOME | End: 2024-07-25
Attending: UROLOGY
Payer: MEDICARE

## 2024-07-25 ENCOUNTER — TELEPHONE (OUTPATIENT)
Dept: PEDIATRICS | Facility: OTHER | Age: 69
End: 2024-07-25
Payer: COMMERCIAL

## 2024-07-25 ENCOUNTER — OFFICE VISIT (OUTPATIENT)
Dept: FAMILY MEDICINE | Facility: OTHER | Age: 69
End: 2024-07-25
Attending: PHYSICIAN ASSISTANT
Payer: MEDICARE

## 2024-07-25 VITALS
HEIGHT: 71 IN | BODY MASS INDEX: 29.43 KG/M2 | WEIGHT: 210.2 LBS | HEART RATE: 85 BPM | SYSTOLIC BLOOD PRESSURE: 162 MMHG | TEMPERATURE: 96 F | DIASTOLIC BLOOD PRESSURE: 80 MMHG | RESPIRATION RATE: 12 BRPM | OXYGEN SATURATION: 99 %

## 2024-07-25 DIAGNOSIS — Z98.890 STATUS POST CARDIAC CATHETERIZATION: ICD-10-CM

## 2024-07-25 DIAGNOSIS — R60.0 EDEMA OF UPPER EXTREMITY: ICD-10-CM

## 2024-07-25 DIAGNOSIS — I25.10 CAD S/P PERCUTANEOUS CORONARY ANGIOPLASTY: ICD-10-CM

## 2024-07-25 DIAGNOSIS — I25.10 ASCVD (ARTERIOSCLEROTIC CARDIOVASCULAR DISEASE): ICD-10-CM

## 2024-07-25 DIAGNOSIS — R60.0 BILATERAL LOWER EXTREMITY EDEMA: Primary | ICD-10-CM

## 2024-07-25 DIAGNOSIS — R31.21 ASYMPTOMATIC MICROSCOPIC HEMATURIA: ICD-10-CM

## 2024-07-25 DIAGNOSIS — Z98.61 CAD S/P PERCUTANEOUS CORONARY ANGIOPLASTY: ICD-10-CM

## 2024-07-25 DIAGNOSIS — R06.9 UNSPECIFIED ABNORMALITIES OF BREATHING: ICD-10-CM

## 2024-07-25 DIAGNOSIS — E11.9 CONTROLLED TYPE 2 DIABETES MELLITUS WITHOUT COMPLICATION, WITHOUT LONG-TERM CURRENT USE OF INSULIN (H): ICD-10-CM

## 2024-07-25 PROBLEM — E78.5 DYSLIPIDEMIA: Status: ACTIVE | Noted: 2020-05-26

## 2024-07-25 LAB
ALBUMIN SERPL BCG-MCNC: 4.5 G/DL (ref 3.5–5.2)
ALP SERPL-CCNC: 66 U/L (ref 40–150)
ALT SERPL W P-5'-P-CCNC: 21 U/L (ref 0–70)
ANION GAP SERPL CALCULATED.3IONS-SCNC: 8 MMOL/L (ref 7–15)
AST SERPL W P-5'-P-CCNC: 16 U/L (ref 0–45)
BASOPHILS # BLD AUTO: 0.1 10E3/UL (ref 0–0.2)
BASOPHILS NFR BLD AUTO: 1 %
BILIRUB SERPL-MCNC: 0.7 MG/DL
BUN SERPL-MCNC: 10.8 MG/DL (ref 8–23)
CALCIUM SERPL-MCNC: 9.8 MG/DL (ref 8.8–10.4)
CHLORIDE SERPL-SCNC: 104 MMOL/L (ref 98–107)
CREAT SERPL-MCNC: 1.02 MG/DL (ref 0.67–1.17)
EGFRCR SERPLBLD CKD-EPI 2021: 79 ML/MIN/1.73M2
EOSINOPHIL # BLD AUTO: 0.1 10E3/UL (ref 0–0.7)
EOSINOPHIL NFR BLD AUTO: 1 %
ERYTHROCYTE [DISTWIDTH] IN BLOOD BY AUTOMATED COUNT: 12.8 % (ref 10–15)
GLUCOSE SERPL-MCNC: 135 MG/DL (ref 70–99)
HBA1C MFR BLD: 6.3 % (ref 4–6.2)
HCO3 SERPL-SCNC: 29 MMOL/L (ref 22–29)
HCT VFR BLD AUTO: 45.2 % (ref 40–53)
HGB BLD-MCNC: 15.3 G/DL (ref 13.3–17.7)
IMM GRANULOCYTES # BLD: 0 10E3/UL
IMM GRANULOCYTES NFR BLD: 0 %
LYMPHOCYTES # BLD AUTO: 1.3 10E3/UL (ref 0.8–5.3)
LYMPHOCYTES NFR BLD AUTO: 21 %
MCH RBC QN AUTO: 30.5 PG (ref 26.5–33)
MCHC RBC AUTO-ENTMCNC: 33.8 G/DL (ref 31.5–36.5)
MCV RBC AUTO: 90 FL (ref 78–100)
MONOCYTES # BLD AUTO: 0.6 10E3/UL (ref 0–1.3)
MONOCYTES NFR BLD AUTO: 10 %
NEUTROPHILS # BLD AUTO: 4 10E3/UL (ref 1.6–8.3)
NEUTROPHILS NFR BLD AUTO: 67 %
NRBC # BLD AUTO: 0 10E3/UL
NRBC BLD AUTO-RTO: 0 /100
NT-PROBNP SERPL-MCNC: 87 PG/ML (ref 0–900)
PLATELET # BLD AUTO: 228 10E3/UL (ref 150–450)
POTASSIUM SERPL-SCNC: 4 MMOL/L (ref 3.4–5.3)
PROT SERPL-MCNC: 6.8 G/DL (ref 6.4–8.3)
RBC # BLD AUTO: 5.01 10E6/UL (ref 4.4–5.9)
SODIUM SERPL-SCNC: 141 MMOL/L (ref 135–145)
WBC # BLD AUTO: 6 10E3/UL (ref 4–11)

## 2024-07-25 PROCEDURE — G0463 HOSPITAL OUTPT CLINIC VISIT: HCPCS | Mod: 25

## 2024-07-25 PROCEDURE — 74178 CT ABD&PLV WO CNTR FLWD CNTR: CPT | Mod: MG

## 2024-07-25 PROCEDURE — 250N000009 HC RX 250: Performed by: UROLOGY

## 2024-07-25 PROCEDURE — 250N000011 HC RX IP 250 OP 636: Performed by: UROLOGY

## 2024-07-25 PROCEDURE — 85025 COMPLETE CBC W/AUTO DIFF WBC: CPT | Mod: ZL | Performed by: PHYSICIAN ASSISTANT

## 2024-07-25 PROCEDURE — 84460 ALANINE AMINO (ALT) (SGPT): CPT | Mod: ZL | Performed by: PHYSICIAN ASSISTANT

## 2024-07-25 PROCEDURE — 83880 ASSAY OF NATRIURETIC PEPTIDE: CPT | Mod: ZL | Performed by: PHYSICIAN ASSISTANT

## 2024-07-25 PROCEDURE — 36415 COLL VENOUS BLD VENIPUNCTURE: CPT | Mod: ZL | Performed by: PHYSICIAN ASSISTANT

## 2024-07-25 PROCEDURE — 84155 ASSAY OF PROTEIN SERUM: CPT | Mod: ZL | Performed by: PHYSICIAN ASSISTANT

## 2024-07-25 PROCEDURE — 83036 HEMOGLOBIN GLYCOSYLATED A1C: CPT | Mod: ZL | Performed by: PHYSICIAN ASSISTANT

## 2024-07-25 PROCEDURE — G0463 HOSPITAL OUTPT CLINIC VISIT: HCPCS

## 2024-07-25 PROCEDURE — 99214 OFFICE O/P EST MOD 30 MIN: CPT | Performed by: PHYSICIAN ASSISTANT

## 2024-07-25 RX ORDER — FUROSEMIDE 20 MG
20 TABLET ORAL DAILY
Qty: 10 TABLET | Refills: 0 | Status: SHIPPED | OUTPATIENT
Start: 2024-07-25

## 2024-07-25 RX ORDER — IOPAMIDOL 755 MG/ML
119 INJECTION, SOLUTION INTRAVASCULAR ONCE
Status: COMPLETED | OUTPATIENT
Start: 2024-07-25 | End: 2024-07-25

## 2024-07-25 RX ADMIN — SODIUM CHLORIDE 60 ML: 9 INJECTION, SOLUTION INTRAVENOUS at 10:05

## 2024-07-25 RX ADMIN — IOPAMIDOL 119 ML: 755 INJECTION, SOLUTION INTRAVENOUS at 09:59

## 2024-07-25 ASSESSMENT — PAIN SCALES - GENERAL: PAINLEVEL: NO PAIN (0)

## 2024-07-25 NOTE — TELEPHONE ENCOUNTER
----- Message from Shaq Akins sent at 7/24/2024 10:40 AM CDT -----  Regarding: UE Edema  Doug,    Lab came to me and wanted me to inform you that this gentleman came to get his blood drawn and he was complaining of several days of moderate UE swelling of his arms and hands.    He asked me to let you know for some reason.  He had a BMP today in anticipation of CT Urogram for hematuria work up.     MCKENZIE Linn

## 2024-07-25 NOTE — NURSING NOTE
Patient presents to clinic with upper extremity swelling.  Cyndee Ko LPN ....................  7/25/2024   2:24 PM  EXT [id number]

## 2024-07-25 NOTE — RESULT ENCOUNTER NOTE
Patient notified.  Left ureter dilated.  I recommend cystoscopy with left retrograde pyelograms, possible stone extraction/stent placement in the OR.    Patient agreeable.       Melita, can we schedule cystoscopy, left retrograde, left ureteroscopy, possible stent, possible stone extraction in the OR.  Will you let the patient know.      Dr. Akins

## 2024-07-25 NOTE — TELEPHONE ENCOUNTER
Isolated upper extremity edema is unusual and requires OV.    This is something that requires a visit.  If he has new concerns he should be seen sooner.    he may need to see one of my partners.  If no one available, he may need to present to [facility].    Doug Curiel MD, FAAP, FACP  Internal Medicine & Pediatrics

## 2024-07-25 NOTE — PROGRESS NOTES
Assessment & Plan     1. Bilateral lower extremity edema  2. Edema of upper extremity  Few days of very mild edema to bilateral upper and lower extremities.  Exam showing very limited swelling to bilateral upper extremities, minimal 1+ pitting edema to lower extremities.  Blood pressure is mildly elevated but patient reports this is his baseline, remainder vital stable.  Remainder of exam stable.  Differential includes situational related to warm weather and minimal hydration, gravity dependent edema, renal dysfunction, hepatic dysfunction, CHF, other cardiac cause, medication side effect, etc.  Lab work pending as below.  Given patient will be traveling out of town this weekend did send in low-dose Lasix to be used for symptomatic management if symptoms progress.  Discussed potential side effects of increasing his chronic urinary frequency and urgency due to his prostate issues as this is a water pill.  Patient reports he has been on Lasix previously due to his cardiac conditions and has tolerated well.  Renal function and electrolytes were stable when checked yesterday.  If lab work is abnormal consider additional evaluation such as follow-up with cardiology and echocardiogram.  - CBC and Differential; Future  - Comprehensive Metabolic Panel; Future  - Brain Natriuretic Peptide (BNP); Future  - CBC and Differential  - Comprehensive Metabolic Panel  - Brain Natriuretic Peptide (BNP)  - furosemide (LASIX) 20 MG tablet; Take 1 tablet (20 mg) by mouth daily  Dispense: 10 tablet; Refill: 0    3. ASCVD (arteriosclerotic cardiovascular disease)  4. Status post cardiac catheterization on 12/6/2019 [facility] with a stenting to his distal circumflex  5. CAD S/P percutaneous coronary angioplasty  Chronic, stable.  As above.  - CBC and Differential; Future  - Comprehensive Metabolic Panel; Future  - Brain Natriuretic Peptide (BNP); Future  - CBC and Differential  - Comprehensive Metabolic Panel  - Brain Natriuretic  Peptide (BNP)    6. Unspecified abnormalities of breathing  Chronic, stable.  As above.  - Brain Natriuretic Peptide (BNP); Future  - Brain Natriuretic Peptide (BNP)    7. Controlled type 2 diabetes mellitus without complication, without long-term current use of insulin (H)  Home numbers have been averaging in the 120s to 140s to 50s.  A1c pending.  Will notify with results and adjust treatment if indicated.  Follow-up with PCP as previously scheduled.  - Hemoglobin A1c; Future  - Hemoglobin A1c      No follow-ups on file.    Nirmal De La Rosa is a 68 year old, presenting for the following health issues:  Edema    History of Present Illness       Reason for visit:  Referal  Symptom onset:  1-3 days ago    He eats 4 or more servings of fruits and vegetables daily.He consumes 0 sweetened beverage(s) daily.He exercises with enough effort to increase his heart rate 60 or more minutes per day.  He exercises with enough effort to increase his heart rate 7 days per week.   He is taking medications regularly.     Here for evaluation of swelling concerns.  Patient reports over the last couple days he has noticed some very mild swelling to his bilateral upper and lower extremities.  Has noticed a small sock line to his bilateral lower extremities.  Did have to loosen up his watch on his right arm.  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  He does not drink quite as much due to his chronic prostate and urinary symptoms causing chronic urinary frequency and urgency.  He is following regularly with urology regarding this.  Had labs completed yesterday for urine and BMP which were stable.  At the time he and  noted that his arms seem to be mildly swollen so discussed with PCP who recommended he be seen in the clinic.  Patient does also have an extensive cardiac history including CAD, status post angioplasty in 2019.  Has been stable and follows with cardiology  regularly.  Does report he have some difficulties with breathing with exertion but this is his baseline.  He has also been monitoring his blood sugars at home as he has controlled type 2 diabetes.  A1c did increase further when checked in early April up to 7.4.  He reports his home blood sugars are averaging between 120s to 150s.  He is going to be traveling out of town for the weekend and wanted to get his swelling checked out before he leaves.    PAST MEDICAL HISTORY:   Past Medical History:   Diagnosis Date    Benign lipomatous neoplasm     1/20/2014    Calculus of kidney     possible    Carpal tunnel syndrome     Both hands    Closed fracture of patella     05/06/09,Sustained right superior pole patellar fracture which underwent conservative management    Diverticulosis of large intestine without perforation or abscess without bleeding     1/28/2014    Exertional chest pain 11/19/2019    Headache     No Comments Provided    Other specified forms of tremor     3/2/2011    Other urticaria (CODE)     3/2/2011    Pain in joint     No Comments Provided    Umbilical hernia without obstruction or gangrene     1/26/2018       PAST SURGICAL HISTORY:   Past Surgical History:   Procedure Laterality Date    COLONOSCOPY  01/28/2014 2009,2014,F/U 2019    COLONOSCOPY  03/01/2019    Serrated adenoma, follow up 1 year    COLONOSCOPY N/A 01/15/2021    5 year, due 1/2026. Procedure: COLONOSCOPY, WITH POLYPECTOMY AND BIOPSY;  Surgeon: Ly Frances MD;  Location:  OR    ESOPHAGOSCOPY, GASTROSCOPY, DUODENOSCOPY (EGD), COMBINED      1/28/14,EGD    ESOPHAGOSCOPY, GASTROSCOPY, DUODENOSCOPY (EGD), COMBINED N/A 01/15/2021    Procedure: ESOPHAGOGASTRODUODENOSCOPY, WITH BIOPSY;  Surgeon: Ly Frances MD;  Location: [facility] OR    JOINT REPLACEMENT, HIP RT/LT Left     OTHER SURGICAL HISTORY      9/10/2014,37513.0,RI REPAIR ING HERNIA  >5 TRS BETTINA    OTHER SURGICAL HISTORY      1/26/2018,92066.0,RI REPAIR UMBILICAL NAZARIO  >5 TRS REDUC     RELEASE CARPAL TUNNEL      3,2004,Both hands    SIGMOIDOSCOPY FLEXIBLE      No Comments Provided       FAMILY HISTORY:   Family History   Problem Relation Age of Onset    Hypertension Mother         Hypertension    Diabetes Mother         Diabetes    Other - See Comments Mother         Spinal stenosis    Other - See Comments Father         scleroderma which was quite severe    Cancer Father         Cancer, of adenocarcinoma of the lung.  He was a nonsmoker.    Diabetes Brother         Diabetes    Other - See Comments Brother         Pancreatitis    Substance Abuse Brother         Alcohol/Drug,Alcoholic, has been through treatment a number of times.       SOCIAL HISTORY:   Social History     Tobacco Use    Smoking status: Never    Smokeless tobacco: Never   Substance Use Topics    Alcohol use: No        Allergies   Allergen Reactions    Ciprofloxacin Other (See Comments)     Tendon issue , and IBS    Lisinopril Cough    Midazolam      Other reaction(s): Other - Describe In Comment Field  Difficult to wake up    Tamsulosin Other (See Comments)     Chest pain     Current Outpatient Medications   Medication Sig Dispense Refill    aspirin 81 MG EC tablet Take 81 mg by mouth daily      blood glucose (NO BRAND SPECIFIED) lancets standard Dispense item covered by insurance. Test blood sugar 2 times daily. 200 each 11    blood glucose (NO BRAND SPECIFIED) test strip Dispense item covered by insurance. Test blood sugar 2 times daily. 200 strip 11    blood glucose monitoring (NO BRAND SPECIFIED) meter device kit Dispense option covered by insurance. Test blood sugar 2 times daily. 1 kit 11    canagliflozin (INVOKANA) 100 MG tablet Take 1 tablet (100 mg) by mouth every morning (before breakfast) 90 tablet 4    famotidine (PEPCID) 20 MG tablet Take 1 tablet (20 mg) by mouth 2 times daily as needed (gerd) 180 tablet 3    furosemide (LASIX) 20 MG tablet Take 1 tablet (20 mg) by mouth daily 10 tablet 0    ibuprofen  "(ADVIL/MOTRIN) 400 MG tablet       losartan (COZAAR) 25 MG tablet Take 1 tablet (25 mg) by mouth daily 90 tablet 4    metoprolol succinate ER (TOPROL XL) 25 MG 24 hr tablet Take 1 tablet (25 mg) by mouth daily 90 tablet 3    Microlet Lancets MISC Inject 1 Lancet subcutaneously 2 times daily      neomycin-polymyxin-dexamethasone (MAXITROL) 3.5-77504-3.1 SUSP ophthalmic susp INSTILL 1 DROP INTO EACH EYE 1-2 TIMES PER DAY AS NEEDED FOR ITCHY EYES      nitroGLYcerin (NITROSTAT) 0.4 MG sublingual tablet For chest pain place 1 tablet under the tongue every 5 minutes for 3 doses. If symptoms persist 5 minutes after 1st dose call 911. 25 tablet 3    rosuvastatin (CRESTOR) 20 MG tablet Take 1 tablet (20 mg) by mouth daily 90 tablet 3    silver sulfADIAZINE (SILVADENE) 1 % external cream Apply topically daily 85 g 1    tamsulosin (FLOMAX) 0.4 MG capsule Take 1 capsule (0.4 mg) by mouth daily 90 capsule 3    triamcinolone (KENALOG) 0.1 % cream Apply 1 Film topically 3 times daily       No current facility-administered medications for this visit.           Objective    BP (!) 162/80   Pulse 85   Temp (!) 96  F (35.6  C)   Resp 12   Ht 1.791 m (5' 10.5\")   Wt 95.3 kg (210 lb 3.2 oz)   SpO2 99%   BMI 29.73 kg/m    Body mass index is 29.73 kg/m .  Physical Exam   General: Pleasant, in no apparent distress.  Eyes: Sclera are white and conjunctiva are clear bilaterally. Lacrimal apparatus free of erythema, edema, and discharge bilaterally.  Ears: External ears without erythema or edema.   Nose: External nose is symmetrical and free of lesions and deformities.  Cardiovascular: Regular rate and rhythm with S1 equal to S2. No murmurs, friction rubs, or gallops.  Very minimal less than 1+ pitting edema to bilateral lower extremities.  No pitting edema to bilateral upper extremities.  Respiratory: Lungs are resonant and clear to auscultation bilaterally. No wheezes, crackles, or rhonchi.  Neurologic Exam: Normal gross motor, tone " coordination and no visible tremor.  Skin: No jaundice, pallor, rashes, or lesions.  Psych: Appropriate mood and affect.      Signed Electronically by: Lulu Cobb PA-C

## 2024-07-25 NOTE — TELEPHONE ENCOUNTER
Called and spoke to Patient after verifying last name and date of birth.   Patient states this is a new problem and he has never experienced it before. Writer informed patient of Dr. Porras's note. Patient is leaving town tomorrow morning and plans to be seen today. Appointment scheduled for today with Lulu Cobb.     Batool Ronquillo RN on 7/25/2024 at 8:27 AM

## 2024-07-30 ENCOUNTER — PATIENT OUTREACH (OUTPATIENT)
Dept: UROLOGY | Facility: CLINIC | Age: 69
End: 2024-07-30
Payer: COMMERCIAL

## 2024-07-30 NOTE — PROGRESS NOTES
[facility] called pt back.  He was referred back to us by Dr Akins for consult with Dr Arredondo for middle lobe Holep and or PAE as well as to discuss recent imaging of blood in the urine and hydroureter.  CT imaging and labs in the chart.  Dr Akins unable to get the pt in for any Cysto until October.  SHERON Keith  Care Coordinator Urology  [phone]

## 2024-08-06 ENCOUNTER — VIRTUAL VISIT (OUTPATIENT)
Dept: UROLOGY | Facility: CLINIC | Age: 69
End: 2024-08-06
Payer: COMMERCIAL

## 2024-08-06 DIAGNOSIS — N40.1 BPH WITH OBSTRUCTION/LOWER URINARY TRACT SYMPTOMS: Primary | ICD-10-CM

## 2024-08-06 DIAGNOSIS — N13.8 BPH WITH OBSTRUCTION/LOWER URINARY TRACT SYMPTOMS: Primary | ICD-10-CM

## 2024-08-06 PROCEDURE — 99215 OFFICE O/P EST HI 40 MIN: CPT | Mod: GT | Performed by: UROLOGY

## 2024-08-06 RX ORDER — ALFUZOSIN HYDROCHLORIDE 10 MG/1
10 TABLET, EXTENDED RELEASE ORAL DAILY
Qty: 30 TABLET | Refills: 5 | Status: SHIPPED | OUTPATIENT
Start: 2024-08-06

## 2024-08-06 ASSESSMENT — PAIN SCALES - GENERAL: PAINLEVEL: MILD PAIN (2)

## 2024-08-06 NOTE — PROGRESS NOTES
Virtual Visit Details    Type of service:  Video Visit   Video Start Time: 9:14 AM  Video End Time:9:41 AM    Originating Location (pt. Location): Home    Distant Location (provider location):  On-site  Platform used for Video Visit: Mann    Assessment & Plan   ASSESSMENT and PLAN  68 year old male here for reevaluation of BPH, hematuria and left hydroureteronephrosis.    1. BPH with obstruction/lower urinary tract symptoms  2.  Hematuria  3.  Left hydroureteronephrosis  - alfuzosin ER (UROXATRAL) 10 MG 24 hr tablet; Take 1 tablet (10 mg) by mouth daily  Dispense: 30 tablet; Refill: 5  - Other Specialty Referral; Future    Dr. Akins is planning on doing a left ureteroscopy sometime in October to evaluate left hydronephrosis.  Discussed that it could be a stone, stricture, J hooking from the prostate, or more less likely a tumor.    I discussed with patient options of prostate artery embolization versus HoLEP.  I do think he has at least a 70 to 80% chance of seeing benefit from prostate artery embolization and given his imaging and what appears to be mostly bilobar prostate.  Rediscussed options and will have him meet with Dr. Rodrigues to discuss prostate artery embolization.    Time spent: 40 minutes spent on the date of the encounter doing chart review, history and exam, documentation and further activities as noted above.    Eliu Arredondo MD   Urology  [facility]         Subjective     CHIEF COMPLAINT   follow-up of BPH and hematuria.      HPI   Rj Juarez is a very pleasant 68 year old male who presents with a history of BPH with lower urinary tract symptoms..    Since last visit, he had some hematuria and CT urogram had left hydroureteronephrosis.  The distal ureters obscured due to hip arthroplasty.  He is also been having left flank pain on and off sometimes while urinating.  He has not been taking tamsulosin.  He does have some possible bladder pain may be related to  bladder fullness.  Has some urinary frequency however is on canagliflozin.  He has also tried cutting out all dietary irritants without much improvement in daytime and nighttime urinary frequency.    Imaging: I personally viewed CT urogram from July 25, 2024 which demonstrated stable enlarged prostate and left hydroureteronephrosis down to the distal ureter.

## 2024-08-06 NOTE — PATIENT INSTRUCTIONS
For prostate artery embolization (PAE), this works by blocking blood flow to prostate to allow it to soften and shrink slightly to allow urine to pass easier.  This has approximately an 85% success rate after the procedure and a 15-20% retreatment rate in 5 years.  This has a very low rate of urinary incontinence.  There is no catheter or hospital stay associated with this procedure.  There is a small chance of developing retrograde ejaculation (no semen coming out of the end of the penis).    For holmium laser enucleation of the prostate (HoLEP), this uses a laser to help physically remove the inner prostate tissue from the shell.  This has approximately an 99% success rate after the procedure and a 1-2% retreatment rate in 5 years.  There is a high chance of short term incontinence for 4-6 weeks that may require pads or diapers.  At 3 months, approximately 5% of men are still having leakage and at 6 months and beyond it is 1-2%.  This procedure will take 1-3 hours under general anesthesia depending on the size of the prostate.  Patients can possibly go home the same day without a catheter or stay the night in the hospital if they prefer. Patients should expect to have retrograde ejaculation after surgery (no semen coming out of the end of the penis).

## 2024-08-06 NOTE — NURSING NOTE
Current patient location: [address] [place] [id number]    Is the patient currently in the state of [place]? NO    Visit mode:VIDEO    If the visit is dropped, the patient can be reconnected by: VIDEO VISIT: Text to cell phone:   Telephone Information:   Mobile [phone]    and VIDEO VISIT: Send to e-mail at: [email or web address]    Will anyone else be joining the visit? NO  (If patient encounters technical issues they should call [phone]    How would you like to obtain your AVS? MyChart    Are changes needed to the allergy or medication list? No    Are refills needed on medications prescribed by this physician? NO    Rooming Documentation:  Not applicable      Reason for visit: RECHECK    Jessica MARAVILLA

## 2024-08-29 ENCOUNTER — HOSPITAL ENCOUNTER (OUTPATIENT)
Facility: OTHER | Age: 69
End: 2024-08-29
Attending: UROLOGY | Admitting: UROLOGY
Payer: MEDICARE

## 2024-08-29 DIAGNOSIS — N13.30 HYDRONEPHROSIS OF LEFT KIDNEY: Primary | ICD-10-CM

## 2024-09-03 NOTE — TELEPHONE ENCOUNTER
After proper verification patient was relayed the message from below. Patient stated that he would callback if he needed a referral.   Antonietta العلي LPN on 8/6/2019 at 8:11 AM    
After proper verification, patient stated that he would like a MRI on his right knee from a [facility] in Right knee.  Antonietta العلي LPN on 8/5/2019 at 2:02 PM    
Pt requesting to get a knee injection.   
Would recommend that he see the orthopedic clinic to determine the correct MRI and if MRI is needed.   May need joint contrast injection (MR Arthrogram).     If needing orthopedic referral, we can place one. Otherwise can just call [facility] and schedule appointment.    Dawood Bearden MD  
CONSTITUTIONAL: Well-appearing; well-nourished; in no apparent distress.   	HEAD: Normocephalic; atraumatic.   	EYES:  conjunctiva and sclera clear  	ENT: normal nose; no rhinorrhea; normal pharynx with no erythema or lesions.   	NECK: Supple; non-tender;   	CARDIOVASCULAR: Normal S1, S2; no murmurs, rubs, or gallops. Regular rate and rhythm.   	RESPIRATORY: Breathing easily; breath sounds clear and equal bilaterally; no wheezes, rhonchi, or rales.  	GI: Soft; non-distended; non-tender  	EXT: WILLIS x 4. normal gait.   	SKIN: Normal for age and race; warm; dry; good turgor; no apparent lesions or rash.   	NEURO: A & O x 3; face symmetric; grossly unremarkable.   PSYCHOLOGICAL: The patient’s mood and manner are appropriate.

## 2024-09-10 ENCOUNTER — TELEPHONE (OUTPATIENT)
Dept: INTERNAL MEDICINE | Facility: OTHER | Age: 69
End: 2024-09-10

## 2024-09-10 ENCOUNTER — OFFICE VISIT (OUTPATIENT)
Dept: PEDIATRICS | Facility: OTHER | Age: 69
End: 2024-09-10
Attending: INTERNAL MEDICINE
Payer: COMMERCIAL

## 2024-09-10 VITALS
HEART RATE: 84 BPM | TEMPERATURE: 96.6 F | SYSTOLIC BLOOD PRESSURE: 138 MMHG | BODY MASS INDEX: 29.43 KG/M2 | DIASTOLIC BLOOD PRESSURE: 80 MMHG | HEIGHT: 70 IN | OXYGEN SATURATION: 98 % | RESPIRATION RATE: 16 BRPM | WEIGHT: 205.6 LBS

## 2024-09-10 DIAGNOSIS — G47.33 OSA ON CPAP: Chronic | ICD-10-CM

## 2024-09-10 DIAGNOSIS — R39.12 BENIGN PROSTATIC HYPERPLASIA WITH WEAK URINARY STREAM: ICD-10-CM

## 2024-09-10 DIAGNOSIS — R31.29 MICROSCOPIC HEMATURIA: ICD-10-CM

## 2024-09-10 DIAGNOSIS — Z98.890 HISTORY OF CARDIAC CATH: ICD-10-CM

## 2024-09-10 DIAGNOSIS — I25.10 CAD S/P PERCUTANEOUS CORONARY ANGIOPLASTY: ICD-10-CM

## 2024-09-10 DIAGNOSIS — N13.30 HYDRONEPHROSIS OF LEFT KIDNEY: ICD-10-CM

## 2024-09-10 DIAGNOSIS — I45.10 RBBB (RIGHT BUNDLE BRANCH BLOCK): ICD-10-CM

## 2024-09-10 DIAGNOSIS — Z98.61 CAD S/P PERCUTANEOUS CORONARY ANGIOPLASTY: ICD-10-CM

## 2024-09-10 DIAGNOSIS — E11.9 CONTROLLED TYPE 2 DIABETES MELLITUS WITHOUT COMPLICATION, WITHOUT LONG-TERM CURRENT USE OF INSULIN (H): ICD-10-CM

## 2024-09-10 DIAGNOSIS — I10 WHITE COAT SYNDROME WITH DIAGNOSIS OF HYPERTENSION: Chronic | ICD-10-CM

## 2024-09-10 DIAGNOSIS — I25.10 ASCVD (ARTERIOSCLEROTIC CARDIOVASCULAR DISEASE): Chronic | ICD-10-CM

## 2024-09-10 DIAGNOSIS — Z98.890 STATUS POST CARDIAC CATHETERIZATION: Chronic | ICD-10-CM

## 2024-09-10 DIAGNOSIS — R93.1 HIGH CORONARY ARTERY CALCIUM SCORE: ICD-10-CM

## 2024-09-10 DIAGNOSIS — E11.9 CONTROLLED TYPE 2 DIABETES MELLITUS WITHOUT COMPLICATION, WITHOUT LONG-TERM CURRENT USE OF INSULIN (H): Primary | ICD-10-CM

## 2024-09-10 DIAGNOSIS — Z01.818 PREOPERATIVE EXAMINATION: Primary | ICD-10-CM

## 2024-09-10 DIAGNOSIS — N40.1 BENIGN PROSTATIC HYPERPLASIA WITH WEAK URINARY STREAM: ICD-10-CM

## 2024-09-10 LAB
ANION GAP SERPL CALCULATED.3IONS-SCNC: 7 MMOL/L (ref 7–15)
ATRIAL RATE - MUSE: 67 BPM
BUN SERPL-MCNC: 18.5 MG/DL (ref 8–23)
CALCIUM SERPL-MCNC: 9.5 MG/DL (ref 8.8–10.4)
CHLORIDE SERPL-SCNC: 102 MMOL/L (ref 98–107)
CREAT SERPL-MCNC: 1.02 MG/DL (ref 0.67–1.17)
DIASTOLIC BLOOD PRESSURE - MUSE: NORMAL MMHG
EGFRCR SERPLBLD CKD-EPI 2021: 79 ML/MIN/1.73M2
ERYTHROCYTE [DISTWIDTH] IN BLOOD BY AUTOMATED COUNT: 13.1 % (ref 10–15)
GLUCOSE SERPL-MCNC: 186 MG/DL (ref 70–99)
HCO3 SERPL-SCNC: 29 MMOL/L (ref 22–29)
HCT VFR BLD AUTO: 47.9 % (ref 40–53)
HGB BLD-MCNC: 15.8 G/DL (ref 13.3–17.7)
INTERPRETATION ECG - MUSE: NORMAL
MCH RBC QN AUTO: 30.8 PG (ref 26.5–33)
MCHC RBC AUTO-ENTMCNC: 33 G/DL (ref 31.5–36.5)
MCV RBC AUTO: 93 FL (ref 78–100)
P AXIS - MUSE: 13 DEGREES
PLATELET # BLD AUTO: 220 10E3/UL (ref 150–450)
POTASSIUM SERPL-SCNC: 3.9 MMOL/L (ref 3.4–5.3)
PR INTERVAL - MUSE: 154 MS
QRS DURATION - MUSE: 158 MS
QT - MUSE: 412 MS
QTC - MUSE: 435 MS
R AXIS - MUSE: 26 DEGREES
RBC # BLD AUTO: 5.13 10E6/UL (ref 4.4–5.9)
SODIUM SERPL-SCNC: 138 MMOL/L (ref 135–145)
SYSTOLIC BLOOD PRESSURE - MUSE: NORMAL MMHG
T AXIS - MUSE: 16 DEGREES
VENTRICULAR RATE- MUSE: 67 BPM
WBC # BLD AUTO: 5.2 10E3/UL (ref 4–11)

## 2024-09-10 PROCEDURE — 85041 AUTOMATED RBC COUNT: CPT | Mod: ZL | Performed by: INTERNAL MEDICINE

## 2024-09-10 PROCEDURE — 93010 ELECTROCARDIOGRAM REPORT: CPT | Performed by: INTERNAL MEDICINE

## 2024-09-10 PROCEDURE — 36415 COLL VENOUS BLD VENIPUNCTURE: CPT | Mod: ZL | Performed by: INTERNAL MEDICINE

## 2024-09-10 PROCEDURE — G2211 COMPLEX E/M VISIT ADD ON: HCPCS | Performed by: INTERNAL MEDICINE

## 2024-09-10 PROCEDURE — G0463 HOSPITAL OUTPT CLINIC VISIT: HCPCS

## 2024-09-10 PROCEDURE — 82374 ASSAY BLOOD CARBON DIOXIDE: CPT | Mod: ZL | Performed by: INTERNAL MEDICINE

## 2024-09-10 PROCEDURE — 82565 ASSAY OF CREATININE: CPT | Mod: ZL | Performed by: INTERNAL MEDICINE

## 2024-09-10 PROCEDURE — 93005 ELECTROCARDIOGRAM TRACING: CPT | Performed by: INTERNAL MEDICINE

## 2024-09-10 PROCEDURE — 99214 OFFICE O/P EST MOD 30 MIN: CPT | Performed by: INTERNAL MEDICINE

## 2024-09-10 ASSESSMENT — PAIN SCALES - GENERAL: PAINLEVEL: MILD PAIN (2)

## 2024-09-10 NOTE — TELEPHONE ENCOUNTER
Per therapeutic substitution protocol:     Medication: Invokana has increased in price through the RxOutreach program. Jardiance is the more affordable option.     Sent new prescription for Jardiance 10 mg per CPA.      Updated medication list in EPIC.    Tavares Jauregui, PharmD  [facility]

## 2024-09-10 NOTE — PATIENT INSTRUCTIONS
-- Hold invokana 24 hours prior, resume when you get home   -- Hold aspirin for 5-7 days before surgery, unless you have had a stent then continue aspirin.   -- Hold ibuprofen/Advil for 2-3 days before surgery   -- Hold naproxen/Aleve for 5-7 days before surgery   -- Acetaminophen (Tylenol) is okay   -- Hold vitamins and herbal remedies for 7 days before surgery

## 2024-09-10 NOTE — PROGRESS NOTES
Preoperative Evaluation  [facility]  [address]  [place] [address]  Phone: [phone]  Fax: [phone]  Primary Provider: Doug Porras MD  Pre-op Performing Provider: Doug Porras MD  Sep 10, 2024             9/9/2024   Surgical Information   What procedure is being done? Cysto left retrograde pyelogram   Facility or Hospital where procedure/surgery will be performed: [facility]   Who is doing the procedure / surgery? Akins   Date of surgery / procedure: 10/8/24   Time of surgery / procedure: ????   Where do you plan to recover after surgery? at home with family          Preoperative History & Physical   Date of Exam: 9/10/2024  Chief Complaint   Patient presents with    Pre-Op Exam       Assessment & Recommendations       ICD-10-CM    1. Preoperative examination  Z01.818 Basic metabolic panel     CBC with platelets     EKG 12-lead, tracing only     Basic metabolic panel     CBC with platelets      2. Hydronephrosis of left kidney  N13.30       3. Benign prostatic hyperplasia with weak urinary stream  N40.1     R39.12       4. Microscopic hematuria  R31.29 CBC with platelets     CBC with platelets      5. Controlled type 2 diabetes mellitus without complication, without long-term current use of insulin (H)  E11.9 Basic metabolic panel     Basic metabolic panel      6. High coronary artery calcium score at 1118.2 on 11/21/2019  R93.1 EKG 12-lead, tracing only      7. History of cardiac cath on 12/6/2019  Z98.890 EKG 12-lead, tracing only      8. ASCVD (arteriosclerotic cardiovascular disease)  I25.10 EKG 12-lead, tracing only      9. Status post cardiac catheterization on 12/6/2019 through [facility] with a stenting to his distal circumflex  Z98.890 EKG 12-lead, tracing only      10. CAD S/P percutaneous coronary angioplasty  I25.10 EKG 12-lead, tracing only    Z98.61       11. FARNAZ on CPAP  G47.33       12. White coat syndrome with diagnosis of  hypertension  I10       13. RBBB (right bundle branch block)  I45.10 EKG 12-lead, tracing only          For above listed surgery and anesthesia:   Patient is at increased risk for perioperative complications based on atherosclerotic cardiovascular disease status post angioplasty with stent, obstructive sleep apnea on CPAP, hypertension, right bundle branch block, diabetes mellitus type 2, age.    APPROVAL GIVEN to proceed with proposed procedure, without further diagnostic evaluation    Patient is on chronic pain medications: No  Patient is on antiplatelet/anticoagulation: Yes  Other medications that need adjustment perioperatively: Yes  Patient Instructions    -- Hold invokana 24 hours prior, resume when you get home   -- Hold aspirin for 5-7 days before surgery, unless you have had a stent then continue aspirin.   -- Hold ibuprofen/Advil for 2-3 days before surgery   -- Hold naproxen/Aleve for 5-7 days before surgery   -- Acetaminophen (Tylenol) is okay   -- Hold vitamins and herbal remedies for 7 days before surgery        Signed, Doug Porras MD, FAAP, FACP  Internal Medicine & Pediatrics      Subjective   I was asked to see Mr. Rj Juarez by Dr. Akins for preoperative management.    Rj Juarez is a 68 year old male with a history of atherosclerotic cardiovascular disease status post angioplasty with stent, obstructive sleep apnea on CPAP here for preoperative examination.  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    Patient Active Problem List    Diagnosis Date Noted    RBBB (right bundle branch block) 09/10/2024     Priority: Medium    Sacroiliitis (H24) 05/14/2024     Priority: Medium    Osteoarthritis of spine with radiculopathy, lumbar region 06/23/2022     Priority: Medium    Chondromalacia, left hip 07/17/2020     Priority: Medium    Hx of decompressive lumbar laminectomy 07/10/2020      Priority: Medium    CAD S/P percutaneous coronary angioplasty 05/26/2020     Priority: Medium     Formatting of this note might be different from the original.   Status post cardiac catheterization on 12/6/2019 through [facility] with a stenting to his distal circumflex 12/24/2019      Dyslipidemia 05/26/2020     Priority: Medium    Lumbar stenosis with neurogenic claudication 05/19/2020     Priority: Medium    Status post cardiac catheterization on 12/6/2019 through [facility] with a stenting to his distal circumflex 12/24/2019     Priority: Medium    History of cardiac cath on 12/6/2019 12/24/2019     Priority: Medium    Chronic midline low back pain with sciatica, sciatica laterality unspecified 12/24/2019     Priority: Medium    ASCVD (arteriosclerotic cardiovascular disease) 12/03/2019     Priority: Medium    High coronary artery calcium score at 1118.2 on 11/21/2019 11/27/2019     Priority: Medium     Added automatically from request for surgery [id number]      CAD, multiple vessel 11/27/2019     Priority: Medium     Added automatically from request for surgery [id number]      GERD 11/19/2019     Priority: Medium    FARNAZ on CPAP 08/22/2019     Priority: Medium    White coat syndrome with diagnosis of hypertension 08/22/2019     Priority: Medium    Controlled type 2 diabetes mellitus without complication, without long-term current use of insulin (H) 08/22/2019     Priority: Medium    NSAID long-term use 08/22/2019     Priority: Medium    Primary osteoarthritis of right knee  04/09/2019     Priority: Medium    Effusion of right knee 04/09/2019     Priority: Medium    Posterior knee pain, right 04/09/2019     Priority: Medium    Elevated prostate specific antigen (PSA) 11/29/2018     Priority: Medium    Lumbar radiculopathy 02/20/2017     Priority: Medium    Benign essential tremor 03/18/2016     Priority: Medium    Irritable bowel syndrome with diarrhea 03/18/2016     Priority: Medium    H/O adenomatous polyp of colon  12/17/2009     Priority: Medium     Past Medical History:   Diagnosis Date    Benign lipomatous neoplasm     1/20/2014    Calculus of kidney     possible    Carpal tunnel syndrome     Both hands    Closed fracture of patella     05/06/09,Sustained right superior pole patellar fracture which underwent conservative management    Diverticulosis of large intestine without perforation or abscess without bleeding     1/28/2014    Exertional chest pain 11/19/2019    Headache     No Comments Provided    Other specified forms of tremor     3/2/2011    Other urticaria (CODE)     3/2/2011    Pain in joint     No Comments Provided    Umbilical hernia without obstruction or gangrene     1/26/2018     Past Surgical History:   Procedure Laterality Date    COLONOSCOPY  01/28/2014 2009,2014,F/U 2019    COLONOSCOPY  03/01/2019    Serrated adenoma, follow up 1 year    COLONOSCOPY N/A 01/15/2021    5 year, due 1/2026. Procedure: COLONOSCOPY, WITH POLYPECTOMY AND BIOPSY;  Surgeon: Ly Frances MD;  Location:  OR    ESOPHAGOSCOPY, GASTROSCOPY, DUODENOSCOPY (EGD), COMBINED      1/28/14,EGD    ESOPHAGOSCOPY, GASTROSCOPY, DUODENOSCOPY (EGD), COMBINED N/A 01/15/2021    Procedure: ESOPHAGOGASTRODUODENOSCOPY, WITH BIOPSY;  Surgeon: Ly Frances MD;  Location: [facility] OR    JOINT REPLACEMENT, HIP RT/LT Left     OTHER SURGICAL HISTORY      9/10/2014,72905.0,CO REPAIR ING HERNIA  >5 TRS BETTINA    OTHER SURGICAL HISTORY      1/26/2018,16751.0,CO REPAIR UMBILICAL NAZARIO  >5 TRS REDUC    RELEASE CARPAL TUNNEL      3,12/2004,Both hands    SIGMOIDOSCOPY FLEXIBLE      No Comments Provided     Current Outpatient Medications   Medication Sig Dispense Refill    alfuzosin ER (UROXATRAL) 10 MG 24 hr tablet Take 1 tablet (10 mg) by mouth daily 30 tablet 5    aspirin 81 MG EC tablet Take 81 mg by mouth daily      blood glucose (NO BRAND SPECIFIED) lancets standard Dispense item covered by insurance. Test blood sugar 2 times daily. 200 each 11    blood glucose  (NO BRAND SPECIFIED) test strip Dispense item covered by insurance. Test blood sugar 2 times daily. 200 strip 11    blood glucose monitoring (NO BRAND SPECIFIED) meter device kit Dispense option covered by insurance. Test blood sugar 2 times daily. 1 kit 11    canagliflozin (INVOKANA) 100 MG tablet Take 1 tablet (100 mg) by mouth every morning (before breakfast) 90 tablet 4    famotidine (PEPCID) 20 MG tablet Take 1 tablet (20 mg) by mouth 2 times daily as needed (gerd) 180 tablet 3    furosemide (LASIX) 20 MG tablet Take 1 tablet (20 mg) by mouth daily 10 tablet 0    ibuprofen (ADVIL/MOTRIN) 400 MG tablet       losartan (COZAAR) 25 MG tablet Take 1 tablet (25 mg) by mouth daily 90 tablet 4    metoprolol succinate ER (TOPROL XL) 25 MG 24 hr tablet Take 1 tablet (25 mg) by mouth daily 90 tablet 3    Microlet Lancets MISC Inject 1 Lancet subcutaneously 2 times daily      neomycin-polymyxin-dexamethasone (MAXITROL) 3.5-73086-9.1 SUSP ophthalmic susp INSTILL 1 DROP INTO EACH EYE 1-2 TIMES PER DAY AS NEEDED FOR ITCHY EYES      nitroGLYcerin (NITROSTAT) 0.4 MG sublingual tablet For chest pain place 1 tablet under the tongue every 5 minutes for 3 doses. If symptoms persist 5 minutes after 1st dose call 911. 25 tablet 3    rosuvastatin (CRESTOR) 20 MG tablet Take 1 tablet (20 mg) by mouth daily 90 tablet 3    silver sulfADIAZINE (SILVADENE) 1 % external cream Apply topically daily 85 g 1    tamsulosin (FLOMAX) 0.4 MG capsule Take 1 capsule (0.4 mg) by mouth daily (Patient not taking: Reported on 8/6/2024) 90 capsule 3    triamcinolone (KENALOG) 0.1 % cream Apply 1 Film topically 3 times daily       Allergies   Allergen Reactions    Ciprofloxacin Other (See Comments)     Tendon issue , and IBS    Lisinopril Cough    Midazolam      Other reaction(s): Other - Describe In Comment Field  Difficult to wake up    Tamsulosin Other (See Comments)     Chest pain     Family History   Problem Relation Age of Onset    Hypertension  Mother         Hypertension    Diabetes Mother         Diabetes    Other - See Comments Mother         Spinal stenosis    Other - See Comments Father         scleroderma which was quite severe    Cancer Father         Cancer, of adenocarcinoma of the lung.  He was a nonsmoker.    Diabetes Brother         Diabetes    Other - See Comments Brother         Pancreatitis    Substance Abuse Brother         Alcohol/Drug,Alcoholic, has been through treatment a number of times.     Social History     Socioeconomic History    Marital status:      Spouse name: None    Number of children: None    Years of education: None    Highest education level: None   Tobacco Use    Smoking status: Never    Smokeless tobacco: Never   Vaping Use    Vaping status: Never Used   Substance and Sexual Activity    Alcohol use: No    Drug use: No    Sexual activity: Not Currently   Social History Narrative    .      2 children.  Both children grown and living elsewhere.      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     Social Determinants of Health     Financial Resource Strain: Low Risk  (2024)    Financial Resource Strain     Within the past 12 months, have you or your family members you live with been unable to get utilities (heat, electricity) when it was really needed?: No   Food Insecurity: Low Risk  (2024)    Food Insecurity     Within the past 12 months, did you worry that your food would run out before you got money to buy more?: No     Within the past 12 months, did the food you bought just not last and you didn t have money to get more?: No   Transportation Needs: Low Risk  (2024)    Transportation Needs     Within the past 12 months, has lack of transportation kept you from medical appointments, getting your medicines, non-medical meetings or appointments, work, or from getting things that you need?: No   Interpersonal Safety: Low Risk  (9/10/2024)     "Interpersonal Safety     Do you feel physically and emotionally safe where you currently live?: Yes     Within the past 12 months, have you been hit, slapped, kicked or otherwise physically hurt by someone?: No     Within the past 12 months, have you been humiliated or emotionally abused in other ways by your partner or ex-partner?: No   Housing Stability: Low Risk  (4/2/2024)    Housing Stability     Do you have housing? : Yes     Are you worried about losing your housing?: No       REVIEW OF SYSTEMS:  Review of Systems:  Skin: Negative  Eyes: Negative  Ears/Nose/Throat: Negative  Respiratory: Negative  Cardiovascular: Negative  Gastrointestinal: Negative  Genitourinary: See HPI  Musculoskeletal: Negative  Neurologic: Negative  Psychiatric: Negative  Hematologic/Lymphatic/Immunologic: Negative  Endocrine: Negative        Objective   Vitals: reviewed in EMR.  /80   Pulse 84   Temp (!) 96.6  F (35.9  C) (Temporal)   Resp 16   Ht 1.778 m (5' 10\")   Wt 93.3 kg (205 lb 9.6 oz)   SpO2 98%   BMI 29.50 kg/m      Gen: Pleasant male, NAD.  HEENT: MMM, no OP erythema.   Neck: Supple, no JVD, no bruits.  CV: RRR no m/r/g.   Pulm: CTAB no w/r/r  Neuro: Grossly intact  Msk: No lower extremity edema.  Skin: No concerning lesions.  Psychiatric: Normal affect and insight. Does not appear anxious or depressed.      DIAGNOSTICS:   Results for orders placed or performed in visit on 09/10/24 (from the past 48 hour(s))   EKG 12-lead, tracing only   Result Value Ref Range    Systolic Blood Pressure  mmHg    Diastolic Blood Pressure  mmHg    Ventricular Rate 67 BPM    Atrial Rate 67 BPM    NH Interval 154 ms    QRS Duration 158 ms     ms    QTc 435 ms    P Axis 13 degrees    R AXIS 26 degrees    T Axis 16 degrees    Interpretation ECG       Sinus rhythm  Right bundle branch block  Cannot rule out Inferior infarct , age undetermined  Abnormal ECG  When compared with ECG of 08-Feb-2023 13:11,  No significant change was " found  Confirmed by MD CHRISTIAN, NISHI ([id number]) on 9/10/2024 9:24:23 AM     Basic metabolic panel   Result Value Ref Range    Sodium 138 135 - 145 mmol/L    Potassium 3.9 3.4 - 5.3 mmol/L    Chloride 102 98 - 107 mmol/L    Carbon Dioxide (CO2) 29 22 - 29 mmol/L    Anion Gap 7 7 - 15 mmol/L    Urea Nitrogen 18.5 8.0 - 23.0 mg/dL    Creatinine 1.02 0.67 - 1.17 mg/dL    GFR Estimate 79 >60 mL/min/1.73m2    Calcium 9.5 8.8 - 10.4 mg/dL    Glucose 186 (H) 70 - 99 mg/dL   CBC with platelets   Result Value Ref Range    WBC Count 5.2 4.0 - 11.0 10e3/uL    RBC Count 5.13 4.40 - 5.90 10e6/uL    Hemoglobin 15.8 13.3 - 17.7 g/dL    Hematocrit 47.9 40.0 - 53.0 %    MCV 93 78 - 100 fL    MCH 30.8 26.5 - 33.0 pg    MCHC 33.0 31.5 - 36.5 g/dL    RDW 13.1 10.0 - 15.0 %    Platelet Count 220 150 - 450 10e3/uL

## 2024-09-10 NOTE — NURSING NOTE
"Chief Complaint   Patient presents with    Pre-Op Exam       Initial /80   Pulse 84   Temp (!) 96.6  F (35.9  C) (Temporal)   Resp 16   Ht 1.778 m (5' 10\")   Wt 93.3 kg (205 lb 9.6 oz)   SpO2 98%   BMI 29.50 kg/m   Estimated body mass index is 29.5 kg/m  as calculated from the following:    Height as of this encounter: 1.778 m (5' 10\").    Weight as of this encounter: 93.3 kg (205 lb 9.6 oz).  Medication Review: complete    The next two questions are to help us understand your food security.  If you are feeling you need any assistance in this area, we have resources available to support you today.          4/2/2024   SDOH- Food Insecurity   Within the past 12 months, did you worry that your food would run out before you got money to buy more? N    N   Within the past 12 months, did the food you bought just not last and you didn t have money to get more? N    N       Multiple values from one day are sorted in reverse-chronological order         Health Care Directive:  Patient does not have a Health Care Directive or Living Will: Discussed advance care planning with patient; however, patient declined at this time.    Norma J. Gosselin, LPN      " General Sunscreen Counseling: I recommended a broad spectrum sunscreen SPF 30 or higher.  Sunscreens should be applied every 2 hours as long as sun exposure continues. Detail Level: Detailed

## 2024-09-12 ENCOUNTER — TRANSFERRED RECORDS (OUTPATIENT)
Dept: HEALTH INFORMATION MANAGEMENT | Facility: OTHER | Age: 69
End: 2024-09-12

## 2024-09-16 ENCOUNTER — TELEPHONE (OUTPATIENT)
Dept: UROLOGY | Facility: OTHER | Age: 69
End: 2024-09-16
Payer: COMMERCIAL

## 2024-09-16 NOTE — TELEPHONE ENCOUNTER
Patient is having surgery for cyst left retrograde pyelogram /possible stone with stent. He would like to know the recovery time for this if a stent is placed.    Please contact patient.    Melita Plata on 9/16/2024 at 10:41 AM

## 2024-09-19 NOTE — TELEPHONE ENCOUNTER
Call to patient to update him on the following: no lifting greater than 10 pounds while the stents in place which could be 1 to 2 weeks depending on what we find? He needs to let us know if he wants to proceed or if we need to reschedule.  There is no urgency other than anxiety. Patient verbalized understanding. Brianna Waldrop RN on 9/19/2024 at 2:33 PM

## 2024-09-23 ENCOUNTER — PATIENT OUTREACH (OUTPATIENT)
Dept: UROLOGY | Facility: CLINIC | Age: 69
End: 2024-09-23
Payer: COMMERCIAL

## 2024-09-23 NOTE — PROGRESS NOTES
Returned patient call from 09/20 about setting up surgery with Dr Arredondo for CYSTOURETEROSCOPY, LEFT RETROGRADE PYELOGRAM that he is currently scheduled with Dr Akins for.  Pt had PAE with Dr Rodrigues on 09/12 and the date for current surgery with Tashi on 10/08 no longer works for the patient.  Dr Akins not available for the procedure to reschedule until January now.  Pt asking if Dr Arredondo can do the procedure end of october early nov?  Message sent to provider and  and writer advised pt that  will reach out once orders are placed if appropriate.  Pt verbalized understanding and agrees to the plan.  SHERON Keith  Care Coordinator Urology  [phone]

## 2024-09-27 ENCOUNTER — TELEPHONE (OUTPATIENT)
Dept: UROLOGY | Facility: CLINIC | Age: 69
End: 2024-09-27
Payer: COMMERCIAL

## 2024-09-27 DIAGNOSIS — N40.1 BENIGN LOCALIZED PROSTATIC HYPERPLASIA WITH LOWER URINARY TRACT SYMPTOMS (LUTS): ICD-10-CM

## 2024-09-27 DIAGNOSIS — N13.30 HYDRONEPHROSIS OF LEFT KIDNEY: Primary | ICD-10-CM

## 2024-09-27 DIAGNOSIS — N28.82 LEFT URETER DILATED: Primary | ICD-10-CM

## 2024-09-27 NOTE — TELEPHONE ENCOUNTER
Spoke with patient who would like to have his ultrasound done in [facility].  Order placed and patient will call to schedule.  Ultrasound will be done late next week or early the week after, as patient is currently in [place]. He would also like to set up procedure and is hoping orders can be put in as soon as able.  Isamar Ron RN

## 2024-09-30 ENCOUNTER — TELEPHONE (OUTPATIENT)
Dept: UROLOGY | Facility: CLINIC | Age: 69
End: 2024-09-30
Payer: COMMERCIAL

## 2024-09-30 NOTE — TELEPHONE ENCOUNTER
Late entry for 9/27 3:50pm.  Spoke with patient regarding need for ct vs ultrasound.  Also advised that provider did put in surgery orders, but will need ct first. Isamar Ron RN

## 2024-10-04 ENCOUNTER — HOSPITAL ENCOUNTER (OUTPATIENT)
Dept: CT IMAGING | Facility: OTHER | Age: 69
Discharge: HOME | End: 2024-10-04
Attending: UROLOGY | Admitting: UROLOGY
Payer: MEDICARE

## 2024-10-04 DIAGNOSIS — N28.82 LEFT URETER DILATED: ICD-10-CM

## 2024-10-04 PROCEDURE — 74176 CT ABD & PELVIS W/O CONTRAST: CPT | Mod: MG

## 2024-10-04 PROCEDURE — G1010 CDSM STANSON: HCPCS

## 2024-10-08 ENCOUNTER — HOSPITAL ENCOUNTER (OUTPATIENT)
Dept: ULTRASOUND IMAGING | Facility: HOSPITAL | Age: 69
Discharge: HOME | End: 2024-10-08
Attending: UROLOGY | Admitting: UROLOGY
Payer: MEDICARE

## 2024-10-08 DIAGNOSIS — N13.30 HYDRONEPHROSIS OF LEFT KIDNEY: ICD-10-CM

## 2024-10-08 PROCEDURE — 76770 US EXAM ABDO BACK WALL COMP: CPT

## 2024-10-09 ENCOUNTER — MEDICAL CORRESPONDENCE (OUTPATIENT)
Dept: HEALTH INFORMATION MANAGEMENT | Facility: OTHER | Age: 69
End: 2024-10-09
Payer: COMMERCIAL

## 2024-10-09 DIAGNOSIS — N40.1 BPH WITH OBSTRUCTION/LOWER URINARY TRACT SYMPTOMS: Primary | ICD-10-CM

## 2024-10-09 DIAGNOSIS — N13.8 BPH WITH OBSTRUCTION/LOWER URINARY TRACT SYMPTOMS: Primary | ICD-10-CM

## 2024-10-11 ENCOUNTER — DOCUMENTATION ONLY (OUTPATIENT)
Dept: UROLOGY | Facility: OTHER | Age: 69
End: 2024-10-11
Payer: COMMERCIAL

## 2024-10-11 DIAGNOSIS — N40.1 BPH WITH OBSTRUCTION/LOWER URINARY TRACT SYMPTOMS: Primary | ICD-10-CM

## 2024-10-11 DIAGNOSIS — N13.8 BPH WITH OBSTRUCTION/LOWER URINARY TRACT SYMPTOMS: Primary | ICD-10-CM

## 2024-10-11 NOTE — PROGRESS NOTES
Rj Juarez has an upcoming lab appointment and there are no orders available. Please review and place future orders, as appropriate.    Sushila Marie

## 2024-10-15 ENCOUNTER — LAB (OUTPATIENT)
Dept: LAB | Facility: OTHER | Age: 69
End: 2024-10-15
Attending: UROLOGY
Payer: MEDICARE

## 2024-10-15 DIAGNOSIS — N40.1 BPH WITH OBSTRUCTION/LOWER URINARY TRACT SYMPTOMS: ICD-10-CM

## 2024-10-15 DIAGNOSIS — N13.8 BPH WITH OBSTRUCTION/LOWER URINARY TRACT SYMPTOMS: ICD-10-CM

## 2024-10-15 LAB
ALBUMIN UR-MCNC: NEGATIVE MG/DL
APPEARANCE UR: CLEAR
BILIRUB UR QL STRIP: NEGATIVE
COLOR UR AUTO: ABNORMAL
GLUCOSE UR STRIP-MCNC: >1000 MG/DL
HGB UR QL STRIP: NEGATIVE
KETONES UR STRIP-MCNC: NEGATIVE MG/DL
LEUKOCYTE ESTERASE UR QL STRIP: NEGATIVE
NITRATE UR QL: NEGATIVE
PH UR STRIP: 6 [PH] (ref 5–9)
SP GR UR STRIP: 1.03 (ref 1–1.03)
UROBILINOGEN UR STRIP-MCNC: NORMAL MG/DL

## 2024-10-15 PROCEDURE — 81003 URINALYSIS AUTO W/O SCOPE: CPT | Mod: ZL

## 2024-10-16 ENCOUNTER — OFFICE VISIT (OUTPATIENT)
Dept: UROLOGY | Facility: OTHER | Age: 69
End: 2024-10-16
Attending: UROLOGY
Payer: MEDICARE

## 2024-10-16 DIAGNOSIS — Z23 NEED FOR COVID-19 VACCINE: ICD-10-CM

## 2024-10-16 DIAGNOSIS — N13.8 BPH WITH OBSTRUCTION/LOWER URINARY TRACT SYMPTOMS: ICD-10-CM

## 2024-10-16 DIAGNOSIS — Z23 NEED FOR IMMUNIZATION AGAINST INFLUENZA: Primary | ICD-10-CM

## 2024-10-16 DIAGNOSIS — J02.9 SORE THROAT: ICD-10-CM

## 2024-10-16 DIAGNOSIS — N40.1 BPH WITH OBSTRUCTION/LOWER URINARY TRACT SYMPTOMS: ICD-10-CM

## 2024-10-16 LAB — GROUP A STREP BY PCR: NOT DETECTED

## 2024-10-16 PROCEDURE — 90480 ADMN SARSCOV2 VAC 1/ONLY CMP: CPT

## 2024-10-16 PROCEDURE — 87651 STREP A DNA AMP PROBE: CPT | Mod: ZL | Performed by: UROLOGY

## 2024-10-16 PROCEDURE — G0463 HOSPITAL OUTPT CLINIC VISIT: HCPCS | Mod: 25

## 2024-10-16 PROCEDURE — 99214 OFFICE O/P EST MOD 30 MIN: CPT | Performed by: UROLOGY

## 2024-10-16 PROCEDURE — 51798 US URINE CAPACITY MEASURE: CPT | Performed by: UROLOGY

## 2024-10-16 PROCEDURE — G0008 ADMIN INFLUENZA VIRUS VAC: HCPCS

## 2024-10-16 NOTE — NURSING NOTE
"Chief Complaint   Patient presents with    Follow Up     Urethral dial ation follow up        Initial There were no vitals taken for this visit. Estimated body mass index is 29.5 kg/m  as calculated from the following:    Height as of 9/10/24: 1.778 m (5' 10\").    Weight as of 9/10/24: 93.3 kg (205 lb 9.6 oz).  Medication Reconciliation: complete    AUA- 10  post void residual -176 ml    Andreina Redman LPN    "

## 2024-10-16 NOTE — PROGRESS NOTES
Chief Complaint: Follow Up (Urethral dial ation follow up )  .    HPI: Mr. Rj Juarez is a 68 year old year old male presenting today October 16, 2024 in follow up for evaluation of extreme BPH with obstruction as well as left hydroureteronephrosis.    Patient will underwent PA E instead of HoLEP for a very large prostate.    In the interim he has been found to have microhematuria which prompted a CT scan demonstrating left hydroureteronephrosis.    There is concern that this was secondary to the prostate therefore follow-up imaging including ultrasound and CT was done.  That demonstrated resolution of the hydroureteronephrosis.    Was seen by Dr. Arredondo at the [facility] who has recommended that the surgery which included ureteroscopy be counseled as it would no longer be necessary.  It was his opinion that the hydroureteronephrosis was just secondary to his large prostate.    Here today for follow-up.  Today's urine is clear with no blood.  Reports less urgency, less nocturia and an improved stream.  No blood.    Still reports some left flank pain which is intermittent and mild.  It appears to be musculoskeletal and is exacerbated by movement.  No nausea or vomiting    Past Medical History:   Diagnosis Date    Benign lipomatous neoplasm     1/20/2014    Calculus of kidney     possible    Carpal tunnel syndrome     Both hands    Closed fracture of patella     05/06/09,Sustained right superior pole patellar fracture which underwent conservative management    Diverticulosis of large intestine without perforation or abscess without bleeding     1/28/2014    Exertional chest pain 11/19/2019    Headache     No Comments Provided    Other specified forms of tremor     3/2/2011    Other urticaria (CODE)     3/2/2011    Pain in joint     No Comments Provided    Umbilical hernia without obstruction or gangrene     1/26/2018       Past Surgical History:   Procedure Laterality Date    COLONOSCOPY  01/28/2014     2009,2014,F/U 2019    COLONOSCOPY  03/01/2019    Serrated adenoma, follow up 1 year    COLONOSCOPY N/A 01/15/2021    5 year, due 1/2026. Procedure: COLONOSCOPY, WITH POLYPECTOMY AND BIOPSY;  Surgeon: Ly Frances MD;  Location: [facility] OR    ESOPHAGOSCOPY, GASTROSCOPY, DUODENOSCOPY (EGD), COMBINED      1/28/14,EGD    ESOPHAGOSCOPY, GASTROSCOPY, DUODENOSCOPY (EGD), COMBINED N/A 01/15/2021    Procedure: ESOPHAGOGASTRODUODENOSCOPY, WITH BIOPSY;  Surgeon: Ly Frances MD;  Location: [facility] OR    JOINT REPLACEMENT, HIP RT/LT Left     OTHER SURGICAL HISTORY      9/10/2014,61160.0,AR REPAIR ING HERNIA  >5 TRS BETTINA    OTHER SURGICAL HISTORY      1/26/2018,14832.0,AR REPAIR UMBILICAL NAZARIO  >5 TRS REDUC    RELEASE CARPAL TUNNEL      3,12/2004,Both hands    SIGMOIDOSCOPY FLEXIBLE      No Comments Provided       Current Outpatient Medications   Medication Sig Dispense Refill    aspirin 81 MG EC tablet Take 81 mg by mouth daily      blood glucose (NO BRAND SPECIFIED) lancets standard Dispense item covered by insurance. Test blood sugar 2 times daily. 200 each 11    blood glucose (NO BRAND SPECIFIED) test strip Dispense item covered by insurance. Test blood sugar 2 times daily. 200 strip 11    blood glucose monitoring (NO BRAND SPECIFIED) meter device kit Dispense option covered by insurance. Test blood sugar 2 times daily. 1 kit 11    empagliflozin (JARDIANCE) 10 MG TABS tablet Take 1 tablet (10 mg) by mouth daily. 90 tablet 3    famotidine (PEPCID) 20 MG tablet Take 1 tablet (20 mg) by mouth 2 times daily as needed (gerd) 180 tablet 3    furosemide (LASIX) 20 MG tablet Take 1 tablet (20 mg) by mouth daily 10 tablet 0    ibuprofen (ADVIL/MOTRIN) 400 MG tablet       losartan (COZAAR) 25 MG tablet Take 1 tablet (25 mg) by mouth daily 90 tablet 4    metoprolol succinate ER (TOPROL XL) 25 MG 24 hr tablet Take 1 tablet (25 mg) by mouth daily 90 tablet 3    Microlet Lancets MISC Inject 1 Lancet subcutaneously 2 times daily       neomycin-polymyxin-dexamethasone (MAXITROL) 3.5-41208-9.1 SUSP ophthalmic susp INSTILL 1 DROP INTO EACH EYE 1-2 TIMES PER DAY AS NEEDED FOR ITCHY EYES      nitroGLYcerin (NITROSTAT) 0.4 MG sublingual tablet For chest pain place 1 tablet under the tongue every 5 minutes for 3 doses. If symptoms persist 5 minutes after 1st dose call 911. 25 tablet 3    rosuvastatin (CRESTOR) 20 MG tablet Take 1 tablet (20 mg) by mouth daily 90 tablet 3    silver sulfADIAZINE (SILVADENE) 1 % external cream Apply topically daily 85 g 1    triamcinolone (KENALOG) 0.1 % cream Apply 1 Film topically 3 times daily      alfuzosin ER (UROXATRAL) 10 MG 24 hr tablet Take 1 tablet (10 mg) by mouth daily (Patient not taking: Reported on 10/16/2024) 30 tablet 5    tamsulosin (FLOMAX) 0.4 MG capsule Take 1 capsule (0.4 mg) by mouth daily (Patient not taking: Reported on 8/6/2024) 90 capsule 3       ALLERGIES: Ciprofloxacin, Lisinopril, Midazolam, and Tamsulosin     GENERAL PHYSICAL EXAM:   Vitals: There were no vitals taken for this visit.  There is no height or weight on file to calculate BMI.    GENERAL: Well groomed, well developed, well nourished male in NAD.  ENT:  ENT exam normal  CV:  Warm extremities   RESPIRATORY: Normal respiratory effort.   GI:  Soft, NT, ND  MS: Moving all four, palpable left rib and intercostal pain  NEURO: Alert and oriented x 3.  PSYCH: Normal mood and affect, pleasant and agreeable during interview and exam.    PVR: Residual urine by ultrasound was 176 ml.       AUA- 10    RADIOLOGY: The following tests were reviewed:    US RENAL COMPLETE NON-VASCULAR     HISTORY: Hydronephrosis of left kidney     TECHNIQUE:  A renal ultrasound was performed.     COMPARISON:  10/4/2024     FINDINGS: The kidneys are normal in size and echogenicity. There is no  hydronephrosis. No shadowing stones are seen.     Mass effect from prostatomegaly is seen upon the base of the bladder.                                                                       IMPRESSION:       No hydronephrosis.       JAQUI HERNANDEZ MD        CT ABDOMEN PELVIS W/O CONTRAST, 10/4/2024 12:08 PM     TECHNIQUE:  Helical CT images from the lung bases through the  symphysis pubis were obtained  without IV contrast. Contrast dose:     COMPARISON: 7/25/2024     HISTORY: assess for left distal ureteral dilation; Left ureter dilated     FINDINGS:     There is dependent atelectasis at the lung bases.     The liver is free of masses or biliary ductal enlargement. No  calcified gallstones are seen.     The the spleen and pancreas appear normal.     The adrenal glands are normal.     The right and left kidneys are free of masses or hydronephrosis. The  ureters are normal in caliber. A portion of the distal ureters is  obscured by streak artifact from the bilateral hip prostheses. A  portion of the bladder was also obscured.     The periaortic lymph nodes are normal in caliber.     No intraperitoneal masses or inflammatory changes are noted. The  appendix is normal. The rectum appears normal. The prostate is  enlarged.        Degenerative changes are present in the thoracic and lumbar spine.                                                                      IMPRESSION: No hydronephrosis. No dilation of the ureters is seen.     No intraperitoneal masses or inflammatory changes      BART ROMERO MD     LABS: The last test results for Mr. Rj Juarez were reviewed:  Results for orders placed or performed in visit on 10/15/24 (from the past 24 hour(s))   Urinalysis Macroscopic   Result Value Ref Range    Color Urine Light Yellow Colorless, Straw, Light Yellow, Yellow    Appearance Urine Clear Clear    Glucose Urine >1000 (A) Negative mg/dL    Bilirubin Urine Negative Negative    Ketones Urine Negative Negative mg/dL    Specific Gravity Urine 1.031 (H) 1.000 - 1.030    Blood Urine Negative Negative    pH Urine 6.0 5.0 - 9.0    Protein Albumin Urine Negative Negative mg/dL     Urobilinogen Urine Normal Normal, 2.0 mg/dL    Nitrite Urine Negative Negative    Leukocyte Esterase Urine Negative Negative       PSA -   Lab Results   Component Value Date    PSA 4.25 01/18/2024    PSA 3.19 08/11/2023    PSA 3.84 04/21/2023    PSA 4.45 03/20/2023    PSA 2.33 03/10/2022     BMP -   Recent Labs   Lab Test 09/10/24  0838 07/25/24  1514 07/24/24  1028    141 140   POTASSIUM 3.9 4.0 4.2   CHLORIDE 102 104 107   CO2 29 29 25   BUN 18.5 10.8 15.5   CR 1.02 1.02 0.86   * 135* 184*   COMFORT 9.5 9.8 9.1       CBC -   Recent Labs   Lab Test 09/10/24  0838 07/25/24  1514 04/02/24  1044   WBC 5.2 6.0 5.4   HGB 15.8 15.3 15.4    228 227       ASSESSMENT:   BPH with obstruction status post PA E  Left hydroureteronephrosis resolved  Microhematuria resolved    PLAN:   Patient doing well.  His left flank pain is more musculoskeletal on my exam.    Frequency and urgency have improved assess his stream.    He is on no medicines.  At this point we will follow-up in 3 months with a PSA, FERNANDA and reassessment.    Patient was reassured.    33 minutes spent on the date of this encounter doing chart review, history and exam, documentation and further activities as noted above.      Shaq Akins MD  [facility]

## 2024-10-29 ENCOUNTER — OFFICE VISIT (OUTPATIENT)
Dept: FAMILY MEDICINE | Facility: OTHER | Age: 69
End: 2024-10-29
Attending: NURSE PRACTITIONER
Payer: COMMERCIAL

## 2024-10-29 VITALS
SYSTOLIC BLOOD PRESSURE: 150 MMHG | DIASTOLIC BLOOD PRESSURE: 82 MMHG | BODY MASS INDEX: 29.68 KG/M2 | HEIGHT: 71 IN | TEMPERATURE: 97.2 F | OXYGEN SATURATION: 98 % | RESPIRATION RATE: 20 BRPM | HEART RATE: 86 BPM | WEIGHT: 212 LBS

## 2024-10-29 DIAGNOSIS — R07.0 THROAT PAIN: Primary | ICD-10-CM

## 2024-10-29 DIAGNOSIS — J98.8 RESPIRATORY INFECTION: ICD-10-CM

## 2024-10-29 PROCEDURE — G0463 HOSPITAL OUTPT CLINIC VISIT: HCPCS

## 2024-10-29 PROCEDURE — 99213 OFFICE O/P EST LOW 20 MIN: CPT | Performed by: FAMILY MEDICINE

## 2024-10-29 RX ORDER — AZITHROMYCIN 500 MG/1
500 TABLET, FILM COATED ORAL DAILY
Qty: 5 TABLET | Refills: 0 | Status: SHIPPED | OUTPATIENT
Start: 2024-10-29 | End: 2024-11-03

## 2024-10-29 RX ORDER — PREDNISONE 20 MG/1
TABLET ORAL
Qty: 12 TABLET | Refills: 0 | Status: SHIPPED | OUTPATIENT
Start: 2024-10-29

## 2024-10-29 ASSESSMENT — PAIN SCALES - GENERAL: PAINLEVEL_OUTOF10: MODERATE PAIN (5)

## 2024-10-29 NOTE — PROGRESS NOTES
"      (R07.0) Throat pain  (primary encounter diagnosis)  Comment:     Generalized respiratory infection, not improving with conservative treatment.  Could be elements of sinusitis, bronchitis, lymphadenitis.    Plan: See below.    (J98.8) Respiratory infection  Comment:   Plan: azithromycin (ZITHROMAX) 500 MG tablet,         predniSONE (DELTASONE) 20 MG tablet            CHIEF COMPLAINT    Persistent sore throat, congestion.      HISTORY    This patient had sore throat symptoms along with fever 2 to 3 weeks ago.  He did have a negative strep test 10-16, actually ordered by his urologist.  He seemed to improve for a bit but in the last week has again had postnasal drainage, sore throat with difficulty swallowing, some cough.    He had recent prostate procedure.  Wife had recent pneumonia.  Some basilar emphysema on CT is noted.      REVIEW OF SYSTEMS    No fever.  Ear discomfort.  No chest pain.  No edema.  No nausea or abdominal pain.  No rash.        EXAM  BP (!) 150/82   Pulse 86   Temp 97.2  F (36.2  C) (Tympanic)   Resp 20   Ht 1.791 m (5' 10.5\")   Wt 96.2 kg (212 lb)   SpO2 98%   BMI 29.99 kg/m      Alert, NAD.  Tympanic membranes WNL.  Pharynx without significant inflammation or ulceration, tonsils of normal size without exudate.  Some anterior cervical tenderness noted without notable lymph node enlargement.  Lungs have scattered rubs, no wheezes.  Extremities no edema.  "

## 2024-10-29 NOTE — NURSING NOTE
"Chief Complaint   Patient presents with    Pharyngitis     Patient here for sore throat since 10/5. He reports hard time swallowing.     Initial BP (!) 150/82   Pulse 86   Temp 97.2  F (36.2  C) (Tympanic)   Resp 20   Ht 1.791 m (5' 10.5\")   Wt 96.2 kg (212 lb)   SpO2 98%   BMI 29.99 kg/m   Estimated body mass index is 29.99 kg/m  as calculated from the following:    Height as of this encounter: 1.791 m (5' 10.5\").    Weight as of this encounter: 96.2 kg (212 lb).  Medication Review: complete    The next two questions are to help us understand your food security.  If you are feeling you need any assistance in this area, we have resources available to support you today.          10/29/2024   SDOH- Food Insecurity   Within the past 12 months, did you worry that your food would run out before you got money to buy more? N   Within the past 12 months, did the food you bought just not last and you didn t have money to get more? N              Health Care Directive:  Patient does not have a Health Care Directive: Discussed advance care planning with patient; however, patient declined at this time.    Genie Oneill LPN      "

## 2024-11-01 ENCOUNTER — TRANSFERRED RECORDS (OUTPATIENT)
Dept: HEALTH INFORMATION MANAGEMENT | Facility: OTHER | Age: 69
End: 2024-11-01
Payer: COMMERCIAL

## 2024-11-07 SDOH — HEALTH STABILITY: PHYSICAL HEALTH: ON AVERAGE, HOW MANY DAYS PER WEEK DO YOU ENGAGE IN MODERATE TO STRENUOUS EXERCISE (LIKE A BRISK WALK)?: 7 DAYS

## 2024-11-07 SDOH — HEALTH STABILITY: PHYSICAL HEALTH: ON AVERAGE, HOW MANY MINUTES DO YOU ENGAGE IN EXERCISE AT THIS LEVEL?: 30 MIN

## 2024-11-07 ASSESSMENT — SOCIAL DETERMINANTS OF HEALTH (SDOH): HOW OFTEN DO YOU GET TOGETHER WITH FRIENDS OR RELATIVES?: MORE THAN THREE TIMES A WEEK

## 2024-11-12 ENCOUNTER — OFFICE VISIT (OUTPATIENT)
Dept: PEDIATRICS | Facility: OTHER | Age: 69
End: 2024-11-12
Attending: INTERNAL MEDICINE
Payer: MEDICARE

## 2024-11-12 VITALS
HEART RATE: 76 BPM | OXYGEN SATURATION: 96 % | WEIGHT: 207 LBS | TEMPERATURE: 97.5 F | HEIGHT: 70 IN | RESPIRATION RATE: 14 BRPM | BODY MASS INDEX: 29.63 KG/M2 | SYSTOLIC BLOOD PRESSURE: 160 MMHG | DIASTOLIC BLOOD PRESSURE: 90 MMHG

## 2024-11-12 DIAGNOSIS — I25.10 ASCVD (ARTERIOSCLEROTIC CARDIOVASCULAR DISEASE): Chronic | ICD-10-CM

## 2024-11-12 DIAGNOSIS — Z00.00 ENCOUNTER FOR MEDICARE ANNUAL WELLNESS EXAM: Primary | ICD-10-CM

## 2024-11-12 DIAGNOSIS — G47.33 OSA ON CPAP: Chronic | ICD-10-CM

## 2024-11-12 DIAGNOSIS — R93.1 HIGH CORONARY ARTERY CALCIUM SCORE: ICD-10-CM

## 2024-11-12 DIAGNOSIS — K21.00 GASTROESOPHAGEAL REFLUX DISEASE WITH ESOPHAGITIS, UNSPECIFIED WHETHER HEMORRHAGE: ICD-10-CM

## 2024-11-12 DIAGNOSIS — E11.9 DIABETES MELLITUS TYPE 2, DIET-CONTROLLED (H): ICD-10-CM

## 2024-11-12 DIAGNOSIS — I25.10 CAD, MULTIPLE VESSEL: ICD-10-CM

## 2024-11-12 DIAGNOSIS — Z98.890 HISTORY OF CARDIAC CATH: ICD-10-CM

## 2024-11-12 DIAGNOSIS — L98.9 SKIN LESION: ICD-10-CM

## 2024-11-12 DIAGNOSIS — E11.9 CONTROLLED TYPE 2 DIABETES MELLITUS WITHOUT COMPLICATION, WITHOUT LONG-TERM CURRENT USE OF INSULIN (H): ICD-10-CM

## 2024-11-12 DIAGNOSIS — I10 ESSENTIAL HYPERTENSION: ICD-10-CM

## 2024-11-12 DIAGNOSIS — I25.10 CAD S/P PERCUTANEOUS CORONARY ANGIOPLASTY: ICD-10-CM

## 2024-11-12 DIAGNOSIS — M54.16 LUMBAR RADICULOPATHY: Chronic | ICD-10-CM

## 2024-11-12 DIAGNOSIS — M48.062 LUMBAR STENOSIS WITH NEUROGENIC CLAUDICATION: Chronic | ICD-10-CM

## 2024-11-12 DIAGNOSIS — Z98.890 STATUS POST CARDIAC CATHETERIZATION: ICD-10-CM

## 2024-11-12 DIAGNOSIS — I25.10 CORONARY ARTERY DISEASE INVOLVING NATIVE CORONARY ARTERY OF NATIVE HEART WITHOUT ANGINA PECTORIS: ICD-10-CM

## 2024-11-12 DIAGNOSIS — Z98.61 CAD S/P PERCUTANEOUS CORONARY ANGIOPLASTY: ICD-10-CM

## 2024-11-12 DIAGNOSIS — I10 WHITE COAT SYNDROME WITH DIAGNOSIS OF HYPERTENSION: Chronic | ICD-10-CM

## 2024-11-12 DIAGNOSIS — Z23 ENCOUNTER FOR IMMUNIZATION: ICD-10-CM

## 2024-11-12 LAB
ALBUMIN SERPL BCG-MCNC: 4.6 G/DL (ref 3.5–5.2)
ALP SERPL-CCNC: 73 U/L (ref 40–150)
ALT SERPL W P-5'-P-CCNC: 21 U/L (ref 0–70)
ANION GAP SERPL CALCULATED.3IONS-SCNC: 8 MMOL/L (ref 7–15)
AST SERPL W P-5'-P-CCNC: 19 U/L (ref 0–45)
BILIRUB SERPL-MCNC: 1.1 MG/DL
BUN SERPL-MCNC: 13.7 MG/DL (ref 8–23)
CALCIUM SERPL-MCNC: 9.6 MG/DL (ref 8.8–10.4)
CHLORIDE SERPL-SCNC: 103 MMOL/L (ref 98–107)
CHOLEST SERPL-MCNC: 111 MG/DL
CREAT SERPL-MCNC: 0.91 MG/DL (ref 0.67–1.17)
CREAT UR-MCNC: 130.5 MG/DL
EGFRCR SERPLBLD CKD-EPI 2021: >90 ML/MIN/1.73M2
ERYTHROCYTE [DISTWIDTH] IN BLOOD BY AUTOMATED COUNT: 13.5 % (ref 10–15)
EST. AVERAGE GLUCOSE BLD GHB EST-MCNC: 146 MG/DL
FASTING STATUS PATIENT QL REPORTED: ABNORMAL
FASTING STATUS PATIENT QL REPORTED: NORMAL
GLUCOSE SERPL-MCNC: 106 MG/DL (ref 70–99)
HBA1C MFR BLD: 6.7 %
HCO3 SERPL-SCNC: 28 MMOL/L (ref 22–29)
HCT VFR BLD AUTO: 48 % (ref 40–53)
HDLC SERPL-MCNC: 55 MG/DL
HGB BLD-MCNC: 15.8 G/DL (ref 13.3–17.7)
LDLC SERPL CALC-MCNC: 47 MG/DL
MCH RBC QN AUTO: 30.1 PG (ref 26.5–33)
MCHC RBC AUTO-ENTMCNC: 32.9 G/DL (ref 31.5–36.5)
MCV RBC AUTO: 91 FL (ref 78–100)
MICROALBUMIN UR-MCNC: <12 MG/L
MICROALBUMIN/CREAT UR: NORMAL MG/G{CREAT}
NONHDLC SERPL-MCNC: 56 MG/DL
PLATELET # BLD AUTO: 215 10E3/UL (ref 150–450)
POTASSIUM SERPL-SCNC: 4.2 MMOL/L (ref 3.4–5.3)
PROT SERPL-MCNC: 7.2 G/DL (ref 6.4–8.3)
RBC # BLD AUTO: 5.25 10E6/UL (ref 4.4–5.9)
SODIUM SERPL-SCNC: 139 MMOL/L (ref 135–145)
TRIGL SERPL-MCNC: 47 MG/DL
WBC # BLD AUTO: 7.9 10E3/UL (ref 4–11)

## 2024-11-12 PROCEDURE — 83036 HEMOGLOBIN GLYCOSYLATED A1C: CPT | Mod: ZL | Performed by: INTERNAL MEDICINE

## 2024-11-12 PROCEDURE — 82043 UR ALBUMIN QUANTITATIVE: CPT | Mod: ZL | Performed by: INTERNAL MEDICINE

## 2024-11-12 PROCEDURE — 85014 HEMATOCRIT: CPT | Mod: ZL | Performed by: INTERNAL MEDICINE

## 2024-11-12 PROCEDURE — 36415 COLL VENOUS BLD VENIPUNCTURE: CPT | Mod: ZL | Performed by: INTERNAL MEDICINE

## 2024-11-12 PROCEDURE — 82465 ASSAY BLD/SERUM CHOLESTEROL: CPT | Mod: ZL | Performed by: INTERNAL MEDICINE

## 2024-11-12 PROCEDURE — 80053 COMPREHEN METABOLIC PANEL: CPT | Mod: ZL | Performed by: INTERNAL MEDICINE

## 2024-11-12 RX ORDER — LOSARTAN POTASSIUM 25 MG/1
25 TABLET ORAL DAILY
Qty: 90 TABLET | Refills: 4 | Status: SHIPPED | OUTPATIENT
Start: 2024-11-12

## 2024-11-12 RX ORDER — METOPROLOL SUCCINATE 25 MG/1
25 TABLET, EXTENDED RELEASE ORAL DAILY
Qty: 90 TABLET | Refills: 4 | Status: SHIPPED | OUTPATIENT
Start: 2024-11-12

## 2024-11-12 RX ORDER — FAMOTIDINE 20 MG/1
20 TABLET, FILM COATED ORAL 2 TIMES DAILY PRN
Qty: 180 TABLET | Refills: 4 | Status: SHIPPED | OUTPATIENT
Start: 2024-11-12

## 2024-11-12 RX ORDER — ROSUVASTATIN CALCIUM 20 MG/1
20 TABLET, COATED ORAL DAILY
Qty: 90 TABLET | Refills: 4 | Status: SHIPPED | OUTPATIENT
Start: 2024-11-12

## 2024-11-12 NOTE — PATIENT INSTRUCTIONS
Patient Education   Preventive Care Advice   This is general advice given by our system to help you stay healthy. However, your care team may have specific advice just for you. Please talk to your care team about your preventive care needs.  Nutrition  Eat 5 or more servings of fruits and vegetables each day.  Try wheat bread, brown rice and whole grain pasta (instead of white bread, rice, and pasta).  Get enough calcium and vitamin D. Check the label on foods and aim for 100% of the RDA (recommended daily allowance).  Lifestyle  Exercise at least 150 minutes each week  (30 minutes a day, 5 days a week).  Do muscle strengthening activities 2 days a week. These help control your weight and prevent disease.  No smoking.  Wear sunscreen to prevent skin cancer.  Have a dental exam and cleaning every 6 months.  Yearly exams  See your health care team every year to talk about:  Any changes in your health.  Any medicines your care team has prescribed.  Preventive care, family planning, and ways to prevent chronic diseases.  Shots (vaccines)   HPV shots (up to age 26), if you've never had them before.  Hepatitis B shots (up to age 59), if you've never had them before.  COVID-19 shot: Get this shot when it's due.  Flu shot: Get a flu shot every year.  Tetanus shot: Get a tetanus shot every 10 years.  Pneumococcal, hepatitis A, and RSV shots: Ask your care team if you need these based on your risk.  Shingles shot (for age 50 and up)  General health tests  Diabetes screening:  Starting at age 35, Get screened for diabetes at least every 3 years.  If you are younger than age 35, ask your care team if you should be screened for diabetes.  Cholesterol test: At age 39, start having a cholesterol test every 5 years, or more often if advised.  Bone density scan (DEXA): At age 50, ask your care team if you should have this scan for osteoporosis (brittle bones).  Hepatitis C: Get tested at least once in your life.  STIs (sexually  transmitted infections)  Before age 24: Ask your care team if you should be screened for STIs.  After age 24: Get screened for STIs if you're at risk. You are at risk for STIs (including HIV) if:  You are sexually active with more than one person.  You don't use condoms every time.  You or a partner was diagnosed with a sexually transmitted infection.  If you are at risk for HIV, ask about PrEP medicine to prevent HIV.  Get tested for HIV at least once in your life, whether you are at risk for HIV or not.  Cancer screening tests  Cervical cancer screening: If you have a cervix, begin getting regular cervical cancer screening tests starting at age 21.  Breast cancer scan (mammogram): If you've ever had breasts, begin having regular mammograms starting at age 40. This is a scan to check for breast cancer.  Colon cancer screening: It is important to start screening for colon cancer at age 45.  Have a colonoscopy test every 10 years (or more often if you're at risk) Or, ask your provider about stool tests like a FIT test every year or Cologuard test every 3 years.  To learn more about your testing options, visit:   .  For help making a decision, visit:   https://bit.ly/xi46738.  Prostate cancer screening test: If you have a prostate, ask your care team if a prostate cancer screening test (PSA) at age 55 is right for you.  Lung cancer screening: If you are a current or former smoker ages 50 to 80, ask your care team if ongoing lung cancer screenings are right for you.  For informational purposes only. Not to replace the advice of your health care provider. Copyright   2023 [facility]. All rights reserved. Clinically reviewed by the [facility]. docTrackr [id number] - REV 01/24.  Preventing Falls: Care Instructions  Injuries and health problems such as trouble walking or poor eyesight can increase your risk of falling. So can some medicines. But there are things you can do to help  "prevent falls. You can exercise to get stronger. You can also arrange your home to make it safer.    Talk to your doctor about the medicines you take. Ask if any of them increase the risk of falls and whether they can be changed or stopped.   Try to exercise regularly. It can help improve your strength and balance. This can help lower your risk of falling.         Practice fall safety and prevention.   Wear low-heeled shoes that fit well and give your feet good support. Talk to your doctor if you have foot problems that make this hard.  Carry a cellphone or wear a medical alert device that you can use to call for help.  Use stepladders instead of chairs to reach high objects. Don't climb if you're at risk for falls. Ask for help, if needed.  Wear the correct eyeglasses, if you need them.        Make your home safer.   Remove rugs, cords, clutter, and furniture from walkways.  Keep your house well lit. Use night-lights in hallways and bathrooms.  Install and use sturdy handrails on stairways.  Wear nonskid footwear, even inside. Don't walk barefoot or in socks without shoes.        Be safe outside.   Use handrails, curb cuts, and ramps whenever possible.  Keep your hands free by using a shoulder bag or backpack.  Try to walk in well-lit areas. Watch out for uneven ground, changes in pavement, and debris.  Be careful in the winter. Walk on the grass or gravel when sidewalks are slippery. Use de-icer on steps and walkways. Add non-slip devices to shoes.    Put grab bars and nonskid mats in your shower or tub and near the toilet. Try to use a shower chair or bath bench when bathing.   Get into a tub or shower by putting in your weaker leg first. Get out with your strong side first. Have a phone or medical alert device in the bathroom with you.   Where can you learn more?  Go to https://www.Talkdeskwise.net/patiented  Enter G117 in the search box to learn more about \"Preventing Falls: Care Instructions.\"  Current as of: " July 17, 2023  Content Version: 14.2 2024 [facility] [employer].   Care instructions adapted under license by your healthcare professional. If you have questions about a medical condition or this instruction, always ask your healthcare professional. [employer] disclaims any warranty or liability for your use of this information.    Hearing Loss: Care Instructions  Overview     Hearing loss is a sudden or slow decrease in how well you hear. It can range from slight to profound. Permanent hearing loss can occur with aging. It also can happen when you are exposed long-term to loud noise. Examples include listening to loud music, riding motorcycles, or being around other loud machines.  Hearing loss can affect your work and home life. It can make you feel lonely or depressed. You may feel that you have lost your independence. But hearing aids and other devices can help you hear better and feel connected to others.  Follow-up care is a key part of your treatment and safety. Be sure to make and go to all appointments, and call your doctor if you are having problems. It's also a good idea to know your test results and keep a list of the medicines you take.  How can you care for yourself at home?  Avoid loud noises whenever possible. This helps keep your hearing from getting worse.  Always wear hearing protection around loud noises.  Wear a hearing aid as directed.  A professional can help you pick a hearing aid that will work best for you.  You can also get hearing aids over the counter for mild to moderate hearing loss.  Have hearing tests as your doctor suggests. They can show whether your hearing has changed. Your hearing aid may need to be adjusted.  Use other devices as needed. These may include:  Telephone amplifiers and hearing aids that can connect to a television, stereo, radio, or microphone.  Devices that use lights or vibrations. These alert you to the doorbell, a ringing telephone, or a baby  "monitor.  Television closed-captioning. This shows the words at the bottom of the screen. Most new TVs can do this.  TTY (text telephone). This lets you type messages back and forth on the telephone instead of talking or listening. These devices are also called TDD. When messages are typed on the keyboard, they are sent over the phone line to a receiving TTY. The message is shown on a monitor.  Use text messaging, social media, and email if it is hard for you to communicate by telephone.  Try to learn a listening technique called speechreading. It is not lipreading. You pay attention to people's gestures, expressions, posture, and tone of voice. These clues can help you understand what a person is saying. Face the person you are talking to, and have them face you. Make sure the lighting is good. You need to see the other person's face clearly.  Think about counseling if you need help to adjust to your hearing loss.  When should you call for help?  Watch closely for changes in your health, and be sure to contact your doctor if:    You think your hearing is getting worse.     You have new symptoms, such as dizziness or nausea.   Where can you learn more?  Go to https://www.Evrent.net/patiented  Enter R798 in the search box to learn more about \"Hearing Loss: Care Instructions.\"  Current as of: September 27, 2023  Content Version: 14.2 2024 [facility] [place] [employer].   Care instructions adapted under license by your healthcare professional. If you have questions about a medical condition or this instruction, always ask your healthcare professional. [employer] Inc[employer] disclaims any warranty or liability for your use of this information.    Learning About Sleeping Well  What does sleeping well mean?     Sleeping well means getting enough sleep to feel good and stay healthy. How much sleep is enough varies among people.  The number of hours you sleep and how you feel when you wake up are both important. If you do " not feel refreshed, you probably need more sleep. Another sign of not getting enough sleep is feeling tired during the day.  Experts recommend that adults get at least 7 or more hours of sleep per day. Children and older adults need more sleep.  Why is getting enough sleep important?  Getting enough quality sleep is a basic part of good health. When your sleep suffers, your physical health, mood, and your thoughts can suffer too. You may find yourself feeling more grumpy or stressed. Not getting enough sleep also can lead to serious problems, including injury, accidents, anxiety, and depression.  What might cause poor sleeping?  Many things can cause sleep problems, including:  Changes to your sleep schedule.  Stress. Stress can be caused by fear about a single event, such as giving a speech. Or you may have ongoing stress, such as worry about work or school.  Depression, anxiety, and other mental or emotional conditions.  Changes in your sleep habits or surroundings. This includes changes that happen where you sleep, such as noise, light, or sleeping in a different bed. It also includes changes in your sleep pattern, such as having jet lag or working a late shift.  Health problems, such as pain, breathing problems, and restless legs syndrome.  Lack of regular exercise.  Using alcohol, nicotine, or caffeine before bed.  How can you help yourself?  Here are some tips that may help you sleep more soundly and wake up feeling more refreshed.  Your sleeping area   Use your bedroom only for sleeping and sex. A bit of light reading may help you fall asleep. But if it doesn't, do your reading elsewhere in the house. Try not to use your TV, computer, smartphone, or tablet while you are in bed.  Be sure your bed is big enough to stretch out comfortably, especially if you have a sleep partner.  Keep your bedroom quiet, dark, and cool. Use curtains, blinds, or a sleep mask to block out light. To block out noise, use earplugs,  "soothing music, or a \"white noise\" machine.  Your evening and bedtime routine   Create a relaxing bedtime routine. You might want to take a warm shower or bath, or listen to soothing music.  Go to bed at the same time every night. And get up at the same time every morning, even if you feel tired.  What to avoid   Limit caffeine (coffee, tea, caffeinated sodas) during the day, and don't have any for at least 6 hours before bedtime.  Avoid drinking alcohol before bedtime. Alcohol can cause you to wake up more often during the night.  Try not to smoke or use tobacco, especially in the evening. Nicotine can keep you awake.  Limit naps during the day, especially close to bedtime.  Avoid lying in bed awake for too long. If you can't fall asleep or if you wake up in the middle of the night and can't get back to sleep within about 20 minutes, get out of bed and go to another room until you feel sleepy.  Avoid taking medicine right before bed that may keep you awake or make you feel hyper or energized. Your doctor can tell you if your medicine may do this and if you can take it earlier in the day.  If you can't sleep   Imagine yourself in a peaceful, pleasant scene. Focus on the details and feelings of being in a place that is relaxing.  Get up and do a quiet or boring activity until you feel sleepy.  Avoid drinking any liquids before going to bed to help prevent waking up often to use the bathroom.  Where can you learn more?  Go to https://www.Luxe Internacionale.net/patiented  Enter J942 in the search box to learn more about \"Learning About Sleeping Well.\"  Current as of: July 10, 2023  Content Version: 14.2 2024 [employer].   Care instructions adapted under license by your healthcare professional. If you have questions about a medical condition or this instruction, always ask your healthcare professional. Healthwise[employer] Incorporated disclaims any warranty or liability for your use of this information.    Bladder " Training: Care Instructions  Your Care Instructions     Bladder training is used to treat urge incontinence and stress incontinence. Urge incontinence means that the need to urinate comes on so fast that you can't get to a toilet in time. Stress incontinence means that you leak urine because of pressure on your bladder. For example, it may happen when you laugh, cough, or lift something heavy.  Bladder training can increase how long you can wait before you have to urinate. It can also help your bladder hold more urine. And it can give you better control over the urge to urinate.  It is important to remember that bladder training takes a few weeks to a few months to make a difference. You may not see results right away, but don't give up.  Follow-up care is a key part of your treatment and safety. Be sure to make and go to all appointments, and call your doctor if you are having problems. It's also a good idea to know your test results and keep a list of the medicines you take.  How can you care for yourself at home?  Work with your doctor to come up with a bladder training program that is right for you. You may use one or more of the following methods.  Delayed urination  In the beginning, try to keep from urinating for 5 minutes after you first feel the need to go.  While you wait, take deep, slow breaths to relax. Kegel exercises can also help you delay the need to go to the bathroom.  After some practice, when you can easily wait 5 minutes to urinate, try to wait 10 minutes before you urinate.  Slowly increase the waiting period until you are able to control when you have to urinate.  Scheduled urination  Empty your bladder when you first wake up in the morning.  Schedule times throughout the day when you will urinate.  Start by going to the bathroom every hour, even if you don't need to go.  Slowly increase the time between trips to the bathroom.  When you have found a schedule that works well for you, keep  "doing it.  If you wake up during the night and have to urinate, do it. Apply your schedule to waking hours only.  Kegel exercises  These tighten and strengthen pelvic muscles, which can help you control the flow of urine. (If doing these exercises causes pain, stop doing them and talk with your doctor.) To do Kegel exercises:  Squeeze your muscles as if you were trying not to pass gas. Or squeeze your muscles as if you were stopping the flow of urine. Your belly, legs, and buttocks shouldn't move.  Hold the squeeze for 3 seconds, then relax for 5 to 10 seconds.  Start with 3 seconds, then add 1 second each week until you are able to squeeze for 10 seconds.  Repeat the exercise 10 times a session. Do 3 to 8 sessions a day.  When should you call for help?  Watch closely for changes in your health, and be sure to contact your doctor if:    Your incontinence is getting worse.     You do not get better as expected.   Where can you learn more?  Go to https://www.Celltrix.net/patiented  Enter V684 in the search box to learn more about \"Bladder Training: Care Instructions.\"  Current as of: November 15, 2023  Content Version: 14.2 2024 [facility] [employer].   Care instructions adapted under license by your healthcare professional. If you have questions about a medical condition or this instruction, always ask your healthcare professional. Healthwise, Incorporated disclaims any warranty or liability for your use of this information.       "

## 2024-11-12 NOTE — PROGRESS NOTES
"Preventive Care Visit  [facility]  Doug Porras MD, Internal Medicine  Nov 12, 2024        Encounter for Medicare annual wellness exam    -Colon cancer screening done via colonoscopy on 1/15/21 (impression: polypectomy). Follow-up 5 years. Due 1/15/26.    -PSA lab work performed 1/18/24 (value of 4.25 micrograms/L).      -Immunizations: Patient is due for the following: RSV and Shingrix.    -Derm: Does patient regularly see dermatologist? No.   -Refills pended for requested medications.  -Labs pended.     Diabetic Foot Screen:  Is there a history of foot ulcers?  no   Is there any redness or open areas? no   Is there a history of calluses? YES  Does the foot have an abnormal shape? no   Are the nails thick, too long or ingrown? no       Sensation Testing done with monofilament   Right foot: Sensation to monofilament intact throughout (10/10 locations tested)  Left Foot: Sensation to monofilament intact throughout (10/10 locations tested)      Foot exam check by: CESARIO SERRA RN      BMI  Estimated body mass index is 29.49 kg/m  as calculated from the following:    Height as of this encounter: 1.784 m (5' 10.25\").    Weight as of this encounter: 93.9 kg (207 lb).       Counseling  Appropriate preventive services were addressed with this patient via screening, questionnaire, or discussion as appropriate for fall prevention, nutrition, physical activity, Tobacco-use cessation, social engagement, weight loss and cognition.  Checklist reviewing preventive services available has been given to the patient.  Reviewed patient's diet, addressing concerns and/or questions.   He is at risk for psychosocial distress and has been provided with information to reduce risk.   Discussed possible causes of fatigue. The patient was provided with written information regarding signs of hearing loss.   Information on urinary incontinence and treatment options given to patient.       Work on weight " loss  Regular exercise    No follow-ups on file.      Nirmal De La Rosa is a 68 year old, presenting for the following:  Medicare Visit        11/12/2024    10:44 AM   Additional Questions   Roomed by SHERON Jensen             Health Care Directive  Patient does not have a Health Care Directive:         11/7/2024   General Health   How would you rate your overall physical health? Good   Feel stress (tense, anxious, or unable to sleep) Only a little      (!) STRESS CONCERN      11/7/2024   Nutrition   Diet: Carbohydrate counting            11/7/2024   Exercise   Days per week of moderate/strenous exercise 7 days   Average minutes spent exercising at this level 30 min            11/7/2024   Social Factors   Frequency of gathering with friends or relatives More than three times a week   Worry food won't last until get money to buy more No   Food not last or not have enough money for food? No   Do you have housing? (Housing is defined as stable permanent housing and does not include staying ouside in a car, in a tent, in an abandoned building, in an overnight shelter, or couch-surfing.) Yes   Are you worried about losing your housing? No   Lack of transportation? No   Unable to get utilities (heat,electricity)? No            11/12/2024   Fall Risk   Gait Speed Test Interpretation Less than or equal to 5.00 seconds - PASS              11/7/2024   Activities of Daily Living- Home Safety   Needs help with the following daily activites None of the above   Safety concerns in the home None of the above            11/7/2024   Dental   Dentist two times every year? Yes            11/7/2024   Hearing Screening   Hearing concerns? (!) I FEEL THAT PEOPLE ARE MUMBLING OR NOT SPEAKING CLEARLY.    (!) I NEED TO ASK PEOPLE TO SPEAK UP OR REPEAT THEMSELVES.    (!) IT'S HARDER TO UNDERSTAND WOMEN'S VOICES THAN MEN'S VOICES.    (!) IT'S HARD TO FOLLOW A CONVERSATION IN A NOISY RESTAURANT OR CROWDED ROOM.    (!) TROUBLE UNDESTANDING A  SPEAKER IN A PUBLIC MEETING OR [employer] SERVICE.    (!) TROUBLE FOLLOWING DIALOGUE IN THE THEATHER.    (!) TROUBLE UNDERSTANDING SOFT OR WHISPERED SPEECH.    (!) TROUBLE UNDERSTANDING SPEECH ON THE TELEPHONE       Multiple values from one day are sorted in reverse-chronological order         11/7/2024   Driving Risk Screening   Patient/family members have concerns about driving No            11/7/2024   General Alertness/Fatigue Screening   Have you been more tired than usual lately? (!) YES            11/7/2024   Urinary Incontinence Screening   Bothered by leaking urine in past 6 months Yes            11/7/2024   TB Screening   Were you born outside of the [place]? No            Today's PHQ-2 Score:       11/12/2024    10:20 AM   PHQ-2 ( 1999 [employer])   Q1: Little interest or pleasure in doing things 0    Q2: Feeling down, depressed or hopeless 0    PHQ-2 Score 0    Q1: Little interest or pleasure in doing things Not at all   Q2: Feeling down, depressed or hopeless Not at all   PHQ-2 Score 0       Patient-reported           11/7/2024   Substance Use   Alcohol more than 3/day or more than 7/wk No   Do you have a current opioid prescription? No   How severe/bad is pain from 1 to 10? 3/10   Do you use any other substances recreationally? No        Social History     Tobacco Use    Smoking status: Never    Smokeless tobacco: Never   Vaping Use    Vaping status: Never Used   Substance Use Topics    Alcohol use: No    Drug use: No                Reviewed and updated as needed this visit by Provider                    Current providers sharing in care for this patient include:  Patient Care Team:  Doug Porras MD as PCP - General (Pediatrics)  Doug Porras MD as Assigned PCP  Wade Ochoa MD as Assigned Musculoskeletal Provider  Eliu Arredondo MD as MD (Urology)  Eliu Arredondo MD as Assigned Surgical Provider    The following health maintenance items are reviewed in Epic and correct as of  "today:  Health Maintenance   Topic Date Due    ZOSTER IMMUNIZATION (1 of 2) Never done    RSV VACCINE (1 - Risk 60-74 years 1-dose series) Never done    DIABETIC FOOT EXAM  04/25/2024    LIPID  09/12/2024    MICROALBUMIN  09/12/2024    A1C  10/25/2024    EYE EXAM  01/15/2025    BMP  09/10/2025    MEDICARE ANNUAL WELLNESS VISIT  11/12/2025    FALL RISK ASSESSMENT  11/12/2025    COLORECTAL CANCER SCREENING  01/15/2026    DTAP/TDAP/TD IMMUNIZATION (3 - Td or Tdap) 10/31/2028    ADVANCE CARE PLANNING  10/29/2029    HEPATITIS C SCREENING  Completed    PHQ-2 (once per calendar year)  Completed    INFLUENZA VACCINE  Completed    Pneumococcal Vaccine: 65+ Years  Completed    COVID-19 Vaccine  Completed    HPV IMMUNIZATION  Aged Out    MENINGITIS IMMUNIZATION  Aged Out    RSV MONOCLONAL ANTIBODY  Aged Out    URINE DRUG SCREEN  Discontinued        Objective      Exam  BP (!) 160/90   Pulse 76   Temp 97.5  F (36.4  C)   Resp 14   Ht 1.784 m (5' 10.25\")   Wt 93.9 kg (207 lb)   SpO2 96%   BMI 29.49 kg/m               11/12/2024   Mini Cog   Clock Draw Score 2 Normal   3 Item Recall 3 objects recalled   Mini Cog Total Score 5               Signed Electronically by: Doug Porras MD    "

## 2024-11-12 NOTE — PROGRESS NOTES
Assessment & Plan   1. Encounter for Medicare annual wellness exam (Primary)  2. White coat syndrome with diagnosis of hypertension  3. Essential hypertension  Patient measures blood pressures at home with predicted average around 112/60. The elevated BP taken in clinic is most likely secondary to known white coat syndrome as well as missed daily medication this morning. He was informed to continue current medication management of his BP.  Plan:  - losartan (COZAAR) 25 MG tablet; Take 1 tablet (25 mg) by mouth daily.  Dispense: 90 tablet; Refill: 4  - metoprolol succinate ER (TOPROL XL) 25 MG 24 hr tablet; Take 1 tablet (25 mg) by mouth daily.  Dispense: 90 tablet; Refill: 4    4. Controlled type 2 diabetes mellitus without complication, without long-term current use of insulin (H)  Most recent A1c taken in July came back 6.3, which was decreased down from 7.4 in April. He has awareness of diet modifications and reports eating vegetables grown in home greenhouses as well as primarily lean wild game that he hunts himself. Peripheral neuropathy test was negative although patient has subjective intermittent loss of sensation in forefoot. No ulcers or calluses seen on exam. Since starting jardiance he noted 30lbs of weight loss and would only want to start ozempic if his A1c was significantly worse than prior.   Plan:  - Albumin Random Urine Quantitative with Creat Ratio; Future  - HEMOGLOBIN A1C; Future  - empagliflozin (JARDIANCE) 10 MG TABS tablet; Take 1 tablet (10 mg) by mouth daily.  Dispense: 90 tablet; Refill: 4  - AR FOOT EXAM NO CHARGE  - Albumin Random Urine Quantitative with Creat Ratio    5. Encounter for immunization  Patient expressed agreement and desire for both vaccinations.   - zoster vaccine recombinant adjuvanted (SHINGRIX) injection; Inject 0.5 mLs into the muscle once for 1 dose. Pharmacist administered  Dispense: 0.5 mL; Refill: 0  - RSV vaccine, bivalent, ABRYSVO, injection; Inject 0.5 mLs into  the muscle once for 1 dose. Pharmacist administered  Dispense: 0.5 mL; Refill: 0    6. Gastroesophageal reflux disease with esophagitis, unspecified whether hemorrhage  No change from prior.   - famotidine (PEPCID) 20 MG tablet; Take 1 tablet (20 mg) by mouth 2 times daily as needed (gerd).  Dispense: 180 tablet; Refill: 4  - CBC with platelets; Future  - Comprehensive metabolic panel; Future  - CBC with platelets  - Comprehensive metabolic panel    7. Coronary artery disease involving native coronary artery of native heart without angina pectoris  8. Status post cardiac catheterization on 12/6/2019 through [facility] with a stenting to his distal circumflex  10. High coronary artery calcium score at 1118.2 on 11/21/2019  11. CAD, multiple vessel  12. CAD S/P percutaneous coronary angioplasty  13. ASCVD (arteriosclerotic cardiovascular disease)  No new or worsening chest pain or shortness of breath reported with physical activity. Managed medically with a statin and metoprolol.   Plan:  - metoprolol succinate ER (TOPROL XL) 25 MG 24 hr tablet; Take 1 tablet (25 mg) by mouth daily.  Dispense: 90 tablet; Refill: 4  - rosuvastatin (CRESTOR) 20 MG tablet; Take 1 tablet (20 mg) by mouth daily.  Dispense: 90 tablet; Refill: 4  - Lipid Profile; Future  - Lipid Profile    14. Skin lesion  Referred to dermatology for skin concerns.   - Adult Dermatology  Referral; Future    15. Lumbar radiculopathy  16. Lumbar stenosis with neurogenic claudication  Patient endorses continued burning pain in right groin and we discussed likely association with lumbar stenosis. He previously benefited from exercises reccommended by his chiropractor.   - Physical Therapy  Referral; Future    17. FARNAZ on CPAP  No change from prior.       Marissa Roman MS3  [facility] Medical Student    Attestation Statement:  I was present with the medical student who participated in the service and in the documentation of this note. I have verified  "the history and personally performed the physical exam and medical decision making, and have verified the content of the note which accurately reflects my assessment of the patient and the plan of care.    Signed, Doug Porras MD, FAAP, FACP  Internal Medicine & Pediatrics  11/14/2024 9:38 AM      Subjective   Rj Juarez is a 68 year old male who presents for Medicare Visit. He measures his blood pressures at home and finds that they normally sit around 112/60 but that when he comes to the clinic they get elevated. He also notes that he mixed up his medications yesterday which resulted in him not taking any BP meds today. 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 He reports that he's lost weight since starting jardiance and after he stopped eating ice cream every night for dessert. He would consider starting ozempic if indicated by a worsening A1c today but otherwise takes too much italo from eating and is happy at his current weight.     Otherwise he has continued left flank pain which has remained consistent over the past year. At this point, he thinks it's most likely muscular in origin. He also has right groin burning pain that worsens with prolonged standing but that resolves by stretching his legs. He hasn't used ibuprofen or tylenol because he's heard that they're bad for his kidneys. Since his previous prostate artery embolization, he wakes less frequently at night to go to the bathroom but still has some residual urine leakage for which he goes to PT.     Objective   Vitals: BP (!) 160/90   Pulse 76   Temp 97.5  F (36.4  C)   Resp 14   Ht 1.784 m (5' 10.25\")   Wt 93.9 kg (207 lb)   SpO2 96%   BMI 29.49 kg/m      General: well appearing  CV: Regular rate and rhythm, no murmur, rub or gallop  Pulm: Clear to auscultation " bilaterally, no wheezing, rales or rhonchi  Neuro: Grossly intact  Musculoskeletal: No lower extremity edema  Skin: No rash  Psychiatry: Normal affect and insight.

## 2024-12-04 ENCOUNTER — THERAPY VISIT (OUTPATIENT)
Dept: PHYSICAL THERAPY | Facility: OTHER | Age: 69
End: 2024-12-04
Attending: RADIOLOGY
Payer: COMMERCIAL

## 2024-12-04 DIAGNOSIS — N40.1 BENIGN LOCALIZED PROSTATIC HYPERPLASIA WITH LOWER URINARY TRACT SYMPTOMS (LUTS): ICD-10-CM

## 2024-12-05 PROBLEM — N40.1 BENIGN LOCALIZED PROSTATIC HYPERPLASIA WITH LOWER URINARY TRACT SYMPTOMS (LUTS): Status: ACTIVE | Noted: 2024-12-05

## 2024-12-05 NOTE — PROGRESS NOTES
PHYSICAL THERAPY EVALUATION  Type of Visit: Evaluation       Fall Risk Screen:  Fall screen completed by: PT  Have you fallen 2 or more times in the past year?: Yes  Have you fallen and had an injury in the past year?: Yes  Is patient a fall risk?: No  Fall screen comments: very active out doors and with work    Subjective         Presenting condition or subjective complaint:  Patient referred to PT following PAE due to BPH with LUTs. Severe urinary frequency and urgency for years prior to procedure. Flow has greatly improved from procedure, still dealing with leakage, urgency. Patient wakes every 3-4 hours to void, used to be every 1 hour. Reports a burning sensation suprapubic, sitting will help, not sure the cause. Also deals with LBP. Drinks a lot of water, no issues with constipation. Sits on toilet to urinate but feels he empties more when standing. PVR in clinic was 176 but patient reported that staff did not have him void prior to bladder scan. Does not ingest caffeine, alcohol. Very active lifestyle.   Date of onset: 09/12/24    Relevant medical history:     Past Medical History:   Diagnosis Date    Benign lipomatous neoplasm     1/20/2014    Calculus of kidney     possible    Carpal tunnel syndrome     Both hands    Closed fracture of patella     05/06/09,Sustained right superior pole patellar fracture which underwent conservative management    Diverticulosis of large intestine without perforation or abscess without bleeding     1/28/2014    Exertional chest pain 11/19/2019    Headache     No Comments Provided    Other specified forms of tremor     3/2/2011    Other urticaria (CODE)     3/2/2011    Pain in joint     No Comments Provided    Umbilical hernia without obstruction or gangrene     1/26/2018       Dates & types of surgery:    Past Surgical History:   Procedure Laterality Date    COLONOSCOPY  01/28/2014 2009,2014,F/U 2019    COLONOSCOPY  03/01/2019    Serrated adenoma, follow up 1 year     COLONOSCOPY N/A 01/15/2021    5 year, due 1/2026. Procedure: COLONOSCOPY, WITH POLYPECTOMY AND BIOPSY;  Surgeon: Ly Frances MD;  Location: [facility] OR    DECOMPRESSION LUMBAR ONE LEVEL      ESOPHAGOSCOPY, GASTROSCOPY, DUODENOSCOPY (EGD), COMBINED      1/28/14,EGD    ESOPHAGOSCOPY, GASTROSCOPY, DUODENOSCOPY (EGD), COMBINED N/A 01/15/2021    Procedure: ESOPHAGOGASTRODUODENOSCOPY, WITH BIOPSY;  Surgeon: Ly Frances MD;  Location: [facility] OR    IR PAE  09/12/2024    JOINT REPLACEMENT, HIP RT/LT Bilateral     OTHER SURGICAL HISTORY      9/10/2014,84187.0,MT REPAIR ING HERNIA  >5 TRS BETTINA    OTHER SURGICAL HISTORY      1/26/2018,45463.0,MT REPAIR UMBILICAL NAZARIO  >5 TRS REDUC    RELEASE CARPAL TUNNEL Bilateral     3,12/2004,Both hands    SIGMOIDOSCOPY FLEXIBLE      No Comments Provided         Prior therapy history for the same diagnosis, illness or injury:    no    Prior Level of Function- independent, no bladder leakage prior to surgery      Employment:    [employer]  Hobbies/Interests:  avid outdoors man    Patient goals for therapy:  improve bladder control    Pain assessment: Pain denied     Objective      PELVIC EVALUATION  ADDITIONAL HISTORY:    Bladder History:    How long can you wait to urinate:  not long  Gets up at night to urinate:    every 3-4 hours, was every hour prior to surgery    Activities causing urine leak:    sit to/from stand, lifting  Amount of urine typically leaked:  drops  Pads used to help with leaking:      no    Bowel History:  Frequency of bowel movement:  daily  Consistency of stool:    bristol 4           Discussed reason for referral regarding pelvic health needs and external/internal pelvic floor muscle examination with patient/guardian.  Opportunity provided to ask questions and verbal consent for assessment and intervention was given.      POSTURE:  left iliac crest superior to right    PELVIC/SI SCREEN:  + FFT left, left superior pubic shear        Abdominal Activation/Strength:  will  continue to assess      Fascial Tension/Restriction:  restricted left pubovesical ligament    PFM use- engaged gluteals instead of pelvic floor, no breath holding noted    Assessment & Plan   CLINICAL IMPRESSIONS  Medical Diagnosis: N40.1 (ICD-10-CM) - Benign localized prostatic hyperplasia with lower urinary tract symptoms (LUTS)    Treatment Diagnosis: muscle weakness   Impression/Assessment: Patient is a 68 year old male with bladder leakage complaints.  The following significant findings have been identified: Decreased strength, Impaired muscle performance, and Decreased activity tolerance. These impairments interfere with their ability to perform self care tasks, work tasks, recreational activities, and household chores as compared to previous level of function.     Clinical Decision Making (Complexity):  Clinical Presentation: Evolving/Changing  Clinical Presentation Rationale: based on medical and personal factors listed in PT evaluation  Clinical Decision Making (Complexity): Low complexity    PLAN OF CARE  Treatment Interventions:  Modalities: Biofeedback  Interventions: Manual Therapy, Neuromuscular Re-education, Therapeutic Activity, Therapeutic Exercise, Self-Care/Home Management    Long Term Goals     PT Goal 1  Goal Identifier: sleep  Goal Description: Patient to have improved sleep at night with only waking once every 6 hours to void.  Target Date: 02/26/25  PT Goal 2  Goal Identifier: bladder control  Goal Description: Patient to have improved bladder control by tolerating movements suchs as squatting, lifting, without bladder leakage 805 of the time.  Target Date: 02/26/25  PT Goal 3  Goal Identifier: HEP  Goal Description: Patient to compliant with HEP for long term strengthening and engagement of pfm to control bladder leakage.  Target Date: 02/26/25      Frequency of Treatment: 1-2 times per week, as needed  Duration of Treatment: 12 weeks    Recommended Referrals to Other Professionals:   NA  Education Assessment:   Learner/Method: Patient;No Barriers to Learning    Risks and benefits of evaluation/treatment have been explained.   Patient/Family/caregiver agrees with Plan of Care.     Evaluation Time:     PT Eval, Low Complexity Minutes (10517): 15       Signing Clinician: Cristal Rosas PT        [facility]                                                                                   OUTPATIENT PHYSICAL THERAPY      PLAN OF TREATMENT FOR OUTPATIENT REHABILITATION   Patient's Last Name, First Name, Rj Cross YOB: 1955   Provider's Name   [facility]   Medical Record No.  [id number]     Onset Date: 09/12/24  Start of Care Date: 12/04/24     Medical Diagnosis:  N40.1 (ICD-10-CM) - Benign localized prostatic hyperplasia with lower urinary tract symptoms (LUTS)      PT Treatment Diagnosis:  muscle weakness Plan of Treatment  Frequency/Duration: 1-2 times per week, as needed/ 12 weeks    Certification date from 12/04/24 to 02/26/25         See note for plan of treatment details and functional goals     Cristal Rosas, PT                         I CERTIFY THE NEED FOR THESE SERVICES FURNISHED UNDER        THIS PLAN OF TREATMENT AND WHILE UNDER MY CARE     (Physician attestation of this document indicates review and certification of the therapy plan).              Referring Provider:  Eliu Arredondo    Initial Assessment  See Epic Evaluation- Start of Care Date: 12/04/24

## 2024-12-09 ENCOUNTER — THERAPY VISIT (OUTPATIENT)
Dept: PHYSICAL THERAPY | Facility: OTHER | Age: 69
End: 2024-12-09
Attending: RADIOLOGY
Payer: COMMERCIAL

## 2024-12-09 ENCOUNTER — OFFICE VISIT (OUTPATIENT)
Dept: PEDIATRICS | Facility: OTHER | Age: 69
End: 2024-12-09
Payer: MEDICARE

## 2024-12-09 VITALS
RESPIRATION RATE: 16 BRPM | OXYGEN SATURATION: 97 % | SYSTOLIC BLOOD PRESSURE: 148 MMHG | TEMPERATURE: 97.7 F | DIASTOLIC BLOOD PRESSURE: 80 MMHG | WEIGHT: 207.6 LBS | HEART RATE: 80 BPM | BODY MASS INDEX: 29.58 KG/M2

## 2024-12-09 DIAGNOSIS — R35.0 URINARY FREQUENCY: ICD-10-CM

## 2024-12-09 DIAGNOSIS — R50.9 FUO (FEVER OF UNKNOWN ORIGIN): Primary | ICD-10-CM

## 2024-12-09 DIAGNOSIS — R30.9 PAINFUL URINATION: ICD-10-CM

## 2024-12-09 DIAGNOSIS — N40.1 BENIGN LOCALIZED PROSTATIC HYPERPLASIA WITH LOWER URINARY TRACT SYMPTOMS (LUTS): Primary | ICD-10-CM

## 2024-12-09 LAB
ALBUMIN SERPL BCG-MCNC: 4 G/DL (ref 3.5–5.2)
ALBUMIN UR-MCNC: NEGATIVE MG/DL
ALP SERPL-CCNC: 81 U/L (ref 40–150)
ALT SERPL W P-5'-P-CCNC: 13 U/L (ref 0–70)
ANION GAP SERPL CALCULATED.3IONS-SCNC: 7 MMOL/L (ref 7–15)
APPEARANCE UR: CLEAR
AST SERPL W P-5'-P-CCNC: 13 U/L (ref 0–45)
B BURGDOR IGG+IGM SER QL: 0.12
BASOPHILS # BLD AUTO: 0.1 10E3/UL (ref 0–0.2)
BASOPHILS NFR BLD AUTO: 1 %
BILIRUB SERPL-MCNC: 0.8 MG/DL
BILIRUB UR QL STRIP: NEGATIVE
BUN SERPL-MCNC: 20.5 MG/DL (ref 8–23)
CALCIUM SERPL-MCNC: 9.7 MG/DL (ref 8.8–10.4)
CHLORIDE SERPL-SCNC: 104 MMOL/L (ref 98–107)
COLOR UR AUTO: ABNORMAL
CREAT SERPL-MCNC: 0.91 MG/DL (ref 0.67–1.17)
CRP SERPL-MCNC: 104.3 MG/L
EGFRCR SERPLBLD CKD-EPI 2021: >90 ML/MIN/1.73M2
EOSINOPHIL # BLD AUTO: 0.1 10E3/UL (ref 0–0.7)
EOSINOPHIL NFR BLD AUTO: 1 %
ERYTHROCYTE [DISTWIDTH] IN BLOOD BY AUTOMATED COUNT: 13.2 % (ref 10–15)
ERYTHROCYTE [SEDIMENTATION RATE] IN BLOOD BY WESTERGREN METHOD: 14 MM/HR (ref 0–20)
FLUAV RNA SPEC QL NAA+PROBE: NEGATIVE
FLUBV RNA RESP QL NAA+PROBE: NEGATIVE
GLUCOSE SERPL-MCNC: 142 MG/DL (ref 70–99)
GLUCOSE UR STRIP-MCNC: >1000 MG/DL
HCO3 SERPL-SCNC: 27 MMOL/L (ref 22–29)
HCT VFR BLD AUTO: 45 % (ref 40–53)
HGB BLD-MCNC: 14.7 G/DL (ref 13.3–17.7)
HGB UR QL STRIP: NEGATIVE
IMM GRANULOCYTES # BLD: 0 10E3/UL
IMM GRANULOCYTES NFR BLD: 0 %
KETONES UR STRIP-MCNC: ABNORMAL MG/DL
LEUKOCYTE ESTERASE UR QL STRIP: NEGATIVE
LYMPHOCYTES # BLD AUTO: 1.1 10E3/UL (ref 0.8–5.3)
LYMPHOCYTES NFR BLD AUTO: 14 %
MCH RBC QN AUTO: 30.2 PG (ref 26.5–33)
MCHC RBC AUTO-ENTMCNC: 32.7 G/DL (ref 31.5–36.5)
MCV RBC AUTO: 93 FL (ref 78–100)
MONOCYTES # BLD AUTO: 0.8 10E3/UL (ref 0–1.3)
MONOCYTES NFR BLD AUTO: 10 %
NEUTROPHILS # BLD AUTO: 6.1 10E3/UL (ref 1.6–8.3)
NEUTROPHILS NFR BLD AUTO: 75 %
NITRATE UR QL: NEGATIVE
NRBC # BLD AUTO: 0 10E3/UL
NRBC BLD AUTO-RTO: 0 /100
PH UR STRIP: 5.5 [PH] (ref 5–9)
PLATELET # BLD AUTO: 268 10E3/UL (ref 150–450)
POTASSIUM SERPL-SCNC: 4.2 MMOL/L (ref 3.4–5.3)
PROT SERPL-MCNC: 7.1 G/DL (ref 6.4–8.3)
RBC # BLD AUTO: 4.86 10E6/UL (ref 4.4–5.9)
RSV RNA SPEC NAA+PROBE: NEGATIVE
SARS-COV-2 RNA RESP QL NAA+PROBE: NEGATIVE
SODIUM SERPL-SCNC: 138 MMOL/L (ref 135–145)
SP GR UR STRIP: 1.03 (ref 1–1.03)
UROBILINOGEN UR STRIP-MCNC: NORMAL MG/DL
WBC # BLD AUTO: 8.1 10E3/UL (ref 4–11)

## 2024-12-09 PROCEDURE — 86618 LYME DISEASE ANTIBODY: CPT | Mod: ZL | Performed by: INTERNAL MEDICINE

## 2024-12-09 PROCEDURE — 85004 AUTOMATED DIFF WBC COUNT: CPT | Mod: ZL | Performed by: INTERNAL MEDICINE

## 2024-12-09 PROCEDURE — 86140 C-REACTIVE PROTEIN: CPT | Mod: ZL | Performed by: INTERNAL MEDICINE

## 2024-12-09 PROCEDURE — G0463 HOSPITAL OUTPT CLINIC VISIT: HCPCS

## 2024-12-09 PROCEDURE — 82247 BILIRUBIN TOTAL: CPT | Mod: ZL | Performed by: INTERNAL MEDICINE

## 2024-12-09 PROCEDURE — 87637 SARSCOV2&INF A&B&RSV AMP PRB: CPT | Mod: ZL | Performed by: INTERNAL MEDICINE

## 2024-12-09 PROCEDURE — 85652 RBC SED RATE AUTOMATED: CPT | Mod: ZL | Performed by: INTERNAL MEDICINE

## 2024-12-09 PROCEDURE — 86666 EHRLICHIA ANTIBODY: CPT | Mod: ZL | Performed by: INTERNAL MEDICINE

## 2024-12-09 PROCEDURE — 36415 COLL VENOUS BLD VENIPUNCTURE: CPT | Mod: ZL | Performed by: INTERNAL MEDICINE

## 2024-12-09 PROCEDURE — 85014 HEMATOCRIT: CPT | Mod: ZL | Performed by: INTERNAL MEDICINE

## 2024-12-09 PROCEDURE — 86753 PROTOZOA ANTIBODY NOS: CPT | Mod: ZL | Performed by: INTERNAL MEDICINE

## 2024-12-09 PROCEDURE — 82040 ASSAY OF SERUM ALBUMIN: CPT | Mod: ZL | Performed by: INTERNAL MEDICINE

## 2024-12-09 PROCEDURE — 82947 ASSAY GLUCOSE BLOOD QUANT: CPT | Mod: ZL | Performed by: INTERNAL MEDICINE

## 2024-12-09 PROCEDURE — 81003 URINALYSIS AUTO W/O SCOPE: CPT | Mod: ZL | Performed by: INTERNAL MEDICINE

## 2024-12-09 PROCEDURE — 80053 COMPREHEN METABOLIC PANEL: CPT | Mod: ZL | Performed by: INTERNAL MEDICINE

## 2024-12-09 ASSESSMENT — PAIN SCALES - GENERAL: PAINLEVEL_OUTOF10: NO PAIN (0)

## 2024-12-09 NOTE — NURSING NOTE
"Chief Complaint   Patient presents with    UTI     With fever        Initial BP (!) 148/80 (BP Location: Right arm, Patient Position: Sitting, Cuff Size: Adult Regular)   Pulse 80   Temp 97.7  F (36.5  C) (Temporal)   Resp 16   Wt 94.2 kg (207 lb 9.6 oz)   SpO2 97%   BMI 29.58 kg/m   Estimated body mass index is 29.58 kg/m  as calculated from the following:    Height as of 11/12/24: 1.784 m (5' 10.25\").    Weight as of this encounter: 94.2 kg (207 lb 9.6 oz).  Medication Review: complete    The next two questions are to help us understand your food security.  If you are feeling you need any assistance in this area, we have resources available to support you today.          11/7/2024   SDOH- Food Insecurity   Within the past 12 months, did you worry that your food would run out before you got money to buy more? N    Within the past 12 months, did the food you bought just not last and you didn t have money to get more? N        Patient-reported         Health Care Directive:  Patient does not have a Health Care Directive: Discussed advance care planning with patient; however, patient declined at this time.    Shu Turcios LPN      "

## 2024-12-09 NOTE — PROGRESS NOTES
Assessment & Plan       ICD-10-CM    1. FUO (fever of unknown origin)  R50.9 CBC with Platelets & Differential     Comprehensive metabolic panel     Erythrocyte sedimentation rate auto     CRP inflammation     Anaplasma phagocytoph Antibodies IgG IgM     Babesia antibody IgG IgM     Lyme Disease Total Antibodies with Reflex to Confirmation     Influenza A/B, RSV and SARS-CoV2 PCR (COVID-19)     Influenza A/B, RSV and SARS-CoV2 PCR (COVID-19) Nasopharyngeal      2. Urinary frequency  R35.0 UA Macroscopic with reflex to Microscopic and Culture     UA Macroscopic with reflex to Microscopic and Culture      3. Painful urination  R30.9 UA Macroscopic with reflex to Microscopic and Culture     UA Macroscopic with reflex to Microscopic and Culture        Mr. Juarez is a 68-year-old gentleman with a history of diabetes mellitus type 2 well-controlled, benign prostate enlargement status post prostate artery embolization procedure who presents with 2 weeks of fatigue, fever, night sweats and chills.  He has no other localizing symptoms.  Urinalysis within normal limits today.  Glucose expected based on Jardiance.  No urinary retention, no evidence for urinary tract infection.  Differential diagnosis includes babesiosis, anaplasmosis, Lyme disease, COVID-19, occult malignancy, acute leukemia, others.  We had a lengthy discussion today with regard to this differential.  We decided to obtain lab work as outlined above.  If symptoms progress or worsen recommend repeat evaluation.    Return if symptoms worsen or fail to improve.    Signed, Doug Porras MD, FAAP, FACP  Internal Medicine & Pediatrics    Subjective   Rj Juarez is a 68 year old male who presents for UTI (With fever ).  He is feeling sick going back to the week before Thanksgiving.  He tried to rest and his symptoms improved.  Now in the last 4 to 5 days he has a fever and chills every afternoon.  He is upset because it is limiting [place].  He does have a significant increase in urinary frequency and getting up to urinate every 45 minutes last night.  His urinary stream is good.  He did have some night sweats.  He is status post prostate artery embolization procedure on October 12, 2024.  No other symptoms including no cough or dyspnea.  He has chronic abdominal pain just to the right of the suprapubic area which is chronic and unchanged.  No vomiting or diarrhea.  No change in chronic arthritis pain.  No sinus pain or pressure.  No rash.    Objective   Vitals: BP (!) 148/80 (BP Location: Right arm, Patient Position: Sitting, Cuff Size: Adult Regular)   Pulse 80   Temp 97.7  F (36.5  C) (Temporal)   Resp 16   Wt 94.2 kg (207 lb 9.6 oz)   SpO2 97%   BMI 29.58 kg/m      Cardiovascular: Regular rate and rhythm, no murmur.  Pulmonary: Clear, no wheezing or rhonchi  Abdomen: Soft, nontender.  Bowel sounds present, normal active.  No tenderness over suprapubic area.    Postvoid residual 31 mL    Review and Analysis of Data   I personally reviewed the following:  External notes: No  Results: Yes urinalysis today, recent lab work, recent scans  Use of an independent historian: No  Independent review of a test performed by another physician: No  Discussion of management with another physician: No  Moderate risk of morbidity from additional diagnostic testing and/or treatment.

## 2024-12-09 NOTE — NURSING NOTE
Post-Void Residual  A post-void residual was measured by ultrasonic bladder scanner.  31 mL   Prema Aviles RN......December 9, 2024...10:35 AM

## 2024-12-12 ENCOUNTER — THERAPY VISIT (OUTPATIENT)
Dept: PHYSICAL THERAPY | Facility: OTHER | Age: 69
End: 2024-12-12
Attending: RADIOLOGY
Payer: MEDICARE

## 2024-12-12 DIAGNOSIS — N40.1 BENIGN LOCALIZED PROSTATIC HYPERPLASIA WITH LOWER URINARY TRACT SYMPTOMS (LUTS): Primary | ICD-10-CM

## 2024-12-12 LAB
A PHAGOCYTOPH IGG TITR SER IF: NORMAL {TITER}
A PHAGOCYTOPH IGM TITR SER IF: NORMAL {TITER}
B MICROTI IGG TITR SER: NORMAL {TITER}
B MICROTI IGM TITR SER: NORMAL {TITER}

## 2024-12-16 ENCOUNTER — THERAPY VISIT (OUTPATIENT)
Dept: PHYSICAL THERAPY | Facility: OTHER | Age: 69
End: 2024-12-16
Attending: RADIOLOGY
Payer: COMMERCIAL

## 2024-12-16 DIAGNOSIS — N40.1 BENIGN LOCALIZED PROSTATIC HYPERPLASIA WITH LOWER URINARY TRACT SYMPTOMS (LUTS): Primary | ICD-10-CM

## 2024-12-19 ENCOUNTER — OFFICE VISIT (OUTPATIENT)
Dept: PEDIATRICS | Facility: OTHER | Age: 69
End: 2024-12-19
Attending: INTERNAL MEDICINE
Payer: MEDICARE

## 2024-12-19 VITALS
HEART RATE: 92 BPM | TEMPERATURE: 96.9 F | BODY MASS INDEX: 29.38 KG/M2 | DIASTOLIC BLOOD PRESSURE: 70 MMHG | WEIGHT: 206.2 LBS | OXYGEN SATURATION: 98 % | SYSTOLIC BLOOD PRESSURE: 138 MMHG | RESPIRATION RATE: 16 BRPM

## 2024-12-19 DIAGNOSIS — R50.9 FEVER, UNSPECIFIED FEVER CAUSE: ICD-10-CM

## 2024-12-19 DIAGNOSIS — J11.1 INFLUENZA-LIKE ILLNESS: Primary | ICD-10-CM

## 2024-12-19 DIAGNOSIS — E11.9 CONTROLLED TYPE 2 DIABETES MELLITUS WITHOUT COMPLICATION, WITHOUT LONG-TERM CURRENT USE OF INSULIN (H): ICD-10-CM

## 2024-12-19 DIAGNOSIS — R35.0 INCREASED FREQUENCY OF URINATION: ICD-10-CM

## 2024-12-19 LAB
ALBUMIN UR-MCNC: 10 MG/DL
APPEARANCE UR: CLEAR
BILIRUB UR QL STRIP: NEGATIVE
COLOR UR AUTO: ABNORMAL
FLUAV RNA SPEC QL NAA+PROBE: NEGATIVE
FLUBV RNA RESP QL NAA+PROBE: NEGATIVE
GLUCOSE UR STRIP-MCNC: >1000 MG/DL
HGB UR QL STRIP: NEGATIVE
KETONES UR STRIP-MCNC: 40 MG/DL
LEUKOCYTE ESTERASE UR QL STRIP: NEGATIVE
NITRATE UR QL: NEGATIVE
PH UR STRIP: 5.5 [PH] (ref 5–9)
RBC URINE: 2 /HPF
RSV RNA SPEC NAA+PROBE: NEGATIVE
SARS-COV-2 RNA RESP QL NAA+PROBE: NEGATIVE
SP GR UR STRIP: 1.03 (ref 1–1.03)
UROBILINOGEN UR STRIP-MCNC: NORMAL MG/DL
WBC URINE: 15 /HPF

## 2024-12-19 PROCEDURE — 87637 SARSCOV2&INF A&B&RSV AMP PRB: CPT | Mod: ZL | Performed by: INTERNAL MEDICINE

## 2024-12-19 PROCEDURE — 81001 URINALYSIS AUTO W/SCOPE: CPT | Mod: ZL | Performed by: INTERNAL MEDICINE

## 2024-12-19 ASSESSMENT — PAIN SCALES - GENERAL: PAINLEVEL_OUTOF10: NO PAIN (0)

## 2024-12-19 NOTE — PROGRESS NOTES
Assessment & Plan       ICD-10-CM    1. Influenza-like illness  J11.1 Influenza A/B, RSV and SARS-CoV2 PCR (COVID-19)     Influenza A/B, RSV and SARS-CoV2 PCR (COVID-19) Nose      2. Fever, unspecified fever cause  R50.9 Influenza A/B, RSV and SARS-CoV2 PCR (COVID-19)     UA with Microscopic reflex to Culture     Influenza A/B, RSV and SARS-CoV2 PCR (COVID-19) Nose     Urine Culture      3. Increased frequency of urination  R35.0 UA with Microscopic reflex to Culture     Urine Culture      4. Controlled type 2 diabetes mellitus without complication, without long-term current use of insulin (H)  E11.9         His clinical history is consistent with an influenza-like illness including acute onset of fever and myalgias with rigors.  He did have timeframe between the illnesses that was essentially asymptomatic.  Previous lab work was normal with the exception of elevated CRP.  Compared with normal sed rate this is likely associated with an acute illness suspected viral at that time.  Differential diagnosis also includes leukemia, lymphoma, urinary tract infection, others.  We discussed the bird flu.  It appears there is no commercially available test that can determine the difference between seasonal influenza A or zoonotic [place] influenza A viruses.  We decided to obtain a multiplex as outlined above.  If fevers continue to spike intermittently we will have to consider broadening our search looking for occult malignancies, etc. which may include repeating lab work, obtaining CT chest abdomen and pelvis, etc.  Warning signs were discussed and prompt repeat evaluation recommended if concerns.    Return if symptoms worsen or fail to improve.      SignedDoug MD, FAAP, FACP  Internal Medicine & Pediatrics    Subjective   Rj Juarez is a 68 year old male who presents for Follow Up (fever) and Fatigue (Clammy, sweaty, fevers at night, night sweats).  He had been sick for couple of weeks.  The fever subsided  for a few days.  He went hunting and shot a deer.  Over the last 2 days he got sick again.  At 10 PM he was in the shop when he developed a 100 fever.  He was shaking.  He developed an increase in urination again.  Tmax 101 Fahrenheit.  Usually his temperature runs low in the 97's.  He has 200 chickens at his house.  He wonders if he could have bird flu.  His wife is not sick.  He has been around grandchildren.    Objective   Vitals: /70   Pulse 92   Temp 96.9  F (36.1  C) (Tympanic)   Resp 16   Wt 93.5 kg (206 lb 3.2 oz)   SpO2 98%   BMI 29.38 kg/m      General: well appearing  CV: Regular rate and rhythm, no murmur, rub or gallop  Pulm: Clear to auscultation bilaterally, no wheezing, rales or rhonchi  Neuro: Grossly intact  Musculoskeletal: No lower extremity edema  Skin: No rash  Psychiatry: Normal affect and insight.    Review and Analysis of Data   I personally reviewed the following:  External notes: No  Results: Yes local lab work  Use of an independent historian: Yes wife  Independent review of a test performed by another physician: No  Discussion of management with another physician: No  Moderate risk of morbidity from additional diagnostic testing and/or treatment.

## 2024-12-19 NOTE — NURSING NOTE
"Chief Complaint   Patient presents with    Follow Up     fever    Fatigue     Clammy, sweaty, fevers at night, night sweats       Initial /70   Pulse 92   Temp 96.9  F (36.1  C) (Tympanic)   Resp 16   Wt 93.5 kg (206 lb 3.2 oz)   SpO2 98%   BMI 29.38 kg/m   Estimated body mass index is 29.38 kg/m  as calculated from the following:    Height as of 11/12/24: 1.784 m (5' 10.25\").    Weight as of this encounter: 93.5 kg (206 lb 3.2 oz).  Medication Review: complete    The next two questions are to help us understand your food security.  If you are feeling you need any assistance in this area, we have resources available to support you today.          11/7/2024   SDOH- Food Insecurity   Within the past 12 months, did you worry that your food would run out before you got money to buy more? N   Within the past 12 months, did the food you bought just not last and you didn t have money to get more? N         Health Care Directive:  Patient does not have a Health Care Directive: Discussed advance care planning with patient; however, patient declined at this time.    Norma J. Gosselin, LPN      "

## 2024-12-23 ENCOUNTER — THERAPY VISIT (OUTPATIENT)
Dept: PHYSICAL THERAPY | Facility: OTHER | Age: 69
End: 2024-12-23
Attending: RADIOLOGY
Payer: COMMERCIAL

## 2024-12-23 DIAGNOSIS — N40.1 BENIGN LOCALIZED PROSTATIC HYPERPLASIA WITH LOWER URINARY TRACT SYMPTOMS (LUTS): Primary | ICD-10-CM

## 2025-01-03 ENCOUNTER — OFFICE VISIT (OUTPATIENT)
Dept: PEDIATRICS | Facility: OTHER | Age: 70
End: 2025-01-03
Attending: INTERNAL MEDICINE
Payer: MEDICARE

## 2025-01-03 VITALS
TEMPERATURE: 98.3 F | DIASTOLIC BLOOD PRESSURE: 74 MMHG | WEIGHT: 207.5 LBS | OXYGEN SATURATION: 96 % | HEART RATE: 85 BPM | SYSTOLIC BLOOD PRESSURE: 128 MMHG | BODY MASS INDEX: 29.56 KG/M2 | RESPIRATION RATE: 18 BRPM

## 2025-01-03 DIAGNOSIS — R19.7 DIARRHEA, UNSPECIFIED TYPE: ICD-10-CM

## 2025-01-03 DIAGNOSIS — I25.10 CAD S/P PERCUTANEOUS CORONARY ANGIOPLASTY: ICD-10-CM

## 2025-01-03 DIAGNOSIS — M25.59 PAIN IN OTHER JOINT: ICD-10-CM

## 2025-01-03 DIAGNOSIS — N30.00 ACUTE CYSTITIS WITHOUT HEMATURIA: ICD-10-CM

## 2025-01-03 DIAGNOSIS — M04.1 PERIODIC FEVER SYNDROME (H): ICD-10-CM

## 2025-01-03 DIAGNOSIS — R93.7 ABNORMAL FINDINGS ON DIAGNOSTIC IMAGING OF OTHER PARTS OF MUSCULOSKELETAL SYSTEM: ICD-10-CM

## 2025-01-03 DIAGNOSIS — Z98.61 CAD S/P PERCUTANEOUS CORONARY ANGIOPLASTY: ICD-10-CM

## 2025-01-03 DIAGNOSIS — R50.9 FUO (FEVER OF UNKNOWN ORIGIN): Primary | ICD-10-CM

## 2025-01-03 DIAGNOSIS — R27.8 OTHER LACK OF COORDINATION: ICD-10-CM

## 2025-01-03 DIAGNOSIS — E11.9 CONTROLLED TYPE 2 DIABETES MELLITUS WITHOUT COMPLICATION, WITHOUT LONG-TERM CURRENT USE OF INSULIN (H): ICD-10-CM

## 2025-01-03 DIAGNOSIS — Z98.890 HISTORY OF CARDIAC CATH: ICD-10-CM

## 2025-01-03 LAB
ALBUMIN SERPL BCG-MCNC: 4.5 G/DL (ref 3.5–5.2)
ALBUMIN UR-MCNC: NEGATIVE MG/DL
ALP SERPL-CCNC: 90 U/L (ref 40–150)
ALT SERPL W P-5'-P-CCNC: 12 U/L (ref 0–70)
ANION GAP SERPL CALCULATED.3IONS-SCNC: 13 MMOL/L (ref 7–15)
APPEARANCE UR: CLEAR
AST SERPL W P-5'-P-CCNC: 12 U/L (ref 0–45)
BASOPHILS # BLD AUTO: 0.1 10E3/UL (ref 0–0.2)
BASOPHILS NFR BLD AUTO: 0 %
BILIRUB SERPL-MCNC: 1 MG/DL
BILIRUB UR QL STRIP: NEGATIVE
BUN SERPL-MCNC: 12.3 MG/DL (ref 8–23)
CALCIUM SERPL-MCNC: 9.6 MG/DL (ref 8.8–10.4)
CHLORIDE SERPL-SCNC: 98 MMOL/L (ref 98–107)
COLOR UR AUTO: ABNORMAL
CREAT SERPL-MCNC: 0.99 MG/DL (ref 0.67–1.17)
CRP SERPL-MCNC: 108.46 MG/L
EGFRCR SERPLBLD CKD-EPI 2021: 81 ML/MIN/1.73M2
EOSINOPHIL # BLD AUTO: 0 10E3/UL (ref 0–0.7)
EOSINOPHIL NFR BLD AUTO: 0 %
ERYTHROCYTE [DISTWIDTH] IN BLOOD BY AUTOMATED COUNT: 13.3 % (ref 10–15)
ERYTHROCYTE [SEDIMENTATION RATE] IN BLOOD BY WESTERGREN METHOD: 23 MM/HR (ref 0–20)
FERRITIN SERPL-MCNC: 249 NG/ML (ref 31–409)
GLUCOSE SERPL-MCNC: 106 MG/DL (ref 70–99)
GLUCOSE UR STRIP-MCNC: >1000 MG/DL
HCO3 SERPL-SCNC: 25 MMOL/L (ref 22–29)
HCT VFR BLD AUTO: 46.4 % (ref 40–53)
HCV AB SERPL QL IA: NONREACTIVE
HGB BLD-MCNC: 15.7 G/DL (ref 13.3–17.7)
HGB UR QL STRIP: ABNORMAL
HIV 1+2 AB+HIV1 P24 AG SERPL QL IA: NONREACTIVE
IMM GRANULOCYTES # BLD: 0.1 10E3/UL
IMM GRANULOCYTES NFR BLD: 1 %
KETONES UR STRIP-MCNC: 40 MG/DL
LEUKOCYTE ESTERASE UR QL STRIP: ABNORMAL
LYMPHOCYTES # BLD AUTO: 1.7 10E3/UL (ref 0.8–5.3)
LYMPHOCYTES NFR BLD AUTO: 15 %
MCH RBC QN AUTO: 30.3 PG (ref 26.5–33)
MCHC RBC AUTO-ENTMCNC: 33.8 G/DL (ref 31.5–36.5)
MCV RBC AUTO: 90 FL (ref 78–100)
MONOCYTES # BLD AUTO: 1.3 10E3/UL (ref 0–1.3)
MONOCYTES NFR BLD AUTO: 11 %
NEUTROPHILS # BLD AUTO: 8.3 10E3/UL (ref 1.6–8.3)
NEUTROPHILS NFR BLD AUTO: 72 %
NITRATE UR QL: NEGATIVE
NRBC # BLD AUTO: 0 10E3/UL
NRBC BLD AUTO-RTO: 0 /100
PH UR STRIP: 5.5 [PH] (ref 5–9)
PLATELET # BLD AUTO: 296 10E3/UL (ref 150–450)
POTASSIUM SERPL-SCNC: 3.7 MMOL/L (ref 3.4–5.3)
PROT SERPL-MCNC: 7.8 G/DL (ref 6.4–8.3)
RBC # BLD AUTO: 5.18 10E6/UL (ref 4.4–5.9)
RBC URINE: 2 /HPF
RETICS # AUTO: 0.08 10E6/UL (ref 0.03–0.1)
RETICS/RBC NFR AUTO: 1.5 % (ref 0.5–2)
RHEUMATOID FACT SERPL-ACNC: 11 IU/ML
SODIUM SERPL-SCNC: 136 MMOL/L (ref 135–145)
SP GR UR STRIP: 1.03 (ref 1–1.03)
T PALLIDUM AB SER QL: NONREACTIVE
TSH SERPL DL<=0.005 MIU/L-ACNC: 2.48 UIU/ML (ref 0.3–4.2)
UROBILINOGEN UR STRIP-MCNC: NORMAL MG/DL
WBC # BLD AUTO: 11.6 10E3/UL (ref 4–11)
WBC URINE: 33 /HPF

## 2025-01-03 PROCEDURE — 85652 RBC SED RATE AUTOMATED: CPT | Mod: ZL | Performed by: INTERNAL MEDICINE

## 2025-01-03 PROCEDURE — 83825 ASSAY OF MERCURY: CPT | Mod: ZL | Performed by: INTERNAL MEDICINE

## 2025-01-03 PROCEDURE — 86780 TREPONEMA PALLIDUM: CPT | Mod: ZL | Performed by: INTERNAL MEDICINE

## 2025-01-03 PROCEDURE — G0463 HOSPITAL OUTPT CLINIC VISIT: HCPCS

## 2025-01-03 PROCEDURE — 36415 COLL VENOUS BLD VENIPUNCTURE: CPT | Mod: ZL | Performed by: INTERNAL MEDICINE

## 2025-01-03 PROCEDURE — 82164 ANGIOTENSIN I ENZYME TEST: CPT | Mod: ZL | Performed by: INTERNAL MEDICINE

## 2025-01-03 PROCEDURE — 85045 AUTOMATED RETICULOCYTE COUNT: CPT | Mod: ZL | Performed by: INTERNAL MEDICINE

## 2025-01-03 PROCEDURE — 81374 HLA I TYPING 1 ANTIGEN LR: CPT | Mod: ZL | Performed by: INTERNAL MEDICINE

## 2025-01-03 PROCEDURE — 87389 HIV-1 AG W/HIV-1&-2 AB AG IA: CPT | Mod: ZL | Performed by: INTERNAL MEDICINE

## 2025-01-03 PROCEDURE — 85025 COMPLETE CBC W/AUTO DIFF WBC: CPT | Mod: ZL | Performed by: INTERNAL MEDICINE

## 2025-01-03 PROCEDURE — 86038 ANTINUCLEAR ANTIBODIES: CPT | Mod: ZL | Performed by: INTERNAL MEDICINE

## 2025-01-03 PROCEDURE — 86200 CCP ANTIBODY: CPT | Mod: ZL | Performed by: INTERNAL MEDICINE

## 2025-01-03 PROCEDURE — 82040 ASSAY OF SERUM ALBUMIN: CPT | Mod: ZL | Performed by: INTERNAL MEDICINE

## 2025-01-03 PROCEDURE — 81001 URINALYSIS AUTO W/SCOPE: CPT | Mod: ZL | Performed by: INTERNAL MEDICINE

## 2025-01-03 PROCEDURE — 86036 ANCA SCREEN EACH ANTIBODY: CPT | Mod: ZL | Performed by: INTERNAL MEDICINE

## 2025-01-03 PROCEDURE — 87040 BLOOD CULTURE FOR BACTERIA: CPT | Mod: ZL | Performed by: INTERNAL MEDICINE

## 2025-01-03 PROCEDURE — 82728 ASSAY OF FERRITIN: CPT | Mod: ZL | Performed by: INTERNAL MEDICINE

## 2025-01-03 PROCEDURE — 85018 HEMOGLOBIN: CPT | Mod: ZL | Performed by: INTERNAL MEDICINE

## 2025-01-03 PROCEDURE — 83655 ASSAY OF LEAD: CPT | Mod: ZL | Performed by: INTERNAL MEDICINE

## 2025-01-03 PROCEDURE — 84443 ASSAY THYROID STIM HORMONE: CPT | Mod: ZL | Performed by: INTERNAL MEDICINE

## 2025-01-03 PROCEDURE — 86140 C-REACTIVE PROTEIN: CPT | Mod: ZL | Performed by: INTERNAL MEDICINE

## 2025-01-03 PROCEDURE — 87186 SC STD MICRODIL/AGAR DIL: CPT | Mod: ZL | Performed by: INTERNAL MEDICINE

## 2025-01-03 PROCEDURE — 86803 HEPATITIS C AB TEST: CPT | Mod: ZL | Performed by: INTERNAL MEDICINE

## 2025-01-03 PROCEDURE — 86431 RHEUMATOID FACTOR QUANT: CPT | Mod: ZL | Performed by: INTERNAL MEDICINE

## 2025-01-03 ASSESSMENT — PAIN SCALES - GENERAL: PAINLEVEL_OUTOF10: SEVERE PAIN (6)

## 2025-01-03 NOTE — NURSING NOTE
"Chief Complaint   Patient presents with    Fever     Spiking fevers     Abdominal Pain     LUQ         Urine frequency every hour see PT notes regarding pain.    Initial /74 (BP Location: Right arm, Patient Position: Sitting, Cuff Size: Adult Large)   Pulse 85   Temp 98.3  F (36.8  C) (Tympanic)   Resp 18   Wt 94.1 kg (207 lb 8 oz)   SpO2 96%   BMI 29.56 kg/m   Estimated body mass index is 29.56 kg/m  as calculated from the following:    Height as of 11/12/24: 1.784 m (5' 10.25\").    Weight as of this encounter: 94.1 kg (207 lb 8 oz).  Medication Review: complete    The next two questions are to help us understand your food security.  If you are feeling you need any assistance in this area, we have resources available to support you today.          11/7/2024   SDOH- Food Insecurity   Within the past 12 months, did you worry that your food would run out before you got money to buy more? N   Within the past 12 months, did the food you bought just not last and you didn t have money to get more? N         Health Care Directive:  Patient does not have a Health Care Directive: Discussed advance care planning with patient; however, patient declined at this time.    Danica Rogers LPN      "

## 2025-01-03 NOTE — PATIENT INSTRUCTIONS
-- Stop Jardiance for now   -- Lots of lab   -- CT scan chest/abd/pelvis   -- Heart echo   -- Consider expert consultation based on results, specialty TBD

## 2025-01-03 NOTE — PROGRESS NOTES
.  Answers submitted by the patient for this visit:  General Questionnaire (Submitted on 1/3/2025)  Chief Complaint: Chronic problems general questions HPI Form  What is the reason for your visit today? : spiking fevers  How many servings of fruits and vegetables do you eat daily?: 4 or more  On average, how many sweetened beverages do you drink each day (Examples: soda, juice, sweet tea, etc.  Do NOT count diet or artificially sweetened beverages)?: 0  How many minutes a day do you exercise enough to make your heart beat faster?: 20 to 29  How many days a week do you exercise enough to make your heart beat faster?: 7  How many days per week do you miss taking your medication?: 0  Questionnaire about: Chronic problems general questions HPI Form (Submitted on 1/3/2025)  Chief Complaint: Chronic problems general questions HPI Form

## 2025-01-03 NOTE — PROGRESS NOTES
Assessment & Plan   1. FUO (fever of unknown origin) (Primary)  2. Periodic fever syndrome (H)  Fevers occur almost daily since end of October after a viral illness. Further treatment with prednisone and azithromycin resolved many of his symptoms. Usually 100+F, associated with joint pain/body aches, rigors and temperature intolerance, and scalp hypersensitivity(without tingling or sharp, shooting pains). VSS and within normal limits in the clinic. Grossly benign physical exam without LLQ abdominal tenderness. No unintentional weight loss, vision changes, hematuria, dysuria, changes in bowel movements. Has has not traveled internationally except to [place]. No sick contacts. Previous testing for borrelia, influenza, and UTI was negative. Other explanations include underlying infection such as endocarditis, occult abscess (eg. from diverticulosis), UTI, tick-borne disease, autoimmune diseases with his previously elevated CRP and father who had scleroderma, vasculitis, medication side effect(due to temperal assocation with beginning jardiance), among others.   - CBC with Platelets & Differential; Future  - Comprehensive metabolic panel; Future  - Erythrocyte sedimentation rate auto; Future  - Anti Nuclear Juanita IgG by IFA with Reflex; Future  - Angiotensin converting enzyme; Future  - Cyclic Citrullinated Peptide Antibody IgG; Future  - CRP inflammation; Future  - Rheumatoid factor; Future  - HLA-B27 Typing; Future  - Ferritin; Future  - Treponema Abs w Reflex to RPR and Titer; Future  - HIV Antigen Antibody Combo Cascade; Future  - Hepatitis C Screen Reflex to HCV RNA Quant and Genotype; Future  - UA Macroscopic with reflex to Microscopic and Culture  - Blood Culture Peripheral Blood; Future  - Blood Culture Peripheral Blood; Future  - Echocardiogram Complete; Future  - CT Chest/Abdomen/Pelvis w Contrast; Future  - ANCA IgG by IFA with Reflex to Titer; Future  - Lab Blood Morphology Pathologist Review; Future  - Blood  metal panel; Future  - Comprehensive metabolic panel  - Erythrocyte sedimentation rate auto  - Anti Nuclear Juanita IgG by IFA with Reflex  - Angiotensin converting enzyme  - Cyclic Citrullinated Peptide Antibody IgG  - CRP inflammation  - Rheumatoid factor  - HLA-B27 Typing  - Ferritin  - Treponema Abs w Reflex to RPR and Titer  - HIV Antigen Antibody Combo Cascade  - Hepatitis C Screen Reflex to HCV RNA Quant and Genotype  - Blood Culture Peripheral Blood  - Blood Culture Peripheral Blood  - ANCA IgG by IFA with Reflex to Titer  - Lab Blood Morphology Pathologist Review  - Blood metal panel  - Urine Culture    3. CAD S/P percutaneous coronary angioplasty  4. History of cardiac cath on 12/6/2019  No new chest pain or shortness of breath. No changes from prior exam.     5. Controlled type 2 diabetes mellitus without complication, without long-term current use of insulin (H)  Currently controlled with jardiance 10mg every day, switched from invokana in late October 2024. We discussed plan to stop jardiance for potential concerns for side effects leading to daily fevers or with his urinary frequency.     6. Pain in other joint  Has joint pain and body aches associated with his cyclical fevers. May be elevated as acute phase reactant.  - Ferritin; Future  - Ferritin    7. Diarrhea, unspecified type  No recent changes in bowel habits. Reportedly has diarrhea frequently. No hematochezia or unintentional weight loss. He thinks this is likely a symptom of his IBS which he has tolerated for years. 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   - Blood metal panel; Future  - Blood metal panel    8. Abnormal findings on diagnostic imaging of other parts of musculoskeletal system      9. 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 These are a part of his income.   - Blood  "metal panel; Future  - Blood metal panel      Patient Instructions    -- Stop Jardiance for now   -- Lots of lab   -- CT scan chest/abd/pelvis   -- Heart echo   -- Consider expert consultation based on results, specialty TBD        Marissa Roman MS3  [facility] Medical Student    Attestation Statement:  I was present with the medical student who participated in the service and in the documentation of this note. I have verified the history and personally performed the physical exam and medical decision making, and have verified the content of the note which accurately reflects my assessment of the patient and the plan of care.    Signed, Doug Porras MD, FAAP, FACP  Internal Medicine & Pediatrics  1/6/2025 10:17 AM      Subjective   Rj Juarez is a 69 year old male who presents for Fever (Spiking fevers ) and Abdominal Pain (LUQ//). The fevers started around Halloween 2024 after he recovered from a viral illness with a sore throat. He was given prednisone and azithromycin shortly after and his symptoms resolved. He said prednisone made him feel great and, \"I was able to do all sorts of things I probably shouldn't be doing.\" Around this time he also received his flu and COVID shots and switched from invokana to jardiance due to insurance coverage. Since then he has had a 100+ F fevers almost daily that develop each evening. Initial evaluation for a tick-borne source such as babesia, influenza from his at home poultry exposure, and UA in mid December 2024 were all negative. He continues to have increased urinary frequency which has him waking up every 45min to urinate at night. He then tries to drink water every time so he doesn't get dehydrated from his jardiance. Since 12/9/24 he went a couple of days fever-free but then developed them again on the 24th and now has had a persistent fever for >3 days where his temperature failed to drop below 100F. Tylenol and ibuprofen only drop his temperature a degree. During " his febrile periods he is severely fatigued with increased joint pain/body aches. He noticed his scalp is tender with even the gentlest of touches, but does not cause sharp shooting pain. He has had 4 episodes of rigors with poor temperature regulation. His blood glucose has also been more elevated to 160-190 fasting in the AM and up to 240 3 hours after eating. Because of this he has tried to be extra conscious of his food intake. He has been so concerned that he debated being seen in the ED or driving down to the [facility] per his friends' suggestions. He also has sharp pains in his LLQ that occasionally burns with urination or after eating meals. No unintentional weight loss, shortness of breath, hematuria, hematochezia, dysuria, headaches.     His father had scleroderma diagnosed in his 50s and  at age 65. No family history of thyroid disease or rheumatoid arthritis. No international travel except to [place]. No sick contacts or others known to have similar symptoms.     Objective   Vitals: /74 (BP Location: Right arm, Patient Position: Sitting, Cuff Size: Adult Large)   Pulse 85   Temp 98.3  F (36.8  C) (Tympanic)   Resp 18   Wt 94.1 kg (207 lb 8 oz)   SpO2 96%   BMI 29.56 kg/m      Physical Exam  Constitutional:       General: He is not in acute distress.     Appearance: Normal appearance.   HENT:      Head: Normocephalic and atraumatic.   Eyes:      Extraocular Movements: Extraocular movements intact.      Conjunctiva/sclera: Conjunctivae normal.   Cardiovascular:      Rate and Rhythm: Normal rate and regular rhythm.      Heart sounds: Normal heart sounds. No murmur heard.     No gallop.   Pulmonary:      Effort: Pulmonary effort is normal. No respiratory distress.      Breath sounds: Normal breath sounds. No wheezing.   Abdominal:      General: There is no distension.      Palpations: Abdomen is soft.      Comments: No tenderness to palpation of LLQ.    Musculoskeletal:      Comments: No lower  extremity edema.    Skin:     General: Skin is warm and dry.      Coloration: Skin is not jaundiced.   Neurological:      General: No focal deficit present.      Mental Status: He is alert and oriented to person, place, and time.   Psychiatric:         Thought Content: Thought content normal.         Judgment: Judgment normal.

## 2025-01-04 LAB — ACE SERPL-CCNC: 20 U/L

## 2025-01-05 LAB
ARSENIC BLD-MCNC: <10 UG/L
LEAD BLDV-MCNC: <2 UG/DL
MERCURY BLD-MCNC: 9.3 UG/L

## 2025-01-06 LAB
ANA PAT SER IF-IMP: ABNORMAL
ANA SER QL IF: ABNORMAL
ANA TITR SER IF: ABNORMAL {TITER}
ANCA AB PATTERN SER IF-IMP: NORMAL
BACTERIA UR CULT: ABNORMAL
C-ANCA TITR SER IF: NORMAL {TITER}
PATH REPORT.FINAL DX SPEC: NORMAL

## 2025-01-06 RX ORDER — SULFAMETHOXAZOLE AND TRIMETHOPRIM 800; 160 MG/1; MG/1
1 TABLET ORAL 2 TIMES DAILY
Qty: 20 TABLET | Refills: 0 | Status: SHIPPED | OUTPATIENT
Start: 2025-01-06 | End: 2025-01-16

## 2025-01-07 LAB — CCP AB SER IA-ACNC: 1.2 U/ML

## 2025-01-08 LAB
B LOCUS: NORMAL
B27TEST METHOD: NORMAL
BACTERIA BLD CULT: NO GROWTH
BACTERIA BLD CULT: NO GROWTH

## 2025-01-10 ENCOUNTER — HOSPITAL ENCOUNTER (OUTPATIENT)
Dept: CT IMAGING | Facility: OTHER | Age: 70
Discharge: HOME | End: 2025-01-10
Attending: INTERNAL MEDICINE | Admitting: INTERNAL MEDICINE
Payer: MEDICARE

## 2025-01-10 DIAGNOSIS — R50.9 FUO (FEVER OF UNKNOWN ORIGIN): ICD-10-CM

## 2025-01-10 DIAGNOSIS — M04.1 PERIODIC FEVER SYNDROME (H): ICD-10-CM

## 2025-01-10 PROCEDURE — 71260 CT THORAX DX C+: CPT

## 2025-01-10 PROCEDURE — 74177 CT ABD & PELVIS W/CONTRAST: CPT

## 2025-01-10 PROCEDURE — 250N000011 HC RX IP 250 OP 636: Performed by: INTERNAL MEDICINE

## 2025-01-10 PROCEDURE — 250N000009 HC RX 250: Performed by: INTERNAL MEDICINE

## 2025-01-10 RX ORDER — IOPAMIDOL 755 MG/ML
119 INJECTION, SOLUTION INTRAVASCULAR ONCE
Status: COMPLETED | OUTPATIENT
Start: 2025-01-10 | End: 2025-01-10

## 2025-01-10 RX ADMIN — SODIUM CHLORIDE 60 ML: 9 INJECTION, SOLUTION INTRAVENOUS at 12:54

## 2025-01-10 RX ADMIN — IOPAMIDOL 119 ML: 755 INJECTION, SOLUTION INTRAVENOUS at 12:54

## 2025-01-14 ENCOUNTER — HOSPITAL ENCOUNTER (OUTPATIENT)
Dept: CARDIOLOGY | Facility: OTHER | Age: 70
Discharge: HOME | End: 2025-01-14
Attending: INTERNAL MEDICINE
Payer: MEDICARE

## 2025-01-14 DIAGNOSIS — R50.9 FUO (FEVER OF UNKNOWN ORIGIN): ICD-10-CM

## 2025-01-14 DIAGNOSIS — M04.1 PERIODIC FEVER SYNDROME (H): ICD-10-CM

## 2025-01-14 LAB — LVEF ECHO: NORMAL

## 2025-01-14 PROCEDURE — 93306 TTE W/DOPPLER COMPLETE: CPT

## 2025-01-22 ENCOUNTER — LAB (OUTPATIENT)
Dept: LAB | Facility: OTHER | Age: 70
End: 2025-01-22
Attending: UROLOGY
Payer: COMMERCIAL

## 2025-01-22 ENCOUNTER — OFFICE VISIT (OUTPATIENT)
Dept: UROLOGY | Facility: OTHER | Age: 70
End: 2025-01-22
Attending: UROLOGY
Payer: MEDICARE

## 2025-01-22 VITALS
OXYGEN SATURATION: 97 % | HEIGHT: 70 IN | SYSTOLIC BLOOD PRESSURE: 120 MMHG | BODY MASS INDEX: 28.63 KG/M2 | DIASTOLIC BLOOD PRESSURE: 80 MMHG | HEART RATE: 88 BPM | TEMPERATURE: 98.2 F | WEIGHT: 200 LBS

## 2025-01-22 DIAGNOSIS — N40.1 BPH WITH OBSTRUCTION/LOWER URINARY TRACT SYMPTOMS: ICD-10-CM

## 2025-01-22 DIAGNOSIS — N40.1 BPH WITH OBSTRUCTION/LOWER URINARY TRACT SYMPTOMS: Primary | ICD-10-CM

## 2025-01-22 DIAGNOSIS — Z87.440 HISTORY OF ACUTE CYSTITIS: ICD-10-CM

## 2025-01-22 DIAGNOSIS — N13.8 BPH WITH OBSTRUCTION/LOWER URINARY TRACT SYMPTOMS: ICD-10-CM

## 2025-01-22 DIAGNOSIS — N13.8 BPH WITH OBSTRUCTION/LOWER URINARY TRACT SYMPTOMS: Primary | ICD-10-CM

## 2025-01-22 DIAGNOSIS — R97.20 ELEVATED PSA: ICD-10-CM

## 2025-01-22 LAB
ALBUMIN UR-MCNC: NEGATIVE MG/DL
APPEARANCE UR: CLEAR
BILIRUB UR QL STRIP: NEGATIVE
COLOR UR AUTO: NORMAL
GLUCOSE UR STRIP-MCNC: NEGATIVE MG/DL
HGB UR QL STRIP: NEGATIVE
KETONES UR STRIP-MCNC: NEGATIVE MG/DL
LEUKOCYTE ESTERASE UR QL STRIP: NEGATIVE
NITRATE UR QL: NEGATIVE
PH UR STRIP: 6 [PH] (ref 5–9)
SP GR UR STRIP: 1.01 (ref 1–1.03)
UROBILINOGEN UR STRIP-MCNC: NORMAL MG/DL

## 2025-01-22 PROCEDURE — G0463 HOSPITAL OUTPT CLINIC VISIT: HCPCS | Mod: 25

## 2025-01-22 PROCEDURE — 81003 URINALYSIS AUTO W/O SCOPE: CPT | Mod: ZL

## 2025-01-22 ASSESSMENT — PAIN SCALES - GENERAL: PAINLEVEL_OUTOF10: MILD PAIN (1)

## 2025-01-22 NOTE — PROGRESS NOTES
Chief Complaint: Follow Up (Urethral dilation - 3 month follow up)  .    HPI: Mr. Rj Juarez is a 69 year old year old male presenting today January 22, 2025 in follow up for evaluation of BPH with obstruction and left hydronephrosis for which he underwent PAE.    Some brief concern about microhematuria and left hydronephrosis but that did resolve on further imaging therefore no further workup was done.  It was thought that the mild left hydronephrosis was secondary to his large prostate.    Here today for follow-up.  It is noted that on January 3 he was seen by Dr. Porras for ongoing fevers of unknown etiology.  Reached 102 degrees.  Positive for more frequency but stream has been good.    Urine culture did show staph hemolyticus for which he was placed on Bactrim double strength for 10 days.  Denies symptoms now.    Has stopped the jardiance since 1/3/25 but anxious to restart it.         Past Medical History:   Diagnosis Date    Benign lipomatous neoplasm     1/20/2014    Calculus of kidney     possible    Carpal tunnel syndrome     Both hands    Closed fracture of patella     05/06/09,Sustained right superior pole patellar fracture which underwent conservative management    Diverticulosis of large intestine without perforation or abscess without bleeding     1/28/2014    Exertional chest pain 11/19/2019    Headache     No Comments Provided    Other specified forms of tremor     3/2/2011    Other urticaria (CODE)     3/2/2011    Pain in joint     No Comments Provided    Umbilical hernia without obstruction or gangrene     1/26/2018       Past Surgical History:   Procedure Laterality Date    COLONOSCOPY  01/28/2014 2009,2014,F/U 2019    COLONOSCOPY  03/01/2019    Serrated adenoma, follow up 1 year    COLONOSCOPY N/A 01/15/2021    5 year, due 1/2026. Procedure: COLONOSCOPY, WITH POLYPECTOMY AND BIOPSY;  Surgeon: Ly Frances MD;  Location: [facility] OR    DECOMPRESSION LUMBAR ONE LEVEL      ESOPHAGOSCOPY,  GASTROSCOPY, DUODENOSCOPY (EGD), COMBINED      1/28/14,EGD    ESOPHAGOSCOPY, GASTROSCOPY, DUODENOSCOPY (EGD), COMBINED N/A 01/15/2021    Procedure: ESOPHAGOGASTRODUODENOSCOPY, WITH BIOPSY;  Surgeon: Ly Frances MD;  Location:  OR    IR PAE  09/12/2024    JOINT REPLACEMENT, HIP RT/LT Bilateral     OTHER SURGICAL HISTORY      9/10/2014,39276.0,[place] REPAIR ING HERNIA  >5 TRS BETTINA    OTHER SURGICAL HISTORY      1/26/2018,77882.0,[place] REPAIR UMBILICAL NAZARIO  >5 TRS REDUC    RELEASE CARPAL TUNNEL Bilateral     3,12/2004,Both hands    SIGMOIDOSCOPY FLEXIBLE      No Comments Provided       Current Outpatient Medications   Medication Sig Dispense Refill    aspirin 81 MG EC tablet Take 81 mg by mouth daily      blood glucose (NO BRAND SPECIFIED) lancets standard Dispense item covered by insurance. Test blood sugar 2 times daily. 200 each 11    blood glucose (NO BRAND SPECIFIED) test strip Dispense item covered by insurance. Test blood sugar 2 times daily. 200 strip 11    blood glucose monitoring (NO BRAND SPECIFIED) meter device kit Dispense option covered by insurance. Test blood sugar 2 times daily. 1 kit 11    empagliflozin (JARDIANCE) 10 MG TABS tablet Take 1 tablet (10 mg) by mouth daily. 90 tablet 4    famotidine (PEPCID) 20 MG tablet Take 1 tablet (20 mg) by mouth 2 times daily as needed (gerd). 180 tablet 4    ibuprofen (ADVIL/MOTRIN) 400 MG tablet       losartan (COZAAR) 25 MG tablet Take 1 tablet (25 mg) by mouth daily. 90 tablet 4    metoprolol succinate ER (TOPROL XL) 25 MG 24 hr tablet Take 1 tablet (25 mg) by mouth daily. 90 tablet 4    Microlet Lancets MISC Inject 1 Lancet subcutaneously 2 times daily      neomycin-polymyxin-dexamethasone (MAXITROL) 3.5-93594-6.1 SUSP ophthalmic susp INSTILL 1 DROP INTO EACH EYE 1-2 TIMES PER DAY AS NEEDED FOR ITCHY EYES      nitroGLYcerin (NITROSTAT) 0.4 MG sublingual tablet For chest pain place 1 tablet under the tongue every 5 minutes for 3 doses. If symptoms persist 5  minutes after 1st dose call 911. 25 tablet 3    predniSONE (DELTASONE) 20 MG tablet Take 2 daily for 4 days, then 1 daily for 4 days. (Patient not taking: Reported on 1/3/2025) 12 tablet 0    rosuvastatin (CRESTOR) 20 MG tablet Take 1 tablet (20 mg) by mouth daily. 90 tablet 4    silver sulfADIAZINE (SILVADENE) 1 % external cream Apply topically daily 85 g 1    triamcinolone (KENALOG) 0.1 % cream Apply 1 Film topically 3 times daily         ALLERGIES: Ciprofloxacin, Lisinopril, and Midazolam     GENERAL PHYSICAL EXAM:   Vitals: There were no vitals taken for this visit.  There is no height or weight on file to calculate BMI.    GENERAL: Well groomed, well developed, well nourished male in NAD.  ENT:  ENT exam normal  CV:  Warm extremities   RESPIRATORY: Normal respiratory effort.   GI:  Soft, NT, ND  MS: Moving all four  NEURO: Alert and oriented x 3.  PSYCH: Normal mood and affect, pleasant and agreeable during interview and exam.    :  Prostate 55 gms, smooth benign, nonfluctuant    PVR: Residual urine by ultrasound was 119 ml.       AUA 4    RADIOLOGY: The following tests were reviewed: CT CHEST/ABDOMEN/PELVIS W CONTRAST     Exam reason: FUO (fever of unknown origin); Periodic fever syndrome  (H)     Technique: Using multidetector helical CT technique, images of the  chest, abdomen, and pelvis were obtained with IV contrast.  Coronal  and sagittal reconstructions also performed. Thick slab coronal MIP  reconstructions of the chest also performed. This CT was performed  using one or more of the following dose reduction techniques:  automated exposure control, adjustment of the mA and/or kV according  to patient size, and/or use of iterative reconstruction technique.     Meds/Contrast: Isovue 370, 119 mL     Comparison: 10/4/2024, 7/25/2024, 12/9/2019      FINDINGS:     CHEST:  Lungs/Airways: There is a 3 mm nodule in the right lower lobe,  unchanged since at least 2019 compatible with benign etiology. No  mass  or consolidation.  Azra/Mediastinum: No adenopathy or mass.   Pleura: No pleural effusion. No pneumothorax.  Cardiovascular: No aortic aneurysm. The main pulmonary artery is  normal caliber.   Chest wall/Axilla: Within normal limits.     ABDOMEN:  Liver: No mass or any significant abnormality.  Gallbladder: No calcified gallstones. No acute inflammatory changes.   Bile Ducts: No biliary ductal dilation.   Spleen: No splenomegaly or focal lesion.  Pancreas: No mass, ductal dilatation, or inflammatory changes.  Kidneys: There is subtle patchy hypoenhancement in the kidneys, more  notably in the left kidney. No hydronephrosis. No solid renal mass.   Adrenals: No nodules.   Lymph Nodes: No adenopathy.   Vascular: No aortic aneurysm.   Abdominal Wall: No acute findings.     PELVIS:   No mass or adenopathy. Mild circumferential bladder wall thickening.     Bowel/Peritoneal Cavity/Mesentery:  -No bowel obstruction or significant ileus.  -No acute inflammatory changes.  -No pneumoperitoneum.  -No ascites.     Musculoskeletal: No acute osseous abnormalities. There are scattered  degenerative changes of the spine. Prior bilateral hip arthroplasties.                                                                         IMPRESSION:     Subtle patchy hypoenhancement in the kidneys, more notable on the left  than on the right, concerning for pyelonephritis.      Mild circumferential bladder wall thickening may be due to cystitis.     No other acute findings in the chest, abdomen, or pelvis.     CARROLL RUDOLPH MD     LABS: The last test results for Mr. Rj Juarez were reviewed:  No results found for this or any previous visit (from the past 24 hours).    PSA -   Lab Results   Component Value Date    PSA 4.25 01/18/2024    PSA 3.19 08/11/2023    PSA 3.84 04/21/2023    PSA 4.45 03/20/2023    PSA 2.33 03/10/2022     BMP -   Recent Labs   Lab Test 01/03/25  1145 12/09/24  1045 11/12/24  1227    138 139    POTASSIUM 3.7 4.2 4.2   CHLORIDE 98 104 103   CO2 25 27 28   BUN 12.3 20.5 13.7   CR 0.99 0.91 0.91   * 142* 106*   COMFORT 9.6 9.7 9.6       CBC -   Recent Labs   Lab Test 01/03/25  1145 12/09/24  1045 11/12/24  1227   WBC 11.6* 8.1 7.9   HGB 15.7 14.7 15.8    268 215       ASSESSMENT:   Acute cystitis without hematuria  BPH with retention  Pyelonephritis resolved    PLAN:   Patient with recent pyelonephritis and acute cystitis.    This is discouraging given his previous PAE.    It is possible that the Jardiance put him at risk as well as his diabetes.  It is also possible that he developed an infection because he does not empty his bladder.    Subjectively he is better since the PA with a stronger stream no urgency and very little nocturia.  However he is still retaining today.    For now we will follow him only.  If he develops another infection then I would recommend transrectal ultrasound measurement of the prostate with cystoscopy in anticipation of TURP versus HoLEP.    All questions were addressed    39 minutes spent on the date of this encounter doing chart review, history and exam, documentation and further activities as noted above.      Shaq Aikns MD  [facility]

## 2025-01-22 NOTE — NURSING NOTE
"Chief Complaint   Patient presents with    Follow Up     Urethral dilation - 3 month follow up   AUA = 4  PVR = 119 ml  Initial /80 (BP Location: Right arm, Patient Position: Sitting)   Pulse 88   Temp 98.2  F (36.8  C) (Temporal)   Ht 1.778 m (5' 10\")   Wt 90.7 kg (200 lb)   SpO2 97%   BMI 28.70 kg/m   Estimated body mass index is 28.7 kg/m  as calculated from the following:    Height as of this encounter: 1.778 m (5' 10\").    Weight as of this encounter: 90.7 kg (200 lb).  Medication Reconciliation: complete    Delia Hernandez RN   "

## 2025-01-22 NOTE — Clinical Note
Doug, I am a little worried about this hosea and whether the Jardiance has put him at greater risk of UTI.  He is going to restart it regardless but I told him to monitor closely.  Thanks Kaveh

## 2025-02-05 NOTE — PROGRESS NOTES
Referral from Dr Porras: FUO (fever of unknown origin), Periodic fever syndrome, Pain in other joint

## 2025-02-06 ENCOUNTER — OFFICE VISIT (OUTPATIENT)
Facility: OTHER | Age: 70
End: 2025-02-06
Attending: INTERNAL MEDICINE
Payer: MEDICARE

## 2025-02-06 VITALS
RESPIRATION RATE: 20 BRPM | WEIGHT: 210 LBS | OXYGEN SATURATION: 98 % | TEMPERATURE: 96.1 F | HEART RATE: 72 BPM | HEIGHT: 71 IN | BODY MASS INDEX: 29.4 KG/M2

## 2025-02-06 DIAGNOSIS — R76.8 POSITIVE ANA (ANTINUCLEAR ANTIBODY): ICD-10-CM

## 2025-02-06 DIAGNOSIS — M15.8 OTHER OSTEOARTHRITIS INVOLVING MULTIPLE JOINTS: ICD-10-CM

## 2025-02-06 DIAGNOSIS — L30.9 DERMATITIS, UNSPECIFIED: ICD-10-CM

## 2025-02-06 DIAGNOSIS — Z71.89 OTHER SPECIFIED COUNSELING: Chronic | ICD-10-CM

## 2025-02-06 DIAGNOSIS — R50.9 FUO (FEVER OF UNKNOWN ORIGIN): Primary | ICD-10-CM

## 2025-02-06 ASSESSMENT — RHEUMATOLOGY NEW PATIENT QUESTIONNAIRE
DOUBLE OR BLURRED VISION: N
COLOR CHANGES OF HANDS OR FEET IN THE COLD: Y
VOMITING OF BLOOD OR COFFEE GROUND CONSISTENCY MATERIAL: N
EYE DRYNESS: Y
BEHAVIORAL CHANGES: N
EYE PAIN: N
STOMACH PAIN: Y
RASH: Y
NODULES/BUMPS: Y
JOINT PAIN: Y
UNUSUALLY RAPID OR SLOWED HEART RATE: N
FEVER: Y
SEIZURES: N
UNUSUAL FATIGUE: Y
HOW WOULD YOU DESCRIBE YOUR STIFFNESS ON AVERAGE: MILD
ANXIETY: Y
HEARTBURN OR REFLUX: Y
EYE REDNESS: N
SWOLLEN LEGS OR FEET: N
UNUSUAL BLEEDING: N
ANEMIA: N
EASILY LOSING TEMPER: N
CHEST PAIN: Y
SKIN REDNESS: N
ABNORMAL URINE: Y
BLOOD IN STOOLS: N
FAINTING: N
PAIN OR BURNING ON URINATION: N
DIFFICULTY STAYING ASLEEP: Y
SORES IN MOUTH OR NOSE: Y
MUSCLE WEAKNESS: Y
NAUSEA: N
NIGHT SWEATS: Y
HOARSE VOICE: Y
INCREASED SUSCEPTIBILITY TO INFECTION: Y
DIFFICULTY SWALLOWING: N
MORNING STIFFNESS: Y
SKIN TIGHTNESS: N
SHORTNESS OF BREATH: N
HEADACHES: N
JOINT SWELLING: N
DIFFICULTY BREATHING LYING DOWN: N
DIFFICULTY FALLING ASLEEP: Y
SUN SENSITIVE (SUN ALLERGY): N
MEMORY LOSS: Y
MORNING STIFFNESS IN LOWER BACK: Y
SWOLLEN OR TENDER GLANDS: N
LOSS OF CONSCIOUSNESS: N
BLACK STOOLS: N
UNEXPLAINED WEIGHT CHANGE: N
NUMBNESS OR TINGLING IN HANDS OR FEET: Y
COUGH: N
UNEXPLAINED HEARING LOSS: N
AGITATION: Y
JAUNDICE: N
EASY BRUISING: N
PERSISTENT DIARRHEA: Y
DEPRESSION: N
EXCESSIVE HAIR LOSS (MORE THAN YOUR NORM): N

## 2025-02-06 ASSESSMENT — PAIN SCALES - GENERAL: PAINLEVEL_OUTOF10: MILD PAIN (1)

## 2025-02-06 NOTE — NURSING NOTE
"Chief Complaint   Patient presents with    Fever of unknown origin    Periodic fever syndrome    Pain in other joint       Initial Pulse 72   Temp (!) 96.1  F (35.6  C) (Temporal)   Resp 20   Ht 1.791 m (5' 10.5\")   Wt 95.3 kg (210 lb)   SpO2 98%   BMI 29.71 kg/m   Estimated body mass index is 29.71 kg/m  as calculated from the following:    Height as of this encounter: 1.791 m (5' 10.5\").    Weight as of this encounter: 95.3 kg (210 lb).  Medication Review: complete      Patience Barrett      "

## 2025-02-13 DIAGNOSIS — N13.8 BPH WITH OBSTRUCTION/LOWER URINARY TRACT SYMPTOMS: Primary | ICD-10-CM

## 2025-02-13 DIAGNOSIS — N40.1 BPH WITH OBSTRUCTION/LOWER URINARY TRACT SYMPTOMS: Primary | ICD-10-CM

## 2025-02-21 NOTE — NURSING NOTE
Pt presents to clinic for follow up on elevated PSA    Review of Systems:    Weight loss:    No     Recent fever/chills:  No   Night sweats:   Yes  Current skin rash:  No   Recent hair loss:  No  Heat intolerance:  No   Cold intolerance:  No  Chest pain:   No   Palpitations:   No  Shortness of breath:  No   Wheezing:   No  Constipation:    No   Diarrhea:   No   Nausea:   No   Vomiting:   No   Kidney/side pain:  Yes   Back pain:   Yes  Frequent headaches:  No   Dizziness:     No  Leg swelling:   No   Calf pain:    No         Impaired gait

## 2025-03-09 ENCOUNTER — HEALTH MAINTENANCE LETTER (OUTPATIENT)
Age: 70
End: 2025-03-09

## 2025-03-10 ENCOUNTER — OFFICE VISIT (OUTPATIENT)
Dept: UROLOGY | Facility: OTHER | Age: 70
End: 2025-03-10
Attending: UROLOGY
Payer: MEDICARE

## 2025-03-10 ENCOUNTER — LAB (OUTPATIENT)
Dept: LAB | Facility: OTHER | Age: 70
End: 2025-03-10
Attending: UROLOGY
Payer: COMMERCIAL

## 2025-03-10 VITALS
TEMPERATURE: 96 F | DIASTOLIC BLOOD PRESSURE: 80 MMHG | OXYGEN SATURATION: 97 % | SYSTOLIC BLOOD PRESSURE: 136 MMHG | RESPIRATION RATE: 14 BRPM | HEART RATE: 73 BPM

## 2025-03-10 DIAGNOSIS — N13.8 BPH WITH OBSTRUCTION/LOWER URINARY TRACT SYMPTOMS: Primary | ICD-10-CM

## 2025-03-10 DIAGNOSIS — N40.1 BPH WITH OBSTRUCTION/LOWER URINARY TRACT SYMPTOMS: Primary | ICD-10-CM

## 2025-03-10 DIAGNOSIS — Z87.440 HISTORY OF ACUTE CYSTITIS: ICD-10-CM

## 2025-03-10 DIAGNOSIS — N40.1 BPH WITH OBSTRUCTION/LOWER URINARY TRACT SYMPTOMS: ICD-10-CM

## 2025-03-10 DIAGNOSIS — N13.8 BPH WITH OBSTRUCTION/LOWER URINARY TRACT SYMPTOMS: ICD-10-CM

## 2025-03-10 LAB
ALBUMIN UR-MCNC: NEGATIVE MG/DL
APPEARANCE UR: CLEAR
BILIRUB UR QL STRIP: NEGATIVE
COLOR UR AUTO: YELLOW
GLUCOSE UR STRIP-MCNC: >1000 MG/DL
HGB UR QL STRIP: NEGATIVE
KETONES UR STRIP-MCNC: NEGATIVE MG/DL
LEUKOCYTE ESTERASE UR QL STRIP: NEGATIVE
NITRATE UR QL: NEGATIVE
PH UR STRIP: 5.5 [PH] (ref 5–9)
SP GR UR STRIP: 1.03 (ref 1–1.03)
UROBILINOGEN UR STRIP-MCNC: NORMAL MG/DL

## 2025-03-10 PROCEDURE — 99213 OFFICE O/P EST LOW 20 MIN: CPT | Performed by: UROLOGY

## 2025-03-10 PROCEDURE — 3079F DIAST BP 80-89 MM HG: CPT | Performed by: UROLOGY

## 2025-03-10 PROCEDURE — G0463 HOSPITAL OUTPT CLINIC VISIT: HCPCS | Performed by: UROLOGY

## 2025-03-10 PROCEDURE — 81003 URINALYSIS AUTO W/O SCOPE: CPT | Mod: ZL

## 2025-03-10 PROCEDURE — 3075F SYST BP GE 130 - 139MM HG: CPT | Performed by: UROLOGY

## 2025-03-10 NOTE — PROGRESS NOTES
Chief Complaint: Procedure (Cystoscopy with TRUS- measure only)  .    HPI: Mr. Rj Juarez is a 69 year old year old male presenting today March 10, 2025 in follow up for evaluation of BPH with obstruction and retention for which he underwent PAE last year with improvement in his lower urinary tract including a stronger stream with no urgency and little nocturia.  He has still been retaining.    Incidental finding of left-sided hydronephrosis that did resolve which was thought to be secondary to his very large prostate.    Seen in January where he had had a fever with more frequency thought to be possible UTI with concerns for left pyelonephritis based on CT imaging..  Urine culture did grow staph hemolyticus for which he was placed on Bactrim double strength for 10 days.    Patient had been on Jardiance although it had been temporarily held at that time.    We recommended further investigation if things did not improve with cystoscopy and TRUS.    Here today for further discussion.  Prostate was still felt to be large on FERNANDA measuring at least 55 to 60 g.  Residual that day was 119 mL although his AUA symptom score was only 4.    Today he is getting up twice at night.  He is drinking Arnold Parsons's with tea throughout the afternoon which may account for 1 of those.  He still has some frequency during the day as well.    Otherwise his symptoms are unchanged.  Urine is clear and he is back on his Jardiance.    The only pain he has is his lower abdomen for which she is seeing physical therapy.    Past Medical History:   Diagnosis Date    Benign lipomatous neoplasm     1/20/2014    Calculus of kidney     possible    Carpal tunnel syndrome     Both hands    Closed fracture of patella     05/06/09,Sustained right superior pole patellar fracture which underwent conservative management    Diverticulosis of large intestine without perforation or abscess without bleeding     1/28/2014    Exertional chest pain  11/19/2019    Headache     No Comments Provided    Other specified forms of tremor     3/2/2011    Other urticaria (CODE)     3/2/2011    Pain in joint     No Comments Provided    Umbilical hernia without obstruction or gangrene     1/26/2018       Past Surgical History:   Procedure Laterality Date    COLONOSCOPY  01/28/2014    2009,2014,F/U 2019    COLONOSCOPY  03/01/2019    Serrated adenoma, follow up 1 year    COLONOSCOPY N/A 01/15/2021    5 year, due 1/2026. Procedure: COLONOSCOPY, WITH POLYPECTOMY AND BIOPSY;  Surgeon: Ly Frances MD;  Location: [facility] OR    DECOMPRESSION LUMBAR ONE LEVEL      ESOPHAGOSCOPY, GASTROSCOPY, DUODENOSCOPY (EGD), COMBINED      1/28/14,EGD    ESOPHAGOSCOPY, GASTROSCOPY, DUODENOSCOPY (EGD), COMBINED N/A 01/15/2021    Procedure: ESOPHAGOGASTRODUODENOSCOPY, WITH BIOPSY;  Surgeon: Ly Frances MD;  Location: [facility] OR    IR PAE  09/12/2024    JOINT REPLACEMENT, HIP RT/LT Bilateral     OTHER SURGICAL HISTORY      9/10/2014,71108.0,[place] REPAIR ING HERNIA  >5 TRS BETTINA    OTHER SURGICAL HISTORY      1/26/2018,79092.0,[place] REPAIR UMBILICAL NAZARIO  >5 TRS REDUC    RELEASE CARPAL TUNNEL Bilateral     3,12/2004,Both hands    SIGMOIDOSCOPY FLEXIBLE      No Comments Provided       Current Outpatient Medications   Medication Sig Dispense Refill    aspirin 81 MG EC tablet Take 81 mg by mouth daily      blood glucose (NO BRAND SPECIFIED) lancets standard Dispense item covered by insurance. Test blood sugar 2 times daily. 200 each 11    blood glucose (NO BRAND SPECIFIED) test strip Dispense item covered by insurance. Test blood sugar 2 times daily. 200 strip 11    blood glucose monitoring (NO BRAND SPECIFIED) meter device kit Dispense option covered by insurance. Test blood sugar 2 times daily. 1 kit 11    empagliflozin (JARDIANCE) 10 MG TABS tablet Take 1 tablet (10 mg) by mouth daily. 90 tablet 4    famotidine (PEPCID) 20 MG tablet Take 1 tablet (20 mg) by mouth 2 times daily as needed (gerd). 180 tablet 4     ibuprofen (ADVIL/MOTRIN) 400 MG tablet       losartan (COZAAR) 25 MG tablet Take 1 tablet (25 mg) by mouth daily. 90 tablet 4    metoprolol succinate ER (TOPROL XL) 25 MG 24 hr tablet Take 1 tablet (25 mg) by mouth daily. 90 tablet 4    Microlet Lancets MISC Inject 1 Lancet subcutaneously 2 times daily      neomycin-polymyxin-dexamethasone (MAXITROL) 3.5-23780-0.1 SUSP ophthalmic susp INSTILL 1 DROP INTO EACH EYE 1-2 TIMES PER DAY AS NEEDED FOR ITCHY EYES      nitroGLYcerin (NITROSTAT) 0.4 MG sublingual tablet For chest pain place 1 tablet under the tongue every 5 minutes for 3 doses. If symptoms persist 5 minutes after 1st dose call 911. 25 tablet 3    predniSONE (DELTASONE) 20 MG tablet Take 2 daily for 4 days, then 1 daily for 4 days. (Patient not taking: Reported on 2/6/2025) 12 tablet 0    rosuvastatin (CRESTOR) 20 MG tablet Take 1 tablet (20 mg) by mouth daily. 90 tablet 4    silver sulfADIAZINE (SILVADENE) 1 % external cream Apply topically daily 85 g 1    triamcinolone (KENALOG) 0.1 % cream Apply topically 3 times daily.         ALLERGIES: Ciprofloxacin, Lisinopril, and Midazolam     GENERAL PHYSICAL EXAM:   Vitals: There were no vitals taken for this visit.  There is no height or weight on file to calculate BMI.    GENERAL: Well groomed, well developed, well nourished male in NAD.  RESPIRATORY: Normal respiratory effort.   MS: Moving all four  NEURO: Alert and oriented x 3.  PSYCH: Normal mood and affect, pleasant and agreeable during interview and exam.    :  Prostate deferred    PVR: Residual urine by ultrasound was 56 ml.           RADIOLOGY: The following tests were reviewed: None    LABS: The last test results for Mr. Rj Juarez were reviewed:  Results for orders placed or performed in visit on 03/10/25 (from the past 24 hours)   Urinalysis Macroscopic   Result Value Ref Range    Color Urine Yellow Colorless, Straw, Light Yellow, Yellow    Appearance Urine Clear Clear    Glucose Urine >1000  (A) Negative mg/dL    Bilirubin Urine Negative Negative    Ketones Urine Negative Negative mg/dL    Specific Gravity Urine 1.032 (H) 1.000 - 1.030    Blood Urine Negative Negative    pH Urine 5.5 5.0 - 9.0    Protein Albumin Urine Negative Negative mg/dL    Urobilinogen Urine Normal Normal, 2.0 mg/dL    Nitrite Urine Negative Negative    Leukocyte Esterase Urine Negative Negative       PSA -   Lab Results   Component Value Date    PSA 4.25 01/18/2024    PSA 3.19 08/11/2023    PSA 3.84 04/21/2023    PSA 4.45 03/20/2023    PSA 2.33 03/10/2022     BMP -   Recent Labs   Lab Test 01/03/25  1145 12/09/24  1045 11/12/24  1227    138 139   POTASSIUM 3.7 4.2 4.2   CHLORIDE 98 104 103   CO2 25 27 28   BUN 12.3 20.5 13.7   CR 0.99 0.91 0.91   * 142* 106*   COMFORT 9.6 9.7 9.6       CBC -   Recent Labs   Lab Test 01/03/25  1145 12/09/24  1045 11/12/24  1227   WBC 11.6* 8.1 7.9   HGB 15.7 14.7 15.8    268 215       ASSESSMENT:   History of acute pyelonephritis /acute cystitis without hematuria  BPH with retention status post PAE  Left pyelonephritis resolved    PLAN:   Overall doing well and improved.  Cystoscopy and TRUS was postponed today as his residuals are improved and he is having no issues.    Urine is clear and there have not been any infections since her last visit.    His lower urinary tract symptoms may be the sequela of his caffeine use which I explained.    Provided he remains stable then yearly follow-up would be sufficient.    Regarding prostate cancer screening PSA every 2 years would be appropriate if he remains healthy.  That can be done through his family medicine doctor.  I performed a FERNANDA at her last visit.    Patient was reassured    15 minutes spent on the date of this encounter doing chart review, history and exam, documentation and further activities as noted above.      Shaq Akins MD  [facility]

## 2025-04-03 ENCOUNTER — MYC MEDICAL ADVICE (OUTPATIENT)
Dept: PEDIATRICS | Facility: OTHER | Age: 70
End: 2025-04-03
Payer: COMMERCIAL

## 2025-04-03 DIAGNOSIS — Z77.018 EXPOSURE TO MERCURY: ICD-10-CM

## 2025-04-03 DIAGNOSIS — Z13.88 SCREENING FOR HEAVY METAL POISONING: Primary | ICD-10-CM

## 2025-04-03 DIAGNOSIS — E11.9 DIABETES MELLITUS TYPE 2, DIET-CONTROLLED (H): Chronic | ICD-10-CM

## 2025-04-03 DIAGNOSIS — E11.9 CONTROLLED TYPE 2 DIABETES MELLITUS WITHOUT COMPLICATION, WITHOUT LONG-TERM CURRENT USE OF INSULIN (H): ICD-10-CM

## 2025-04-03 DIAGNOSIS — T56.1X4S: ICD-10-CM

## 2025-04-03 NOTE — TELEPHONE ENCOUNTER
Due for A1C test. Requesting orders for that and retest of Mercury Level.     Needs new Rx for testing supplies.     Last A1C was 11/12/24  Heavy Metal Panel last done 1/3/25    Sherman'd up A1C and Heavy Metal Panel. Sherman'd up diabetic supplies.     Routing to provider to review and respond.  Joe Velasquez RN on 4/3/2025 at 2:02 PM

## 2025-04-04 ENCOUNTER — LAB (OUTPATIENT)
Dept: LAB | Facility: OTHER | Age: 70
End: 2025-04-04
Attending: INTERNAL MEDICINE
Payer: MEDICARE

## 2025-04-04 DIAGNOSIS — T56.1X4S: ICD-10-CM

## 2025-04-04 DIAGNOSIS — E11.9 CONTROLLED TYPE 2 DIABETES MELLITUS WITHOUT COMPLICATION, WITHOUT LONG-TERM CURRENT USE OF INSULIN (H): ICD-10-CM

## 2025-04-04 DIAGNOSIS — Z77.018 EXPOSURE TO MERCURY: ICD-10-CM

## 2025-04-04 LAB
EST. AVERAGE GLUCOSE BLD GHB EST-MCNC: 128 MG/DL
HBA1C MFR BLD: 6.1 %

## 2025-04-04 PROCEDURE — 83036 HEMOGLOBIN GLYCOSYLATED A1C: CPT | Mod: ZL

## 2025-04-04 PROCEDURE — 83825 ASSAY OF MERCURY: CPT | Mod: ZL

## 2025-04-04 PROCEDURE — 36415 COLL VENOUS BLD VENIPUNCTURE: CPT | Mod: ZL

## 2025-04-07 ENCOUNTER — MEDICAL CORRESPONDENCE (OUTPATIENT)
Dept: HEALTH INFORMATION MANAGEMENT | Facility: OTHER | Age: 70
End: 2025-04-07
Payer: COMMERCIAL

## 2025-04-07 LAB — MERCURY BLD-MCNC: 6 UG/L

## 2025-07-19 ENCOUNTER — HEALTH MAINTENANCE LETTER (OUTPATIENT)
Age: 70
End: 2025-07-19

## 2025-09-02 RX ORDER — AZITHROMYCIN 500 MG/1
500 TABLET, FILM COATED ORAL DAILY
Qty: 5 TABLET | Refills: 0 | OUTPATIENT
Start: 2025-09-02 | End: 2025-09-07

## (undated) DEVICE — ENDO BITE BLOCK 60 MAXI LF 00712804

## (undated) DEVICE — TUBING SUCTION 10'X3/16" N510

## (undated) DEVICE — ENDO KIT COMPLIANCE DYKENDOCMPLY

## (undated) DEVICE — SUCTION MANIFOLD NEPTUNE 2 SYS 4 PORT 0702-020-000

## (undated) DEVICE — ENDO BRUSH CHANNEL MASTER CLEANING 2-4.2MM BW-412T

## (undated) DEVICE — HEMOCLIP QUICKCLIP PRO OLYMPUS 230CM HX-202UR.B

## (undated) DEVICE — SOL WATER 1500ML

## (undated) DEVICE — ESU GROUND PAD ADULT W/CORD E7507

## (undated) DEVICE — ENDO FORCEP ENDOJAW BIOPSY 2.8MMX230CM FB-220U

## (undated) DEVICE — SYR 50ML LL W/O NDL 309653

## (undated) DEVICE — ENDO SNARE EXACTO COLD 9MM LOOP 2.4MMX230CM 00711115

## (undated) DEVICE — ENDO TRAP POLYP E-TRAP 00711099

## (undated) DEVICE — ESU ENDO FORCEP BX HOT FD-210U

## (undated) RX ORDER — MAGNESIUM HYDROXIDE/ALUMINUM HYDROXICE/SIMETHICONE 120; 1200; 1200 MG/30ML; MG/30ML; MG/30ML
SUSPENSION ORAL
Status: DISPENSED
Start: 2022-04-10

## (undated) RX ORDER — HYDROXYZINE PAMOATE 25 MG/1
CAPSULE ORAL
Status: DISPENSED
Start: 2022-04-10

## (undated) RX ORDER — SODIUM CHLORIDE 9 MG/ML
INJECTION, SOLUTION INTRAVENOUS
Status: DISPENSED
Start: 2019-12-09

## (undated) RX ORDER — LIDOCAINE HYDROCHLORIDE 20 MG/ML
SOLUTION OROPHARYNGEAL
Status: DISPENSED
Start: 2022-04-10

## (undated) RX ORDER — LIDOCAINE HYDROCHLORIDE 20 MG/ML
SOLUTION OROPHARYNGEAL
Status: DISPENSED
Start: 2019-11-07

## (undated) RX ORDER — PROPOFOL 10 MG/ML
INJECTION, EMULSION INTRAVENOUS
Status: DISPENSED
Start: 2019-03-01

## (undated) RX ORDER — CLOPIDOGREL BISULFATE 75 MG/1
TABLET ORAL
Status: DISPENSED
Start: 2019-12-09

## (undated) RX ORDER — LORAZEPAM 2 MG/ML
INJECTION INTRAMUSCULAR
Status: DISPENSED
Start: 2019-12-09

## (undated) RX ORDER — TRIAMCINOLONE ACETONIDE 40 MG/ML
INJECTION, SUSPENSION INTRA-ARTICULAR; INTRAMUSCULAR
Status: DISPENSED
Start: 2019-04-10

## (undated) RX ORDER — OXYCODONE HYDROCHLORIDE 5 MG/1
TABLET ORAL
Status: DISPENSED
Start: 2020-09-20

## (undated) RX ORDER — LIDOCAINE HYDROCHLORIDE 10 MG/ML
INJECTION, SOLUTION INFILTRATION; PERINEURAL
Status: DISPENSED
Start: 2019-04-10

## (undated) RX ORDER — NITROGLYCERIN 0.4 MG/1
TABLET SUBLINGUAL
Status: DISPENSED
Start: 2019-11-07

## (undated) RX ORDER — LIDOCAINE HYDROCHLORIDE 10 MG/ML
INJECTION, SOLUTION INFILTRATION; PERINEURAL
Status: DISPENSED
Start: 2020-07-17

## (undated) RX ORDER — PROPOFOL 10 MG/ML
INJECTION, EMULSION INTRAVENOUS
Status: DISPENSED
Start: 2021-01-15

## (undated) RX ORDER — CEFTRIAXONE SODIUM 1 G/50ML
INJECTION, SOLUTION INTRAVENOUS
Status: DISPENSED
Start: 2023-02-08

## (undated) RX ORDER — LIDOCAINE HYDROCHLORIDE 10 MG/ML
INJECTION, SOLUTION INFILTRATION; PERINEURAL
Status: DISPENSED
Start: 2022-07-20

## (undated) RX ORDER — ASPIRIN 325 MG
TABLET ORAL
Status: DISPENSED
Start: 2019-11-07

## (undated) RX ORDER — NITROGLYCERIN 0.4 MG/1
TABLET SUBLINGUAL
Status: DISPENSED
Start: 2022-04-10

## (undated) RX ORDER — ALUMINA, MAGNESIA, AND SIMETHICONE 2400; 2400; 240 MG/30ML; MG/30ML; MG/30ML
SUSPENSION ORAL
Status: DISPENSED
Start: 2019-11-07

## (undated) RX ORDER — LIDOCAINE HYDROCHLORIDE 10 MG/ML
INJECTION, SOLUTION EPIDURAL; INFILTRATION; INTRACAUDAL; PERINEURAL
Status: DISPENSED
Start: 2022-07-20

## (undated) RX ORDER — ASPIRIN 81 MG/1
TABLET, CHEWABLE ORAL
Status: DISPENSED
Start: 2022-04-10

## (undated) RX ORDER — LIDOCAINE HYDROCHLORIDE 10 MG/ML
INJECTION, SOLUTION INFILTRATION; PERINEURAL
Status: DISPENSED
Start: 2023-02-08

## (undated) RX ORDER — LIDOCAINE HYDROCHLORIDE 20 MG/ML
INJECTION, SOLUTION EPIDURAL; INFILTRATION; INTRACAUDAL; PERINEURAL
Status: DISPENSED
Start: 2019-03-01

## (undated) RX ORDER — CEFTRIAXONE SODIUM 1 G
VIAL (EA) INJECTION
Status: DISPENSED
Start: 2023-02-08

## (undated) RX ORDER — NITROGLYCERIN 0.4 MG/1
TABLET SUBLINGUAL
Status: DISPENSED
Start: 2019-12-09

## (undated) RX ORDER — BUPIVACAINE HYDROCHLORIDE 5 MG/ML
INJECTION, SOLUTION EPIDURAL; INTRACAUDAL
Status: DISPENSED
Start: 2019-04-10

## (undated) RX ORDER — DEXAMETHASONE SODIUM PHOSPHATE 10 MG/ML
INJECTION, SOLUTION INTRAMUSCULAR; INTRAVENOUS
Status: DISPENSED
Start: 2022-07-20

## (undated) RX ORDER — REGADENOSON 0.08 MG/ML
INJECTION, SOLUTION INTRAVENOUS
Status: DISPENSED
Start: 2019-11-14

## (undated) RX ORDER — ASPIRIN 81 MG/1
TABLET, CHEWABLE ORAL
Status: DISPENSED
Start: 2019-12-09

## (undated) RX ORDER — LIDOCAINE HYDROCHLORIDE 10 MG/ML
INJECTION, SOLUTION EPIDURAL; INFILTRATION; INTRACAUDAL; PERINEURAL
Status: DISPENSED
Start: 2019-12-09